# Patient Record
Sex: FEMALE | Race: BLACK OR AFRICAN AMERICAN | Employment: FULL TIME | URBAN - METROPOLITAN AREA
[De-identification: names, ages, dates, MRNs, and addresses within clinical notes are randomized per-mention and may not be internally consistent; named-entity substitution may affect disease eponyms.]

---

## 2017-02-22 ENCOUNTER — HOSPITAL ENCOUNTER (EMERGENCY)
Age: 42
Discharge: HOME | End: 2017-02-22
Attending: EMERGENCY MEDICINE
Payer: COMMERCIAL

## 2017-02-22 ENCOUNTER — APPOINTMENT (OUTPATIENT)
Dept: ULTRASOUND IMAGING | Age: 42
End: 2017-02-22
Attending: EMERGENCY MEDICINE
Payer: COMMERCIAL

## 2017-02-22 VITALS
HEART RATE: 79 BPM | HEIGHT: 64 IN | RESPIRATION RATE: 18 BRPM | WEIGHT: 235 LBS | OXYGEN SATURATION: 96 % | TEMPERATURE: 98.3 F | BODY MASS INDEX: 40.12 KG/M2 | SYSTOLIC BLOOD PRESSURE: 152 MMHG | DIASTOLIC BLOOD PRESSURE: 80 MMHG

## 2017-02-22 DIAGNOSIS — R10.11 ABDOMINAL PAIN, RIGHT UPPER QUADRANT: Primary | ICD-10-CM

## 2017-02-22 DIAGNOSIS — K80.50 BILIARY COLIC: ICD-10-CM

## 2017-02-22 LAB
ALBUMIN SERPL BCP-MCNC: 3.3 G/DL (ref 3.5–5)
ALBUMIN/GLOB SERPL: 0.7 {RATIO} (ref 1.2–3.5)
ALP SERPL-CCNC: 68 U/L (ref 50–136)
ALT SERPL-CCNC: 15 U/L (ref 12–65)
ANION GAP BLD CALC-SCNC: 6 MMOL/L (ref 7–16)
AST SERPL W P-5'-P-CCNC: 14 U/L (ref 15–37)
BACTERIA URNS QL MICRO: ABNORMAL /HPF
BASOPHILS # BLD AUTO: 0 K/UL (ref 0–0.2)
BASOPHILS # BLD: 0 % (ref 0–2)
BILIRUB SERPL-MCNC: 0.3 MG/DL (ref 0.2–1.1)
BUN SERPL-MCNC: 11 MG/DL (ref 6–23)
CALCIUM SERPL-MCNC: 9 MG/DL (ref 8.3–10.4)
CASTS URNS QL MICRO: 0 /LPF
CHLORIDE SERPL-SCNC: 103 MMOL/L (ref 98–107)
CO2 SERPL-SCNC: 30 MMOL/L (ref 21–32)
CREAT SERPL-MCNC: 0.9 MG/DL (ref 0.6–1)
CRYSTALS URNS QL MICRO: ABNORMAL /LPF
DIFFERENTIAL METHOD BLD: ABNORMAL
EOSINOPHIL # BLD: 0.2 K/UL (ref 0–0.8)
EOSINOPHIL NFR BLD: 3 % (ref 0.5–7.8)
EPI CELLS #/AREA URNS HPF: ABNORMAL /HPF
ERYTHROCYTE [DISTWIDTH] IN BLOOD BY AUTOMATED COUNT: 14.5 % (ref 11.9–14.6)
GLOBULIN SER CALC-MCNC: 4.9 G/DL (ref 2.3–3.5)
GLUCOSE SERPL-MCNC: 110 MG/DL (ref 65–100)
HCG UR QL: NEGATIVE
HCT VFR BLD AUTO: 33.9 % (ref 35.8–46.3)
HGB BLD-MCNC: 10.5 G/DL (ref 11.7–15.4)
IMM GRANULOCYTES # BLD: 0 K/UL (ref 0–0.5)
IMM GRANULOCYTES NFR BLD AUTO: 0.1 % (ref 0–5)
LIPASE SERPL-CCNC: 119 U/L (ref 73–393)
LYMPHOCYTES # BLD AUTO: 43 % (ref 13–44)
LYMPHOCYTES # BLD: 3.1 K/UL (ref 0.5–4.6)
MCH RBC QN AUTO: 25.7 PG (ref 26.1–32.9)
MCHC RBC AUTO-ENTMCNC: 31 G/DL (ref 31.4–35)
MCV RBC AUTO: 83.1 FL (ref 79.6–97.8)
MONOCYTES # BLD: 0.3 K/UL (ref 0.1–1.3)
MONOCYTES NFR BLD AUTO: 4 % (ref 4–12)
MUCOUS THREADS URNS QL MICRO: 0 /LPF
NEUTS SEG # BLD: 3.6 K/UL (ref 1.7–8.2)
NEUTS SEG NFR BLD AUTO: 50 % (ref 43–78)
PLATELET # BLD AUTO: 302 K/UL (ref 150–450)
PMV BLD AUTO: 8.9 FL (ref 10.8–14.1)
POTASSIUM SERPL-SCNC: 3.8 MMOL/L (ref 3.5–5.1)
PROT SERPL-MCNC: 8.2 G/DL (ref 6.3–8.2)
RBC # BLD AUTO: 4.08 M/UL (ref 4.05–5.25)
RBC #/AREA URNS HPF: ABNORMAL /HPF
SODIUM SERPL-SCNC: 139 MMOL/L (ref 136–145)
WBC # BLD AUTO: 7.1 K/UL (ref 4.3–11.1)
WBC URNS QL MICRO: ABNORMAL /HPF

## 2017-02-22 PROCEDURE — 81003 URINALYSIS AUTO W/O SCOPE: CPT | Performed by: EMERGENCY MEDICINE

## 2017-02-22 PROCEDURE — 76705 ECHO EXAM OF ABDOMEN: CPT

## 2017-02-22 PROCEDURE — 85025 COMPLETE CBC W/AUTO DIFF WBC: CPT | Performed by: STUDENT IN AN ORGANIZED HEALTH CARE EDUCATION/TRAINING PROGRAM

## 2017-02-22 PROCEDURE — 81025 URINE PREGNANCY TEST: CPT

## 2017-02-22 PROCEDURE — 83690 ASSAY OF LIPASE: CPT | Performed by: STUDENT IN AN ORGANIZED HEALTH CARE EDUCATION/TRAINING PROGRAM

## 2017-02-22 PROCEDURE — 99284 EMERGENCY DEPT VISIT MOD MDM: CPT | Performed by: EMERGENCY MEDICINE

## 2017-02-22 PROCEDURE — 96361 HYDRATE IV INFUSION ADD-ON: CPT | Performed by: EMERGENCY MEDICINE

## 2017-02-22 PROCEDURE — 96374 THER/PROPH/DIAG INJ IV PUSH: CPT | Performed by: EMERGENCY MEDICINE

## 2017-02-22 PROCEDURE — 74011250636 HC RX REV CODE- 250/636: Performed by: EMERGENCY MEDICINE

## 2017-02-22 PROCEDURE — 81015 MICROSCOPIC EXAM OF URINE: CPT | Performed by: EMERGENCY MEDICINE

## 2017-02-22 PROCEDURE — 80053 COMPREHEN METABOLIC PANEL: CPT | Performed by: STUDENT IN AN ORGANIZED HEALTH CARE EDUCATION/TRAINING PROGRAM

## 2017-02-22 RX ORDER — ONDANSETRON 2 MG/ML
4 INJECTION INTRAMUSCULAR; INTRAVENOUS
Status: COMPLETED | OUTPATIENT
Start: 2017-02-22 | End: 2017-02-22

## 2017-02-22 RX ORDER — HYOSCYAMINE SULFATE 0.12 MG/1
0.25 TABLET SUBLINGUAL
Qty: 20 TAB | Refills: 0 | Status: SHIPPED | OUTPATIENT
Start: 2017-02-22 | End: 2017-06-01

## 2017-02-22 RX ORDER — SUCRALFATE 1 G/10ML
1 SUSPENSION ORAL 4 TIMES DAILY
Qty: 414 ML | Refills: 1 | Status: SHIPPED | OUTPATIENT
Start: 2017-02-22 | End: 2017-03-27 | Stop reason: CLARIF

## 2017-02-22 RX ORDER — ONDANSETRON 4 MG/1
4 TABLET, FILM COATED ORAL
Qty: 15 TAB | Refills: 0 | Status: SHIPPED | OUTPATIENT
Start: 2017-02-22 | End: 2017-03-27 | Stop reason: CLARIF

## 2017-02-22 RX ADMIN — ONDANSETRON HYDROCHLORIDE 4 MG: 2 SOLUTION INTRAMUSCULAR; INTRAVENOUS at 19:42

## 2017-02-22 RX ADMIN — SODIUM CHLORIDE 1000 ML: 900 INJECTION, SOLUTION INTRAVENOUS at 19:42

## 2017-02-22 NOTE — ED PROVIDER NOTES
Patient is a 42 y.o. female presenting with abdominal pain. The history is provided by the patient. Abdominal Pain    This is a recurrent problem. The current episode started more than 1 week ago. The problem occurs every several days. The problem has been gradually worsening. The pain is associated with eating. The pain is located in the RUQ. The quality of the pain is cramping and throbbing. The pain is moderate. Associated symptoms include diarrhea, nausea, vomiting and back pain. Pertinent negatives include no fever, no constipation, no dysuria, no headaches, no arthralgias, no myalgias and no chest pain. The pain is worsened by eating. The pain is relieved by nothing. Past workup includes no CT scan, no ultrasound. Past Medical History:   Diagnosis Date    Anemia     Diabetes ([address]     Former smoker     GERD (gastroesophageal reflux disease)     medication conrtolled    History of kidney stones     Hypertension     medication controlled    Menorrhagia with irregular cycle     Prediabetes 2/13/2016    on metformin as preventative        Past Surgical History:   Procedure Laterality Date    HX LITHOTRIPSY      HX ORTHOPAEDIC Left     hand- tendon repair    HX OTHER SURGICAL Bilateral     sweat gland removal under both arms    HX TUBAL LIGATION           Family History:   Problem Relation Age of Onset    Heart Disease Maternal Grandmother     Hypertension Mother     Hypertension Father        Social History     Social History    Marital status: SINGLE     Spouse name: N/A    Number of children: N/A    Years of education: N/A     Occupational History    Not on file.      Social History Main Topics    Smoking status: Former Smoker     Packs/day: 1.00     Years: 0.50     Quit date: 1/1/2009    Smokeless tobacco: Never Used    Alcohol use No    Drug use: No    Sexual activity: Not on file     Other Topics Concern    Not on file     Social History Narrative         ALLERGIES: Bactrim [sulfamethoxazole-trimethoprim]; Lisinopril; Oxycodone; and Sulfa (sulfonamide antibiotics)    Review of Systems   Constitutional: Negative for chills and fever. HENT: Negative for congestion, ear pain and rhinorrhea. Eyes: Negative for photophobia and discharge. Respiratory: Negative for cough and shortness of breath. Cardiovascular: Negative for chest pain and palpitations. Gastrointestinal: Positive for abdominal pain, diarrhea, nausea and vomiting. Negative for constipation. Endocrine: Negative for cold intolerance and heat intolerance. Genitourinary: Negative for dysuria and flank pain. Musculoskeletal: Positive for back pain. Negative for arthralgias, myalgias and neck pain. Skin: Negative for rash and wound. Allergic/Immunologic: Negative for environmental allergies and food allergies. Neurological: Negative for syncope and headaches. Hematological: Negative for adenopathy. Does not bruise/bleed easily. Psychiatric/Behavioral: Negative for dysphoric mood. The patient is not nervous/anxious. All other systems reviewed and are negative. Vitals:    02/22/17 1523   BP: (!) 167/101   Pulse: 99   Resp: 16   Temp: 98.5 °F (36.9 °C)   SpO2: 96%   Weight: 106.6 kg (235 lb)   Height: 5' 4\" (1.626 m)            Physical Exam   Constitutional: She is oriented to person, place, and time. She appears well-developed and well-nourished. She appears distressed. HENT:   Head: Normocephalic and atraumatic. Mouth/Throat: Oropharynx is clear and moist.   Eyes: Conjunctivae and EOM are normal. Pupils are equal, round, and reactive to light. Right eye exhibits no discharge. Left eye exhibits no discharge. No scleral icterus. Neck: Normal range of motion. Neck supple. No thyromegaly present. Cardiovascular: Normal rate, regular rhythm, normal heart sounds and intact distal pulses. Exam reveals no gallop and no friction rub. No murmur heard.   Pulmonary/Chest: Effort normal and breath sounds normal. No respiratory distress. She has no wheezes. She exhibits no tenderness. Abdominal: Soft. Bowel sounds are normal. She exhibits no distension. There is no hepatosplenomegaly. There is no tenderness. There is no rebound and no guarding. No hernia. Mild epigastric and right upper quadrant tenderness to palpation. There is no rigidity, rebound or guarding. No mass. Musculoskeletal: Normal range of motion. She exhibits no edema or tenderness. Neurological: She is alert and oriented to person, place, and time. No cranial nerve deficit. She exhibits normal muscle tone. Skin: Skin is warm and dry. No rash noted. She is not diaphoretic. No erythema. Psychiatric: She has a normal mood and affect. Her behavior is normal. Judgment and thought content normal.   Nursing note and vitals reviewed. MDM  Number of Diagnoses or Management Options  Abdominal pain, right upper quadrant: new and requires workup  Biliary colic: new and requires workup  Diagnosis management comments: Reflux versus biliary colic  Less than likely pancreatitis or hepatitis         Amount and/or Complexity of Data Reviewed  Clinical lab tests: ordered and reviewed  Tests in the radiology section of CPT®: ordered and reviewed  Tests in the medicine section of CPT®: reviewed and ordered  Review and summarize past medical records: yes    Risk of Complications, Morbidity, and/or Mortality  Presenting problems: moderate  Diagnostic procedures: moderate  Management options: moderate  General comments: Elements of this note have been dictated via voice recognition software. Text and phrases may be limited by the accuracy of the software. The chart has been reviewed, but errors may still be present.       Patient Progress  Patient progress: stable    ED Course       Procedures

## 2017-02-23 NOTE — DISCHARGE INSTRUCTIONS
Abdominal Pain: Care Instructions  Your Care Instructions    Abdominal pain has many possible causes. Some aren't serious and get better on their own in a few days. Others need more testing and treatment. If your pain continues or gets worse, you need to be rechecked and may need more tests to find out what is wrong. You may need surgery to correct the problem. Don't ignore new symptoms, such as fever, nausea and vomiting, urination problems, pain that gets worse, and dizziness. These may be signs of a more serious problem. Your doctor may have recommended a follow-up visit in the next 8 to 12 hours. If you are not getting better, you may need more tests or treatment. The doctor has checked you carefully, but problems can develop later. If you notice any problems or new symptoms, get medical treatment right away. Follow-up care is a key part of your treatment and safety. Be sure to make and go to all appointments, and call your doctor if you are having problems. It's also a good idea to know your test results and keep a list of the medicines you take. How can you care for yourself at home? · Rest until you feel better. · To prevent dehydration, drink plenty of fluids, enough so that your urine is light yellow or clear like water. Choose water and other caffeine-free clear liquids until you feel better. If you have kidney, heart, or liver disease and have to limit fluids, talk with your doctor before you increase the amount of fluids you drink. · If your stomach is upset, eat mild foods, such as rice, dry toast or crackers, bananas, and applesauce. Try eating several small meals instead of two or three large ones. · Wait until 48 hours after all symptoms have gone away before you have spicy foods, alcohol, and drinks that contain caffeine. · Do not eat foods that are high in fat. · Avoid anti-inflammatory medicines such as aspirin, ibuprofen (Advil, Motrin), and naproxen (Aleve).  These can cause stomach upset. Talk to your doctor if you take daily aspirin for another health problem. When should you call for help? Call 911 anytime you think you may need emergency care. For example, call if:  · You passed out (lost consciousness). · You pass maroon or very bloody stools. · You vomit blood or what looks like coffee grounds. · You have new, severe belly pain. Call your doctor now or seek immediate medical care if:  · Your pain gets worse, especially if it becomes focused in one area of your belly. · You have a new or higher fever. · Your stools are black and look like tar, or they have streaks of blood. · You have unexpected vaginal bleeding. · You have symptoms of a urinary tract infection. These may include:  ¨ Pain when you urinate. ¨ Urinating more often than usual.  ¨ Blood in your urine. · You are dizzy or lightheaded, or you feel like you may faint. Watch closely for changes in your health, and be sure to contact your doctor if:  · You are not getting better after 1 day (24 hours). Where can you learn more? Go to http://gwenAaron Andrews Apparelbrennan.info/. Enter T237 in the search box to learn more about \"Abdominal Pain: Care Instructions. \"  Current as of: May 27, 2016  Content Version: 11.1  © 4348-4035 [employer]. Care instructions adapted under license by [employer] (which disclaims liability or warranty for this information). If you have questions about a medical condition or this instruction, always ask your healthcare professional. [place] [address] any warranty or liability for your use of this information. Low-Fat Diet for Gallbladder Disease: Care Instructions  Your Care Instructions  When you eat, the gallbladder releases bile, which helps you digest the fat in food. If you have an inflamed gallbladder, this may cause pain.  A low-fat diet may give your gallbladder a rest so you can start to heal. Your doctor and dietitian can help you make an eating plan that does not irritate your digestive system. Always talk with your doctor or dietitian before you make changes in your diet. Follow-up care is a key part of your treatment and safety. Be sure to make and go to all appointments, and call your doctor if you are having problems. It's also a good idea to know your test results and keep a list of the medicines you take. How can you care for yourself at home? · Eat many small meals and snacks each day instead of three large meals. · Choose lean meats. ¨ Eat no more than 5 to 6½ ounces of meat a day. ¨ Cut off all fat you can see. ¨ Eat chicken and turkey without the skin. ¨ Many types of fish, such as salmon, lake trout, tuna, and herring, provide healthy omega-3 fat. But, avoid fish canned in oil, such as sardines in olive oil. ¨ Bake, broil, or grill meats, poultry, or fish instead of frying them in butter or fat. · Drink or eat nonfat or low-fat milk, yogurt, cheese, or other milk products each day. ¨ Read the labels on cheeses, and choose those with less than 5 grams of fat an ounce. ¨ Try fat-free sour cream, cream cheese, or yogurt. ¨ Avoid cream soups and cream sauces on pasta. ¨ Eat low-fat ice cream, frozen yogurt, or sorbet. Avoid regular ice cream.  · Eat whole-grain cereals, breads, crackers, rice, or pasta. Avoid high-fat foods such as croissants, scones, biscuits, waffles, doughnuts, muffins, granola, and high-fat breads. · Flavor your foods with herbs and spices (such as basil, tarragon, or mint), fat-free sauces, or lemon juice instead of butter. You can also use butter substitutes, fat-free mayonnaise, or fat-free dressing. · Try applesauce, prune puree, or mashed bananas to replace some or all of the fat when you bake.   · Limit fats and oils, such as butter, margarine, mayonnaise, and salad dressing, to no more than 1 tablespoon a meal.  · Avoid high-fat foods, such as:  ¨ Chocolate, whole milk, ice cream, and processed cheese. ¨ Fried or buttered foods. ¨ Sausage, salami, and ramirez. ¨ Cinnamon rolls, cakes, pies, cookies, and other pastries. ¨ Prepared snack foods, such as potato chips, nut and granola bars, and mixed nuts. ¨ Coconut and avocado. · Learn how to read food labels for serving sizes and ingredients. Fast-food and convenience-food meals often have lots of fat. Where can you learn more? Go to http://gwen-brennan.info/. Enter B818 in the search box to learn more about \"Low-Fat Diet for Gallbladder Disease: Care Instructions. \"  Current as of: July 26, 2016  Content Version: 11.1  © 5003-0759 [employer]. Care instructions adapted under license by [employer] (which disclaims liability or warranty for this information). If you have questions about a medical condition or this instruction, always ask your healthcare professional. [place] [address] any warranty or liability for your use of this information.

## 2017-03-01 PROBLEM — K21.00 GASTROESOPHAGEAL REFLUX DISEASE WITH ESOPHAGITIS: Status: ACTIVE | Noted: 2017-03-01

## 2017-03-01 PROBLEM — R10.11 RUQ PAIN: Status: ACTIVE | Noted: 2017-03-01

## 2017-03-07 ENCOUNTER — HOSPITAL ENCOUNTER (OUTPATIENT)
Dept: NUCLEAR MEDICINE | Age: 42
Discharge: HOME | End: 2017-03-07
Attending: SURGERY
Payer: COMMERCIAL

## 2017-03-07 DIAGNOSIS — R10.11 RUQ PAIN: ICD-10-CM

## 2017-03-07 PROCEDURE — 74011250636 HC RX REV CODE- 250/636: Performed by: SURGERY

## 2017-03-07 PROCEDURE — 78227 HEPATOBIL SYST IMAGE W/DRUG: CPT

## 2017-03-07 RX ADMIN — SINCALIDE 2.22 MCG: 5 INJECTION, POWDER, LYOPHILIZED, FOR SOLUTION INTRAVENOUS at 09:15

## 2017-03-10 RX ORDER — CEFAZOLIN SODIUM IN 0.9 % NACL 2 G/50 ML
2 INTRAVENOUS SOLUTION, PIGGYBACK (ML) INTRAVENOUS ONCE
Status: CANCELLED | OUTPATIENT
Start: 2017-03-28 | End: 2017-03-28

## 2017-03-27 ENCOUNTER — ANESTHESIA EVENT (OUTPATIENT)
Dept: SURGERY | Age: 42
End: 2017-03-27
Payer: COMMERCIAL

## 2017-03-28 ENCOUNTER — HOSPITAL ENCOUNTER (OUTPATIENT)
Age: 42
Setting detail: OUTPATIENT SURGERY
Discharge: HOME | End: 2017-03-28
Attending: SURGERY | Admitting: SURGERY
Payer: COMMERCIAL

## 2017-03-28 ENCOUNTER — ANESTHESIA (OUTPATIENT)
Dept: SURGERY | Age: 42
End: 2017-03-28
Payer: COMMERCIAL

## 2017-03-28 ENCOUNTER — SURGERY (OUTPATIENT)
Age: 42
End: 2017-03-28

## 2017-03-28 VITALS
SYSTOLIC BLOOD PRESSURE: 144 MMHG | DIASTOLIC BLOOD PRESSURE: 76 MMHG | BODY MASS INDEX: 42.25 KG/M2 | TEMPERATURE: 97.4 F | RESPIRATION RATE: 18 BRPM | HEART RATE: 81 BPM | OXYGEN SATURATION: 98 % | WEIGHT: 246.13 LBS

## 2017-03-28 PROBLEM — K81.1 CHRONIC CHOLECYSTITIS: Status: ACTIVE | Noted: 2017-03-28

## 2017-03-28 LAB — GLUCOSE BLD STRIP.AUTO-MCNC: 96 MG/DL (ref 65–100)

## 2017-03-28 PROCEDURE — 74011000250 HC RX REV CODE- 250: Performed by: SURGERY

## 2017-03-28 PROCEDURE — 74011250636 HC RX REV CODE- 250/636

## 2017-03-28 PROCEDURE — 74011250637 HC RX REV CODE- 250/637: Performed by: SURGERY

## 2017-03-28 PROCEDURE — 77030008703 HC TU ET UNCUF COVD -A: Performed by: ANESTHESIOLOGY

## 2017-03-28 PROCEDURE — 77030000038 HC TIP SCIS LAPSCP SURI -B: Performed by: SURGERY

## 2017-03-28 PROCEDURE — 77030008771 HC TU NG SALEM SUMP -A: Performed by: ANESTHESIOLOGY

## 2017-03-28 PROCEDURE — 74011250636 HC RX REV CODE- 250/636: Performed by: ANESTHESIOLOGY

## 2017-03-28 PROCEDURE — 77030008518 HC TBNG INSUF ENDO STRY -B: Performed by: SURGERY

## 2017-03-28 PROCEDURE — 77030008477 HC STYL SATN SLP COVD -A: Performed by: ANESTHESIOLOGY

## 2017-03-28 PROCEDURE — 77030035044 HC TRCR ENDOSC VRSPRT BLDLSS COVD -C: Performed by: SURGERY

## 2017-03-28 PROCEDURE — 74011250637 HC RX REV CODE- 250/637: Performed by: ANESTHESIOLOGY

## 2017-03-28 PROCEDURE — 77030021158 HC TRCR BLN GELPRT AMR -B: Performed by: SURGERY

## 2017-03-28 PROCEDURE — 76010000161 HC OR TIME 1 TO 1.5 HR INTENSV-TIER 1: Performed by: SURGERY

## 2017-03-28 PROCEDURE — 76060000033 HC ANESTHESIA 1 TO 1.5 HR: Performed by: SURGERY

## 2017-03-28 PROCEDURE — 88304 TISSUE EXAM BY PATHOLOGIST: CPT | Performed by: SURGERY

## 2017-03-28 PROCEDURE — 74011000250 HC RX REV CODE- 250

## 2017-03-28 PROCEDURE — 77030009851 HC PCH RTVR ENDOSC AMR -B: Performed by: SURGERY

## 2017-03-28 PROCEDURE — 77030008467 HC STPLR SKN COVD -B: Performed by: SURGERY

## 2017-03-28 PROCEDURE — 82962 GLUCOSE BLOOD TEST: CPT

## 2017-03-28 PROCEDURE — 76210000063 HC OR PH I REC FIRST 0.5 HR: Performed by: SURGERY

## 2017-03-28 PROCEDURE — 76210000020 HC REC RM PH II FIRST 0.5 HR: Performed by: SURGERY

## 2017-03-28 PROCEDURE — 77030011640 HC PAD GRND REM COVD -A: Performed by: SURGERY

## 2017-03-28 PROCEDURE — 77030031139 HC SUT VCRL2 J&J -A: Performed by: SURGERY

## 2017-03-28 PROCEDURE — 77030035051: Performed by: SURGERY

## 2017-03-28 PROCEDURE — 77030025088 HC APPL CLP LIG 1 COVD -B: Performed by: SURGERY

## 2017-03-28 PROCEDURE — 77030035048 HC TRCR ENDOSC OPTCL COVD -B: Performed by: SURGERY

## 2017-03-28 RX ORDER — SUCCINYLCHOLINE CHLORIDE 20 MG/ML
INJECTION INTRAMUSCULAR; INTRAVENOUS AS NEEDED
Status: DISCONTINUED | OUTPATIENT
Start: 2017-03-28 | End: 2017-03-28 | Stop reason: HOSPADM

## 2017-03-28 RX ORDER — LIDOCAINE HYDROCHLORIDE 10 MG/ML
0.1 INJECTION INFILTRATION; PERINEURAL AS NEEDED
Status: DISCONTINUED | OUTPATIENT
Start: 2017-03-28 | End: 2017-03-28 | Stop reason: HOSPADM

## 2017-03-28 RX ORDER — NEOSTIGMINE METHYLSULFATE 1 MG/ML
INJECTION INTRAVENOUS AS NEEDED
Status: DISCONTINUED | OUTPATIENT
Start: 2017-03-28 | End: 2017-03-28 | Stop reason: HOSPADM

## 2017-03-28 RX ORDER — GUAIFENESIN 600 MG/1
600 TABLET, EXTENDED RELEASE ORAL 2 TIMES DAILY
COMMUNITY
End: 2017-06-01

## 2017-03-28 RX ORDER — MIDAZOLAM HYDROCHLORIDE 1 MG/ML
2 INJECTION, SOLUTION INTRAMUSCULAR; INTRAVENOUS ONCE
Status: DISCONTINUED | OUTPATIENT
Start: 2017-03-28 | End: 2017-03-28 | Stop reason: HOSPADM

## 2017-03-28 RX ORDER — SODIUM CHLORIDE 9 MG/ML
50 INJECTION, SOLUTION INTRAVENOUS CONTINUOUS
Status: DISCONTINUED | OUTPATIENT
Start: 2017-03-28 | End: 2017-03-28 | Stop reason: HOSPADM

## 2017-03-28 RX ORDER — SODIUM CHLORIDE, SODIUM LACTATE, POTASSIUM CHLORIDE, CALCIUM CHLORIDE 600; 310; 30; 20 MG/100ML; MG/100ML; MG/100ML; MG/100ML
100 INJECTION, SOLUTION INTRAVENOUS CONTINUOUS
Status: DISCONTINUED | OUTPATIENT
Start: 2017-03-28 | End: 2017-03-28 | Stop reason: HOSPADM

## 2017-03-28 RX ORDER — OXYCODONE HYDROCHLORIDE 5 MG/1
5 TABLET ORAL
Status: DISCONTINUED | OUTPATIENT
Start: 2017-03-28 | End: 2017-03-28 | Stop reason: HOSPADM

## 2017-03-28 RX ORDER — DIPHENHYDRAMINE HYDROCHLORIDE 50 MG/ML
12.5 INJECTION, SOLUTION INTRAMUSCULAR; INTRAVENOUS ONCE
Status: DISCONTINUED | OUTPATIENT
Start: 2017-03-28 | End: 2017-03-28 | Stop reason: HOSPADM

## 2017-03-28 RX ORDER — MIDAZOLAM HYDROCHLORIDE 1 MG/ML
2 INJECTION, SOLUTION INTRAMUSCULAR; INTRAVENOUS
Status: DISCONTINUED | OUTPATIENT
Start: 2017-03-28 | End: 2017-03-28 | Stop reason: HOSPADM

## 2017-03-28 RX ORDER — ROCURONIUM BROMIDE 10 MG/ML
INJECTION, SOLUTION INTRAVENOUS AS NEEDED
Status: DISCONTINUED | OUTPATIENT
Start: 2017-03-28 | End: 2017-03-28 | Stop reason: HOSPADM

## 2017-03-28 RX ORDER — CEFAZOLIN SODIUM IN 0.9 % NACL 2 G/50 ML
2 INTRAVENOUS SOLUTION, PIGGYBACK (ML) INTRAVENOUS ONCE
Status: DISCONTINUED | OUTPATIENT
Start: 2017-03-28 | End: 2017-03-28 | Stop reason: HOSPADM

## 2017-03-28 RX ORDER — FENTANYL CITRATE 50 UG/ML
INJECTION, SOLUTION INTRAMUSCULAR; INTRAVENOUS AS NEEDED
Status: DISCONTINUED | OUTPATIENT
Start: 2017-03-28 | End: 2017-03-28 | Stop reason: HOSPADM

## 2017-03-28 RX ORDER — HYDROMORPHONE HYDROCHLORIDE 2 MG/ML
0.5 INJECTION, SOLUTION INTRAMUSCULAR; INTRAVENOUS; SUBCUTANEOUS
Status: DISCONTINUED | OUTPATIENT
Start: 2017-03-28 | End: 2017-03-28 | Stop reason: HOSPADM

## 2017-03-28 RX ORDER — GLYCOPYRROLATE 0.2 MG/ML
INJECTION INTRAMUSCULAR; INTRAVENOUS AS NEEDED
Status: DISCONTINUED | OUTPATIENT
Start: 2017-03-28 | End: 2017-03-28 | Stop reason: HOSPADM

## 2017-03-28 RX ORDER — SODIUM CHLORIDE 0.9 % (FLUSH) 0.9 %
5-10 SYRINGE (ML) INJECTION AS NEEDED
Status: DISCONTINUED | OUTPATIENT
Start: 2017-03-28 | End: 2017-03-28 | Stop reason: HOSPADM

## 2017-03-28 RX ORDER — PROPOFOL 10 MG/ML
INJECTION, EMULSION INTRAVENOUS AS NEEDED
Status: DISCONTINUED | OUTPATIENT
Start: 2017-03-28 | End: 2017-03-28 | Stop reason: HOSPADM

## 2017-03-28 RX ORDER — ONDANSETRON 2 MG/ML
INJECTION INTRAMUSCULAR; INTRAVENOUS AS NEEDED
Status: DISCONTINUED | OUTPATIENT
Start: 2017-03-28 | End: 2017-03-28 | Stop reason: HOSPADM

## 2017-03-28 RX ORDER — LIDOCAINE HYDROCHLORIDE 20 MG/ML
INJECTION, SOLUTION EPIDURAL; INFILTRATION; INTRACAUDAL; PERINEURAL AS NEEDED
Status: DISCONTINUED | OUTPATIENT
Start: 2017-03-28 | End: 2017-03-28 | Stop reason: HOSPADM

## 2017-03-28 RX ORDER — SODIUM CHLORIDE 0.9 % (FLUSH) 0.9 %
5-10 SYRINGE (ML) INJECTION EVERY 8 HOURS
Status: DISCONTINUED | OUTPATIENT
Start: 2017-03-28 | End: 2017-03-28 | Stop reason: HOSPADM

## 2017-03-28 RX ORDER — ACETAMINOPHEN 325 MG/1
650 TABLET ORAL
Status: COMPLETED | OUTPATIENT
Start: 2017-03-28 | End: 2017-03-28

## 2017-03-28 RX ORDER — FENTANYL CITRATE 50 UG/ML
25 INJECTION, SOLUTION INTRAMUSCULAR; INTRAVENOUS ONCE
Status: DISCONTINUED | OUTPATIENT
Start: 2017-03-28 | End: 2017-03-28 | Stop reason: HOSPADM

## 2017-03-28 RX ORDER — FAMOTIDINE 20 MG/1
20 TABLET, FILM COATED ORAL ONCE
Status: COMPLETED | OUTPATIENT
Start: 2017-03-28 | End: 2017-03-28

## 2017-03-28 RX ORDER — SODIUM CHLORIDE, SODIUM LACTATE, POTASSIUM CHLORIDE, CALCIUM CHLORIDE 600; 310; 30; 20 MG/100ML; MG/100ML; MG/100ML; MG/100ML
INJECTION, SOLUTION INTRAVENOUS
Status: DISCONTINUED | OUTPATIENT
Start: 2017-03-28 | End: 2017-03-28 | Stop reason: HOSPADM

## 2017-03-28 RX ORDER — PSEUDOEPHEDRINE HCL 30 MG
60 TABLET ORAL
COMMUNITY
End: 2017-06-01

## 2017-03-28 RX ORDER — CEFAZOLIN SODIUM IN 0.9 % NACL 2 G/50 ML
INTRAVENOUS SOLUTION, PIGGYBACK (ML) INTRAVENOUS AS NEEDED
Status: DISCONTINUED | OUTPATIENT
Start: 2017-03-28 | End: 2017-03-28 | Stop reason: HOSPADM

## 2017-03-28 RX ORDER — BUPIVACAINE HYDROCHLORIDE 2.5 MG/ML
INJECTION, SOLUTION EPIDURAL; INFILTRATION; INTRACAUDAL AS NEEDED
Status: DISCONTINUED | OUTPATIENT
Start: 2017-03-28 | End: 2017-03-28 | Stop reason: HOSPADM

## 2017-03-28 RX ORDER — OXYCODONE AND ACETAMINOPHEN 5; 325 MG/1; MG/1
1 TABLET ORAL AS NEEDED
Status: DISCONTINUED | OUTPATIENT
Start: 2017-03-28 | End: 2017-03-28 | Stop reason: HOSPADM

## 2017-03-28 RX ADMIN — FENTANYL CITRATE 50 MCG: 50 INJECTION, SOLUTION INTRAMUSCULAR; INTRAVENOUS at 07:42

## 2017-03-28 RX ADMIN — GLYCOPYRROLATE 0.1 MG: 0.2 INJECTION INTRAMUSCULAR; INTRAVENOUS at 07:50

## 2017-03-28 RX ADMIN — HYDROMORPHONE HYDROCHLORIDE 0.5 MG: 2 INJECTION, SOLUTION INTRAMUSCULAR; INTRAVENOUS; SUBCUTANEOUS at 09:17

## 2017-03-28 RX ADMIN — SODIUM CHLORIDE, SODIUM LACTATE, POTASSIUM CHLORIDE, CALCIUM CHLORIDE: 600; 310; 30; 20 INJECTION, SOLUTION INTRAVENOUS at 07:34

## 2017-03-28 RX ADMIN — PROPOFOL 200 MG: 10 INJECTION, EMULSION INTRAVENOUS at 07:45

## 2017-03-28 RX ADMIN — Medication 2 G: at 07:34

## 2017-03-28 RX ADMIN — ROCURONIUM BROMIDE 5 MG: 10 INJECTION, SOLUTION INTRAVENOUS at 07:44

## 2017-03-28 RX ADMIN — LIDOCAINE HYDROCHLORIDE 70 MG: 20 INJECTION, SOLUTION EPIDURAL; INFILTRATION; INTRACAUDAL; PERINEURAL at 07:44

## 2017-03-28 RX ADMIN — ROCURONIUM BROMIDE 25 MG: 10 INJECTION, SOLUTION INTRAVENOUS at 07:50

## 2017-03-28 RX ADMIN — ACETAMINOPHEN 650 MG: 325 TABLET, FILM COATED ORAL at 10:00

## 2017-03-28 RX ADMIN — FENTANYL CITRATE 50 MCG: 50 INJECTION, SOLUTION INTRAMUSCULAR; INTRAVENOUS at 07:44

## 2017-03-28 RX ADMIN — GLYCOPYRROLATE 0.3 MG: 0.2 INJECTION INTRAMUSCULAR; INTRAVENOUS at 08:29

## 2017-03-28 RX ADMIN — NEOSTIGMINE METHYLSULFATE 3 MG: 1 INJECTION INTRAVENOUS at 08:30

## 2017-03-28 RX ADMIN — FAMOTIDINE 20 MG: 20 TABLET ORAL at 06:56

## 2017-03-28 RX ADMIN — PROPOFOL 50 MG: 10 INJECTION, EMULSION INTRAVENOUS at 08:06

## 2017-03-28 RX ADMIN — ONDANSETRON 4 MG: 2 INJECTION INTRAMUSCULAR; INTRAVENOUS at 08:28

## 2017-03-28 RX ADMIN — BUPIVACAINE HYDROCHLORIDE 30 ML: 2.5 INJECTION, SOLUTION EPIDURAL; INFILTRATION; INTRACAUDAL; PERINEURAL at 08:25

## 2017-03-28 RX ADMIN — ROCURONIUM BROMIDE 10 MG: 10 INJECTION, SOLUTION INTRAVENOUS at 08:06

## 2017-03-28 RX ADMIN — SODIUM CHLORIDE, SODIUM LACTATE, POTASSIUM CHLORIDE, AND CALCIUM CHLORIDE 100 ML/HR: 600; 310; 30; 20 INJECTION, SOLUTION INTRAVENOUS at 07:03

## 2017-03-28 RX ADMIN — SUCCINYLCHOLINE CHLORIDE 160 MG: 20 INJECTION INTRAMUSCULAR; INTRAVENOUS at 07:45

## 2017-03-28 RX ADMIN — HYDROMORPHONE HYDROCHLORIDE 0.5 MG: 2 INJECTION, SOLUTION INTRAMUSCULAR; INTRAVENOUS; SUBCUTANEOUS at 09:07

## 2017-03-28 NOTE — IP AVS SNAPSHOT
[address] 
[phone] Patient: Chuy Reynaga MRN: [id number] GQL:1[birth year]3/0981 You are allergic to the following Allergen Reactions Bactrim (Sulfamethoxazole-Trimethoprim) Hives Lisinopril Angioedema Oxycodone Itching Sulfa (Sulfonamide Antibiotics) Hives Recent Documentation Weight BMI OB Status Smoking Status 111.6 kg 42.25 kg/m2 Having regular periods Former Smoker Emergency Contacts Name Discharge Info Relation Home Work Mobile [phone] [employer] CAREGIVER [3] Spouse [3] [phone] About your hospitalization You were admitted on:  March 28, 2017 You last received care in the:  [facility] OP PACU You were discharged on:  March 28, 2017 Unit phone number:  [phone] Why you were hospitalized Your primary diagnosis was:  Not on File Providers Seen During Your Hospitalizations Provider Role Specialty Primary office phone Shawna Soto MD Attending Provider General Surgery [phone] Your Primary Care Physician (PCP) Primary Care Physician Office Phone Office Fax [phone], [address] [place] [address] [phone] Follow-up Information Follow up With Details Comments Contact Info Joseph Burger NP   [address] [facility] 27 Your Appointments Wednesday April 05, 2017  8:15 AM EDT Global Post Op with Shawna Soto MD  
[facility] MAIN ([facility] MAIN) [facility] [address]  
[phone] Current Discharge Medication List  
  
CONTINUE these medications which have CHANGED Dose & Instructions Dispensing Information Comments Morning Noon Evening Bedtime  
 amLODIPine 5 mg tablet Commonly known as:  Derek Kaur What changed:   
- when to take this - additional instructions Your last dose was: Your next dose is:    
   
   
 Dose:  5 mg Take 1 Tab by mouth daily. Quantity:  30 Tab Refills:  0 CONTINUE these medications which have NOT CHANGED Dose & Instructions Dispensing Information Comments Morning Noon Evening Bedtime  
 hydroCHLOROthiazide 25 mg tablet Commonly known as:  HYDRODIURIL Your last dose was: Your next dose is:    
   
   
 Dose:  25 mg Take 25 mg by mouth daily. Refills:  0  
     
   
   
   
  
 hyoscyamine SL 0.125 mg SL tablet Commonly known as:  LEVSIN/SL Your last dose was: Your next dose is:    
   
   
 Dose:  0.25 mg  
2 Tabs by SubLINGual route every six (6) hours as needed for Cramping. Quantity:  20 Tab Refills:  0  
     
   
   
   
  
 metFORMIN  mg tablet Commonly known as:  GLUCOPHAGE XR Your last dose was: Your next dose is:    
   
   
 Dose:  1000 mg Take 1,000 mg by mouth Before breakfast and dinner. Refills:  0 MUCINEX 600 mg ER tablet Generic drug:  guaiFENesin ER Your last dose was: Your next dose is:    
   
   
 Dose:  600 mg Take 600 mg by mouth two (2) times a day. Refills:  0  
     
   
   
   
  
 multivitamin tablet Commonly known as:  ONE A DAY Your last dose was: Your next dose is:    
   
   
 Dose:  1 Tab Take 1 Tab by mouth daily. Hold for surgery - instructed 3/27/17 Refills:  0 PRAVACHOL 20 mg tablet Generic drug:  pravastatin Your last dose was: Your next dose is:    
   
   
 Dose:  20 mg Take 20 mg by mouth nightly. Refills:  0 SUDAFED 30 mg tablet Generic drug:  pseudoephedrine Your last dose was: Your next dose is:    
   
   
 Dose:  60 mg Take 60 mg by mouth every four (4) hours as needed for Congestion. Refills:  0 ZANTAC 150 mg tablet Generic drug:  raNITIdine Your last dose was: Your next dose is:    
   
   
 Dose:  150 mg Take 150 mg by mouth two (2) times a day. Refills:  0 Discharge Instructions ACTIVITY · As tolerated and as directed by your doctor. · Bathe or shower as directed by your doctor. DIET · Clear liquids until no nausea or vomiting; then light diet for the first day. · Advance to regular diet on second day, unless your doctor orders otherwise. · If nausea and vomiting continues, call your doctor. PAIN 
· Take pain medication as directed by your doctor. · Call your doctor if pain is NOT relieved by medication. · DO NOT take aspirin of blood thinners unless directed by your doctor. DRESSING CARE  
 
 
 
YOUR DOCTOR'S PHONE NUMBER ; [phone] DISCHARGE SUMMARY from Nurse PATIENT INSTRUCTIONS: 
 
After general anesthesia or intravenous sedation, for 24 hours or while taking prescription Narcotics: · Limit your activities · Do not drive and operate hazardous machinery · Do not make important personal or business decisions · Do  not drink alcoholic beverages · If you have not urinated within 8 hours after discharge, please contact your surgeon on call. *  Please give a list of your current medications to your Primary Care Provider.  
 
*  Please update this list whenever your medications are discontinued, doses are 
 changed, or new medications (including over-the-counter products) are added. *  Please carry medication information at all times in case of emergency situations. These are general instructions for a healthy lifestyle: No smoking/ No tobacco products/ Avoid exposure to second hand smoke Surgeon General's Warning:  Quitting smoking now greatly reduces serious risk to your health. Obesity, smoking, and sedentary lifestyle greatly increases your risk for illness A healthy diet, regular physical exercise & weight monitoring are important for maintaining a healthy lifestyle You may be retaining fluid if you have a history of heart failure or if you experience any of the following symptoms:  Weight gain of 3 pounds or more overnight or 5 pounds in a week, increased swelling in our hands or feet or shortness of breath while lying flat in bed. Please call your doctor as soon as you notice any of these symptoms; do not wait until your next office visit. Recognize signs and symptoms of STROKE: 
 
F-face looks uneven A-arms unable to move or move unevenly S-speech slurred or non-existent T-time-call 911 as soon as signs and symptoms begin-DO NOT go Back to bed or wait to see if you get better-TIME IS BRAIN. Leave dressings 4-5 days. Then remove, but keep incisions covered daily until follow-up. Try to keep incisions as dry as possible to lower risk of infection. No heavy lifting (>5lbs) for 6 weeks to reduce risk of developing a hernia in the incisions. No driving until you are off pain meds for 24hrs and have no pain with movements associated with driving. Pain prescription (Norco) on chart for patient to use as needed for pain Follow-up with Dr Hannah Ching in 8 days on a Wednesday in the office at: 
[address], [address] 
(Call for an appt time-->[phone]) Discharge Orders None Introducing [facility]! Soren Lucero introduces [employer] patient portal. Now you can access parts of your medical record, email your doctor's office, and request medication refills online. 1. In your internet browser, go to [email or web address] 2. Click on the First Time User? Click Here link in the Sign In box. You will see the New Member Sign Up page. 3. Enter your Brighter[employer] Access Code exactly as it appears below. You will not need to use this code after youve completed the sign-up process. If you do not sign up before the expiration date, you must request a new code. · [employer] Access Code: [email or web address] Expires: 5/23/2017  4:17 PM 
 
4. Enter the last four digits of your Social Security Number (xxxx) and Date of Birth (mm/dd/yyyy) as indicated and click Submit. You will be taken to the next sign-up page. 5. Create a Brightergy ID. This will be your [employer] login ID and cannot be changed, so think of one that is secure and easy to remember. 6. Create a [employer] password. You can change your password at any time. 7. Enter your Password Reset Question and Answer. This can be used at a later time if you forget your password. 8. Enter your e-mail address. You will receive e-mail notification when new information is available in [address] 9. Click Sign Up. You can now view and download portions of your medical record. 10. Click the Download Summary menu link to download a portable copy of your medical information. If you have questions, please visit the Frequently Asked Questions section of the [employer] website. Remember, [employer]ightergy is NOT to be used for urgent needs. For medical emergencies, dial 911. Now available from your iPhone and Android! General Information Please provide this summary of care documentation to your next provider. Patient Signature:  ____________________________________________________________ Date:  ____________________________________________________________  
  
Althia Kras Provider Signature:  ____________________________________________________________ Date:  ____________________________________________________________

## 2017-03-28 NOTE — ANESTHESIA POSTPROCEDURE EVALUATION
Post-Anesthesia Evaluation and Assessment    Patient: Radha Johnson MRN: [id number]  SSN: [id number]    YOB: 1975  Age: 36 y.o. Sex: female       Cardiovascular Function/Vital Signs  Visit Vitals    /75    Pulse 70    Temp 36.3 °C (97.4 °F)    Resp 16    Wt 111.6 kg (246 lb 2 oz)    SpO2 97%    BMI 42.25 kg/m2       Patient is status post general anesthesia for Procedure(s):  CHOLECYSTECTOMY LAPAROSCOPIC. Nausea/Vomiting: None    Postoperative hydration reviewed and adequate. Pain:  Pain Scale 1: Numeric (0 - 10) (03/28/17 0907)  Pain Intensity 1: 6 (03/28/17 0907)   Managed    Neurological Status:   Neuro (WDL): Exceptions to [facility] (03/28/17 5873)  Neuro  Neurologic State: Other (Comment) (sleeping from anesthesia) (03/28/17 0849)   At baseline    Mental Status and Level of Consciousness: Arousable    Pulmonary Status:   O2 Device: Nasal cannula (03/28/17 0849)   Adequate oxygenation and airway patent    Complications related to anesthesia: None    Post-anesthesia assessment completed.  No concerns    Signed By: Aliyah La MD     March 28, 2017

## 2017-03-28 NOTE — DISCHARGE INSTRUCTIONS
ACTIVITY  · As tolerated and as directed by your doctor. · Bathe or shower as directed by your doctor. DIET  · Clear liquids until no nausea or vomiting; then light diet for the first day. · Advance to regular diet on second day, unless your doctor orders otherwise. · If nausea and vomiting continues, call your doctor. PAIN  · Take pain medication as directed by your doctor. · Call your doctor if pain is NOT relieved by medication. · DO NOT take aspirin of blood thinners unless directed by your doctor. DRESSING CARE       CALL YOUR DOCTOR IF   · Excessive bleeding that does not stop after holding pressure over the area  · Temperature of 101 degrees F or above  · Excessive redness, swelling or bruising, and/ or green or yellow, smelly discharge from incision    AFTER ANESTHESIA   · For the first 24 hours: DO NOT Drive, Drink alcoholic beverages, or Make important decisions. · Be aware of dizziness following anesthesia and while taking pain medication. APPOINTMENT DATE/ TIME; weds 4/5/17 0855am    YOUR DOCTOR'S PHONE NUMBER ; [phone]      DISCHARGE SUMMARY from Nurse    PATIENT INSTRUCTIONS:    After general anesthesia or intravenous sedation, for 24 hours or while taking prescription Narcotics:  · Limit your activities  · Do not drive and operate hazardous machinery  · Do not make important personal or business decisions  · Do  not drink alcoholic beverages  · If you have not urinated within 8 hours after discharge, please contact your surgeon on call. *  Please give a list of your current medications to your Primary Care Provider. *  Please update this list whenever your medications are discontinued, doses are      changed, or new medications (including over-the-counter products) are added. *  Please carry medication information at all times in case of emergency situations.       These are general instructions for a healthy lifestyle:    No smoking/ No tobacco products/ Avoid exposure to second hand smoke    Surgeon General's Warning:  Quitting smoking now greatly reduces serious risk to your health. Obesity, smoking, and sedentary lifestyle greatly increases your risk for illness    A healthy diet, regular physical exercise & weight monitoring are important for maintaining a healthy lifestyle    You may be retaining fluid if you have a history of heart failure or if you experience any of the following symptoms:  Weight gain of 3 pounds or more overnight or 5 pounds in a week, increased swelling in our hands or feet or shortness of breath while lying flat in bed. Please call your doctor as soon as you notice any of these symptoms; do not wait until your next office visit. Recognize signs and symptoms of STROKE:    F-face looks uneven    A-arms unable to move or move unevenly    S-speech slurred or non-existent    T-time-call 911 as soon as signs and symptoms begin-DO NOT go       Back to bed or wait to see if you get better-TIME IS BRAIN. Leave dressings 4-5 days. Then remove, but keep incisions covered daily until follow-up. Try to keep incisions as dry as possible to lower risk of infection. No heavy lifting (>5lbs) for 6 weeks to reduce risk of developing a hernia in the incisions. No driving until you are off pain meds for 24hrs and have no pain with movements associated with driving.     Pain prescription (Norco) on chart for patient to use as needed for pain  Follow-up with Dr Sandra Han in 8 days on a Wednesday in the office at:  [address], [address]  (Call for an appt time-->[phone])

## 2017-03-28 NOTE — PERIOP NOTES
Difficulty obtaining blood for labs. Verbal order from Dr. Ilda York that CBC, CMP, and K+ did not have to be checked. Informed Dr. Meryle Mealing of order.

## 2017-03-28 NOTE — OP NOTES
[address], [address] [facility]  [phone]    [address] [place] [address] REPORT    Name: Juliano Lindsay     Date of Surgery: 3/28/2017  Med Record Number: [id number]   Age: 36 y.o. Sex: female   Pre-operative Diagnosis: Chronic Cholecystitis  Post-operative Diagnosis: same  Procedure: Laparoscopic Cholecystectomy  Surgeon: Kaila Collins MD  Asistants: none  Anesthesia:  General  Complications: none  Specimen: gallbladder to path  Estimated Blood Loss: <30cc    Procedure Description:   The risks, benefits, potential complications, treatment options, and expected outcomes were discussed with the patient pre-operatively. The patient voiced understanding and gave informed consent preoperatively. The patient was taken to the Operating Room, and the [address] [place] [address] time-out protocol checklist was followed. After the induction of adequate anesthesia, the abdomen was prepped and draped in the usual sterile fashion. Preoperative antibiotics were given. A sub umbilical incision was made and a cut down approach was used to place a blunt Jamila trochar under direct vision. After adequate pneumoperitioneum, two 5mm static and dynamic retraction ports were placed and an 11mm trochar also placed, all in the usual locations. The gallbladder was retracted and inflammatory adhesions to the inferior aspect of the gallbladder were taken down. Careful and tedious dissection was performed to free up the cystic duct and artery from the surrounding tissues. A clear window was created between the inferior aspect of the gallbladder wall, the cystic duct, and the cystic artery. The cystic duct could clearly be seen to enter the gallbladder and the cystic artery seen to traverse on the gallbladder wall toward the fundus of the gallbladder. We placed 3 clips on the distal cystic duct (patient side) and 2 clips on the proximal (specimen side) of the cystic duct.   We then placed 2 clips on the proximal cystic artery and 2 clips on the distal cystic artery. Both structures were then divided. The cystic artery could be seen to pulsate at its clipped end as usual.  The gallbladder was the removed from its attachments to the liver. Small venous tributaries were clipped as needed as dissection was carried up toward the gallbladder fundus. The gallbladder was retracted out through the umbilical trochar suite with the camera placed temporarily through the epigastric trochar site. The gallbladder fossa was inspected. No bile leaks were seen, the clips on the artery and duct were intact and hemostatsis confirmed. Marcaine was infiltrated into the preperitoneal tissues around each trochar site. The fascia of the umbilical incision was closed with interrupted 0 vicryl suture. Clips were placed to close the skin incisions. The wounds were dressed sterilely with telfa and tegaderm, All sponge, instruments, and needle counts were correct. The patient was taken to recovery in good condition.     Shakeel Agosto MD, FACS

## 2017-03-28 NOTE — INTERVAL H&P NOTE
H&P Update:  Aflred Infante was seen and examined. History and physical has been reviewed. The patient has been examined.  There have been no significant clinical changes since the completion of the originally dated History and Physical.    Signed By: Oly Bowden MD     March 28, 2017 7:11 AM

## 2017-03-28 NOTE — IP AVS SNAPSHOT
Current Discharge Medication List  
  
CONTINUE these medications which have CHANGED Dose & Instructions Dispensing Information Comments Morning Noon Evening Bedtime  
 amLODIPine 5 mg tablet Commonly known as:  Sabrina Hamilton What changed:   
- when to take this 
- additional instructions Your last dose was: Your next dose is:    
   
   
 Dose:  5 mg Take 1 Tab by mouth daily. Quantity:  30 Tab Refills:  0 CONTINUE these medications which have NOT CHANGED Dose & Instructions Dispensing Information Comments Morning Noon Evening Bedtime  
 hydroCHLOROthiazide 25 mg tablet Commonly known as:  HYDRODIURIL Your last dose was: Your next dose is:    
   
   
 Dose:  25 mg Take 25 mg by mouth daily. Refills:  0  
     
   
   
   
  
 hyoscyamine SL 0.125 mg SL tablet Commonly known as:  LEVSIN/SL Your last dose was: Your next dose is:    
   
   
 Dose:  0.25 mg  
2 Tabs by SubLINGual route every six (6) hours as needed for Cramping. Quantity:  20 Tab Refills:  0  
     
   
   
   
  
 metFORMIN  mg tablet Commonly known as:  GLUCOPHAGE XR Your last dose was: Your next dose is:    
   
   
 Dose:  1000 mg Take 1,000 mg by mouth Before breakfast and dinner. Refills:  0 MUCINEX 600 mg ER tablet Generic drug:  guaiFENesin ER Your last dose was: Your next dose is:    
   
   
 Dose:  600 mg Take 600 mg by mouth two (2) times a day. Refills:  0  
     
   
   
   
  
 multivitamin tablet Commonly known as:  ONE A DAY Your last dose was: Your next dose is:    
   
   
 Dose:  1 Tab Take 1 Tab by mouth daily. Hold for surgery - instructed 3/27/17 Refills:  0 PRAVACHOL 20 mg tablet Generic drug:  pravastatin Your last dose was:     
   
Your next dose is:    
   
   
 Dose:  20 mg  
 Take 20 mg by mouth nightly. Refills:  0 SUDAFED 30 mg tablet Generic drug:  pseudoephedrine Your last dose was: Your next dose is:    
   
   
 Dose:  60 mg Take 60 mg by mouth every four (4) hours as needed for Congestion. Refills:  0  
     
   
   
   
  
 ZANTAC 150 mg tablet Generic drug:  raNITIdine Your last dose was: Your next dose is:    
   
   
 Dose:  150 mg Take 150 mg by mouth two (2) times a day. Refills:  0

## 2017-03-28 NOTE — H&P (VIEW-ONLY)
[facility]  [address] [place] [address]  [phone]    Office Note/H&P/Consult Note   Kali Rich   MRN: [id number]     : 1975        HPI: Kali Rich is a 42 y.o. female who was referred for evaluation of right upper quadrant pain. She recently was seen in the ER with RUQ pain. Her white count LFTs and lipase were normal.  She had an ultrasound which is shown below and was unremarkable. She reported her pain following meals. The attack pain that led to her ER visit was preceded by a large sandwich. She has nausea but no vomiting. She has a history of a bilateral tubal ligation about 20 years ago. She is status post EGD in 2016 by Dr. Zainab Garcia. The patient reports EGD was unremarkable except for esophagitis. Sudeep test was negative. She is on Zantac twice a day. She reports prior episodes of right upper quadrant pain as well dating back to about 7 months ago. She reports RUQ pain which is constant now and located focally in the right subcostal margin region without radiation. Nothing seems to make it better or worse. She has no associated fever. 17 Right Upper Quadrant Ultrasound  INDICATION: Right upper quadrant pain     FINDINGS: There are no discrete lesions in the visualized portions of the liver  or pancreas. There is no bile duct dilatation. The common bile duct measures 4  mm. The gallbladder is normal in size and appearance. No gallstones are seen. There is no wall thickening.     The right kidney measures 11.2 cm in length. There is no hydronephrosis. There  is no evidence of a renal mass. There is no ascites.      IMPRESSION: Unremarkable right upper quadrant ultrasound    3/7/17 HIDA    HISTORY: RUQ pain, no fever, no elevated WBC; need EF; h/o RUQ pain after  meals; negative U/S and labs ; epigastric pain for 2 weeks     TECHNIQUE: The patient received 6.1 mCi technetium 99m Choletec.  Imaging of the  abdomen was performed. At 40 minutes, 2.2 mcg cholecystokinin analog was  administered intravenously and a gallbladder ejection fraction was measured.     COMPARISON: Right upper quadrant ultrasound 2/22/2017     FINDINGS: Radiopharmaceutical is distributed homogeneously throughout the liver. Gallbladder activity appears at 15 minutes. Small bowel activity appears at 25  minutes. The gallbladder ejection fraction was normal, measuring 82%. The  patient had a significant symptomatic response to cholecystokinin analog ,  describing pain nausea and bloating at a level of 8 out of 10.     IMPRESSION:   1. Normal nuclear medicine hepatobiliary scan with gallbladder EF 82%. 2. Significant symptomatic response to cholecystokinin analog        Past Medical History:   Diagnosis Date    Anemia     Diabetes ([address]     Former smoker     GERD (gastroesophageal reflux disease)     medication conrtolled    History of kidney stones     Hypertension     medication controlled    Menorrhagia with irregular cycle     Prediabetes 2/13/2016    on metformin as preventative      Past Surgical History:   Procedure Laterality Date    HX LITHOTRIPSY      HX ORTHOPAEDIC Left     hand- tendon repair    HX OTHER SURGICAL Bilateral     sweat gland removal under both arms    HX TUBAL LIGATION       Current Outpatient Prescriptions   Medication Sig    pravastatin (PRAVACHOL) 20 mg tablet Take 20 mg by mouth nightly.  hydroCHLOROthiazide (HYDRODIURIL) 25 mg tablet Take 25 mg by mouth daily.  raNITIdine (ZANTAC) 150 mg tablet Take 150 mg by mouth two (2) times a day.  hyoscyamine SL (LEVSIN/SL) 0.125 mg SL tablet 2 Tabs by SubLINGual route every six (6) hours as needed for Cramping.  sucralfate (CARAFATE) 100 mg/mL suspension Take 10 mL by mouth four (4) times daily.  ondansetron hcl (ZOFRAN) 4 mg tablet Take 1 Tab by mouth every six (6) hours as needed for Nausea.  multivitamin (ONE A DAY) tablet Take 1 Tab by mouth daily.     amLODIPine (NORVASC) 5 mg tablet Take 1 Tab by mouth daily. (Patient taking differently: Take 5 mg by mouth daily. Take DOS per anesthesia protocol)    metFORMIN ER (GLUCOPHAGE XR) 500 mg tablet Before breakfast and dinner. No current facility-administered medications for this visit. ALLERGIES:  Bactrim [sulfamethoxazole-trimethoprim]; Lisinopril; Oxycodone; and Sulfa (sulfonamide antibiotics)    Social History     Social History    Marital status:      Spouse name: N/A    Number of children: N/A    Years of education: N/A     Social History Main Topics    Smoking status: Former Smoker     Packs/day: 1.00     Years: 0.50     Quit date: 1/1/2009    Smokeless tobacco: Never Used    Alcohol use No    Drug use: No    Sexual activity: Not Asked     Other Topics Concern    None     Social History Narrative     History   Smoking Status    Former Smoker    Packs/day: 1.00    Years: 0.50    Quit date: 1/1/2009   Smokeless Tobacco    Never Used     Family History   Problem Relation Age of Onset    Heart Disease Maternal Grandmother     Hypertension Mother     Hypertension Father        ROS: The patient has no difficulty with chest pain or shortness of breath. No fever or chills. Comprehensive review of systems was otherwise unremarkable except as noted above. Physical Exam:   Constitutional: Alert oriented cooperative patient in no acute distress. There were no vitals taken for this visit. Eyes:Sclera are clear. ENMT: no obvious neck masses, no ear or lip lesions  CV: RRR. Normal perfusion  Resp: No JVD. Breathing is  non-labored. GI: soft and non-distended; healed subumbilical incision     Musculoskeletal: unremarkable with normal function.    Neuro:  No obvious focal deficits  Psychiatric: normal affect and mood, no memory impairment      Labs:  Lab Results   Component Value Date/Time    WBC 7.1 02/22/2017 03:30 PM    HGB 10.5 02/22/2017 03:30 PM    PLATELET 089 25/80/2714 03:30 PM    Sodium 139 02/22/2017 03:30 PM    Potassium 3.8 02/22/2017 03:30 PM    Chloride 103 02/22/2017 03:30 PM    CO2 30 02/22/2017 03:30 PM    BUN 11 02/22/2017 03:30 PM    Creatinine 0.90 02/22/2017 03:30 PM    Glucose 110 02/22/2017 03:30 PM    Bilirubin, total 0.3 02/22/2017 03:30 PM    AST (SGOT) 14 02/22/2017 03:30 PM    ALT (SGPT) 15 02/22/2017 03:30 PM    Alk. phosphatase 68 02/22/2017 03:30 PM    Lipase 119 02/22/2017 03:30 PM    Troponin-I, Qt. <0.04 10/04/2016 09:40 PM       No results found for this or any previous visit. Assessment/Plan:     )Marcos Tanner is a 42 y.o. female who has signs and symptoms consistent with the below issues:  Problem List  Date Reviewed: 3/8/2017          Codes Class Noted    RUQ pain ICD-10-CM: R10.11  ICD-9-CM: 789.01  3/1/2017        Gastroesophageal reflux disease with esophagitis ICD-10-CM: K21.0  ICD-9-CM: 530.11  3/1/2017        ACE inhibitor-aggravated angioedema ICD-10-CM: T46.4X1A, T78. 3XXA  ICD-9-CM: 995.1, E942.6  2/13/2016        Type II diabetes mellitus ([facility] ICD-10-CM: E11.9  ICD-9-CM: 250.00  2/13/2016        Kidney stones ICD-10-CM: N20.0  ICD-9-CM: 592.0  2/10/2016        Nocturia ICD-10-CM: R35.1  ICD-9-CM: 788.43  2/10/2016        Urinary urgency ICD-10-CM: R39.15  ICD-9-CM: 788.63  2/10/2016              She will remain on Zantac twice a day for her GERD. Her signs and symptoms are worrisome for chronic cholecystitis. I discussed the patient's condition with the patient at length and treatment options. I discussed risks of surgery (laparoscopic cholecystectomy)  in language the patient could understand including bleeding, infection, need for further surgery, abscess, fistula, persistent symptoms requiring further studies, SBO, DVT, PE, heart attack, stroke, renal failure, respiratory failure, etc.... and death. The patient voiced understanding of all this and all questions were answered.   Alternatives to surgery were discussed also and risks of the alternatives. The patient requested that we proceed with surgery. Informed consent was obtained. We will schedule lap saumya for her soon.           Mela You MD,  FACS        CC: Lory Pierce NP

## 2017-04-11 ENCOUNTER — APPOINTMENT (OUTPATIENT)
Dept: GENERAL RADIOLOGY | Age: 42
End: 2017-04-11
Attending: STUDENT IN AN ORGANIZED HEALTH CARE EDUCATION/TRAINING PROGRAM
Payer: COMMERCIAL

## 2017-04-11 ENCOUNTER — HOSPITAL ENCOUNTER (EMERGENCY)
Age: 42
Discharge: HOME | End: 2017-04-11
Attending: STUDENT IN AN ORGANIZED HEALTH CARE EDUCATION/TRAINING PROGRAM
Payer: COMMERCIAL

## 2017-04-11 ENCOUNTER — APPOINTMENT (OUTPATIENT)
Dept: CT IMAGING | Age: 42
End: 2017-04-11
Attending: STUDENT IN AN ORGANIZED HEALTH CARE EDUCATION/TRAINING PROGRAM
Payer: COMMERCIAL

## 2017-04-11 VITALS
HEART RATE: 88 BPM | TEMPERATURE: 98.2 F | SYSTOLIC BLOOD PRESSURE: 137 MMHG | RESPIRATION RATE: 16 BRPM | BODY MASS INDEX: 40.97 KG/M2 | OXYGEN SATURATION: 98 % | WEIGHT: 240 LBS | DIASTOLIC BLOOD PRESSURE: 63 MMHG | HEIGHT: 64 IN

## 2017-04-11 DIAGNOSIS — R10.11 ABDOMINAL PAIN, RIGHT UPPER QUADRANT: Primary | ICD-10-CM

## 2017-04-11 LAB
ALBUMIN SERPL BCP-MCNC: 3.7 G/DL (ref 3.5–5)
ALBUMIN/GLOB SERPL: 0.7 {RATIO} (ref 1.2–3.5)
ALP SERPL-CCNC: 80 U/L (ref 50–136)
ALT SERPL-CCNC: 15 U/L (ref 12–65)
ANION GAP BLD CALC-SCNC: 7 MMOL/L (ref 7–16)
AST SERPL W P-5'-P-CCNC: 20 U/L (ref 15–37)
BASOPHILS # BLD AUTO: 0 K/UL (ref 0–0.2)
BASOPHILS # BLD: 0 % (ref 0–2)
BILIRUB SERPL-MCNC: 0.3 MG/DL (ref 0.2–1.1)
BUN SERPL-MCNC: 9 MG/DL (ref 6–23)
CALCIUM SERPL-MCNC: 8.9 MG/DL (ref 8.3–10.4)
CHLORIDE SERPL-SCNC: 99 MMOL/L (ref 98–107)
CO2 SERPL-SCNC: 29 MMOL/L (ref 21–32)
CREAT SERPL-MCNC: 0.75 MG/DL (ref 0.6–1)
DIFFERENTIAL METHOD BLD: ABNORMAL
EOSINOPHIL # BLD: 0.3 K/UL (ref 0–0.8)
EOSINOPHIL NFR BLD: 4 % (ref 0.5–7.8)
ERYTHROCYTE [DISTWIDTH] IN BLOOD BY AUTOMATED COUNT: 14.6 % (ref 11.9–14.6)
GLOBULIN SER CALC-MCNC: 5.5 G/DL (ref 2.3–3.5)
GLUCOSE SERPL-MCNC: 117 MG/DL (ref 65–100)
HCG UR QL: NEGATIVE
HCT VFR BLD AUTO: 37.5 % (ref 35.8–46.3)
HGB BLD-MCNC: 11.7 G/DL (ref 11.7–15.4)
IMM GRANULOCYTES # BLD: 0 K/UL (ref 0–0.5)
IMM GRANULOCYTES NFR BLD AUTO: 0.1 % (ref 0–5)
LIPASE SERPL-CCNC: 78 U/L (ref 73–393)
LYMPHOCYTES # BLD AUTO: 23 % (ref 13–44)
LYMPHOCYTES # BLD: 1.9 K/UL (ref 0.5–4.6)
MCH RBC QN AUTO: 25.9 PG (ref 26.1–32.9)
MCHC RBC AUTO-ENTMCNC: 31.2 G/DL (ref 31.4–35)
MCV RBC AUTO: 83 FL (ref 79.6–97.8)
MONOCYTES # BLD: 0.3 K/UL (ref 0.1–1.3)
MONOCYTES NFR BLD AUTO: 4 % (ref 4–12)
NEUTS SEG # BLD: 5.6 K/UL (ref 1.7–8.2)
NEUTS SEG NFR BLD AUTO: 69 % (ref 43–78)
PLATELET # BLD AUTO: 345 K/UL (ref 150–450)
PMV BLD AUTO: 8.8 FL (ref 10.8–14.1)
POTASSIUM SERPL-SCNC: 3.9 MMOL/L (ref 3.5–5.1)
PROT SERPL-MCNC: 9.2 G/DL (ref 6.3–8.2)
RBC # BLD AUTO: 4.52 M/UL (ref 4.05–5.25)
SODIUM SERPL-SCNC: 135 MMOL/L (ref 136–145)
WBC # BLD AUTO: 8.1 K/UL (ref 4.3–11.1)

## 2017-04-11 PROCEDURE — 99284 EMERGENCY DEPT VISIT MOD MDM: CPT | Performed by: STUDENT IN AN ORGANIZED HEALTH CARE EDUCATION/TRAINING PROGRAM

## 2017-04-11 PROCEDURE — 74011000258 HC RX REV CODE- 258: Performed by: STUDENT IN AN ORGANIZED HEALTH CARE EDUCATION/TRAINING PROGRAM

## 2017-04-11 PROCEDURE — 83690 ASSAY OF LIPASE: CPT | Performed by: EMERGENCY MEDICINE

## 2017-04-11 PROCEDURE — 71020 XR CHEST PA LAT: CPT

## 2017-04-11 PROCEDURE — 74177 CT ABD & PELVIS W/CONTRAST: CPT

## 2017-04-11 PROCEDURE — 96374 THER/PROPH/DIAG INJ IV PUSH: CPT | Performed by: STUDENT IN AN ORGANIZED HEALTH CARE EDUCATION/TRAINING PROGRAM

## 2017-04-11 PROCEDURE — 96375 TX/PRO/DX INJ NEW DRUG ADDON: CPT | Performed by: STUDENT IN AN ORGANIZED HEALTH CARE EDUCATION/TRAINING PROGRAM

## 2017-04-11 PROCEDURE — 96361 HYDRATE IV INFUSION ADD-ON: CPT | Performed by: STUDENT IN AN ORGANIZED HEALTH CARE EDUCATION/TRAINING PROGRAM

## 2017-04-11 PROCEDURE — 85025 COMPLETE CBC W/AUTO DIFF WBC: CPT | Performed by: EMERGENCY MEDICINE

## 2017-04-11 PROCEDURE — 96376 TX/PRO/DX INJ SAME DRUG ADON: CPT | Performed by: STUDENT IN AN ORGANIZED HEALTH CARE EDUCATION/TRAINING PROGRAM

## 2017-04-11 PROCEDURE — 74011250636 HC RX REV CODE- 250/636: Performed by: STUDENT IN AN ORGANIZED HEALTH CARE EDUCATION/TRAINING PROGRAM

## 2017-04-11 PROCEDURE — 81025 URINE PREGNANCY TEST: CPT

## 2017-04-11 PROCEDURE — 74011636320 HC RX REV CODE- 636/320: Performed by: STUDENT IN AN ORGANIZED HEALTH CARE EDUCATION/TRAINING PROGRAM

## 2017-04-11 PROCEDURE — 81003 URINALYSIS AUTO W/O SCOPE: CPT | Performed by: STUDENT IN AN ORGANIZED HEALTH CARE EDUCATION/TRAINING PROGRAM

## 2017-04-11 PROCEDURE — 80053 COMPREHEN METABOLIC PANEL: CPT | Performed by: EMERGENCY MEDICINE

## 2017-04-11 RX ORDER — ONDANSETRON 2 MG/ML
4 INJECTION INTRAMUSCULAR; INTRAVENOUS
Status: COMPLETED | OUTPATIENT
Start: 2017-04-11 | End: 2017-04-11

## 2017-04-11 RX ORDER — MORPHINE SULFATE 4 MG/ML
4 INJECTION, SOLUTION INTRAMUSCULAR; INTRAVENOUS
Status: DISCONTINUED | OUTPATIENT
Start: 2017-04-11 | End: 2017-04-11 | Stop reason: HOSPADM

## 2017-04-11 RX ORDER — ONDANSETRON 4 MG/1
4 TABLET, ORALLY DISINTEGRATING ORAL
Qty: 8 TAB | Refills: 2 | Status: SHIPPED | OUTPATIENT
Start: 2017-04-11 | End: 2017-06-01

## 2017-04-11 RX ORDER — SODIUM CHLORIDE 0.9 % (FLUSH) 0.9 %
10 SYRINGE (ML) INJECTION
Status: COMPLETED | OUTPATIENT
Start: 2017-04-11 | End: 2017-04-11

## 2017-04-11 RX ORDER — TRAMADOL HYDROCHLORIDE 50 MG/1
50 TABLET ORAL
Qty: 20 TAB | Refills: 0 | Status: SHIPPED | OUTPATIENT
Start: 2017-04-11 | End: 2017-06-01

## 2017-04-11 RX ORDER — MORPHINE SULFATE 4 MG/ML
4 INJECTION, SOLUTION INTRAMUSCULAR; INTRAVENOUS
Status: COMPLETED | OUTPATIENT
Start: 2017-04-11 | End: 2017-04-11

## 2017-04-11 RX ADMIN — IOPAMIDOL 100 ML: 755 INJECTION, SOLUTION INTRAVENOUS at 15:06

## 2017-04-11 RX ADMIN — MORPHINE SULFATE 4 MG: 4 INJECTION, SOLUTION INTRAMUSCULAR; INTRAVENOUS at 12:35

## 2017-04-11 RX ADMIN — ONDANSETRON 4 MG: 2 INJECTION INTRAMUSCULAR; INTRAVENOUS at 12:34

## 2017-04-11 RX ADMIN — Medication 10 ML: at 15:06

## 2017-04-11 RX ADMIN — SODIUM CHLORIDE 100 ML: 900 INJECTION, SOLUTION INTRAVENOUS at 15:06

## 2017-04-11 RX ADMIN — MORPHINE SULFATE 4 MG: 4 INJECTION, SOLUTION INTRAMUSCULAR; INTRAVENOUS at 14:08

## 2017-04-11 RX ADMIN — SODIUM CHLORIDE 1000 ML: 900 INJECTION, SOLUTION INTRAVENOUS at 12:34

## 2017-04-11 RX ADMIN — DIATRIZOATE MEGLUMINE AND DIATRIZOATE SODIUM 30 ML: 600; 100 SOLUTION ORAL; RECTAL at 13:25

## 2017-04-11 NOTE — Clinical Note
Take the medication for pain/nausea as needed. Drink plenty of clear liquids to ensure hydration at home. Avoid activities that exacerbate you were pain. Please write follow-up for further evaluation and recheck with your primary care physician within  one week. Return at any time if your symptoms worsen or you have any concerns or questions.

## 2017-04-11 NOTE — ED PROVIDER NOTES
HPI Comments: 76-year-old female patient presents to emergency department with reports of increasing abdominal pain ×4 days. Patient recently underwent a laparoscopic cholecystectomy approximately 2 weeks ago. Patient states she was recovering well after this procedure. She [place] she spent an extended amount of time on her feet and developed some abdominal cramping from this. She states her pain is continuously worsened since this time. Patient describes a sharp stabbing pain over the lower epigastrium with radiation to left and right upper quadrants. She reports worsening pain with prolonged standing, palpation to the area. She reports mild improvement with leaning forward. Reports nausea, vomiting and subjective fevers. She reports a history of chronic diarrhea but denies any change in her bowel habits at this time. She denies any bladder habit changes as well. Patient is a 36 y.o. female presenting with bleeding. The history is provided by the patient. No  was used. Post-Op Problem   This is a new problem. The current episode started more than 2 days ago. The problem occurs constantly. The problem has been gradually worsening. Associated symptoms include abdominal pain. Pertinent negatives include no chest pain, no headaches and no shortness of breath. The symptoms are aggravated by standing and exertion. Relieved by: leaning over. She has tried nothing for the symptoms. The treatment provided no relief. Past Medical History:   Diagnosis Date    Adverse effect of anesthesia     delayed awakening per pt following D&C    Anemia     not requiring blood transfusions per pt    Angioedema     Breast lump on left side at 2 o'clock position     Chronic cholecystitis 3/28/2017    3/28/17 s/p lap saumya; Dr Angelika Lopez: [address]. Electronically signed out on 3/29/2017 11:23 by SY Daniels M.D.   Microscopic Description  Microscopic examination reveals gallbladder with focal chronic inflammation. Dr. Loren Lee concurs.  Former smoker     GERD (gastroesophageal reflux disease)     \"mostly controlled\" with meds- sleeps on 2 pillows    History of kidney stones     Hypercholesterolemia     Hypertension     medication controlled    Menorrhagia with irregular cycle     Morbid obesity ([address] 03/27/2017    BMI 41.5    Obesity     Prediabetes 2/13/2016    on metformin as preventative     Tobacco use disorder     Type 2 diabetes mellitus (HCC)     type 2- oral gents only- takes BS 1-2 x week- avg bs 90--- denies hypoglycemia    Vertigo     Vitamin D deficiency        Past Surgical History:   Procedure Laterality Date    HX DILATION AND CURETTAGE      HX LAP CHOLECYSTECTOMY  03/28/2017    HX LITHOTRIPSY      HX ORTHOPAEDIC Left     hand- tendon repair    HX OTHER SURGICAL Bilateral     sweat gland removal under both arms    HX TUBAL LIGATION  07/25/2005         Family History:   Problem Relation Age of Onset    Heart Disease Maternal Grandmother     Hypertension Mother     Hypertension Father     Diabetes Sister     Diabetes Brother     Colon Cancer Other        Social History     Social History    Marital status:      Spouse name: N/A    Number of children: N/A    Years of education: N/A     Occupational History    Not on file. Social History Main Topics    Smoking status: Former Smoker     Packs/day: 1.00     Years: 0.50     Quit date: 1/1/2009    Smokeless tobacco: Never Used    Alcohol use No    Drug use: No    Sexual activity: Not on file     Other Topics Concern    Not on file     Social History Narrative         ALLERGIES: Ace inhibitors; Bactrim [sulfamethoxazole-trimethoprim]; Lisinopril; Norvasc [amlodipine]; Oxycodone; and Sulfa (sulfonamide antibiotics)    Review of Systems   Constitutional: Negative for chills, diaphoresis and fever.    HENT: Negative for congestion, sneezing and sore throat. Eyes: Negative for visual disturbance. Respiratory: Negative for cough, chest tightness, shortness of breath and wheezing. Cardiovascular: Negative for chest pain and leg swelling. Gastrointestinal: Positive for abdominal pain. Negative for blood in stool, diarrhea, nausea and vomiting. Endocrine: Negative for polyuria. Genitourinary: Negative for difficulty urinating, dysuria, flank pain, hematuria and urgency. Musculoskeletal: Negative for back pain, myalgias, neck pain and neck stiffness. Skin: Negative for color change and rash. Neurological: Negative for dizziness, syncope, speech difficulty, weakness, light-headedness, numbness and headaches. Psychiatric/Behavioral: Negative for behavioral problems. All other systems reviewed and are negative. Vitals:    04/11/17 1046   BP: 132/68   Pulse: 96   Resp: 16   Temp: 98.2 °F (36.8 °C)   SpO2: 98%   Weight: 108.9 kg (240 lb)   Height: 5' 4\" (1.626 m)            Physical Exam   Constitutional: She is oriented to person, place, and time. She appears well-developed and well-nourished. No distress. Alert and oriented to person, place and time. No acute distress. Speaks in clear, fluent sentences. HENT:   Head: Normocephalic and atraumatic. Right Ear: External ear normal.   Nose: Nose normal.   Eyes: Conjunctivae and EOM are normal. Pupils are equal, round, and reactive to light. Neck: Normal range of motion and full passive range of motion without pain. Neck supple. No JVD present. No tracheal deviation present. Cardiovascular: Normal rate, regular rhythm, S1 normal, S2 normal, normal heart sounds and intact distal pulses. Exam reveals no gallop, no distant heart sounds and no friction rub. No murmur heard. Pulmonary/Chest: Effort normal and breath sounds normal. No accessory muscle usage or stridor. No tachypnea and no bradypnea. No respiratory distress. She has no decreased breath sounds.  She has no wheezes. She has no rhonchi. She has no rales. She exhibits no tenderness. Abdominal: Soft. Normal appearance. She exhibits no distension and no mass. There is no hepatosplenomegaly, splenomegaly or hepatomegaly. There is no tenderness. There is no rigidity, no rebound, no guarding, no CVA tenderness, no tenderness at McBurney's point and negative Stringer's sign. Severe pain with palpation over the left upper quadrant to epigastrium and right upper quadrant. ,  Voluntary guarding. No obvious distention. Abdomen soft to palpation. Musculoskeletal: Normal range of motion. She exhibits no edema, tenderness or deformity. Neurological: She is alert and oriented to person, place, and time. No cranial nerve deficit. Skin: Skin is warm and dry. No rash noted. She is not diaphoretic. Psychiatric: She has a normal mood and affect. Her behavior is normal.   Nursing note and vitals reviewed. MDM  Number of Diagnoses or Management Options  Diagnosis management comments: Per pathology report, patient's gallbladder showed no evidence of stones rather chronic inflammation present on evaluation. Chest x-ray clear. No evidence of intra-abdominal free air. Laboratory evaluation appears unremarkable.        Amount and/or Complexity of Data Reviewed  Clinical lab tests: ordered and reviewed  Tests in the radiology section of CPT®: ordered and reviewed  Tests in the medicine section of CPT®: ordered and reviewed  Independent visualization of images, tracings, or specimens: yes    Risk of Complications, Morbidity, and/or Mortality  Presenting problems: moderate  Diagnostic procedures: low  Management options: moderate    Patient Progress  Patient progress: stable    ED Course       Procedures

## 2017-04-11 NOTE — ED TRIAGE NOTES
Pt reports upper abdominal pain and nausea since Saturday. Pt had laproscopic gallbladder removal about 2 weeks ago. Pt also reports diarrhea.     Antonio Mosquera RN

## 2017-07-12 ENCOUNTER — HOSPITAL ENCOUNTER (EMERGENCY)
Age: 42
Discharge: HOME | End: 2017-07-12
Attending: STUDENT IN AN ORGANIZED HEALTH CARE EDUCATION/TRAINING PROGRAM
Payer: COMMERCIAL

## 2017-07-12 ENCOUNTER — APPOINTMENT (OUTPATIENT)
Dept: CT IMAGING | Age: 42
End: 2017-07-12
Attending: STUDENT IN AN ORGANIZED HEALTH CARE EDUCATION/TRAINING PROGRAM
Payer: COMMERCIAL

## 2017-07-12 VITALS
RESPIRATION RATE: 16 BRPM | HEART RATE: 87 BPM | DIASTOLIC BLOOD PRESSURE: 105 MMHG | HEIGHT: 64 IN | TEMPERATURE: 99.2 F | OXYGEN SATURATION: 100 % | SYSTOLIC BLOOD PRESSURE: 171 MMHG | BODY MASS INDEX: 40.97 KG/M2 | WEIGHT: 240 LBS

## 2017-07-12 DIAGNOSIS — N20.0 KIDNEY STONE: Primary | ICD-10-CM

## 2017-07-12 LAB
ALBUMIN SERPL BCP-MCNC: 3.5 G/DL (ref 3.5–5)
ALBUMIN/GLOB SERPL: 0.6 {RATIO} (ref 1.2–3.5)
ALP SERPL-CCNC: 73 U/L (ref 50–136)
ALT SERPL-CCNC: 15 U/L (ref 12–65)
ANION GAP BLD CALC-SCNC: 10 MMOL/L (ref 7–16)
AST SERPL W P-5'-P-CCNC: 17 U/L (ref 15–37)
BACTERIA URNS QL MICRO: ABNORMAL /HPF
BASOPHILS # BLD AUTO: 0 K/UL (ref 0–0.2)
BASOPHILS # BLD: 0 % (ref 0–2)
BILIRUB SERPL-MCNC: 0.3 MG/DL (ref 0.2–1.1)
BUN SERPL-MCNC: 9 MG/DL (ref 6–23)
CALCIUM SERPL-MCNC: 9 MG/DL (ref 8.3–10.4)
CASTS URNS QL MICRO: ABNORMAL /LPF
CHLORIDE SERPL-SCNC: 100 MMOL/L (ref 98–107)
CO2 SERPL-SCNC: 30 MMOL/L (ref 21–32)
CREAT SERPL-MCNC: 0.73 MG/DL (ref 0.6–1)
DIFFERENTIAL METHOD BLD: ABNORMAL
EOSINOPHIL # BLD: 0.2 K/UL (ref 0–0.8)
EOSINOPHIL NFR BLD: 2 % (ref 0.5–7.8)
EPI CELLS #/AREA URNS HPF: ABNORMAL /HPF
ERYTHROCYTE [DISTWIDTH] IN BLOOD BY AUTOMATED COUNT: 14.4 % (ref 11.9–14.6)
GLOBULIN SER CALC-MCNC: 5.6 G/DL (ref 2.3–3.5)
GLUCOSE SERPL-MCNC: 100 MG/DL (ref 65–100)
HCG UR QL: NEGATIVE
HCT VFR BLD AUTO: 34.9 % (ref 35.8–46.3)
HGB BLD-MCNC: 11.1 G/DL (ref 11.7–15.4)
IMM GRANULOCYTES # BLD: 0 K/UL (ref 0–0.5)
IMM GRANULOCYTES NFR BLD AUTO: 0.1 % (ref 0–5)
LYMPHOCYTES # BLD AUTO: 41 % (ref 13–44)
LYMPHOCYTES # BLD: 3.1 K/UL (ref 0.5–4.6)
MCH RBC QN AUTO: 26.3 PG (ref 26.1–32.9)
MCHC RBC AUTO-ENTMCNC: 31.8 G/DL (ref 31.4–35)
MCV RBC AUTO: 82.7 FL (ref 79.6–97.8)
MONOCYTES # BLD: 0.4 K/UL (ref 0.1–1.3)
MONOCYTES NFR BLD AUTO: 5 % (ref 4–12)
NEUTS SEG # BLD: 3.8 K/UL (ref 1.7–8.2)
NEUTS SEG NFR BLD AUTO: 52 % (ref 43–78)
PLATELET # BLD AUTO: 303 K/UL (ref 150–450)
PMV BLD AUTO: 9.1 FL (ref 10.8–14.1)
POTASSIUM SERPL-SCNC: 3.6 MMOL/L (ref 3.5–5.1)
PROT SERPL-MCNC: 9.1 G/DL (ref 6.3–8.2)
RBC # BLD AUTO: 4.22 M/UL (ref 4.05–5.25)
RBC #/AREA URNS HPF: >100 /HPF
SODIUM SERPL-SCNC: 140 MMOL/L (ref 136–145)
WBC # BLD AUTO: 7.5 K/UL (ref 4.3–11.1)
WBC URNS QL MICRO: ABNORMAL /HPF

## 2017-07-12 PROCEDURE — 81003 URINALYSIS AUTO W/O SCOPE: CPT | Performed by: STUDENT IN AN ORGANIZED HEALTH CARE EDUCATION/TRAINING PROGRAM

## 2017-07-12 PROCEDURE — 96375 TX/PRO/DX INJ NEW DRUG ADDON: CPT | Performed by: STUDENT IN AN ORGANIZED HEALTH CARE EDUCATION/TRAINING PROGRAM

## 2017-07-12 PROCEDURE — 99284 EMERGENCY DEPT VISIT MOD MDM: CPT | Performed by: STUDENT IN AN ORGANIZED HEALTH CARE EDUCATION/TRAINING PROGRAM

## 2017-07-12 PROCEDURE — 74176 CT ABD & PELVIS W/O CONTRAST: CPT

## 2017-07-12 PROCEDURE — 74011000258 HC RX REV CODE- 258: Performed by: STUDENT IN AN ORGANIZED HEALTH CARE EDUCATION/TRAINING PROGRAM

## 2017-07-12 PROCEDURE — 81025 URINE PREGNANCY TEST: CPT

## 2017-07-12 PROCEDURE — 85025 COMPLETE CBC W/AUTO DIFF WBC: CPT | Performed by: STUDENT IN AN ORGANIZED HEALTH CARE EDUCATION/TRAINING PROGRAM

## 2017-07-12 PROCEDURE — 74011250636 HC RX REV CODE- 250/636: Performed by: STUDENT IN AN ORGANIZED HEALTH CARE EDUCATION/TRAINING PROGRAM

## 2017-07-12 PROCEDURE — 96361 HYDRATE IV INFUSION ADD-ON: CPT | Performed by: STUDENT IN AN ORGANIZED HEALTH CARE EDUCATION/TRAINING PROGRAM

## 2017-07-12 PROCEDURE — 96365 THER/PROPH/DIAG IV INF INIT: CPT | Performed by: STUDENT IN AN ORGANIZED HEALTH CARE EDUCATION/TRAINING PROGRAM

## 2017-07-12 PROCEDURE — 80053 COMPREHEN METABOLIC PANEL: CPT | Performed by: STUDENT IN AN ORGANIZED HEALTH CARE EDUCATION/TRAINING PROGRAM

## 2017-07-12 PROCEDURE — 81015 MICROSCOPIC EXAM OF URINE: CPT | Performed by: STUDENT IN AN ORGANIZED HEALTH CARE EDUCATION/TRAINING PROGRAM

## 2017-07-12 RX ORDER — IBUPROFEN 800 MG/1
800 TABLET ORAL
Qty: 20 TAB | Refills: 0 | Status: SHIPPED | OUTPATIENT
Start: 2017-07-12 | End: 2017-07-19

## 2017-07-12 RX ORDER — CIPROFLOXACIN 500 MG/1
500 TABLET ORAL 2 TIMES DAILY
Qty: 14 TAB | Refills: 0 | Status: SHIPPED | OUTPATIENT
Start: 2017-07-12 | End: 2017-07-16

## 2017-07-12 RX ORDER — HYDROMORPHONE HYDROCHLORIDE 1 MG/ML
1 INJECTION, SOLUTION INTRAMUSCULAR; INTRAVENOUS; SUBCUTANEOUS
Status: COMPLETED | OUTPATIENT
Start: 2017-07-12 | End: 2017-07-12

## 2017-07-12 RX ORDER — KETOROLAC TROMETHAMINE 30 MG/ML
30 INJECTION, SOLUTION INTRAMUSCULAR; INTRAVENOUS
Status: COMPLETED | OUTPATIENT
Start: 2017-07-12 | End: 2017-07-12

## 2017-07-12 RX ORDER — TAMSULOSIN HYDROCHLORIDE 0.4 MG/1
0.4 CAPSULE ORAL DAILY
Qty: 15 CAP | Refills: 0 | Status: SHIPPED | OUTPATIENT
Start: 2017-07-12 | End: 2017-07-16

## 2017-07-12 RX ORDER — OXYCODONE HYDROCHLORIDE 5 MG/1
5 TABLET ORAL
Qty: 15 TAB | Refills: 0 | Status: SHIPPED | OUTPATIENT
Start: 2017-07-12 | End: 2017-07-16

## 2017-07-12 RX ORDER — ONDANSETRON 2 MG/ML
4 INJECTION INTRAMUSCULAR; INTRAVENOUS
Status: COMPLETED | OUTPATIENT
Start: 2017-07-12 | End: 2017-07-12

## 2017-07-12 RX ORDER — ONDANSETRON 4 MG/1
4 TABLET, ORALLY DISINTEGRATING ORAL
Qty: 8 TAB | Refills: 2 | Status: SHIPPED | OUTPATIENT
Start: 2017-07-12 | End: 2017-07-26 | Stop reason: ALTCHOICE

## 2017-07-12 RX ADMIN — HYDROMORPHONE HYDROCHLORIDE 1 MG: 1 INJECTION, SOLUTION INTRAMUSCULAR; INTRAVENOUS; SUBCUTANEOUS at 21:38

## 2017-07-12 RX ADMIN — SODIUM CHLORIDE 1000 ML: 900 INJECTION, SOLUTION INTRAVENOUS at 20:49

## 2017-07-12 RX ADMIN — ONDANSETRON 4 MG: 2 INJECTION INTRAMUSCULAR; INTRAVENOUS at 20:48

## 2017-07-12 RX ADMIN — CEFTRIAXONE 1 G: 1 INJECTION, POWDER, FOR SOLUTION INTRAMUSCULAR; INTRAVENOUS at 21:31

## 2017-07-12 RX ADMIN — KETOROLAC TROMETHAMINE 30 MG: 30 INJECTION, SOLUTION INTRAMUSCULAR at 20:48

## 2017-07-12 NOTE — ED TRIAGE NOTES
Patient complaining of left groin area pain, patient advise [facility] and they were able to visualize kidney stones on US. Patient denies any urinary complaints.

## 2017-07-12 NOTE — LETTER
NOTIFICATION RETURN TO WORK / SCHOOL 
 
7/12/2017 10:05 PM 
 
Ms. Isidra Lee 
[address] [facility] [address] To Whom It May Concern: 
 
Isidra Lee is currently under the care of [address] [place] [address] EMERGENCY DEPT. She will return to work/school on: 7/17/17 Sincerely,

## 2017-07-12 NOTE — Clinical Note
Take the medication prescribed as directed for her symptoms. Please arrange follow-up with the specialist listed.

## 2017-07-13 ENCOUNTER — ANESTHESIA EVENT (OUTPATIENT)
Dept: SURGERY | Age: 42
End: 2017-07-13
Payer: COMMERCIAL

## 2017-07-13 RX ORDER — OXYCODONE HYDROCHLORIDE 5 MG/1
5 TABLET ORAL
Status: CANCELLED | OUTPATIENT
Start: 2017-07-13

## 2017-07-13 NOTE — ED PROVIDER NOTES
HPI Comments: 44-year-old female patient presents to the emergency department with reports of left lower quadrant abdominal pain and left flank pain. Patient was seen at [facility] earlier today at which time she underwent ultrasound imaging and plain film imaging of the abdomen. Per reports, it visible renal stone was identified on the right side. Patient reports long history of multiple kidney stones requiring lithotripsy in the past.  Patient states her symptoms started approximately 24 hours ago and being constant nature since. She describes sharp aching pain in the left flank with radiation into the left lower quadrant. She reports nausea and several episodes of vomiting as well. It edition patient reports occasional diarrhea with her symptoms though denies bloody, black or tarry stools. Patient denies fever or chills. She denies chest pain or shortness of breath. Patient is a 36 y.o. female presenting with groin pain. The history is provided by the patient. No  was used. Groin Pain   This is a new problem. The current episode started yesterday. The problem occurs constantly. The problem has not changed since onset. Associated symptoms include abdominal pain. Pertinent negatives include no chest pain, no headaches and no shortness of breath. Nothing aggravates the symptoms. Nothing relieves the symptoms. She has tried nothing for the symptoms. Past Medical History:   Diagnosis Date    Adverse effect of anesthesia     delayed awakening per pt following D&C    Anemia     not requiring blood transfusions per pt    Angioedema     Breast lump on left side at 2 o'clock position     Chronic cholecystitis 3/28/2017    3/28/17 s/p lap saumya; Dr Jet Werner: [address]. Electronically signed out on 3/29/2017 11:23 by SY Garrett M.D. Microscopic Description  Microscopic examination reveals gallbladder with focal chronic inflammation.   [place] [address].  Former smoker     GERD (gastroesophageal reflux disease)     \"mostly controlled\" with meds- sleeps on 2 pillows    History of kidney stones     Hypercholesterolemia     Hypertension     medication controlled    Menorrhagia with irregular cycle     Morbid obesity ([address] 03/27/2017    BMI 41.5    Obesity     Prediabetes 2/13/2016    on metformin as preventative     Tobacco use disorder     Type 2 diabetes mellitus (HCC)     type 2- oral gents only- takes BS 1-2 x week- avg bs 90--- denies hypoglycemia    Vertigo     Vitamin D deficiency        Past Surgical History:   Procedure Laterality Date    HX DILATION AND CURETTAGE      HX LAP CHOLECYSTECTOMY  03/28/2017    HX LITHOTRIPSY      HX ORTHOPAEDIC Left     hand- tendon repair    HX OTHER SURGICAL Bilateral     sweat gland removal under both arms    HX TUBAL LIGATION  07/25/2005         Family History:   Problem Relation Age of Onset    Heart Disease Maternal Grandmother     Hypertension Mother     Hypertension Father     Diabetes Sister     Diabetes Brother     Colon Cancer Other        Social History     Social History    Marital status:      Spouse name: N/A    Number of children: N/A    Years of education: N/A     Occupational History    Not on file. Social History Main Topics    Smoking status: Former Smoker     Packs/day: 1.00     Years: 0.50     Quit date: 1/1/2009    Smokeless tobacco: Never Used    Alcohol use No    Drug use: No    Sexual activity: Yes     Partners: Male     Birth control/ protection: None     Other Topics Concern    Not on file     Social History Narrative         ALLERGIES: Ace inhibitors; Bactrim [sulfamethoxazole-trimethoprim]; Lisinopril; Norvasc [amlodipine]; Oxycodone; and Sulfa (sulfonamide antibiotics)    Review of Systems   Constitutional: Negative for chills, diaphoresis and fever. HENT: Negative for congestion, sneezing and sore throat.     Eyes: Negative for visual disturbance. Respiratory: Negative for cough, chest tightness, shortness of breath and wheezing. Cardiovascular: Negative for chest pain and leg swelling. Gastrointestinal: Positive for abdominal pain. Negative for blood in stool, diarrhea, nausea and vomiting. Endocrine: Negative for polyuria. Genitourinary: Negative for difficulty urinating, dysuria, flank pain, hematuria and urgency. Musculoskeletal: Negative for back pain, myalgias, neck pain and neck stiffness. Skin: Negative for color change and rash. Neurological: Negative for dizziness, syncope, speech difficulty, weakness, light-headedness, numbness and headaches. Psychiatric/Behavioral: Negative for behavioral problems. All other systems reviewed and are negative. Vitals:    07/12/17 1832   BP: (!) 171/105   Pulse: 87   Resp: 16   Temp: 99.2 °F (37.3 °C)   SpO2: 100%   Weight: 108.9 kg (240 lb)   Height: 5' 4\" (1.626 m)            Physical Exam   Constitutional: She is oriented to person, place, and time. She appears well-developed and well-nourished. No distress. Appears slightly uncomfortable. Alert and oriented to person, place and time. Mild acute distress. Speaks in clear, fluent sentences. HENT:   Head: Normocephalic and atraumatic. Nose: Nose normal.   Eyes: Conjunctivae and EOM are normal. Pupils are equal, round, and reactive to light. Neck: Normal range of motion. Neck supple. No JVD present. No tracheal deviation present. Cardiovascular: Normal rate, regular rhythm, S1 normal, S2 normal, normal heart sounds and intact distal pulses. Exam reveals no gallop, no distant heart sounds and no friction rub. No murmur heard. Pulmonary/Chest: Effort normal and breath sounds normal. No accessory muscle usage or stridor. No tachypnea and no bradypnea. No respiratory distress. She has no decreased breath sounds. She has no wheezes. She has no rhonchi. She has no rales. She exhibits no tenderness.    Abdominal: Soft. Normal appearance. She exhibits no distension and no mass. There is no hepatosplenomegaly, splenomegaly or hepatomegaly. There is tenderness in the left lower quadrant. There is CVA tenderness. There is no rigidity, no rebound, no guarding, no tenderness at McBurney's point and negative Stringer's sign. Left-sided CVA tenderness with pain on palpation of the left lower quadrant. No other focal findings. Musculoskeletal: Normal range of motion. She exhibits no edema, tenderness or deformity. Neurological: She is alert and oriented to person, place, and time. No cranial nerve deficit. Skin: Skin is warm and dry. No rash noted. She is not diaphoretic. Psychiatric: She has a normal mood and affect. Her behavior is normal.   Nursing note and vitals reviewed. MDM  Number of Diagnoses or Management Options  Diagnosis management comments: Pursue CT imaging of the pelvis without contrast to rule out renal stone as a cause of patient's pain given her history of multiple kidney stones in the past requiring lithotripsy.        Amount and/or Complexity of Data Reviewed  Clinical lab tests: ordered and reviewed  Tests in the radiology section of CPT®: ordered and reviewed  Tests in the medicine section of CPT®: reviewed and ordered  Independent visualization of images, tracings, or specimens: yes    Risk of Complications, Morbidity, and/or Mortality  Presenting problems: moderate  Diagnostic procedures: low  Management options: moderate    Patient Progress  Patient progress: stable    ED Course       Procedures

## 2017-07-13 NOTE — DISCHARGE INSTRUCTIONS
Kidney Stone: Care Instructions  Your Care Instructions    Kidney stones are formed when salts, minerals, and other substances normally found in the urine clump together. They can be as small as grains of sand or, rarely, as large as golf balls. While the stone is traveling through the ureter, which is the tube that carries urine from the kidney to the bladder, you will probably feel pain. The pain may be mild or very severe. You may also have some blood in your urine. As soon as the stone reaches the bladder, any intense pain should go away. If a stone is too large to pass on its own, you may need a medical procedure to help you pass the stone. The doctor has checked you carefully, but problems can develop later. If you notice any problems or new symptoms, get medical treatment right away. Follow-up care is a key part of your treatment and safety. Be sure to make and go to all appointments, and call your doctor if you are having problems. It's also a good idea to know your test results and keep a list of the medicines you take. How can you care for yourself at home? · Drink plenty of fluids, enough so that your urine is light yellow or clear like water. If you have kidney, heart, or liver disease and have to limit fluids, talk with your doctor before you increase the amount of fluids you drink. · Take pain medicines exactly as directed. Call your doctor if you think you are having a problem with your medicine. ¨ If the doctor gave you a prescription medicine for pain, take it as prescribed. ¨ If you are not taking a prescription pain medicine, ask your doctor if you can take an over-the-counter medicine. Read and follow all instructions on the label. · Your doctor may ask you to strain your urine so that you can collect your kidney stone when it passes. You can use a kitchen strainer or a tea strainer to catch the stone. Store it in a plastic bag until you see your doctor again.   Preventing future kidney stones  Some changes in your diet may help prevent kidney stones. Depending on the cause of your stones, your doctor may recommend that you:  · Drink plenty of fluids, enough so that your urine is light yellow or clear like water. If you have kidney, heart, or liver disease and have to limit fluids, talk with your doctor before you increase the amount of fluids you drink. · Limit coffee, tea, and alcohol. Also avoid grapefruit juice. · Do not take more than the recommended daily dose of vitamins C and D.  · Avoid antacids such as Gaviscon, Maalox, Mylanta, or Tums. · Limit the amount of salt (sodium) in your diet. · Eat a balanced diet that is not too high in protein. · Limit foods that are high in a substance called oxalate, which can cause kidney stones. These foods include dark green vegetables, rhubarb, chocolate, wheat bran, nuts, cranberries, and beans. When should you call for help? Call your doctor now or seek immediate medical care if:  · You cannot keep down fluids. · Your pain gets worse. · You have a fever or chills. · You have new or worse pain in your back just below your rib cage (the flank area). · You have new or more blood in your urine. Watch closely for changes in your health, and be sure to contact your doctor if:  · You do not get better as expected. Where can you learn more? Go to http://gwen-brennan.info/. Enter V082 in the search box to learn more about \"Kidney Stone: Care Instructions. \"  Current as of: April 3, 2017  Content Version: 11.3  © 6904-3406 [employer]. Care instructions adapted under license by [employer] (which disclaims liability or warranty for this information). If you have questions about a medical condition or this instruction, always ask your healthcare professional. [address] any warranty or liability for your use of this information.

## 2017-07-14 ENCOUNTER — ANESTHESIA (OUTPATIENT)
Dept: SURGERY | Age: 42
End: 2017-07-14
Payer: COMMERCIAL

## 2017-07-14 ENCOUNTER — HOSPITAL ENCOUNTER (OUTPATIENT)
Age: 42
Setting detail: OUTPATIENT SURGERY
Discharge: HOME | End: 2017-07-14
Attending: UROLOGY | Admitting: UROLOGY
Payer: COMMERCIAL

## 2017-07-14 VITALS
TEMPERATURE: 98.1 F | RESPIRATION RATE: 16 BRPM | WEIGHT: 243.2 LBS | HEART RATE: 70 BPM | DIASTOLIC BLOOD PRESSURE: 69 MMHG | SYSTOLIC BLOOD PRESSURE: 130 MMHG | OXYGEN SATURATION: 100 % | BODY MASS INDEX: 41.75 KG/M2

## 2017-07-14 LAB — GLUCOSE BLD STRIP.AUTO-MCNC: 90 MG/DL (ref 65–100)

## 2017-07-14 PROCEDURE — 77030032490 HC SLV COMPR SCD KNE COVD -B: Performed by: UROLOGY

## 2017-07-14 PROCEDURE — 76210000063 HC OR PH I REC FIRST 0.5 HR: Performed by: UROLOGY

## 2017-07-14 PROCEDURE — 77030019927 HC TBNG IRR CYSTO BAXT -A: Performed by: UROLOGY

## 2017-07-14 PROCEDURE — 77030008477 HC STYL SATN SLP COVD -A: Performed by: ANESTHESIOLOGY

## 2017-07-14 PROCEDURE — 74011000250 HC RX REV CODE- 250: Performed by: ANESTHESIOLOGY

## 2017-07-14 PROCEDURE — C1894 INTRO/SHEATH, NON-LASER: HCPCS | Performed by: UROLOGY

## 2017-07-14 PROCEDURE — 74011000250 HC RX REV CODE- 250

## 2017-07-14 PROCEDURE — 77030033647: Performed by: UROLOGY

## 2017-07-14 PROCEDURE — 76060000032 HC ANESTHESIA 0.5 TO 1 HR: Performed by: UROLOGY

## 2017-07-14 PROCEDURE — C1769 GUIDE WIRE: HCPCS | Performed by: UROLOGY

## 2017-07-14 PROCEDURE — 82962 GLUCOSE BLOOD TEST: CPT

## 2017-07-14 PROCEDURE — 74011250636 HC RX REV CODE- 250/636: Performed by: UROLOGY

## 2017-07-14 PROCEDURE — C2617 STENT, NON-COR, TEM W/O DEL: HCPCS | Performed by: UROLOGY

## 2017-07-14 PROCEDURE — 74011250636 HC RX REV CODE- 250/636: Performed by: ANESTHESIOLOGY

## 2017-07-14 PROCEDURE — 77030018832 HC SOL IRR H20 ICUM -A: Performed by: UROLOGY

## 2017-07-14 PROCEDURE — 74011250636 HC RX REV CODE- 250/636

## 2017-07-14 PROCEDURE — 76210000021 HC REC RM PH II 0.5 TO 1 HR: Performed by: UROLOGY

## 2017-07-14 PROCEDURE — 77030020463 HC FCPS ENDOSC STN BSC -C: Performed by: UROLOGY

## 2017-07-14 PROCEDURE — 76010000160 HC OR TIME 0.5 TO 1 HR INTENSV-TIER 1: Performed by: UROLOGY

## 2017-07-14 PROCEDURE — 77030008703 HC TU ET UNCUF COVD -A: Performed by: ANESTHESIOLOGY

## 2017-07-14 DEVICE — URETERAL STENT
Type: IMPLANTABLE DEVICE | Site: URETER | Status: FUNCTIONAL
Brand: PERCUFLEX™ PLUS

## 2017-07-14 RX ORDER — SODIUM CHLORIDE 0.9 % (FLUSH) 0.9 %
5-10 SYRINGE (ML) INJECTION AS NEEDED
Status: DISCONTINUED | OUTPATIENT
Start: 2017-07-14 | End: 2017-07-14 | Stop reason: HOSPADM

## 2017-07-14 RX ORDER — FENTANYL CITRATE 50 UG/ML
INJECTION, SOLUTION INTRAMUSCULAR; INTRAVENOUS AS NEEDED
Status: DISCONTINUED | OUTPATIENT
Start: 2017-07-14 | End: 2017-07-14 | Stop reason: HOSPADM

## 2017-07-14 RX ORDER — HYDROMORPHONE HYDROCHLORIDE 2 MG/ML
0.5 INJECTION, SOLUTION INTRAMUSCULAR; INTRAVENOUS; SUBCUTANEOUS
Status: DISCONTINUED | OUTPATIENT
Start: 2017-07-14 | End: 2017-07-14 | Stop reason: HOSPADM

## 2017-07-14 RX ORDER — OXYCODONE AND ACETAMINOPHEN 5; 325 MG/1; MG/1
1 TABLET ORAL
Qty: 10 TAB | Refills: 0 | Status: SHIPPED | OUTPATIENT
Start: 2017-07-14 | End: 2017-07-19

## 2017-07-14 RX ORDER — SODIUM CHLORIDE, SODIUM LACTATE, POTASSIUM CHLORIDE, CALCIUM CHLORIDE 600; 310; 30; 20 MG/100ML; MG/100ML; MG/100ML; MG/100ML
100 INJECTION, SOLUTION INTRAVENOUS CONTINUOUS
Status: DISCONTINUED | OUTPATIENT
Start: 2017-07-14 | End: 2017-07-14 | Stop reason: HOSPADM

## 2017-07-14 RX ORDER — ONDANSETRON 2 MG/ML
INJECTION INTRAMUSCULAR; INTRAVENOUS AS NEEDED
Status: DISCONTINUED | OUTPATIENT
Start: 2017-07-14 | End: 2017-07-14 | Stop reason: HOSPADM

## 2017-07-14 RX ORDER — FLUMAZENIL 0.1 MG/ML
0.2 INJECTION INTRAVENOUS
Status: DISCONTINUED | OUTPATIENT
Start: 2017-07-14 | End: 2017-07-14 | Stop reason: HOSPADM

## 2017-07-14 RX ORDER — PROPOFOL 10 MG/ML
INJECTION, EMULSION INTRAVENOUS AS NEEDED
Status: DISCONTINUED | OUTPATIENT
Start: 2017-07-14 | End: 2017-07-14 | Stop reason: HOSPADM

## 2017-07-14 RX ORDER — FENTANYL CITRATE 50 UG/ML
50 INJECTION, SOLUTION INTRAMUSCULAR; INTRAVENOUS
Status: DISCONTINUED | OUTPATIENT
Start: 2017-07-14 | End: 2017-07-14 | Stop reason: HOSPADM

## 2017-07-14 RX ORDER — NALBUPHINE HYDROCHLORIDE 20 MG/ML
5 INJECTION, SOLUTION INTRAMUSCULAR; INTRAVENOUS; SUBCUTANEOUS
Status: DISCONTINUED | OUTPATIENT
Start: 2017-07-14 | End: 2017-07-14 | Stop reason: HOSPADM

## 2017-07-14 RX ORDER — MIDAZOLAM HYDROCHLORIDE 1 MG/ML
2 INJECTION, SOLUTION INTRAMUSCULAR; INTRAVENOUS
Status: DISCONTINUED | OUTPATIENT
Start: 2017-07-14 | End: 2017-07-14 | Stop reason: HOSPADM

## 2017-07-14 RX ORDER — FENTANYL CITRATE 50 UG/ML
100 INJECTION, SOLUTION INTRAMUSCULAR; INTRAVENOUS ONCE
Status: DISCONTINUED | OUTPATIENT
Start: 2017-07-14 | End: 2017-07-14 | Stop reason: HOSPADM

## 2017-07-14 RX ORDER — DEXAMETHASONE SODIUM PHOSPHATE 4 MG/ML
INJECTION, SOLUTION INTRA-ARTICULAR; INTRALESIONAL; INTRAMUSCULAR; INTRAVENOUS; SOFT TISSUE AS NEEDED
Status: DISCONTINUED | OUTPATIENT
Start: 2017-07-14 | End: 2017-07-14 | Stop reason: HOSPADM

## 2017-07-14 RX ORDER — MIDAZOLAM HYDROCHLORIDE 1 MG/ML
2 INJECTION, SOLUTION INTRAMUSCULAR; INTRAVENOUS ONCE
Status: COMPLETED | OUTPATIENT
Start: 2017-07-14 | End: 2017-07-14

## 2017-07-14 RX ORDER — NALOXONE HYDROCHLORIDE 0.4 MG/ML
0.1 INJECTION, SOLUTION INTRAMUSCULAR; INTRAVENOUS; SUBCUTANEOUS
Status: DISCONTINUED | OUTPATIENT
Start: 2017-07-14 | End: 2017-07-14 | Stop reason: HOSPADM

## 2017-07-14 RX ORDER — LIDOCAINE HYDROCHLORIDE 20 MG/ML
INJECTION, SOLUTION EPIDURAL; INFILTRATION; INTRACAUDAL; PERINEURAL AS NEEDED
Status: DISCONTINUED | OUTPATIENT
Start: 2017-07-14 | End: 2017-07-14 | Stop reason: HOSPADM

## 2017-07-14 RX ORDER — CIPROFLOXACIN 2 MG/ML
400 INJECTION, SOLUTION INTRAVENOUS ONCE
Status: COMPLETED | OUTPATIENT
Start: 2017-07-14 | End: 2017-07-14

## 2017-07-14 RX ORDER — LIDOCAINE HYDROCHLORIDE 10 MG/ML
0.1 INJECTION INFILTRATION; PERINEURAL AS NEEDED
Status: DISCONTINUED | OUTPATIENT
Start: 2017-07-14 | End: 2017-07-14 | Stop reason: HOSPADM

## 2017-07-14 RX ORDER — ROCURONIUM BROMIDE 10 MG/ML
INJECTION, SOLUTION INTRAVENOUS AS NEEDED
Status: DISCONTINUED | OUTPATIENT
Start: 2017-07-14 | End: 2017-07-14 | Stop reason: HOSPADM

## 2017-07-14 RX ORDER — GLYCOPYRROLATE 0.2 MG/ML
INJECTION INTRAMUSCULAR; INTRAVENOUS AS NEEDED
Status: DISCONTINUED | OUTPATIENT
Start: 2017-07-14 | End: 2017-07-14 | Stop reason: HOSPADM

## 2017-07-14 RX ORDER — NEOSTIGMINE METHYLSULFATE 1 MG/ML
INJECTION INTRAVENOUS AS NEEDED
Status: DISCONTINUED | OUTPATIENT
Start: 2017-07-14 | End: 2017-07-14 | Stop reason: HOSPADM

## 2017-07-14 RX ORDER — SODIUM CHLORIDE 0.9 % (FLUSH) 0.9 %
5-10 SYRINGE (ML) INJECTION EVERY 8 HOURS
Status: DISCONTINUED | OUTPATIENT
Start: 2017-07-14 | End: 2017-07-14 | Stop reason: HOSPADM

## 2017-07-14 RX ADMIN — NEOSTIGMINE METHYLSULFATE 3 MG: 1 INJECTION INTRAVENOUS at 10:51

## 2017-07-14 RX ADMIN — SODIUM CHLORIDE, SODIUM LACTATE, POTASSIUM CHLORIDE, AND CALCIUM CHLORIDE 100 ML/HR: 600; 310; 30; 20 INJECTION, SOLUTION INTRAVENOUS at 08:53

## 2017-07-14 RX ADMIN — ONDANSETRON 4 MG: 2 INJECTION INTRAMUSCULAR; INTRAVENOUS at 10:51

## 2017-07-14 RX ADMIN — ROCURONIUM BROMIDE 40 MG: 10 INJECTION, SOLUTION INTRAVENOUS at 10:22

## 2017-07-14 RX ADMIN — FENTANYL CITRATE 100 MCG: 50 INJECTION, SOLUTION INTRAMUSCULAR; INTRAVENOUS at 10:22

## 2017-07-14 RX ADMIN — MIDAZOLAM HYDROCHLORIDE 2 MG: 1 INJECTION, SOLUTION INTRAMUSCULAR; INTRAVENOUS at 09:50

## 2017-07-14 RX ADMIN — DEXAMETHASONE SODIUM PHOSPHATE 4 MG: 4 INJECTION, SOLUTION INTRA-ARTICULAR; INTRALESIONAL; INTRAMUSCULAR; INTRAVENOUS; SOFT TISSUE at 10:51

## 2017-07-14 RX ADMIN — FENTANYL CITRATE 50 MCG: 50 INJECTION, SOLUTION INTRAMUSCULAR; INTRAVENOUS at 09:52

## 2017-07-14 RX ADMIN — PROPOFOL 200 MG: 10 INJECTION, EMULSION INTRAVENOUS at 10:22

## 2017-07-14 RX ADMIN — FAMOTIDINE 20 MG: 10 INJECTION, SOLUTION INTRAVENOUS at 09:54

## 2017-07-14 RX ADMIN — CIPROFLOXACIN 400 MG: 2 INJECTION, SOLUTION INTRAVENOUS at 10:21

## 2017-07-14 RX ADMIN — GLYCOPYRROLATE 0.4 MG: 0.2 INJECTION INTRAMUSCULAR; INTRAVENOUS at 10:51

## 2017-07-14 RX ADMIN — LIDOCAINE HYDROCHLORIDE 60 MG: 20 INJECTION, SOLUTION EPIDURAL; INFILTRATION; INTRACAUDAL; PERINEURAL at 10:22

## 2017-07-14 NOTE — ANESTHESIA POSTPROCEDURE EVALUATION
Post-Anesthesia Evaluation and Assessment    Patient: Dulce Maria Rowland MRN: [id number]  SSN: [id number]    YOB: 1975  Age: 36 y.o. Sex: female       Cardiovascular Function/Vital Signs  Visit Vitals    /69 (BP 1 Location: Left arm, BP Patient Position: Supine)    Pulse 70    Temp 36.7 °C (98.1 °F)    Resp 16    Wt 110.3 kg (243 lb 3.2 oz)    SpO2 100%    BMI 41.75 kg/m2       Patient is status post general anesthesia for Procedure(s):  CYSTOSCOPY LEFT URETEROSCOPY/LEFT STENT PLACEMENT/ STONE EXTRACTION. Nausea/Vomiting: None    Postoperative hydration reviewed and adequate. Pain:  Pain Scale 1: Numeric (0 - 10) (07/14/17 1134)  Pain Intensity 1: 0 (07/14/17 1134)   Managed    Neurological Status:   Neuro (WDL): Within Defined Limits (07/14/17 1134)   At baseline    Mental Status and Level of Consciousness: Arousable    Pulmonary Status:   O2 Device: Room air (07/14/17 1134)   Adequate oxygenation and airway patent    Complications related to anesthesia: None    Post-anesthesia assessment completed.  No concerns    Signed By: Scott Bruno MD     July 14, 2017

## 2017-07-14 NOTE — DISCHARGE INSTRUCTIONS
Ureteral Stent Placement: What to Expect at [address] [facility]  A ureteral (say \"you-REE-ter-ul\") stent is a thin, hollow tube that is placed in the ureter to help urine pass from the kidney into the bladder. Ureters are the tubes that connect the kidneys to the bladder. You may have a small amount of blood in your urine for 1 to 3 days after the procedure. While the stent is in place, you may have to urinate more often, feel a sudden need to urinate, or feel like you cannot completely empty your bladder. You may feel some pain when you urinate or do strenuous activity. You also may notice a small amount of blood in your urine after strenuous activities. These side effects usually do not prevent people from doing their normal daily activities. You may have a thin string coming out of your urethra. Your urethra is the tube that carries urine from your bladder to outside your body. This string is attached to the stent. Try not to pull on the string. The doctor will use the string to pull out the stent when you no longer need it. After the procedure, urine may flow better from your kidneys to your bladder. A ureteral stent may be left in place for several days or for as long as several months. Your doctor will take it out when you no longer need it. This care sheet gives you a general idea about how long it will take for you to recover. But each person recovers at a different pace. Follow the steps below to get better as quickly as possible. How can you care for yourself at home? Activity  · Rest when you feel tired. Getting enough sleep will help you recover. · Avoid strenuous activities, such as bicycle riding, jogging, weight lifting, or aerobic exercise, until your doctor says it is okay. · Ask your doctor when you can drive again. · Most people are able to return to work the day after the procedure.  If your work requires intense activity, you may feel pain in your kidney area or get tired easily. If this happens, you may need to do less strenuous activities while the stent is in. · Ask your doctor when it is okay for you to have sex. Diet  · You can eat your normal diet. If your stomach is upset, try bland, low-fat foods like plain rice, broiled chicken, toast, and yogurt. · Drink plenty of fluids (unless your doctor tells you not to). Medicines  · Your doctor will tell you if and when you can restart your medicines. He or she will also give you instructions about taking any new medicines. · If you take blood thinners, such as warfarin (Coumadin), clopidogrel (Plavix), or aspirin, be sure to talk to your doctor. He or she will tell you if and when to start taking those medicines again. Make sure that you understand exactly what your doctor wants you to do. · Be safe with medicines. Take pain medicines exactly as directed. ¨ If the doctor gave you a prescription medicine for pain, take it as prescribed. ¨ If you are not taking a prescription pain medicine, ask your doctor if you can take an over-the-counter medicine. · If you think your pain medicine is making you sick to your stomach:  ¨ Take your medicine after meals (unless your doctor has told you not to). ¨ Ask your doctor for a different pain medicine. · If your doctor prescribed antibiotics, take them as directed. Do not stop taking them just because you feel better. You need to take the full course of antibiotics. Follow-up care is a key part of your treatment and safety. Be sure to make and go to all appointments, and call your doctor if you are having problems. It's also a good idea to know your test results and keep a list of the medicines you take. When should you call for help? Call 911 anytime you think you may need emergency care. For example, call if:  · You passed out (lost consciousness). · You have severe trouble breathing. · You have sudden chest pain and shortness of breath, or you cough up blood.   · You have severe belly pain. Call your doctor now or seek immediate medical care if:  · Part or all of the stent comes out of your urethra. · You have pain that does not get better after you take pain medicine. · You have symptoms of a urinary infection. For example:  ¨ You have blood or pus in your urine. ¨ You have pain in your back just below your rib cage. This is called flank pain. ¨ You have a fever, chills, or body aches. ¨ It hurts to urinate. ¨ You have groin or belly pain. · You cannot control when you urinate, or you leak urine. Watch closely for changes in your health, and be sure to contact your doctor if you have any problems. Where can you learn more? Go to http://gwen-brennan.info/. Enter G777 in the search box to learn more about \"Ureteral Stent Placement: What to Expect at Home. \"  Current as of: August 12, 2016  Content Version: 11.3  © 6680-0460 [employer]. Care instructions adapted under license by [employer] (which disclaims liability or warranty for this information). If you have questions about a medical condition or this instruction, always ask your healthcare professional. [place] [address] any warranty or liability for your use of this information. CYSTOSCOPY    ACTIVITY   · As tolerated and as directed by your doctor. · Bathe or shower as directed by your doctor. DIET  · Clear liquids until no nausea or vomiting; then light diet for the first day. · Drink plenty of liquids. At least 8 glasses of water to help flush out bladder. Limit amount of caffeine. · Advance to regular diet on second day, unless your doctor orders otherwise. · If nausea and vomiting continues, call your doctor. PAIN  · Take pain medication as directed by your doctor. · Call your doctor if pain is NOT relieved by medication. · DO NOT take aspirin or blood thinners until directed by your doctor.      CALL YOUR DOCTOR IF  · Expect blood-tinged urine. Call your doctor if it lasts more than 72 hours or if you cannot see through the urine. · Temperature of 101 degrees or above. · Unable to empty bladder. AFTER ANESTHESIA  · For the next 24 hours: DO NOT Drive, drink alcoholic beverages, or Make important decisions. · Be aware of dizziness following anesthesia and while taking pain medications        DISCHARGE SUMMARY from Nurse    PATIENT INSTRUCTIONS:    After general anesthesia or intravenous sedation, for 24 hours or while taking prescription Narcotics:  · Limit your activities  · Do not drive and operate hazardous machinery  · Do not make important personal or business decisions  · Do  not drink alcoholic beverages  · If you have not urinated within 8 hours after discharge, please contact your surgeon on call. *  Please give a list of your current medications to your Primary Care Provider. *  Please update this list whenever your medications are discontinued, doses are      changed, or new medications (including over-the-counter products) are added. *  Please carry medication information at all times in case of emergency situations. These are general instructions for a healthy lifestyle:    No smoking/ No tobacco products/ Avoid exposure to second hand smoke    Surgeon General's Warning:  Quitting smoking now greatly reduces serious risk to your health. Obesity, smoking, and sedentary lifestyle greatly increases your risk for illness    A healthy diet, regular physical exercise & weight monitoring are important for maintaining a healthy lifestyle    You may be retaining fluid if you have a history of heart failure or if you experience any of the following symptoms:  Weight gain of 3 pounds or more overnight or 5 pounds in a week, increased swelling in our hands or feet or shortness of breath while lying flat in bed.   Please call your doctor as soon as you notice any of these symptoms; do not wait until your next office visit. Recognize signs and symptoms of STROKE:    F-face looks uneven    A-arms unable to move or move unevenly    S-speech slurred or non-existent    T-time-call 911 as soon as signs and symptoms begin-DO NOT go       Back to bed or wait to see if you get better-TIME IS BRAIN.

## 2017-07-14 NOTE — PERIOP NOTES
Discharge instructions given to spouse. Verbalized understanding and opportunity for questions was given. Dr Wendell Cockayne okay to discharge at this time. Pt and belongings taken via wheelchair to car.

## 2017-07-14 NOTE — IP AVS SNAPSHOT
Home 
 
 
 [address] [facility] [address] 
[phone] Patient: Staci Lanier MRN: [id number] FHR:[birth year] You are allergic to the following Allergen Reactions Ace Inhibitors Angioedema Bactrim (Sulfamethoxazole-Trimethoprim) Hives Lisinopril Angioedema Norvasc (Amlodipine) Unknown (comments) Oxycodone Itching Sulfa (Sulfonamide Antibiotics) Hives Recent Documentation Weight BMI OB Status Smoking Status  
  
  
 110.3 kg 41.75 kg/m2 Having regular periods Former Smoker Emergency Contacts Name Discharge Info Relation Home Work Mobile [address] CAREGIVER [3] Spouse [3] [phone] About your hospitalization You were admitted on:  July 14, 2017 You last received care in the:  [place] [address] You were discharged on:  July 14, 2017 Unit phone number:  [phone] Why you were hospitalized Your primary diagnosis was:  Not on File Providers Seen During Your Hospitalizations Provider Role Specialty Primary office phone Toro Morrell MD Attending Provider Urology [phone] Your Primary Care Physician (PCP) Primary Care Physician Office Phone Office Fax [address] [phone] Follow-up Information Follow up With Details Comments Contact Info Alexandra Gallego NP   [address] Toro Morrell MD Follow up on 7/20/2017  3:15 [address] [address] 
[phone] Your Appointments Thursday July 20, 2017  3:15 PM EDT Office Visit with MIQU[facility] Urology [facility] (U [facility] UROLOGY) [address]   
[phone] Friday July 21, 2017  9:30 AM EDT SHORT with Alexandra Gallego NP  
 [address]  
[phone] Current Discharge Medication List  
  
CONTINUE these medications which have CHANGED Dose & Instructions Dispensing Information Comments Morning Noon Evening Bedtime * oxyCODONE-acetaminophen  mg per tablet Commonly known as:  PERCOCET 10 What changed:  Another medication with the same name was added. Make sure you understand how and when to take each. Your last dose was: Your next dose is:    
   
   
 Dose:  1 Tab Take 1 Tab by mouth every six (6) hours as needed for Pain. Max Daily Amount: 4 Tabs. Quantity:  20 Tab Refills:  0  
     
   
   
   
  
 * oxyCODONE-acetaminophen 5-325 mg per tablet Commonly known as:  PERCOCET What changed: You were already taking a medication with the same name, and this prescription was added. Make sure you understand how and when to take each. Your last dose was: Your next dose is:    
   
   
 Dose:  1 Tab Take 1 Tab by mouth every six (6) hours as needed for Pain. Max Daily Amount: 4 Tabs. Quantity:  10 Tab Refills:  0  
     
   
   
   
  
 tamsulosin 0.4 mg capsule Commonly known as:  FLOMAX What changed:  when to take this Your last dose was: Your next dose is:    
   
   
 Dose:  0.4 mg Take 1 Cap by mouth daily for 15 days. Quantity:  15 Cap Refills:  0  
     
   
   
   
  
 * Notice: This list has 2 medication(s) that are the same as other medications prescribed for you. Read the directions carefully, and ask your doctor or other care provider to review them with you. CONTINUE these medications which have NOT CHANGED Dose & Instructions Dispensing Information Comments Morning Noon Evening Bedtime  
 ciprofloxacin HCl 500 mg tablet Commonly known as:  CIPRO Your last dose was: Your next dose is: Dose:  500 mg Take 1 Tab by mouth two (2) times a day for 7 days. Quantity:  14 Tab Refills:  0  
     
   
   
   
  
 hydroCHLOROthiazide 25 mg tablet Commonly known as:  HYDRODIURIL Your last dose was: Your next dose is:    
   
   
 Dose:  25 mg Take 25 mg by mouth every morning. Refills:  0  
     
   
   
   
  
 ibuprofen 800 mg tablet Commonly known as:  MOTRIN Your last dose was: Your next dose is:    
   
   
 Dose:  800 mg Take 1 Tab by mouth every six (6) hours as needed for Pain for up to 7 days. Quantity:  20 Tab Refills:  0  
     
   
   
   
  
 ketorolac 10 mg tablet Commonly known as:  TORADOL Your last dose was: Your next dose is:    
   
   
 Dose:  10 mg Take 1 Tab by mouth every six (6) hours as needed for Pain for up to 5 days. Quantity:  15 Tab Refills:  0  
     
   
   
   
  
 metFORMIN  mg tablet Commonly known as:  GLUCOPHAGE XR Your last dose was: Your next dose is:    
   
   
 Dose:  1000 mg Take 1,000 mg by mouth Before breakfast and dinner. Refills:  0  
     
   
   
   
  
 multivitamin tablet Commonly known as:  ONE A DAY Your last dose was: Your next dose is:    
   
   
 Dose:  1 Tab Take 1 Tab by mouth daily. Hold for surgery - instructed 3/27/17 Refills:  0  
     
   
   
   
  
 ondansetron 4 mg disintegrating tablet Commonly known as:  ZOFRAN ODT Your last dose was: Your next dose is:    
   
   
 Dose:  4 mg Take 1 Tab by mouth every eight (8) hours as needed for Nausea. Quantity:  8 Tab Refills:  2  
     
   
   
   
  
 oxyCODONE IR 5 mg immediate release tablet Commonly known as:  Buren Lyme Your last dose was: Your next dose is:    
   
   
 Dose:  5 mg Take 1 Tab by mouth every four (4) hours as needed for Pain for up to 15 doses. Max Daily Amount: 30 mg.  
 Quantity:  15 Tab Refills:  0 pravastatin 40 mg tablet Commonly known as:  PRAVACHOL Your last dose was: Your next dose is:    
   
   
 Dose:  40 mg Take 1 Tab by mouth daily for 30 days. Quantity:  30 Tab Refills:  0 Where to Get Your Medications Information on where to get these meds will be given to you by the nurse or doctor. ! Ask your nurse or doctor about these medications  
  oxyCODONE-acetaminophen 5-325 mg per tablet Discharge Instructions Ureteral Stent Placement: What to Expect at Home Your Recovery A ureteral (say \"you-REE-ter-\") stent is a thin, hollow tube that is placed in the ureter to help urine pass from the kidney into the bladder. Ureters are the tubes that connect the kidneys to the bladder. You may have a small amount of blood in your urine for 1 to 3 days after the procedure. While the stent is in place, you may have to urinate more often, feel a sudden need to urinate, or feel like you cannot completely empty your bladder. You may feel some pain when you urinate or do strenuous activity. You also may notice a small amount of blood in your urine after strenuous activities. These side effects usually do not prevent people from doing their normal daily activities. You may have a thin string coming out of your urethra. Your urethra is the tube that carries urine from your bladder to outside your body. This string is attached to the stent. Try not to pull on the string. The doctor will use the string to pull out the stent when you no longer need it. After the procedure, urine may flow better from your kidneys to your bladder. A ureteral stent may be left in place for several days or for as long as several months. Your doctor will take it out when you no longer need it. This care sheet gives you a general idea about how long it will take for you to recover. But each person recovers at a different pace.  Follow the steps below to get better as quickly as possible. How can you care for yourself at home? Activity · Rest when you feel tired. Getting enough sleep will help you recover. · Avoid strenuous activities, such as bicycle riding, jogging, weight lifting, or aerobic exercise, until your doctor says it is okay. · Ask your doctor when you can drive again. · Most people are able to return to work the day after the procedure. If your work requires intense activity, you may feel pain in your kidney area or get tired easily. If this happens, you may need to do less strenuous activities while the stent is in. · Ask your doctor when it is okay for you to have sex. Diet · You can eat your normal diet. If your stomach is upset, try bland, low-fat foods like plain rice, broiled chicken, toast, and yogurt. · Drink plenty of fluids (unless your doctor tells you not to). Medicines · Your doctor will tell you if and when you can restart your medicines. He or she will also give you instructions about taking any new medicines. · If you take blood thinners, such as warfarin (Coumadin), clopidogrel (Plavix), or aspirin, be sure to talk to your doctor. He or she will tell you if and when to start taking those medicines again. Make sure that you understand exactly what your doctor wants you to do. · Be safe with medicines. Take pain medicines exactly as directed. ¨ If the doctor gave you a prescription medicine for pain, take it as prescribed. ¨ If you are not taking a prescription pain medicine, ask your doctor if you can take an over-the-counter medicine. · If you think your pain medicine is making you sick to your stomach: 
¨ Take your medicine after meals (unless your doctor has told you not to). ¨ Ask your doctor for a different pain medicine. · If your doctor prescribed antibiotics, take them as directed. Do not stop taking them just because you feel better. You need to take the full course of antibiotics. Follow-up care is a key part of your treatment and safety. Be sure to make and go to all appointments, and call your doctor if you are having problems. It's also a good idea to know your test results and keep a list of the medicines you take. When should you call for help? Call 911 anytime you think you may need emergency care. For example, call if: 
· You passed out (lost consciousness). · You have severe trouble breathing. · You have sudden chest pain and shortness of breath, or you cough up blood. · You have severe belly pain. Call your doctor now or seek immediate medical care if: · Part or all of the stent comes out of your urethra. · You have pain that does not get better after you take pain medicine. · You have symptoms of a urinary infection. For example: ¨ You have blood or pus in your urine. ¨ You have pain in your back just below your rib cage. This is called flank pain. ¨ You have a fever, chills, or body aches. ¨ It hurts to urinate. ¨ You have groin or belly pain. · You cannot control when you urinate, or you leak urine. Watch closely for changes in your health, and be sure to contact your doctor if you have any problems. Where can you learn more? Go to http://gwen-brennan.info/. Enter K968 in the search box to learn more about \"Ureteral Stent Placement: What to Expect at Home. \" Current as of: August 12, 2016 Content Version: 11.3 © 0190-4390 "[employer]. Care instructions adapted under license by [employer] (which disclaims liability or warranty for this information). If you have questions about a medical condition or this instruction, always ask your healthcare professional. [place] [address] any warranty or liability for your use of this information. CYSTOSCOPY 
 
ACTIVITY · As tolerated and as directed by your doctor. · Bathe or shower as directed by your doctor. DIET · Clear liquids until no nausea or vomiting; then light diet for the first day. · Drink plenty of liquids. At least 8 glasses of water to help flush out bladder. Limit amount of caffeine. · Advance to regular diet on second day, unless your doctor orders otherwise. · If nausea and vomiting continues, call your doctor. PAIN 
· Take pain medication as directed by your doctor. · Call your doctor if pain is NOT relieved by medication. · DO NOT take aspirin or blood thinners until directed by your doctor. CALL YOUR DOCTOR IF 
· Expect blood-tinged urine. Call your doctor if it lasts more than 72 hours or if you cannot see through the urine. · Temperature of 101 degrees or above. · Unable to empty bladder. AFTER ANESTHESIA · For the next 24 hours: DO NOT Drive, drink alcoholic beverages, or Make important decisions. · Be aware of dizziness following anesthesia and while taking pain medications DISCHARGE SUMMARY from Nurse PATIENT INSTRUCTIONS: 
 
After general anesthesia or intravenous sedation, for 24 hours or while taking prescription Narcotics: · Limit your activities · Do not drive and operate hazardous machinery · Do not make important personal or business decisions · Do  not drink alcoholic beverages · If you have not urinated within 8 hours after discharge, please contact your surgeon on call. *  Please give a list of your current medications to your Primary Care Provider. *  Please update this list whenever your medications are discontinued, doses are 
    changed, or new medications (including over-the-counter products) are added. *  Please carry medication information at all times in case of emergency situations. These are general instructions for a healthy lifestyle: No smoking/ No tobacco products/ Avoid exposure to second hand smoke Surgeon General's Warning:  Quitting smoking now greatly reduces serious risk to your health. Obesity, smoking, and sedentary lifestyle greatly increases your risk for illness A healthy diet, regular physical exercise & weight monitoring are important for maintaining a healthy lifestyle You may be retaining fluid if you have a history of heart failure or if you experience any of the following symptoms:  Weight gain of 3 pounds or more overnight or 5 pounds in a week, increased swelling in our hands or feet or shortness of breath while lying flat in bed. Please call your doctor as soon as you notice any of these symptoms; do not wait until your next office visit. Recognize signs and symptoms of STROKE: 
 
F-face looks uneven A-arms unable to move or move unevenly S-speech slurred or non-existent T-time-call 911 as soon as signs and symptoms begin-DO NOT go Back to bed or wait to see if you get better-TIME IS BRAIN. Discharge Orders None Introducing [address]! [facility] introduces ipnexus patient portal. Now you can access parts of your medical record, email your doctor's office, and request medication refills online. 1. In your internet browser, go to [email or web address] 2. Click on the First Time User? Click Here link in the Sign In box. You will see the New Member Sign Up page. 3. Enter your ipnexus Access Code exactly as it appears below. You will not need to use this code after youve completed the sign-up process. If you do not sign up before the expiration date, you must request a new code. · ipnexus Access Code: [email or web address] Expires: 8/30/2017  2:37 PM 
 
4. Enter the last four digits of your Social Security Number (xxxx) and Date of Birth (mm/dd/yyyy) as indicated and click Submit. You will be taken to the next sign-up page. 5. Create a ipnexus ID. This will be your ipnexus login ID and cannot be changed, so think of one that is secure and easy to remember. 6. Create a Ramen password. You can change your password at any time. 7. Enter your Password Reset Question and Answer. This can be used at a later time if you forget your password. 8. Enter your e-mail address. You will receive e-mail notification when new information is available in [address] 9. Click Sign Up. You can now view and download portions of your medical record. 10. Click the Download Summary menu link to download a portable copy of your medical information. If you have questions, please visit the Frequently Asked Questions section of the Ramen website. Remember, Ramen is NOT to be used for urgent needs. For medical emergencies, dial 911. Now available from your iPhone and Android! General Information Please provide this summary of care documentation to your next provider. Patient Signature:  ____________________________________________________________ Date:  ____________________________________________________________  
  
Dee Ibarra Provider Signature:  ____________________________________________________________ Date:  ____________________________________________________________

## 2017-07-14 NOTE — ANESTHESIA PREPROCEDURE EVALUATION
Anesthetic History   No history of anesthetic complications            Review of Systems / Medical History  Nursing notes reviewed and pertinent labs reviewed    Pulmonary                Comments: Former smoker   Neuro/Psych   Within defined limits           Cardiovascular    Hypertension: well controlled          Hyperlipidemia    Exercise tolerance: >4 METS  Comments: Denies recent CP, SOB or Palpitations   GI/Hepatic/Renal     GERD    Renal disease: stones       Endo/Other    Diabetes: type 2    Morbid obesity and anemia     Other Findings            Physical Exam    Airway  Mallampati: II  TM Distance: 4 - 6 cm  Neck ROM: normal range of motion   Mouth opening: Normal     Cardiovascular  Regular rate and rhythm,  S1 and S2 normal,  no murmur, click, rub, or gallop             Dental  No notable dental hx       Pulmonary  Breath sounds clear to auscultation               Abdominal  GI exam deferred       Other Findings            Anesthetic Plan    ASA: 3  Anesthesia type: general          Induction: Intravenous  Anesthetic plan and risks discussed with: Patient

## 2017-07-16 ENCOUNTER — HOSPITAL ENCOUNTER (EMERGENCY)
Age: 42
Discharge: HOME | End: 2017-07-17
Attending: EMERGENCY MEDICINE
Payer: COMMERCIAL

## 2017-07-16 VITALS
OXYGEN SATURATION: 98 % | RESPIRATION RATE: 16 BRPM | DIASTOLIC BLOOD PRESSURE: 96 MMHG | BODY MASS INDEX: 41.32 KG/M2 | SYSTOLIC BLOOD PRESSURE: 149 MMHG | HEART RATE: 85 BPM | HEIGHT: 64 IN | WEIGHT: 242 LBS | TEMPERATURE: 98.6 F

## 2017-07-16 DIAGNOSIS — N20.0 KIDNEY STONE: Primary | ICD-10-CM

## 2017-07-16 LAB
APPEARANCE UR: ABNORMAL
BACTERIA URNS QL MICRO: ABNORMAL /HPF
BILIRUB UR QL: NEGATIVE
CASTS URNS QL MICRO: ABNORMAL /LPF
COLOR UR: YELLOW
EPI CELLS #/AREA URNS HPF: ABNORMAL /HPF
GLUCOSE UR STRIP.AUTO-MCNC: NEGATIVE MG/DL
HGB UR QL STRIP: ABNORMAL
KETONES UR QL STRIP.AUTO: NEGATIVE MG/DL
LEUKOCYTE ESTERASE UR QL STRIP.AUTO: ABNORMAL
NITRITE UR QL STRIP.AUTO: NEGATIVE
PH UR STRIP: 7.5 [PH] (ref 5–9)
PROT UR STRIP-MCNC: 100 MG/DL
RBC #/AREA URNS HPF: >100 /HPF
SP GR UR REFRACTOMETRY: 1.01 (ref 1–1.02)
UROBILINOGEN UR QL STRIP.AUTO: 0.2 EU/DL (ref 0.2–1)
WBC URNS QL MICRO: ABNORMAL /HPF

## 2017-07-16 PROCEDURE — 74011250637 HC RX REV CODE- 250/637: Performed by: EMERGENCY MEDICINE

## 2017-07-16 PROCEDURE — 74011250636 HC RX REV CODE- 250/636: Performed by: EMERGENCY MEDICINE

## 2017-07-16 PROCEDURE — 81001 URINALYSIS AUTO W/SCOPE: CPT | Performed by: EMERGENCY MEDICINE

## 2017-07-16 PROCEDURE — 96372 THER/PROPH/DIAG INJ SC/IM: CPT | Performed by: EMERGENCY MEDICINE

## 2017-07-16 PROCEDURE — 99283 EMERGENCY DEPT VISIT LOW MDM: CPT | Performed by: EMERGENCY MEDICINE

## 2017-07-16 RX ORDER — KETOROLAC TROMETHAMINE 30 MG/ML
60 INJECTION, SOLUTION INTRAMUSCULAR; INTRAVENOUS
Status: COMPLETED | OUTPATIENT
Start: 2017-07-16 | End: 2017-07-16

## 2017-07-16 RX ORDER — ONDANSETRON 4 MG/1
4 TABLET, ORALLY DISINTEGRATING ORAL ONCE
Status: COMPLETED | OUTPATIENT
Start: 2017-07-16 | End: 2017-07-16

## 2017-07-16 RX ADMIN — ONDANSETRON 4 MG: 4 TABLET, ORALLY DISINTEGRATING ORAL at 23:40

## 2017-07-16 RX ADMIN — KETOROLAC TROMETHAMINE 60 MG: 30 INJECTION, SOLUTION INTRAMUSCULAR at 23:39

## 2017-07-16 NOTE — LETTER
[facility] EMERGENCY DEPT 
[address] [place] [id number] 
[phone] Work/School Note Date: 7/16/2017 To Whom It May concern: 
 
Petar Barber was seen and treated today in the emergency room by the following provider(s): 
No providers found. Petar Barber may return to work on 7/20/2017. Sincerely, Ceferino Blackburn RN

## 2017-07-17 NOTE — ED PROVIDER NOTES
HPI Comments: Patient she had a stent placed in her left ureter on Friday July 14. She is here because she wants it removed. She was seen July 12 and diagnosed with a 5 mm stone in the left UPJ. She followed up with Urology and had stone extraction and stent placement. Was told it should remain for 7 days. She does not like the way Oxycodone makes her feel so is not taking it. Has been taking Ibuprofen. 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 Has nausea with no vomiting. Not taking the Zofran. Has some dysuria. Not taking the Cipro. No fever. Complains of left flank pain. Patient is a 36 y.o. female presenting with flank pain. The history is provided by the patient, medical records and the spouse. Flank Pain    This is a new problem. The current episode started more than 2 days ago. The problem has not changed since onset. The problem occurs constantly. Patient reports not work related injury. The pain is associated with no known injury. The pain is present in the left side. The quality of the pain is described as sharp. The pain does not radiate. The pain is moderate. Pertinent negatives include no fever, no abdominal pain and no leg pain. She has tried analgesics for the symptoms. Risk factors include history of kidney stones. Past Medical History:   Diagnosis Date    Adverse effect of anesthesia     delayed awakening per pt following D&C    Anemia     not requiring blood transfusions per pt    Angioedema     Breast lump on left side at 2 o'clock position     Chronic cholecystitis 3/28/2017    3/28/17 s/p lap saumya; Dr Candance Shin: [address]. Electronically signed out on 3/29/2017 11:23 by SY Hewitt M.D. Microscopic Description  Microscopic examination reveals gallbladder with focal chronic inflammation. Dr. Mellisa Tinsley concurs.      Former smoker     GERD (gastroesophageal reflux disease)     \"mostly controlled\" with meds- sleeps on 2 pillows    History of kidney stones     Hypercholesterolemia     Hypertension     medication controlled    Menorrhagia with irregular cycle     Morbid obesity ([address] 03/27/2017    BMI 41.5    Obesity     Prediabetes 2/13/2016    on metformin as preventative     Tobacco use disorder     Type 2 diabetes mellitus (HCC)     type 2- oral gents only- takes BS 1-2 x week- avg bs 90--- denies hypoglycemia    Vertigo     Vitamin D deficiency        Past Surgical History:   Procedure Laterality Date    HX DILATION AND CURETTAGE      HX LAP CHOLECYSTECTOMY  03/28/2017    HX LITHOTRIPSY      HX ORTHOPAEDIC Left     hand- tendon repair    HX OTHER SURGICAL Bilateral     sweat gland removal under both arms    HX TUBAL LIGATION  07/25/2005         Family History:   Problem Relation Age of Onset    Heart Disease Maternal Grandmother     Hypertension Mother     Hypertension Father     Diabetes Sister     Diabetes Brother     Colon Cancer Other        Social History     Social History    Marital status:      Spouse name: N/A    Number of children: N/A    Years of education: N/A     Occupational History    Not on file. Social History Main Topics    Smoking status: Former Smoker     Packs/day: 1.00     Years: 0.50     Quit date: 1/1/2009    Smokeless tobacco: Never Used    Alcohol use No    Drug use: No    Sexual activity: Yes     Partners: Male     Birth control/ protection: None     Other Topics Concern    Not on file     Social History Narrative         ALLERGIES: Ace inhibitors; Bactrim [sulfamethoxazole-trimethoprim]; Lisinopril; Norvasc [amlodipine]; Oxycodone; and Sulfa (sulfonamide antibiotics)    Review of Systems   Constitutional: Negative. Negative for fever. Respiratory: Negative. Cardiovascular: Negative. Gastrointestinal: Positive for nausea. Negative for abdominal pain and vomiting.    Genitourinary: Positive for flank pain. Musculoskeletal: Positive for back pain. Skin: Negative. Neurological: Negative. Vitals:    07/16/17 2135 07/16/17 2342   BP: (!) 149/96    Pulse: 85    Resp: 16    Temp: 98.6 °F (37 °C)    SpO2: 98% 98%   Weight: 109.8 kg (242 lb)    Height: 5' 4\" (1.626 m)             Physical Exam   Constitutional: She is oriented to person, place, and time. She appears well-developed and well-nourished. HENT:   Head: Normocephalic and atraumatic. Mouth/Throat: Oropharynx is clear and moist.   Eyes: Pupils are equal, round, and reactive to light. Cardiovascular: Normal rate, regular rhythm, normal heart sounds and intact distal pulses. Pulmonary/Chest: Effort normal and breath sounds normal.   Abdominal: Soft. Bowel sounds are normal. She exhibits no mass. There is no tenderness. Musculoskeletal: She exhibits no edema. Neurological: She is alert and oriented to person, place, and time. Skin: Skin is warm and dry. Nursing note and vitals reviewed. [facility]  ED Course       Procedures    Left flank pain - recent stone extraction and stent placement  Toradol 60 mg IM, Zofran 4 mg po  Advised to call Urology office in am. Take medications as prescribed for pain, nausea, antibiotic. Drink water.

## 2017-07-17 NOTE — ED TRIAGE NOTES
Had a urinary stent placed on Friday by Dr Lisandro Hernandes. States that the pain is unbearable.   Feels constipated

## 2017-07-17 NOTE — DISCHARGE INSTRUCTIONS
Take your Cipro  Drink a lot of water  Take your Percocet / Oxycodone if needed  Take your Zofran if needed for nausea  Call Dr. Raj Castorena office in am       Kidney Stone: Care Instructions  Your Care Instructions    Kidney stones are formed when salts, minerals, and other substances normally found in the urine clump together. They can be as small as grains of sand or, rarely, as large as golf balls. While the stone is traveling through the ureter, which is the tube that carries urine from the kidney to the bladder, you will probably feel pain. The pain may be mild or very severe. You may also have some blood in your urine. As soon as the stone reaches the bladder, any intense pain should go away. If a stone is too large to pass on its own, you may need a medical procedure to help you pass the stone. The doctor has checked you carefully, but problems can develop later. If you notice any problems or new symptoms, get medical treatment right away. Follow-up care is a key part of your treatment and safety. Be sure to make and go to all appointments, and call your doctor if you are having problems. It's also a good idea to know your test results and keep a list of the medicines you take. How can you care for yourself at home? · Drink plenty of fluids, enough so that your urine is light yellow or clear like water. If you have kidney, heart, or liver disease and have to limit fluids, talk with your doctor before you increase the amount of fluids you drink. · Take pain medicines exactly as directed. Call your doctor if you think you are having a problem with your medicine. ¨ If the doctor gave you a prescription medicine for pain, take it as prescribed. ¨ If you are not taking a prescription pain medicine, ask your doctor if you can take an over-the-counter medicine. Read and follow all instructions on the label.   · Your doctor may ask you to strain your urine so that you can collect your kidney stone when it passes. You can use a kitchen strainer or a tea strainer to catch the stone. Store it in a plastic bag until you see your doctor again. Preventing future kidney stones  Some changes in your diet may help prevent kidney stones. Depending on the cause of your stones, your doctor may recommend that you:  · Drink plenty of fluids, enough so that your urine is light yellow or clear like water. If you have kidney, heart, or liver disease and have to limit fluids, talk with your doctor before you increase the amount of fluids you drink. · Limit coffee, tea, and alcohol. Also avoid grapefruit juice. · Do not take more than the recommended daily dose of vitamins C and D.  · Avoid antacids such as Gaviscon, Maalox, Mylanta, or Tums. · Limit the amount of salt (sodium) in your diet. · Eat a balanced diet that is not too high in protein. · Limit foods that are high in a substance called oxalate, which can cause kidney stones. These foods include dark green vegetables, rhubarb, chocolate, wheat bran, nuts, cranberries, and beans. When should you call for help? Call your doctor now or seek immediate medical care if:  · You cannot keep down fluids. · Your pain gets worse. · You have a fever or chills. · You have new or worse pain in your back just below your rib cage (the flank area). · You have new or more blood in your urine. Watch closely for changes in your health, and be sure to contact your doctor if:  · You do not get better as expected. Where can you learn more? Go to http://gwen-brennan.info/. Enter Z957 in the search box to learn more about \"Kidney Stone: Care Instructions. \"  Current as of: April 3, 2017  Content Version: 11.3  © 6299-3298 [employer]. Care instructions adapted under license by [employer] (which disclaims liability or warranty for this information).  If you have questions about a medical condition or this instruction, always ask your healthcare professional. [address] any warranty or liability for your use of this information.

## 2017-07-18 NOTE — OP NOTES
[address] [facility]   OPERATIVE REPORT       Name:  Yuval Valles   MR#:  [id number]   :  1975   Account #:  [de-identified]   Date of Adm:  2017       DESCRIPTION OF PROCEDURE: 2017     SURGEON: Padma Gilman. Patricia Caldera MD    PREOPERATIVE DIAGNOSIS: A 5 mm left ureteropelvic junction   calculus. POSTOPERATIVE DIAGNOSIS: A 5 mm left ureteropelvic junction   calculus. PROCEDURE PERFORMED TODAY: Cystourethroscopy, left ureteroscopy,   stone extraction, and left ureteral stent placement. ANESTHESIA: General.     ESTIMATED BLOOD LOSS: Minimal.     COMPLICATIONS: None apparent. INDICATIONS: This is a 53-year-old,  female with   a history of stones who presented to the office after a CAT scan   on , revealing a 5 mm left ureteropelvic junction   calculus with proximal left hydronephrosis and a nonobstructive   6 mm right midpole calculus. After a discussion of the risks   versus benefits, she decided to move forward with the above-  named procedure in regard to her left obstructive UPJ stone. TECHNIQUE: The patient was taken to the operating room, placed   in supine position. Anesthesia was induced via the   anesthesiology service. She was repositioned in the lithotomy   position, and prepped and draped in a sterile surgical fashion   with her genitalia in a sterile field. A 22-Armenian rigid   cystoscope was introduced atraumatically via the urethra, and   pan urethroscopy and cystoscopy were performed with no pathology   noted. I turned my attention to the left ureteral orifice, which was   cannulated with a Sensor wire, which was advanced until a good   curl was noted in the left renal pelvis by fluoroscopy. The   bladder was emptied and the cystoscope was removed. An 8/10 coaxial dilator sheath set was used to dilate the left   ureteral orifice under fluoroscopic guidance, and it was then   removed.  I next advanced the semi-rigid ureteroscope to the   level of the left ureteropelvic junction, where the stone was   evident. A Tricep grasper was then used. The stone was very soft   and when grasping with the Tricep grasper, it actually broke   into 3 fragments. Each of these 3 fragments were then grasped and   brought to the level of the bladder, such that at the end of the   procedure there were no stone fragments whatsoever left within   the left ureter. The scope was removed. Due to some edema at the UPJ, the decision was made to place a   left ureteral stent and the cystoscope was backloaded over the   safety wire to the level of the left ureteral orifice. A 4.8-  Fijian x 24 cm Double-J ureteral stent was deployed with a good   curl noted in the left renal pelvis by fluoroscopy and in the   bladder by cystoscopy. The bladder was emptied and the   cystoscope was removed, and a short portion of string was left   exiting the urethral meatus. PLAN: The patient will follow up in our office the middle of next   week. No KUB will be needed and the stent may be removed by   grasping the string exiting the urethral meatus.         MD ERINN Vang / CHRIS   D:  07/14/2017   11:07   T:  07/18/2017   13:45   Job #:  [id number]

## 2017-07-19 ENCOUNTER — HOSPITAL ENCOUNTER (EMERGENCY)
Age: 42
Discharge: HOME | End: 2017-07-19
Attending: EMERGENCY MEDICINE
Payer: COMMERCIAL

## 2017-07-19 VITALS
TEMPERATURE: 98.7 F | DIASTOLIC BLOOD PRESSURE: 72 MMHG | OXYGEN SATURATION: 96 % | RESPIRATION RATE: 22 BRPM | WEIGHT: 242 LBS | BODY MASS INDEX: 41.32 KG/M2 | SYSTOLIC BLOOD PRESSURE: 151 MMHG | HEART RATE: 83 BPM | HEIGHT: 64 IN

## 2017-07-19 DIAGNOSIS — R10.9 FLANK PAIN: Primary | ICD-10-CM

## 2017-07-19 LAB
ALBUMIN SERPL BCP-MCNC: 3.3 G/DL (ref 3.5–5)
ALBUMIN/GLOB SERPL: 0.6 {RATIO} (ref 1.2–3.5)
ALP SERPL-CCNC: 64 U/L (ref 50–136)
ALT SERPL-CCNC: 16 U/L (ref 12–65)
ANION GAP BLD CALC-SCNC: 9 MMOL/L (ref 7–16)
AST SERPL W P-5'-P-CCNC: 17 U/L (ref 15–37)
BASOPHILS # BLD AUTO: 0 K/UL (ref 0–0.2)
BASOPHILS # BLD: 0 % (ref 0–2)
BILIRUB SERPL-MCNC: 0.3 MG/DL (ref 0.2–1.1)
BUN SERPL-MCNC: 11 MG/DL (ref 6–23)
CALCIUM SERPL-MCNC: 9.2 MG/DL (ref 8.3–10.4)
CHLORIDE SERPL-SCNC: 101 MMOL/L (ref 98–107)
CO2 SERPL-SCNC: 29 MMOL/L (ref 21–32)
CREAT SERPL-MCNC: 0.91 MG/DL (ref 0.6–1)
DIFFERENTIAL METHOD BLD: ABNORMAL
EOSINOPHIL # BLD: 0.1 K/UL (ref 0–0.8)
EOSINOPHIL NFR BLD: 2 % (ref 0.5–7.8)
ERYTHROCYTE [DISTWIDTH] IN BLOOD BY AUTOMATED COUNT: 14.6 % (ref 11.9–14.6)
GLOBULIN SER CALC-MCNC: 5.3 G/DL (ref 2.3–3.5)
GLUCOSE SERPL-MCNC: 120 MG/DL (ref 65–100)
HCG UR QL: NEGATIVE
HCT VFR BLD AUTO: 33 % (ref 35.8–46.3)
HGB BLD-MCNC: 10.4 G/DL (ref 11.7–15.4)
IMM GRANULOCYTES # BLD: 0 K/UL (ref 0–0.5)
IMM GRANULOCYTES NFR BLD AUTO: 0.2 % (ref 0–5)
LIPASE SERPL-CCNC: 65 U/L (ref 73–393)
LYMPHOCYTES # BLD AUTO: 21 % (ref 13–44)
LYMPHOCYTES # BLD: 1.9 K/UL (ref 0.5–4.6)
MCH RBC QN AUTO: 26.1 PG (ref 26.1–32.9)
MCHC RBC AUTO-ENTMCNC: 31.5 G/DL (ref 31.4–35)
MCV RBC AUTO: 82.7 FL (ref 79.6–97.8)
MONOCYTES # BLD: 0.5 K/UL (ref 0.1–1.3)
MONOCYTES NFR BLD AUTO: 5 % (ref 4–12)
NEUTS SEG # BLD: 6.8 K/UL (ref 1.7–8.2)
NEUTS SEG NFR BLD AUTO: 72 % (ref 43–78)
PLATELET # BLD AUTO: 302 K/UL (ref 150–450)
PMV BLD AUTO: 8.7 FL (ref 10.8–14.1)
POTASSIUM SERPL-SCNC: 3.8 MMOL/L (ref 3.5–5.1)
PROT SERPL-MCNC: 8.6 G/DL (ref 6.3–8.2)
RBC # BLD AUTO: 3.99 M/UL (ref 4.05–5.25)
SODIUM SERPL-SCNC: 139 MMOL/L (ref 136–145)
WBC # BLD AUTO: 9.3 K/UL (ref 4.3–11.1)

## 2017-07-19 PROCEDURE — 99284 EMERGENCY DEPT VISIT MOD MDM: CPT | Performed by: EMERGENCY MEDICINE

## 2017-07-19 PROCEDURE — 96374 THER/PROPH/DIAG INJ IV PUSH: CPT | Performed by: EMERGENCY MEDICINE

## 2017-07-19 PROCEDURE — 74011250636 HC RX REV CODE- 250/636: Performed by: EMERGENCY MEDICINE

## 2017-07-19 PROCEDURE — 81025 URINE PREGNANCY TEST: CPT

## 2017-07-19 PROCEDURE — 80053 COMPREHEN METABOLIC PANEL: CPT | Performed by: EMERGENCY MEDICINE

## 2017-07-19 PROCEDURE — 85025 COMPLETE CBC W/AUTO DIFF WBC: CPT | Performed by: EMERGENCY MEDICINE

## 2017-07-19 PROCEDURE — 83690 ASSAY OF LIPASE: CPT | Performed by: EMERGENCY MEDICINE

## 2017-07-19 PROCEDURE — 96361 HYDRATE IV INFUSION ADD-ON: CPT | Performed by: EMERGENCY MEDICINE

## 2017-07-19 PROCEDURE — 81003 URINALYSIS AUTO W/O SCOPE: CPT | Performed by: EMERGENCY MEDICINE

## 2017-07-19 PROCEDURE — 96375 TX/PRO/DX INJ NEW DRUG ADDON: CPT | Performed by: EMERGENCY MEDICINE

## 2017-07-19 RX ORDER — ONDANSETRON 2 MG/ML
4 INJECTION INTRAMUSCULAR; INTRAVENOUS
Status: COMPLETED | OUTPATIENT
Start: 2017-07-19 | End: 2017-07-19

## 2017-07-19 RX ORDER — KETOROLAC TROMETHAMINE 30 MG/ML
30 INJECTION, SOLUTION INTRAMUSCULAR; INTRAVENOUS
Status: COMPLETED | OUTPATIENT
Start: 2017-07-19 | End: 2017-07-19

## 2017-07-19 RX ORDER — OXYCODONE AND ACETAMINOPHEN 5; 325 MG/1; MG/1
1 TABLET ORAL
Qty: 15 TAB | Refills: 0 | Status: SHIPPED | OUTPATIENT
Start: 2017-07-19 | End: 2017-07-26 | Stop reason: ALTCHOICE

## 2017-07-19 RX ADMIN — SODIUM CHLORIDE 1000 ML: 900 INJECTION, SOLUTION INTRAVENOUS at 18:22

## 2017-07-19 RX ADMIN — KETOROLAC TROMETHAMINE 30 MG: 30 INJECTION, SOLUTION INTRAMUSCULAR at 18:22

## 2017-07-19 RX ADMIN — ONDANSETRON 4 MG: 2 INJECTION INTRAMUSCULAR; INTRAVENOUS at 18:23

## 2017-07-19 NOTE — ED TRIAGE NOTES
Pt had lithotripsy 4 days ago and a stent to the left ureter removed today, here with \"severe pain\".

## 2017-07-19 NOTE — ED PROVIDER NOTES
HPI Comments: Patient with lithotripsy and stent placement 4 days ago. Had the stent removed on the left today around 2:30.   1-2 hours afterwards developed some left flank pain. This progressively worsened with some nausea and vomiting. Patient is a 36 y.o. female presenting with flank pain. The history is provided by the patient. No  was used. Flank Pain    This is a new problem. The current episode started 1 to 2 hours ago. The problem has been gradually worsening. The problem occurs constantly. Patient reports not work related injury. The pain is associated with no known injury. The pain is present in the left side. The quality of the pain is described as sharp. The pain does not radiate. The pain is moderate. Associated symptoms include abdominal pain (Left flank). Pertinent negatives include no chest pain, no fever, no numbness, no headaches, no abdominal swelling, no bowel incontinence, no perianal numbness, no bladder incontinence, no dysuria, no leg pain, no paresthesias and no weakness. She has tried nothing for the symptoms. Risk factors include history of kidney stones. Past Medical History:   Diagnosis Date    Adverse effect of anesthesia     delayed awakening per pt following D&C    Anemia     not requiring blood transfusions per pt    Angioedema     Breast lump on left side at 2 o'clock position     Chronic cholecystitis 3/28/2017    3/28/17 s/p lisandra saumya; Dr Esthela Orozco: [address]. Electronically signed out on 3/29/2017 11:23 by SY Moreno M.D. Microscopic Description  Microscopic examination reveals gallbladder with focal chronic inflammation. Dr. Marlen Nelson concurs.      Former smoker     GERD (gastroesophageal reflux disease)     \"mostly controlled\" with meds- sleeps on 2 pillows    History of kidney stones     Hypercholesterolemia     Hypertension     medication controlled    Menorrhagia with irregular cycle     Morbid obesity ([facility] [address] 03/27/2017    BMI 41.5    Obesity     Prediabetes 2/13/2016    on metformin as preventative     Tobacco use disorder     Type 2 diabetes mellitus (HCC)     type 2- oral gents only- takes BS 1-2 x week- avg bs 90--- denies hypoglycemia    Vertigo     Vitamin D deficiency        Past Surgical History:   Procedure Laterality Date    HX DILATION AND CURETTAGE      HX LAP CHOLECYSTECTOMY  03/28/2017    HX LITHOTRIPSY      HX ORTHOPAEDIC Left     hand- tendon repair    HX OTHER SURGICAL Bilateral     sweat gland removal under both arms    HX TUBAL LIGATION  07/25/2005         Family History:   Problem Relation Age of Onset    Heart Disease Maternal Grandmother     Hypertension Mother     Hypertension Father     Diabetes Sister     Diabetes Brother     Colon Cancer Other        Social History     Social History    Marital status:      Spouse name: N/A    Number of children: N/A    Years of education: N/A     Occupational History    Not on file. Social History Main Topics    Smoking status: Former Smoker     Packs/day: 1.00     Years: 0.50     Quit date: 1/1/2009    Smokeless tobacco: Never Used    Alcohol use No    Drug use: No    Sexual activity: Yes     Partners: Male     Birth control/ protection: None     Other Topics Concern    Not on file     Social History Narrative         ALLERGIES: Ace inhibitors; Bactrim [sulfamethoxazole-trimethoprim]; Lisinopril; Norvasc [amlodipine]; Oxycodone; and Sulfa (sulfonamide antibiotics)    Review of Systems   Constitutional: Negative for chills and fever. HENT: Negative for rhinorrhea and sore throat. Eyes: Negative for pain and redness. Respiratory: Negative for chest tightness, shortness of breath and wheezing. Cardiovascular: Negative for chest pain and leg swelling. Gastrointestinal: Positive for abdominal pain (Left flank), nausea and vomiting. Negative for bowel incontinence and diarrhea.    Genitourinary: Positive for flank pain and hematuria. Negative for bladder incontinence and dysuria. Musculoskeletal: Negative for back pain, gait problem, neck pain and neck stiffness. Skin: Negative for color change and rash. Neurological: Negative for weakness, numbness, headaches and paresthesias. Vitals:    07/19/17 1734   BP: (!) 124/93   Pulse: (!) 108   Resp: 22   Temp: 98.6 °F (37 °C)   SpO2: 97%   Weight: 109.8 kg (242 lb)   Height: 5' 4\" (1.626 m)            Physical Exam   Constitutional: She is oriented to person, place, and time. She appears well-developed and well-nourished. HENT:   Head: Normocephalic and atraumatic. Neck: Normal range of motion. Neck supple. Cardiovascular: Regular rhythm. Tachycardia present. No murmur heard. Pulmonary/Chest: Effort normal and breath sounds normal. She has no wheezes. Abdominal: Soft. Bowel sounds are normal. There is tenderness (left flank). Musculoskeletal: Normal range of motion. She exhibits no edema. Neurological: She is alert and oriented to person, place, and time. Skin: Skin is warm and dry. Nursing note and vitals reviewed. MDM  Number of Diagnoses or Management Options  Diagnosis management comments: Pt feels much better with toradol. Will discharge with urology follow up.         Amount and/or Complexity of Data Reviewed  Clinical lab tests: ordered and reviewed  Tests in the medicine section of CPT®: ordered and reviewed    Patient Progress  Patient progress: stable    ED Course       Procedures           Results Include:    Recent Results (from the past 24 hour(s))   CBC WITH AUTOMATED DIFF    Collection Time: 07/19/17  6:15 PM   Result Value Ref Range    WBC 9.3 4.3 - 11.1 K/uL    RBC 3.99 (L) 4.05 - 5.25 M/uL    HGB 10.4 (L) 11.7 - 15.4 g/dL    HCT 33.0 (L) 35.8 - 46.3 %    MCV 82.7 79.6 - 97.8 FL    MCH 26.1 26.1 - 32.9 PG    MCHC 31.5 31.4 - 35.0 g/dL    RDW 14.6 11.9 - 14.6 %    PLATELET 898 142 - 217 K/uL    MPV 8.7 (L) 10.8 - 14.1 FL    DF AUTOMATED      NEUTROPHILS 72 43 - 78 %    LYMPHOCYTES 21 13 - 44 %    MONOCYTES 5 4.0 - 12.0 %    EOSINOPHILS 2 0.5 - 7.8 %    BASOPHILS 0 0.0 - 2.0 %    IMMATURE GRANULOCYTES 0.2 0.0 - 5.0 %    ABS. NEUTROPHILS 6.8 1.7 - 8.2 K/UL    ABS. LYMPHOCYTES 1.9 0.5 - 4.6 K/UL    ABS. MONOCYTES 0.5 0.1 - 1.3 K/UL    ABS. EOSINOPHILS 0.1 0.0 - 0.8 K/UL    ABS. BASOPHILS 0.0 0.0 - 0.2 K/UL    ABS. IMM. GRANS. 0.0 0.0 - 0.5 K/UL   METABOLIC PANEL, COMPREHENSIVE    Collection Time: 07/19/17  6:15 PM   Result Value Ref Range    Sodium 139 136 - 145 mmol/L    Potassium 3.8 3.5 - 5.1 mmol/L    Chloride 101 98 - 107 mmol/L    CO2 29 21 - 32 mmol/L    Anion gap 9 7 - 16 mmol/L    Glucose 120 (H) 65 - 100 mg/dL    BUN 11 6 - 23 MG/DL    Creatinine 0.91 0.6 - 1.0 MG/DL    GFR est AA >60 >60 ml/min/1.73m2    GFR est non-AA >60 >60 ml/min/1.73m2    Calcium 9.2 8.3 - 10.4 MG/DL    Bilirubin, total 0.3 0.2 - 1.1 MG/DL    ALT (SGPT) 16 12 - 65 U/L    AST (SGOT) 17 15 - 37 U/L    Alk.  phosphatase 64 50 - 136 U/L    Protein, total 8.6 (H) 6.3 - 8.2 g/dL    Albumin 3.3 (L) 3.5 - 5.0 g/dL    Globulin 5.3 (H) 2.3 - 3.5 g/dL    A-G Ratio 0.6 (L) 1.2 - 3.5     LIPASE    Collection Time: 07/19/17  6:15 PM   Result Value Ref Range    Lipase 65 (L) 73 - 393 U/L   HCG URINE, QL. - POC    Collection Time: 07/19/17  6:32 PM   Result Value Ref Range    Pregnancy test,urine (POC) NEGATIVE  NEG

## 2017-07-19 NOTE — DISCHARGE INSTRUCTIONS

## 2017-07-29 PROBLEM — M54.50 CHRONIC BILATERAL LOW BACK PAIN WITHOUT SCIATICA: Status: ACTIVE | Noted: 2017-07-29

## 2017-07-29 PROBLEM — E66.01 MORBID OBESITY WITH BMI OF 40.0-44.9, ADULT (HCC): Status: ACTIVE | Noted: 2017-07-29

## 2017-07-29 PROBLEM — G89.29 CHRONIC BILATERAL LOW BACK PAIN WITHOUT SCIATICA: Status: ACTIVE | Noted: 2017-07-29

## 2017-08-01 PROBLEM — E11.9 COMPREHENSIVE DIABETIC FOOT EXAMINATION, TYPE 2 DM, ENCOUNTER FOR (HCC): Status: ACTIVE | Noted: 2017-08-01

## 2017-09-24 ENCOUNTER — HOSPITAL ENCOUNTER (EMERGENCY)
Age: 42
Discharge: HOME | End: 2017-09-24
Payer: COMMERCIAL

## 2017-09-24 VITALS
BODY MASS INDEX: 40.97 KG/M2 | HEART RATE: 94 BPM | WEIGHT: 240 LBS | OXYGEN SATURATION: 98 % | HEIGHT: 64 IN | SYSTOLIC BLOOD PRESSURE: 162 MMHG | TEMPERATURE: 97.9 F | DIASTOLIC BLOOD PRESSURE: 77 MMHG | RESPIRATION RATE: 18 BRPM

## 2017-09-24 DIAGNOSIS — J02.9 ACUTE PHARYNGITIS, UNSPECIFIED ETIOLOGY: Primary | ICD-10-CM

## 2017-09-24 LAB — DEPRECATED S PYO AG THROAT QL EIA: NEGATIVE

## 2017-09-24 PROCEDURE — 87880 STREP A ASSAY W/OPTIC: CPT | Performed by: EMERGENCY MEDICINE

## 2017-09-24 PROCEDURE — 99282 EMERGENCY DEPT VISIT SF MDM: CPT

## 2017-09-24 PROCEDURE — 87081 CULTURE SCREEN ONLY: CPT | Performed by: EMERGENCY MEDICINE

## 2017-09-24 RX ORDER — HYDROCODONE BITARTRATE AND HOMATROPINE METHYLBROMIDE 1.5; 5 MG/5ML; MG/5ML
5 SYRUP ORAL
Qty: 30 ML | Refills: 0 | Status: SHIPPED | OUTPATIENT
Start: 2017-09-24 | End: 2017-11-13 | Stop reason: ALTCHOICE

## 2017-09-24 RX ORDER — IBUPROFEN 600 MG/1
600 TABLET ORAL
Qty: 20 TAB | Refills: 0 | Status: SHIPPED | OUTPATIENT
Start: 2017-09-24 | End: 2018-01-25

## 2017-09-24 RX ORDER — AMOXICILLIN 500 MG/1
500 TABLET, FILM COATED ORAL 3 TIMES DAILY
Qty: 30 TAB | Refills: 0 | Status: SHIPPED | OUTPATIENT
Start: 2017-09-24 | End: 2017-09-24

## 2017-09-24 RX ORDER — AMOXICILLIN 500 MG/1
500 TABLET, FILM COATED ORAL 3 TIMES DAILY
Qty: 30 TAB | Refills: 0 | Status: SHIPPED | OUTPATIENT
Start: 2017-09-24 | End: 2017-11-13 | Stop reason: ALTCHOICE

## 2017-09-24 RX ORDER — LIDOCAINE HYDROCHLORIDE 20 MG/ML
15 SOLUTION OROPHARYNGEAL
Status: DISCONTINUED | OUTPATIENT
Start: 2017-09-24 | End: 2017-09-24

## 2017-09-24 RX ORDER — MAG HYDROX/ALUMINUM HYD/SIMETH 200-200-20
30 SUSPENSION, ORAL (FINAL DOSE FORM) ORAL
Status: DISCONTINUED | OUTPATIENT
Start: 2017-09-24 | End: 2017-09-24

## 2017-09-24 NOTE — ED TRIAGE NOTES
Pt also states she found out on 9/14/17 that she was diagnosed with herpes simplex. States she is having outbreak.

## 2017-09-24 NOTE — DISCHARGE INSTRUCTIONS
Sore Throat: Care Instructions  Your Care Instructions    Infection by bacteria or a virus causes most sore throats. Cigarette smoke, dry air, air pollution, allergies, and yelling can also cause a sore throat. Sore throats can be painful and annoying. Fortunately, most sore throats go away on their own. If you have a bacterial infection, your doctor may prescribe antibiotics. Follow-up care is a key part of your treatment and safety. Be sure to make and go to all appointments, and call your doctor if you are having problems. It's also a good idea to know your test results and keep a list of the medicines you take. How can you care for yourself at home? · If your doctor prescribed antibiotics, take them as directed. Do not stop taking them just because you feel better. You need to take the full course of antibiotics. · Gargle with warm salt water once an hour to help reduce swelling and relieve discomfort. Use 1 teaspoon of salt mixed in 1 cup of warm water. · Take an over-the-counter pain medicine, such as acetaminophen (Tylenol), ibuprofen (Advil, Motrin), or naproxen (Aleve). Read and follow all instructions on the label. · Be careful when taking over-the-counter cold or flu medicines and Tylenol at the same time. Many of these medicines have acetaminophen, which is Tylenol. Read the labels to make sure that you are not taking more than the recommended dose. Too much acetaminophen (Tylenol) can be harmful. · Drink plenty of fluids. Fluids may help soothe an irritated throat. Hot fluids, such as tea or soup, may help decrease throat pain. · Use over-the-counter throat lozenges to soothe pain. Regular cough drops or hard candy may also help. These should not be given to young children because of the risk of choking. · Do not smoke or allow others to smoke around you. If you need help quitting, talk to your doctor about stop-smoking programs and medicines.  These can increase your chances of quitting for good. · Use a vaporizer or humidifier to add moisture to your bedroom. Follow the directions for cleaning the machine. When should you call for help? Call your doctor now or seek immediate medical care if:  · You have new or worse trouble swallowing. · Your sore throat gets much worse on one side. Watch closely for changes in your health, and be sure to contact your doctor if you do not get better as expected. Where can you learn more? Go to http://gwen-brennan.info/. Enter [phone] in the search box to learn more about \"Sore Throat: Care Instructions. \"  Current as of: July 29, 2016  Content Version: 11.3  © 9517-5554 [employer]. Care instructions adapted under license by [employer] (which disclaims liability or warranty for this information). If you have questions about a medical condition or this instruction, always ask your healthcare professional. [address] any warranty or liability for your use of this information.

## 2017-09-24 NOTE — LETTER
[facility] EMERGENCY DEPT 
[address] [place] [id number] 
[phone] Work/School Note Date: 9/24/2017 To Whom It May concern: 
 
Char Urrutia was seen and treated today in the emergency room by the following provider(s): 
Attending Provider: Blas Griffin MD. Char Urrutia may return to work on 9/25/17. Sincerely, Shanta Wolf RN

## 2017-09-24 NOTE — ED TRIAGE NOTES
Pt in c/o sore throat with bilateral ear pain and fever x 3 days. Denies vomiting or diarrhea. Attempted tylenol without relief.

## 2017-09-24 NOTE — LETTER
[facility] EMERGENCY DEPT 
[address] [place] [id number] 
[phone] Work/School Note Date: 9/24/2017 To Whom It May concern: 
 
Petros Santiago was seen and treated today in the emergency room by the following provider(s): 
Attending Provider: Yoselin Meadows MD. Petros Santiago may return to work on 9/26/17. Sincerely, Pinky Frederick RN

## 2017-09-24 NOTE — ED PROVIDER NOTES
HPI Comments: 39-year-old female complaining of sore throat fever. Patient is a 36 y.o. female presenting with sore throat. The history is provided by the patient. Sore Throat    This is a new problem. Patient reports a subjective fever - was not measured. Pertinent negatives include no diarrhea, no vomiting, no congestion and no drooling. She has tried NSAIDs and acetaminophen for the symptoms. Past Medical History:   Diagnosis Date    Adverse effect of anesthesia     delayed awakening per pt following D&C    Anemia     not requiring blood transfusions per pt    Angioedema     Breast lump on left side at 2 o'clock position     Chronic cholecystitis 3/28/2017    3/28/17 s/p lap saumya; Dr Moncada Ao: [address]. Electronically signed out on 3/29/2017 11:23 by SY Fajardo M.D. Microscopic Description  Microscopic examination reveals gallbladder with focal chronic inflammation. Dr. Lyssa Brady concurs.      Former smoker     GERD (gastroesophageal reflux disease)     \"mostly controlled\" with meds- sleeps on 2 pillows    History of kidney stones     Hypercholesterolemia     Hypertension     medication controlled    Menorrhagia with irregular cycle     Morbid obesity ([address] 03/27/2017    BMI 41.5    Obesity     Prediabetes 2/13/2016    on metformin as preventative     Tobacco use disorder     Type 2 diabetes mellitus (HCC)     type 2- oral gents only- takes BS 1-2 x week- avg bs 90--- denies hypoglycemia    Vertigo     Vitamin D deficiency        Past Surgical History:   Procedure Laterality Date    HX DILATION AND CURETTAGE      HX LAP CHOLECYSTECTOMY  03/28/2017    HX LITHOTRIPSY      HX ORTHOPAEDIC Left     hand- tendon repair    HX OTHER SURGICAL Bilateral     sweat gland removal under both arms    HX TUBAL LIGATION  07/25/2005         Family History:   Problem Relation Age of Onset    Heart Disease Maternal Grandmother     Hypertension Mother  Hypertension Father     Diabetes Sister     Diabetes Brother     Colon Cancer Other        Social History     Social History    Marital status:      Spouse name: N/A    Number of children: N/A    Years of education: N/A     Occupational History    Not on file. Social History Main Topics    Smoking status: Former Smoker     Packs/day: 1.00     Years: 0.50     Quit date: 1/1/2009    Smokeless tobacco: Never Used    Alcohol use No    Drug use: No    Sexual activity: Yes     Partners: Male     Birth control/ protection: None     Other Topics Concern    Not on file     Social History Narrative         ALLERGIES: Ace inhibitors; Bactrim [sulfamethoxazole-trimethoprim]; Lisinopril; Norvasc [amlodipine]; Oxycodone; and Sulfa (sulfonamide antibiotics)    Review of Systems   Constitutional: Negative. Negative for activity change. HENT: Positive for sore throat. Negative for congestion and drooling. Eyes: Negative. Respiratory: Negative. Cardiovascular: Negative. Gastrointestinal: Negative. Negative for diarrhea and vomiting. Genitourinary: Negative. Musculoskeletal: Negative. Skin: Negative. Neurological: Negative. Psychiatric/Behavioral: Negative. All other systems reviewed and are negative. Vitals:    09/24/17 0651 09/24/17 0704   BP: 162/77    Pulse: 94    Resp: 18    Temp: 97.9 °F (36.6 °C)    SpO2: 98% 98%   Weight: 108.9 kg (240 lb)    Height: 5' 4\" (1.626 m)             Physical Exam   Constitutional: She is oriented to person, place, and time. She appears well-developed and well-nourished. No distress. HENT:   Head: Normocephalic and atraumatic. Right Ear: External ear normal.   Left Ear: External ear normal.   Nose: Nose normal.   Mouth/Throat: Posterior oropharyngeal edema and posterior oropharyngeal erythema present. No oropharyngeal exudate. Eyes: Conjunctivae and EOM are normal. Pupils are equal, round, and reactive to light.  Right eye exhibits no discharge. Left eye exhibits no discharge. No scleral icterus. Neck: Normal range of motion. Neck supple. No JVD present. No tracheal deviation present. Cardiovascular: Normal rate, regular rhythm and intact distal pulses. Pulmonary/Chest: Effort normal and breath sounds normal. No stridor. No respiratory distress. She has no wheezes. She exhibits no tenderness. Abdominal: Soft. Bowel sounds are normal. She exhibits no distension and no mass. There is no tenderness. Musculoskeletal: Normal range of motion. She exhibits no edema or tenderness. Neurological: She is alert and oriented to person, place, and time. No cranial nerve deficit. Skin: Skin is warm and dry. No rash noted. She is not diaphoretic. No erythema. No pallor. Psychiatric: She has a normal mood and affect. Her behavior is normal. Thought content normal.   Nursing note and vitals reviewed. MDM  Number of Diagnoses or Management Options  Acute pharyngitis, unspecified etiology: minor  Diagnosis management comments: Gargle warm salt water Tylenol or Motrin for fever and chills body aches.   Complete antibiotics course follow up primary care physician and return if any problems    ED Course       Procedures

## 2017-09-26 LAB
BACTERIA SPEC CULT: NORMAL
SERVICE CMNT-IMP: NORMAL

## 2017-11-14 ENCOUNTER — HOSPITAL ENCOUNTER (OUTPATIENT)
Dept: CT IMAGING | Age: 42
Discharge: HOME | End: 2017-11-14
Attending: NURSE PRACTITIONER
Payer: COMMERCIAL

## 2017-11-14 DIAGNOSIS — R10.9 RIGHT FLANK PAIN: ICD-10-CM

## 2017-11-14 PROCEDURE — 74176 CT ABD & PELVIS W/O CONTRAST: CPT

## 2017-11-26 PROBLEM — E66.01 OBESITY, CLASS III, BMI 40-49.9 (MORBID OBESITY) (HCC): Status: ACTIVE | Noted: 2017-11-26

## 2017-11-26 PROBLEM — M79.672 LEFT FOOT PAIN: Status: ACTIVE | Noted: 2017-11-26

## 2017-11-26 PROBLEM — N20.0 KIDNEY STONE: Status: ACTIVE | Noted: 2017-11-26

## 2017-11-30 ENCOUNTER — ANESTHESIA (OUTPATIENT)
Dept: SURGERY | Age: 42
End: 2017-11-30
Payer: COMMERCIAL

## 2017-11-30 ENCOUNTER — ANESTHESIA EVENT (OUTPATIENT)
Dept: SURGERY | Age: 42
End: 2017-11-30
Payer: COMMERCIAL

## 2017-11-30 ENCOUNTER — HOSPITAL ENCOUNTER (OUTPATIENT)
Age: 42
Setting detail: OUTPATIENT SURGERY
Discharge: HOME | End: 2017-11-30
Attending: UROLOGY | Admitting: UROLOGY
Payer: COMMERCIAL

## 2017-11-30 VITALS
DIASTOLIC BLOOD PRESSURE: 86 MMHG | TEMPERATURE: 98.4 F | SYSTOLIC BLOOD PRESSURE: 129 MMHG | BODY MASS INDEX: 42.02 KG/M2 | HEART RATE: 77 BPM | HEIGHT: 64 IN | WEIGHT: 246.1 LBS | OXYGEN SATURATION: 98 % | RESPIRATION RATE: 18 BRPM

## 2017-11-30 LAB
GLUCOSE BLD STRIP.AUTO-MCNC: 95 MG/DL (ref 65–100)
HGB BLD-MCNC: 11.2 G/DL (ref 11.7–15.4)

## 2017-11-30 PROCEDURE — 74011250637 HC RX REV CODE- 250/637: Performed by: ANESTHESIOLOGY

## 2017-11-30 PROCEDURE — 74011250636 HC RX REV CODE- 250/636: Performed by: UROLOGY

## 2017-11-30 PROCEDURE — 74011250636 HC RX REV CODE- 250/636

## 2017-11-30 PROCEDURE — 82962 GLUCOSE BLOOD TEST: CPT

## 2017-11-30 PROCEDURE — 85018 HEMOGLOBIN: CPT | Performed by: ANESTHESIOLOGY

## 2017-11-30 PROCEDURE — 74011250636 HC RX REV CODE- 250/636: Performed by: ANESTHESIOLOGY

## 2017-11-30 PROCEDURE — 76210000020 HC REC RM PH II FIRST 0.5 HR: Performed by: UROLOGY

## 2017-11-30 PROCEDURE — 76060000032 HC ANESTHESIA 0.5 TO 1 HR: Performed by: UROLOGY

## 2017-11-30 PROCEDURE — 76210000006 HC OR PH I REC 0.5 TO 1 HR: Performed by: UROLOGY

## 2017-11-30 PROCEDURE — 76010000138 HC OR TIME 0.5 TO 1 HR: Performed by: UROLOGY

## 2017-11-30 PROCEDURE — 74011000250 HC RX REV CODE- 250

## 2017-11-30 PROCEDURE — 74011000250 HC RX REV CODE- 250: Performed by: ANESTHESIOLOGY

## 2017-11-30 PROCEDURE — 50590 FRAGMENTING OF KIDNEY STONE: CPT | Performed by: UROLOGY

## 2017-11-30 PROCEDURE — 77030020143 HC AIRWY LARYN INTUB CGAS -A: Performed by: ANESTHESIOLOGY

## 2017-11-30 RX ORDER — CEFAZOLIN SODIUM IN 0.9 % NACL 2 G/50 ML
2 INTRAVENOUS SOLUTION, PIGGYBACK (ML) INTRAVENOUS ONCE
Status: COMPLETED | OUTPATIENT
Start: 2017-11-30 | End: 2017-11-30

## 2017-11-30 RX ORDER — SODIUM CHLORIDE 0.9 % (FLUSH) 0.9 %
5-10 SYRINGE (ML) INJECTION AS NEEDED
Status: DISCONTINUED | OUTPATIENT
Start: 2017-11-30 | End: 2017-12-01 | Stop reason: HOSPADM

## 2017-11-30 RX ORDER — ACETAMINOPHEN 500 MG
1000 TABLET ORAL ONCE
Status: COMPLETED | OUTPATIENT
Start: 2017-11-30 | End: 2017-11-30

## 2017-11-30 RX ORDER — TAMSULOSIN HYDROCHLORIDE 0.4 MG/1
0.4 CAPSULE ORAL DAILY
Qty: 20 CAP | Refills: 0 | Status: SHIPPED | OUTPATIENT
Start: 2017-11-30 | End: 2018-01-03 | Stop reason: CLARIF

## 2017-11-30 RX ORDER — OXYCODONE AND ACETAMINOPHEN 5; 325 MG/1; MG/1
1-2 TABLET ORAL
Qty: 25 TAB | Refills: 0 | Status: SHIPPED | OUTPATIENT
Start: 2017-11-30 | End: 2018-01-03 | Stop reason: CLARIF

## 2017-11-30 RX ORDER — LIDOCAINE HYDROCHLORIDE 10 MG/ML
0.1 INJECTION INFILTRATION; PERINEURAL AS NEEDED
Status: DISCONTINUED | OUTPATIENT
Start: 2017-11-30 | End: 2017-11-30 | Stop reason: HOSPADM

## 2017-11-30 RX ORDER — PROPOFOL 10 MG/ML
INJECTION, EMULSION INTRAVENOUS AS NEEDED
Status: DISCONTINUED | OUTPATIENT
Start: 2017-11-30 | End: 2017-11-30 | Stop reason: HOSPADM

## 2017-11-30 RX ORDER — NALOXONE HYDROCHLORIDE 0.4 MG/ML
0.1 INJECTION, SOLUTION INTRAMUSCULAR; INTRAVENOUS; SUBCUTANEOUS AS NEEDED
Status: DISCONTINUED | OUTPATIENT
Start: 2017-11-30 | End: 2017-12-01 | Stop reason: HOSPADM

## 2017-11-30 RX ORDER — SODIUM CHLORIDE, SODIUM LACTATE, POTASSIUM CHLORIDE, CALCIUM CHLORIDE 600; 310; 30; 20 MG/100ML; MG/100ML; MG/100ML; MG/100ML
125 INJECTION, SOLUTION INTRAVENOUS CONTINUOUS
Status: DISCONTINUED | OUTPATIENT
Start: 2017-11-30 | End: 2017-11-30 | Stop reason: HOSPADM

## 2017-11-30 RX ORDER — ONDANSETRON 2 MG/ML
INJECTION INTRAMUSCULAR; INTRAVENOUS AS NEEDED
Status: DISCONTINUED | OUTPATIENT
Start: 2017-11-30 | End: 2017-11-30 | Stop reason: HOSPADM

## 2017-11-30 RX ORDER — SODIUM CHLORIDE, SODIUM LACTATE, POTASSIUM CHLORIDE, CALCIUM CHLORIDE 600; 310; 30; 20 MG/100ML; MG/100ML; MG/100ML; MG/100ML
75 INJECTION, SOLUTION INTRAVENOUS CONTINUOUS
Status: DISCONTINUED | OUTPATIENT
Start: 2017-11-30 | End: 2017-11-30 | Stop reason: HOSPADM

## 2017-11-30 RX ORDER — KETOROLAC TROMETHAMINE 30 MG/ML
30 INJECTION, SOLUTION INTRAMUSCULAR; INTRAVENOUS AS NEEDED
Status: DISCONTINUED | OUTPATIENT
Start: 2017-11-30 | End: 2017-12-01 | Stop reason: HOSPADM

## 2017-11-30 RX ORDER — SODIUM CHLORIDE, SODIUM LACTATE, POTASSIUM CHLORIDE, CALCIUM CHLORIDE 600; 310; 30; 20 MG/100ML; MG/100ML; MG/100ML; MG/100ML
125 INJECTION, SOLUTION INTRAVENOUS CONTINUOUS
Status: DISCONTINUED | OUTPATIENT
Start: 2017-11-30 | End: 2017-12-01 | Stop reason: HOSPADM

## 2017-11-30 RX ORDER — MIDAZOLAM HYDROCHLORIDE 1 MG/ML
2 INJECTION, SOLUTION INTRAMUSCULAR; INTRAVENOUS
Status: DISCONTINUED | OUTPATIENT
Start: 2017-11-30 | End: 2017-11-30 | Stop reason: HOSPADM

## 2017-11-30 RX ORDER — SODIUM CHLORIDE 0.9 % (FLUSH) 0.9 %
5-10 SYRINGE (ML) INJECTION AS NEEDED
Status: DISCONTINUED | OUTPATIENT
Start: 2017-11-30 | End: 2017-11-30 | Stop reason: HOSPADM

## 2017-11-30 RX ORDER — LIDOCAINE HYDROCHLORIDE 20 MG/ML
INJECTION, SOLUTION EPIDURAL; INFILTRATION; INTRACAUDAL; PERINEURAL AS NEEDED
Status: DISCONTINUED | OUTPATIENT
Start: 2017-11-30 | End: 2017-11-30 | Stop reason: HOSPADM

## 2017-11-30 RX ORDER — SODIUM CHLORIDE 0.9 % (FLUSH) 0.9 %
5-10 SYRINGE (ML) INJECTION EVERY 8 HOURS
Status: DISCONTINUED | OUTPATIENT
Start: 2017-11-30 | End: 2017-11-30 | Stop reason: HOSPADM

## 2017-11-30 RX ORDER — HYDROMORPHONE HYDROCHLORIDE 2 MG/ML
0.5 INJECTION, SOLUTION INTRAMUSCULAR; INTRAVENOUS; SUBCUTANEOUS
Status: DISCONTINUED | OUTPATIENT
Start: 2017-11-30 | End: 2017-12-01 | Stop reason: HOSPADM

## 2017-11-30 RX ADMIN — LIDOCAINE HYDROCHLORIDE 100 MG: 20 INJECTION, SOLUTION EPIDURAL; INFILTRATION; INTRACAUDAL; PERINEURAL at 18:52

## 2017-11-30 RX ADMIN — CEFAZOLIN 2 G: 1 INJECTION, POWDER, FOR SOLUTION INTRAMUSCULAR; INTRAVENOUS; PARENTERAL at 18:52

## 2017-11-30 RX ADMIN — MIDAZOLAM HYDROCHLORIDE 2 MG: 1 INJECTION, SOLUTION INTRAMUSCULAR; INTRAVENOUS at 18:10

## 2017-11-30 RX ADMIN — FAMOTIDINE 20 MG: 10 INJECTION, SOLUTION INTRAVENOUS at 18:09

## 2017-11-30 RX ADMIN — ONDANSETRON 4 MG: 2 INJECTION INTRAMUSCULAR; INTRAVENOUS at 18:57

## 2017-11-30 RX ADMIN — ACETAMINOPHEN 1000 MG: 500 TABLET, FILM COATED ORAL at 18:08

## 2017-11-30 RX ADMIN — SODIUM CHLORIDE, SODIUM LACTATE, POTASSIUM CHLORIDE, AND CALCIUM CHLORIDE 75 ML/HR: 600; 310; 30; 20 INJECTION, SOLUTION INTRAVENOUS at 17:01

## 2017-11-30 RX ADMIN — PROPOFOL 200 MG: 10 INJECTION, EMULSION INTRAVENOUS at 18:52

## 2017-11-30 RX ADMIN — SODIUM CHLORIDE, SODIUM LACTATE, POTASSIUM CHLORIDE, AND CALCIUM CHLORIDE: 600; 310; 30; 20 INJECTION, SOLUTION INTRAVENOUS at 18:46

## 2017-11-30 NOTE — IP AVS SNAPSHOT
[address] [address] 
[phone] Patient: Mattie Smith MRN: [id number] VQP:9/9/1979 About your hospitalization You were admitted on:  November 30, 2017 You last received care in the:  [facility] PACU You were discharged on:  November 30, 2017 Why you were hospitalized Your primary diagnosis was:  Not on File Things You Need To Do (next 8 weeks) Call Lila Mccullough MD in 2 week(s) Call for appt with NP in 2 weeks for JESSIE Phone:  [phone] Where:  [address], [facility] [address] Discharge Orders None A check keila indicates which time of day the medication should be taken. My Medications TAKE these medications as instructed Instructions Each Dose to Equal  
 Morning Noon Evening Bedtime  
 hydroCHLOROthiazide 25 mg tablet Commonly known as:  HYDRODIURIL Your last dose was: Your next dose is: Take 1 Tab by mouth every morning for 90 days. 25 mg  
    
   
   
   
  
 ibuprofen 600 mg tablet Commonly known as:  MOTRIN Your last dose was: Your next dose is: Take 1 Tab by mouth every six (6) hours as needed for Pain. 600 mg  
    
   
   
   
  
 meclizine 25 mg tablet Commonly known as:  ANTIVERT Your last dose was: Your next dose is: Take 25 mg by mouth three (3) times daily as needed. 25 mg  
    
   
   
   
  
 metFORMIN 1,000 mg tablet Commonly known as:  GLUCOPHAGE Your last dose was: Your next dose is: Take 2,000 mg by mouth two (2) times daily (with meals). 2000 mg  
    
   
   
   
  
 metroNIDAZOLE 500 mg tablet Commonly known as:  FLAGYL Your last dose was: Your next dose is: Take 1 Tab by mouth two (2) times a day for 10 days.   
 500 mg  
    
   
   
   
  
 * oxyCODONE-acetaminophen 5-325 mg per tablet Commonly known as:  PERCOCET Your last dose was: Your next dose is: Take 1-2 Tabs by mouth every four (4) hours as needed for Pain. Max Daily Amount: 12 Tabs. 1-2 Tab * oxyCODONE-acetaminophen 5-325 mg per tablet Commonly known as:  PERCOCET Your last dose was: Your next dose is: Take 1-2 Tabs by mouth every four (4) hours as needed for Pain. Max Daily Amount: 12 Tabs. 1-2 Tab PRAVACHOL 40 mg tablet Generic drug:  pravastatin Your last dose was: Your next dose is: Take 40 mg by mouth nightly. 40 mg  
    
   
   
   
  
 tamsulosin 0.4 mg capsule Commonly known as:  FLOMAX Your last dose was: Your next dose is: Take 1 Cap by mouth daily. 0.4 mg  
    
   
   
   
  
 * Notice: This list has 2 medication(s) that are the same as other medications prescribed for you. Read the directions carefully, and ask your doctor or other care provider to review them with you. Where to Get Your Medications These medications were sent to [facility], [address] [place] [address] Way [address] Hours:  24-hours Phone:  [phone]  
  tamsulosin 0.4 mg capsule Information on where to get these meds will be given to you by the nurse or doctor. ! Ask your nurse or doctor about these medications  
  oxyCODONE-acetaminophen 5-325 mg per tablet Discharge Instructions Call for appt with Nurse Practitionor  in 2 weeks for KUB ACTIVITY · As tolerated and as directed by your doctor. · Walking and mild exercise help to pass the stone fragments. DIET · Clear liquids until no nausea and vomiting; then resume light diet for the first day · Advance to regular diet on second day, unless your doctor orders otherwise. · If nauseated and/ or vomiting, call your doctor. · Drink at least 8 glasses of water a day (avoid caffeinated beverages). PAIN 
· Take pain medication as directed. · Call your doctor if pain is NOT relieved by medication. · DO NOT take aspirin or blood thinners until directed by your doctor. CALL YOUR DOCTOR IF  
· Expect blood-tinged urine. Call your doctor if it lasts more than 72 hours or if you cannot see through the urine. · Aches, chills, or fever of 101 degree F or above Lithotripsy does not make your stone disappear. The treatment is meant to crush your stone so that the fragments can be passed. Strain your urine, save any fragments, and take them to your doctor. The fragments may not begin to pass for up to 1-2 months. You may experience slight bruising at the treated site. After general anesthesia or intravenous sedation, for 24 hours or while taking prescription Narcotics: · Limit your activities · Do not drive and operate hazardous machinery · Do not make important personal or business decisions · Do  not drink alcoholic beverages · If you have not urinated within 8 hours after discharge, please contact your surgeon on call. *  Please give a list of your current medications to your Primary Care Provider. *  Please update this list whenever your medications are discontinued, doses are 
    changed, or new medications (including over-the-counter products) are added. *  Please carry medication information at all times in case of emergency situations. These are general instructions for a healthy lifestyle: No smoking/ No tobacco products/ Avoid exposure to second hand smoke Surgeon General's Warning:  Quitting smoking now greatly reduces serious risk to your health. Obesity, smoking, and sedentary lifestyle greatly increases your risk for illness A healthy diet, regular physical exercise & weight monitoring are important for maintaining a healthy lifestyle Recognize signs and symptoms of STROKE: 
F-face looks uneven A-arms unable to move or move unevenly S-speech slurred or non-existent T-time-call 911 as soon as signs and symptoms begin-DO NOT go Back to bed or wait to see if you get better-TIME IS BRAIN. Introducing [facility] & HEALTH SERVICES! Dear Paramjit Dimas: 
Thank you for requesting a Barcoding account. Our records indicate that you already have an active Barcoding account. You can access your account anytime at [email or web address] Eco Did you know that you can access your hospital and ER discharge instructions at any time in Barcoding? You can also review all of your test results from your hospital stay or ER visit. Additional Information If you have questions, please visit the Frequently Asked Questions section of the Barcoding website at [email or web address] Remember, Barcoding is NOT to be used for urgent needs. For medical emergencies, dial 911. Now available from your iPhone and Android! Providers Seen During Your Hospitalization Provider Specialty Primary office phone Hugo Crews MD Urology [phone] Your Primary Care Physician (PCP) Primary Care Physician Office Phone Office Fax [facility] [facility], [address] [place] [address] [phone] You are allergic to the following Allergen Reactions Lisinopril Anaphylaxis Ace Inhibitors Angioedema Bactrim (Sulfamethoxazole-Trimethoprim) Hives Norvasc (Amlodipine) Other (comments) Pt denies any allergic reaction to Norvasc Oxycodone Other (comments) Pt denies any type of allergic reaction with Oxycodone Sulfa (Sulfonamide Antibiotics) Hives Recent Documentation Height Weight BMI OB Status Smoking Status 1.626 m 111.6 kg 42.24 kg/m2 Ablation Former Smoker Emergency Contacts Name Discharge Info Relation Home Work Mobile [address] CAREGIVER [3] Spouse [3] [phone] Patient Belongings The following personal items are in your possession at time of discharge: 
  Dental Appliances: None  Visual Aid: Glasses      Home Medications: None   Jewelry: None  Clothing: Footwear, Pants, Shirt, Undergarments    Other Valuables: Eyeglasses Please provide this summary of care documentation to your next provider. Signatures-by signing, you are acknowledging that this After Visit Summary has been reviewed with you and you have received a copy. Patient Signature:  ____________________________________________________________ Date:  ____________________________________________________________  
  
[facility] Provider Signature:  ____________________________________________________________ Date:  ____________________________________________________________

## 2017-11-30 NOTE — PROGRESS NOTES
Initial and pre-op visit made by [facility] [place], operated by [facility]. Pt appeared to be comfortable and in a good mood. Pt expressed being glad that the procedure is soon. Pt's son and linda were present during visit. Pt spoke of her health history and how they are working on helping her address her current and future health concerns. Pt shared of her family and was in a joking mood.  provided spiritual care through presence, active listening, supportive responses, and prayer.

## 2017-11-30 NOTE — ANESTHESIA PREPROCEDURE EVALUATION
Anesthetic History               Review of Systems / Medical History  Patient summary reviewed, nursing notes reviewed and pertinent labs reviewed    Pulmonary                   Neuro/Psych              Cardiovascular    Hypertension              Exercise tolerance[de-identified] Limited by morbid obesity     GI/Hepatic/Renal     GERD: well controlled    Renal disease: stones       Endo/Other    Diabetes: well controlled, type 2    Morbid obesity     Other Findings            Physical Exam    Airway  Mallampati: I  TM Distance: 4 - 6 cm  Neck ROM: normal range of motion   Mouth opening: Normal     Cardiovascular  Regular rate and rhythm,  S1 and S2 normal,  no murmur, click, rub, or gallop             Dental  No notable dental hx       Pulmonary  Breath sounds clear to auscultation               Abdominal  GI exam deferred       Other Findings            Anesthetic Plan    ASA: 3  Anesthesia type: general            Anesthetic plan and risks discussed with: Spouse and Son / Daughter

## 2017-12-01 NOTE — PERIOP NOTES
Preoperative flank skin condition: clear, dry and intact  Lead shield: {BSHSI YES  Patient ear protection: {MODEL YES/  Gel applied to patient's flank and Lithotripsy head: {MODEL YES/Starting power level:11  Shock start time (first  fluoroscopy): 1933  Shock stop time (last lithotripsy shock)1958  Ending power level:11  Total shocks: 3000  Total fluoroscopy time: 3.44  Postoperative flank skin condition:clear, dry and intact

## 2017-12-01 NOTE — OP NOTES
[address]   OPERATIVE REPORT       Name:  Renee Vásquez   MR#:  [id number]   :  1975   Account #:  [de-identified]   Date of Adm:  2017       DATE OF SURGERY: 2017     PREOPERATIVE DIAGNOSIS: Right renal calculus. POSTOPERATIVE DIAGNOSIS: Right renal calculus. PROCEDURE PERFORMED: Right renal extracorporeal shock wave   lithotripsy. SURGEON: Andres Griffin. Radha Cordero MD.     ANESTHESIA: General.     COMPLICATIONS:      FINDINGS: A 6 mm stone in the right mid kidney. DESCRIPTION OF PROCEDURE: The patient was given a general   anesthetic, placed in the supine position on the lithotripsy   treatment table. The stone was visualized by fluoroscopy. It was   a 6 mm stone in the right mid kidney. Treatment was begun at a low treatment setting and gradually   increased up to a power setting of 11 kV. A total of 3000 shocks   were given. The stone showed excellent pulverization. She   tolerated this well. PLAN: She will be discharged home. MEDICATIONS   1. Tamsulosin. 2. Norco 5. DISCHARGE INSTRUCTIONS: She is to call for an appointment to   come back in 2 weeks for a KUB.         MD Nohelia Benjamin / Leon Rayo   D:  2017   19:42   T:  2017   11:04   Job #:  [id number]

## 2017-12-01 NOTE — BRIEF OP NOTE
BRIEF OPERATIVE NOTE    Date of Procedure: 11/30/2017   Preoperative Diagnosis: Stone, kidney [N20.0]  Postoperative Diagnosis: Stone, kidney [N20.0]    Procedure(s):  RIGHT LITHOTRIPSY EXTRACORPOREAL SHOCKWAVE ESWL  REQUEST TO FOLLOW  Surgeon(s) and Role:     * Melvin Vega MD - Primary         Assistant Staff:       Surgical Staff:  Circ-1: Mona Bejarano RN  Radiology Technician: Aimee Brand, RT, R, CT  Event Time In   Incision Start 1858   Incision Close 1933     Anesthesia: General   Estimated Blood Loss: none  Specimens: * No specimens in log *   Findings: see op note   Complications: none  Implants: * No implants in log *

## 2017-12-01 NOTE — PERIOP NOTES
Preoperative flank skin condition: clear , dry and intact  Lead shield: {BSHSI Yes   [id number]}  Patient ear protection: {MODEL YES 200010}  Gel applied to patient's flank and Lithotripsy head: {MODEL YES:200010}  Starting power level: ***  Shock start time (first  fluoroscopy): ***  Shock stop time (last lithotripsy shock): ***  Ending power level: ***  Total shocks: ***  Total fluoroscopy time: ***  Postoperative flank skin condition: ***

## 2017-12-01 NOTE — DISCHARGE INSTRUCTIONS
Call for appt with Nurse Practitionor  in 2 weeks for KUB    ACTIVITY  · As tolerated and as directed by your doctor. · Walking and mild exercise help to pass the stone fragments. DIET  · Clear liquids until no nausea and vomiting; then resume light diet for the first day  · Advance to regular diet on second day, unless your doctor orders otherwise. · If nauseated and/ or vomiting, call your doctor. · Drink at least 8 glasses of water a day (avoid caffeinated beverages). PAIN  · Take pain medication as directed. · Call your doctor if pain is NOT relieved by medication. · DO NOT take aspirin or blood thinners until directed by your doctor. CALL YOUR DOCTOR IF   · Expect blood-tinged urine. Call your doctor if it lasts more than 72 hours or if you cannot see through the urine. · Aches, chills, or fever of 101 degree F or above    Lithotripsy does not make your stone disappear. The treatment is meant to crush your stone so that the fragments can be passed. Strain your urine, save any fragments, and take them to your doctor. The fragments may not begin to pass for up to 1-2 months. You may experience slight bruising at the treated site. After general anesthesia or intravenous sedation, for 24 hours or while taking prescription Narcotics:  · Limit your activities  · Do not drive and operate hazardous machinery  · Do not make important personal or business decisions  · Do  not drink alcoholic beverages  · If you have not urinated within 8 hours after discharge, please contact your surgeon on call. *  Please give a list of your current medications to your Primary Care Provider. *  Please update this list whenever your medications are discontinued, doses are      changed, or new medications (including over-the-counter products) are added. *  Please carry medication information at all times in case of emergency situations.     These are general instructions for a healthy lifestyle:  No smoking/ No tobacco products/ Avoid exposure to second hand smoke  Surgeon General's Warning:  Quitting smoking now greatly reduces serious risk to your health. Obesity, smoking, and sedentary lifestyle greatly increases your risk for illness  A healthy diet, regular physical exercise & weight monitoring are important for maintaining a healthy lifestyle    Recognize signs and symptoms of STROKE:  F-face looks uneven  A-arms unable to move or move unevenly  S-speech slurred or non-existent  T-time-call 911 as soon as signs and symptoms begin-DO NOT go       Back to bed or wait to see if you get better-TIME IS BRAIN.

## 2017-12-01 NOTE — ANESTHESIA POSTPROCEDURE EVALUATION
Post-Anesthesia Evaluation and Assessment    Patient: Delia Damon MRN: [id number]  SSN: [id number]    YOB: 1975  Age: 36 y.o. Sex: female       Cardiovascular Function/Vital Signs  Visit Vitals    /86    Pulse 77    Temp 36.9 °C (98.4 °F)    Resp 18    Ht 5' 4\" (1.626 m)    Wt 111.6 kg (246 lb 1.6 oz)    SpO2 98%    BMI 42.24 kg/m2       Patient is status post general anesthesia for Procedure(s):  RIGHT LITHOTRIPSY EXTRACORPOREAL SHOCKWAVE ESWL  REQUEST TO FOLLOW. Nausea/Vomiting: None    Postoperative hydration reviewed and adequate. Pain:  Pain Scale 1: Numeric (0 - 10) (11/30/17 2018)  Pain Intensity 1: 0 (11/30/17 2018)   Managed    Neurological Status:   Neuro (WDL): Within Defined Limits (11/30/17 2018)   At baseline    Mental Status and Level of Consciousness: Arousable    Pulmonary Status:   O2 Device: Room air (11/30/17 2018)   Adequate oxygenation and airway patent    Complications related to anesthesia: None    Post-anesthesia assessment completed.  No concerns    Signed By: Lata Ashley MD     November 30, 2017

## 2018-01-02 ENCOUNTER — HOSPITAL ENCOUNTER (OUTPATIENT)
Dept: GENERAL RADIOLOGY | Age: 43
Discharge: HOME | End: 2018-01-02
Attending: NURSE PRACTITIONER
Payer: COMMERCIAL

## 2018-01-02 VITALS — BODY MASS INDEX: 41.32 KG/M2 | WEIGHT: 242 LBS | HEIGHT: 64 IN

## 2018-01-02 DIAGNOSIS — R10.9 FLANK PAIN: ICD-10-CM

## 2018-01-02 DIAGNOSIS — Z87.442 HISTORY OF RENAL STONE: ICD-10-CM

## 2018-01-02 PROCEDURE — 74400 UROGRAPHY IV +-KUB TOMOG: CPT

## 2018-01-02 PROCEDURE — 74011636320 HC RX REV CODE- 636/320: Performed by: NURSE PRACTITIONER

## 2018-01-02 RX ADMIN — IOPAMIDOL 100 ML: 755 INJECTION, SOLUTION INTRAVENOUS at 09:34

## 2018-01-03 ENCOUNTER — ANESTHESIA EVENT (OUTPATIENT)
Dept: SURGERY | Age: 43
End: 2018-01-03
Payer: COMMERCIAL

## 2018-01-04 ENCOUNTER — ANESTHESIA (OUTPATIENT)
Dept: SURGERY | Age: 43
End: 2018-01-04
Payer: COMMERCIAL

## 2018-01-04 ENCOUNTER — HOSPITAL ENCOUNTER (OUTPATIENT)
Age: 43
Setting detail: OUTPATIENT SURGERY
Discharge: HOME | End: 2018-01-04
Attending: UROLOGY | Admitting: UROLOGY
Payer: COMMERCIAL

## 2018-01-04 VITALS
WEIGHT: 247 LBS | OXYGEN SATURATION: 100 % | SYSTOLIC BLOOD PRESSURE: 144 MMHG | HEART RATE: 77 BPM | BODY MASS INDEX: 42.4 KG/M2 | RESPIRATION RATE: 16 BRPM | TEMPERATURE: 97.8 F | DIASTOLIC BLOOD PRESSURE: 71 MMHG

## 2018-01-04 DIAGNOSIS — N20.0 KIDNEY STONES: Primary | ICD-10-CM

## 2018-01-04 LAB — GLUCOSE BLD STRIP.AUTO-MCNC: 92 MG/DL (ref 65–100)

## 2018-01-04 PROCEDURE — 50590 FRAGMENTING OF KIDNEY STONE: CPT | Performed by: UROLOGY

## 2018-01-04 PROCEDURE — 76210000021 HC REC RM PH II 0.5 TO 1 HR: Performed by: UROLOGY

## 2018-01-04 PROCEDURE — 74011250636 HC RX REV CODE- 250/636

## 2018-01-04 PROCEDURE — 76010000149 HC OR TIME 1 TO 1.5 HR: Performed by: UROLOGY

## 2018-01-04 PROCEDURE — 76210000063 HC OR PH I REC FIRST 0.5 HR: Performed by: UROLOGY

## 2018-01-04 PROCEDURE — 76060000033 HC ANESTHESIA 1 TO 1.5 HR: Performed by: UROLOGY

## 2018-01-04 PROCEDURE — 74011250636 HC RX REV CODE- 250/636: Performed by: UROLOGY

## 2018-01-04 PROCEDURE — 74011000250 HC RX REV CODE- 250

## 2018-01-04 PROCEDURE — 74011250637 HC RX REV CODE- 250/637: Performed by: ANESTHESIOLOGY

## 2018-01-04 PROCEDURE — 74011250636 HC RX REV CODE- 250/636: Performed by: ANESTHESIOLOGY

## 2018-01-04 PROCEDURE — 82962 GLUCOSE BLOOD TEST: CPT

## 2018-01-04 PROCEDURE — 77030020143 HC AIRWY LARYN INTUB CGAS -A: Performed by: ANESTHESIOLOGY

## 2018-01-04 PROCEDURE — 77030032490 HC SLV COMPR SCD KNE COVD -B: Performed by: UROLOGY

## 2018-01-04 RX ORDER — TAMSULOSIN HYDROCHLORIDE 0.4 MG/1
0.4 CAPSULE ORAL DAILY
Qty: 20 CAP | Refills: 0 | Status: SHIPPED | OUTPATIENT
Start: 2018-01-04 | End: 2018-01-24 | Stop reason: SINTOL

## 2018-01-04 RX ORDER — ONDANSETRON 2 MG/ML
4 INJECTION INTRAMUSCULAR; INTRAVENOUS ONCE
Status: DISCONTINUED | OUTPATIENT
Start: 2018-01-04 | End: 2018-01-04 | Stop reason: HOSPADM

## 2018-01-04 RX ORDER — LIDOCAINE HYDROCHLORIDE 10 MG/ML
0.1 INJECTION INFILTRATION; PERINEURAL AS NEEDED
Status: DISCONTINUED | OUTPATIENT
Start: 2018-01-04 | End: 2018-01-04 | Stop reason: HOSPADM

## 2018-01-04 RX ORDER — SODIUM CHLORIDE, SODIUM LACTATE, POTASSIUM CHLORIDE, CALCIUM CHLORIDE 600; 310; 30; 20 MG/100ML; MG/100ML; MG/100ML; MG/100ML
100 INJECTION, SOLUTION INTRAVENOUS CONTINUOUS
Status: DISCONTINUED | OUTPATIENT
Start: 2018-01-04 | End: 2018-01-04 | Stop reason: HOSPADM

## 2018-01-04 RX ORDER — CEFAZOLIN SODIUM/WATER 2 G/20 ML
2 SYRINGE (ML) INTRAVENOUS ONCE
Status: COMPLETED | OUTPATIENT
Start: 2018-01-04 | End: 2018-01-04

## 2018-01-04 RX ORDER — MIDAZOLAM HYDROCHLORIDE 1 MG/ML
2 INJECTION, SOLUTION INTRAMUSCULAR; INTRAVENOUS ONCE
Status: DISCONTINUED | OUTPATIENT
Start: 2018-01-04 | End: 2018-01-04 | Stop reason: HOSPADM

## 2018-01-04 RX ORDER — MIDAZOLAM HYDROCHLORIDE 1 MG/ML
INJECTION, SOLUTION INTRAMUSCULAR; INTRAVENOUS AS NEEDED
Status: DISCONTINUED | OUTPATIENT
Start: 2018-01-04 | End: 2018-01-04 | Stop reason: HOSPADM

## 2018-01-04 RX ORDER — NALOXONE HYDROCHLORIDE 0.4 MG/ML
0.1 INJECTION, SOLUTION INTRAMUSCULAR; INTRAVENOUS; SUBCUTANEOUS AS NEEDED
Status: DISCONTINUED | OUTPATIENT
Start: 2018-01-04 | End: 2018-01-04 | Stop reason: HOSPADM

## 2018-01-04 RX ORDER — OXYCODONE HYDROCHLORIDE 5 MG/1
5 TABLET ORAL ONCE
Status: COMPLETED | OUTPATIENT
Start: 2018-01-04 | End: 2018-01-04

## 2018-01-04 RX ORDER — LIDOCAINE HYDROCHLORIDE 20 MG/ML
INJECTION, SOLUTION EPIDURAL; INFILTRATION; INTRACAUDAL; PERINEURAL AS NEEDED
Status: DISCONTINUED | OUTPATIENT
Start: 2018-01-04 | End: 2018-01-04 | Stop reason: HOSPADM

## 2018-01-04 RX ORDER — DEXAMETHASONE SODIUM PHOSPHATE 4 MG/ML
INJECTION, SOLUTION INTRA-ARTICULAR; INTRALESIONAL; INTRAMUSCULAR; INTRAVENOUS; SOFT TISSUE AS NEEDED
Status: DISCONTINUED | OUTPATIENT
Start: 2018-01-04 | End: 2018-01-04 | Stop reason: HOSPADM

## 2018-01-04 RX ORDER — HYDROMORPHONE HYDROCHLORIDE 2 MG/ML
0.5 INJECTION, SOLUTION INTRAMUSCULAR; INTRAVENOUS; SUBCUTANEOUS
Status: DISCONTINUED | OUTPATIENT
Start: 2018-01-04 | End: 2018-01-04 | Stop reason: HOSPADM

## 2018-01-04 RX ORDER — PROPOFOL 10 MG/ML
INJECTION, EMULSION INTRAVENOUS AS NEEDED
Status: DISCONTINUED | OUTPATIENT
Start: 2018-01-04 | End: 2018-01-04 | Stop reason: HOSPADM

## 2018-01-04 RX ORDER — DIPHENHYDRAMINE HYDROCHLORIDE 50 MG/ML
12.5 INJECTION, SOLUTION INTRAMUSCULAR; INTRAVENOUS
Status: DISCONTINUED | OUTPATIENT
Start: 2018-01-04 | End: 2018-01-04 | Stop reason: HOSPADM

## 2018-01-04 RX ORDER — FENTANYL CITRATE 50 UG/ML
INJECTION, SOLUTION INTRAMUSCULAR; INTRAVENOUS AS NEEDED
Status: DISCONTINUED | OUTPATIENT
Start: 2018-01-04 | End: 2018-01-04 | Stop reason: HOSPADM

## 2018-01-04 RX ORDER — MIDAZOLAM HYDROCHLORIDE 1 MG/ML
2 INJECTION, SOLUTION INTRAMUSCULAR; INTRAVENOUS
Status: DISCONTINUED | OUTPATIENT
Start: 2018-01-04 | End: 2018-01-04 | Stop reason: HOSPADM

## 2018-01-04 RX ORDER — ONDANSETRON 2 MG/ML
INJECTION INTRAMUSCULAR; INTRAVENOUS AS NEEDED
Status: DISCONTINUED | OUTPATIENT
Start: 2018-01-04 | End: 2018-01-04 | Stop reason: HOSPADM

## 2018-01-04 RX ORDER — FENTANYL CITRATE 50 UG/ML
100 INJECTION, SOLUTION INTRAMUSCULAR; INTRAVENOUS ONCE
Status: DISCONTINUED | OUTPATIENT
Start: 2018-01-04 | End: 2018-01-04 | Stop reason: HOSPADM

## 2018-01-04 RX ORDER — HYDROCODONE BITARTRATE AND ACETAMINOPHEN 5; 325 MG/1; MG/1
1-2 TABLET ORAL
Qty: 20 TAB | Refills: 0 | Status: SHIPPED | OUTPATIENT
Start: 2018-01-04 | End: 2018-01-25

## 2018-01-04 RX ORDER — ALBUTEROL SULFATE 0.83 MG/ML
2.5 SOLUTION RESPIRATORY (INHALATION) AS NEEDED
Status: DISCONTINUED | OUTPATIENT
Start: 2018-01-04 | End: 2018-01-04 | Stop reason: HOSPADM

## 2018-01-04 RX ADMIN — MIDAZOLAM HYDROCHLORIDE 2 MG: 1 INJECTION, SOLUTION INTRAMUSCULAR; INTRAVENOUS at 15:06

## 2018-01-04 RX ADMIN — PROPOFOL 200 MG: 10 INJECTION, EMULSION INTRAVENOUS at 15:20

## 2018-01-04 RX ADMIN — ONDANSETRON 4 MG: 2 INJECTION INTRAMUSCULAR; INTRAVENOUS at 15:40

## 2018-01-04 RX ADMIN — FENTANYL CITRATE 50 MCG: 50 INJECTION, SOLUTION INTRAMUSCULAR; INTRAVENOUS at 15:47

## 2018-01-04 RX ADMIN — OXYCODONE HYDROCHLORIDE 5 MG: 5 TABLET ORAL at 16:57

## 2018-01-04 RX ADMIN — LIDOCAINE HYDROCHLORIDE 100 MG: 20 INJECTION, SOLUTION EPIDURAL; INFILTRATION; INTRACAUDAL; PERINEURAL at 15:20

## 2018-01-04 RX ADMIN — SODIUM CHLORIDE, SODIUM LACTATE, POTASSIUM CHLORIDE, AND CALCIUM CHLORIDE 100 ML/HR: 600; 310; 30; 20 INJECTION, SOLUTION INTRAVENOUS at 14:15

## 2018-01-04 RX ADMIN — FENTANYL CITRATE 50 MCG: 50 INJECTION, SOLUTION INTRAMUSCULAR; INTRAVENOUS at 15:09

## 2018-01-04 RX ADMIN — Medication 2 G: at 15:09

## 2018-01-04 RX ADMIN — DEXAMETHASONE SODIUM PHOSPHATE 4 MG: 4 INJECTION, SOLUTION INTRA-ARTICULAR; INTRALESIONAL; INTRAMUSCULAR; INTRAVENOUS; SOFT TISSUE at 15:40

## 2018-01-04 NOTE — PERIOP NOTES
PACU DISCHARGE NOTE    Vital signs stable, pain well controlled, alert and oriented times three or at baseline, follow up per surgeon, no anesthetic complications. Discharge instructions given to pt, her , and her father. All voice understanding of instructions. Pt's , Yo Valdivia, has the prescription for Cobb and pt's belongings. Pt is tolerating PO and has had her IV removed intact. Pt to discharge door via wheelchair and left in care of her .

## 2018-01-04 NOTE — ANESTHESIA POSTPROCEDURE EVALUATION
Post-Anesthesia Evaluation and Assessment    Patient: Sia Balderas MRN: [id number]  SSN: [id number]    YOB: 1975  Age: 36 y.o. Sex: female       Cardiovascular Function/Vital Signs  Visit Vitals    /83 (BP 1 Location: Right arm, BP Patient Position: At rest)    Pulse 87    Temp 36.6 °C (97.8 °F)    Resp 16    Wt 112 kg (247 lb)    SpO2 100%    BMI 42.4 kg/m2       Patient is status post general anesthesia for Procedure(s):  LEFT LITHOTRIPSY EXTRACORPOREAL SHOCKWAVE . Nausea/Vomiting: None    Postoperative hydration reviewed and adequate. Pain:  Pain Scale 1: Numeric (0 - 10) (01/04/18 1635)  Pain Intensity 1: 0 (01/04/18 1635)   Managed    Neurological Status:   Neuro (WDL): Exceptions to WDL (01/04/18 1635)  Neuro  Neurologic State: Drowsy (01/04/18 1635)  Orientation Level: Oriented X4 (01/04/18 1635)  LUE Motor Response: Purposeful (01/04/18 1635)  LLE Motor Response: Purposeful (01/04/18 1635)  RUE Motor Response: Purposeful (01/04/18 1635)  RLE Motor Response: Purposeful (01/04/18 1635)   At baseline    Mental Status and Level of Consciousness: Arousable    Pulmonary Status:   O2 Device: Room air (01/04/18 1635)   Adequate oxygenation and airway patent    Complications related to anesthesia: None    Post-anesthesia assessment completed.  No concerns    Signed By: Deejay Emery MD     January 4, 2018

## 2018-01-04 NOTE — BRIEF OP NOTE
BRIEF OPERATIVE NOTE    Date of Procedure: 1/4/2018   Preoperative Diagnosis: Stone, kidney [N20.0]  Postoperative Diagnosis: LEFT UPJ STONE    Procedure(s):  LEFT LITHOTRIPSY EXTRACORPOREAL SHOCKWAVE   Surgeon(s) and Role:     * Stephani Levine MD - Primary         Assistant Staff:       Surgical Staff:  Circ-1: Otoniel Martinez RN  Circ-Relief: Lia Jaeger RN  Radiology Technician: Raisa Bergeron, RT, R, CT  Event Time In   Incision Start 1536   Incision Close 1604     Anesthesia: General   Estimated Blood Loss: nne  Specimens: * No specimens in log *   Findings: see op note   Complications: none  Implants: * No implants in log *

## 2018-01-04 NOTE — IP AVS SNAPSHOT
[address] 
[phone] Patient: Guy Ly MRN: [id number] SRM:3/6[birth year]102 About your hospitalization You were admitted on:  January 4, 2018 You last received care in the:  [facility] OP PACU You were discharged on:  January 4, 2018 Why you were hospitalized Your primary diagnosis was:  Not on File Follow-up Information Follow up With Details Comments Contact Info MD Dr. Melvin Norwood office will call you [facility] follow-up date and time. [address] [address] 
[phone] Your Scheduled Appointments Monday January 08, 2018  8:30 AM EST Office Visit with Stephani Levine MD  
[facility] ([facility] [place] [address]) [address] [address]  
[phone] Friday February 16, 2018 11:20 AM EST Office Visit with Stephani Levine MD  
[facility] ([facility] [place] [address]) [address] [address]  
[phone] Discharge Orders None A check keila indicates which time of day the medication should be taken. My Medications START taking these medications Instructions Each Dose to Equal  
 Morning Noon Evening Bedtime HYDROcodone-acetaminophen 5-325 mg per tablet Commonly known as:  Chalo Fraction Your last dose was: Your next dose is: Take 1-2 Tabs by mouth every four (4) hours as needed for Pain. Max Daily Amount: 12 Tabs. Indications: Pain 1-2 Tab  
    
   
   
   
  
 tamsulosin 0.4 mg capsule Commonly known as:  FLOMAX Your last dose was: Your next dose is: Take 1 Cap by mouth daily. 0.4 mg  
    
   
   
   
  
  
CONTINUE taking these medications Instructions Each Dose to Equal  
 Morning Noon Evening Bedtime  
 ibuprofen 600 mg tablet Commonly known as:  MOTRIN  
 Your last dose was: Your next dose is: Take 1 Tab by mouth every six (6) hours as needed for Pain. 600 mg  
    
   
   
   
  
 meclizine 25 mg tablet Commonly known as:  ANTIVERT Your last dose was: Your next dose is: Take 25 mg by mouth three (3) times daily as needed. 25 mg  
    
   
   
   
  
 metFORMIN 1,000 mg tablet Commonly known as:  GLUCOPHAGE Your last dose was: Your next dose is: Take 1,000 mg by mouth two (2) times daily (with meals). Indications: type 2 diabetes mellitus 1000 mg  
    
   
   
   
  
 metroNIDAZOLE 500 mg tablet Commonly known as:  FLAGYL Your last dose was: Your next dose is: Take 1 Tab by mouth two (2) times a day for 10 days. 500 mg  
    
   
   
   
  
 oxyCODONE-acetaminophen 5-325 mg per tablet Commonly known as:  PERCOCET Your last dose was: Your next dose is: Take 1-2 Tabs by mouth every four (4) hours as needed for Pain. Max Daily Amount: 12 Tabs. 1-2 Tab PRAVACHOL 40 mg tablet Generic drug:  pravastatin Your last dose was: Your next dose is: Take 40 mg by mouth nightly. 40 mg Where to Get Your Medications These medications were sent to [address] [facility], [address] [place] [address] Way [address] Hours:  24-hours Phone:  [phone]  
  tamsulosin 0.4 mg capsule Information on where to get these meds will be given to you by the nurse or doctor. ! Ask your nurse or doctor about these medications HYDROcodone-acetaminophen 5-325 mg per tablet Discharge Instructions Lithotripsy: What to Expect at [facility] Your Recovery Lithotripsy is a way to treat kidney stones without surgery.  It is also called extracorporeal shock wave lithotripsy, or ESWL. This treatment uses sound waves to break kidney stones into tiny pieces. These pieces can then pass out of the body in the urine. You may have a small amount of blood in your urine after this treatment. Your urine may be slightly pink or reddish. The blood in the urine often goes away after 2 days. This care sheet gives you a general idea about how long it will take for you to recover. But each person recovers at a different pace. Follow the steps below to feel better as quickly as possible. How can you care for yourself at home? Activity ? · Rest as much as you need to after you go home. ? · You may do your regular activities. But avoid hard exercise or sports for a week. Wait until there is no blood in your urine and the stent is out. Diet ? · You can eat your normal diet. ? · Drink plenty of fluids, enough so that your urine is light yellow or clear like water. If you have kidney, heart, or liver disease and have to limit fluids, talk with your doctor before you increase the amount of fluids you drink. Medicines ? · Your doctor will tell you if and when you can restart your medicines. He or she will also give you instructions about taking any new medicines. ? · If you take blood thinners, such as warfarin (Coumadin), clopidogrel (Plavix), or aspirin, be sure to talk to your doctor. He or she will tell you if and when to start taking those medicines again. Make sure that you understand exactly what your doctor wants you to do. ? · Be safe with medicines. Read and follow all instructions on the label. ¨ If the doctor gave you a prescription medicine for pain, take it as prescribed. ¨ If you are not taking a prescription pain medicine, ask your doctor if you can take acetaminophen (Tylenol). Do not take ibuprofen (Advil, Motrin) or naproxen (Aleve), or similar medicines unless your doctor tells you to. ¨ Do not take two or more pain medicines at the same time unless the doctor told you to. Many pain medicines have acetaminophen, which is Tylenol. Too much acetaminophen (Tylenol) can be harmful. Other instructions ? · Urinate through the strainer the doctor gives you. Save any stone pieces, including those that look like sand or gravel. Take these to your doctor. This will help your doctor find the cause of your stones. Follow-up care is a key part of your treatment and safety. Be sure to make and go to all appointments, and call your doctor if you are having problems. It's also a good idea to know your test results and keep a list of the medicines you take. When should you call for help? Call 911 anytime you think you may need emergency care. For example, call if: 
? · You passed out (lost consciousness). ? · You have chest pain, are short of breath, or cough up blood. ?Call your doctor now or seek immediate medical care if: 
? · You have pain that does not get better after you take pain medicine. ? · You have new or more blood clots in your urine. (It is normal for the urine to be pink for a few days.) ? · You cannot urinate. ? · You have symptoms of a urinary tract infection. These may include: 
¨ Pain or burning when you urinate. ¨ A frequent need to urinate without being able to pass much urine. ¨ Pain in the flank, which is just below the rib cage and above the waist on either side of the back. ¨ Blood in the urine. ¨ A fever. ? · You are sick to your stomach or cannot drink fluids. ? · You have signs of a blood clot in your leg (called a deep vein thrombosis), such as: 
¨ Pain in the calf, back of the knee, thigh, or groin. ¨ Redness and swelling in your leg. ? Watch closely for any changes in your health, and be sure to contact your doctor if you have any problems. Where can you learn more? Go to http://gwen-brennan.info/. Enter B911 in the search box to learn more about \"Lithotripsy: What to Expect at Home. \" Current as of: May 12, 2017 Content Version: 11.4 © 3026-1116 Healthwise, i4.ms. Care instructions adapted under license by [employer] (which disclaims liability or warranty for this information). If you have questions about a medical condition or this instruction, always ask your healthcare professional. [place] [address] any warranty or liability for your use of this information. After general anesthesia or intravenous sedation, for 24 hours or while taking prescription Narcotics: · Limit your activities · Do not drive and operate hazardous machinery · Do not make important personal or business decisions · Do  not drink alcoholic beverages · If you have not urinated within 8 hours after discharge, please contact your surgeon on call. *  Please give a list of your current medications to your Primary Care Provider. *  Please update this list whenever your medications are discontinued, doses are 
    changed, or new medications (including over-the-counter products) are added. *  Please carry medication information at all times in case of emergency situations. These are general instructions for a healthy lifestyle: No smoking/ No tobacco products/ Avoid exposure to second hand smoke Surgeon General's Warning:  Quitting smoking now greatly reduces serious risk to your health. Obesity, smoking, and sedentary lifestyle greatly increases your risk for illness A healthy diet, regular physical exercise & weight monitoring are important for maintaining a healthy lifestyle You may be retaining fluid if you have a history of heart failure or if you experience any of the following symptoms:  Weight gain of 3 pounds or more overnight or 5 pounds in a week, increased swelling in our hands or feet or shortness of breath while lying flat in bed.   Please call your doctor as soon as you notice any of these symptoms; do not wait until your next office visit. Recognize signs and symptoms of STROKE: 
 
F-face looks uneven A-arms unable to move or move unevenly S-speech slurred or non-existent T-time-call 911 as soon as signs and symptoms begin-DO NOT go Back to bed or wait to see if you get better-TIME IS BRAIN. [address] January 4, 2018 RE: Anthony Spear To Whom It May Concern, This is to certify that Anthony Spear may may return to work on Monday, January 8. She was a patient here at [facility] on Thursday, January 4, 2018. Please feel free to contact my office if you have any questions or concerns. Thank you for your assistance in this matter. Sincerely, Homero Garcia RN On behalf of Dr. Jose Taylor. Lisha Blackman. ([phone]) Introducing [facility] SERVICES! Dear Sachi Simmons: 
Thank you for requesting a Splyst account. Our records indicate that you already have an active Splyst account. You can access your account anytime at [email or web address] Did you know that you can access your hospital and ER discharge instructions at any time in Splyst? You can also review all of your test results from your hospital stay or ER visit. Additional Information If you have questions, please visit the Frequently Asked Questions section of the Splyst website at [email or web address] Remember, Splyst is NOT to be used for urgent needs. For medical emergencies, dial 911. Now available from your iPhone and Android! Providers Seen During Your Hospitalization Provider Specialty Primary office phone Joce St MD Urology [phone] Your Primary Care Physician (PCP) Primary Care Physician Office Phone Office Fax [address] [facility], [address] [place] [address] [phone] You are allergic to the following Allergen Reactions Lisinopril Anaphylaxis Ace Inhibitors Angioedema Bactrim (Sulfamethoxazole-Trimethoprim) Hives Norvasc (Amlodipine) Other (comments) Pt denies any allergic reaction to Norvasc Sulfa (Sulfonamide Antibiotics) Hives Recent Documentation Weight BMI OB Status Smoking Status 112 kg 42.4 kg/m2 Ablation Former Smoker Emergency Contacts Name Discharge Info Relation Home Work Mobile [address] CAREGIVER [3] Spouse [3] [phone] Patient Belongings The following personal items are in your possession at time of discharge: 
  Dental Appliances: None  Visual Aid: Glasses      Home Medications: None   Jewelry: None  Clothing: Footwear, Undergarments, Shirt, Pants    Other Valuables: Cell Phone, Eyeglasses Please provide this summary of care documentation to your next provider. Signatures-by signing, you are acknowledging that this After Visit Summary has been reviewed with you and you have received a copy. Patient Signature:  ____________________________________________________________ Date:  ____________________________________________________________  
  
Shea Moritz Provider Signature:  ____________________________________________________________ Date:  ____________________________________________________________

## 2018-01-04 NOTE — PERIOP NOTES
Preoperative flank skin condition: WARM DRY AND INTACT  Lead shield: Yes  Patient ear protection: Yes   Gel applied to patient's flank and Lithotripsy head: Yes   Starting power level: 3  Shock start time (first  fluoroscopy): 15:26  Shock stop time (last lithotripsy shock): 16:03  Ending power level: 11  Total shocks: 3000  Total fluoroscopy time: 2 min 59 sec  Postoperative flank skin condition: pink     FILM TAKEN PRIOR TO ANESTHESIA INDUCTION CONFIRMING LEFT UPJ STONE

## 2018-01-04 NOTE — PERIOP NOTES
Pt had an allergy for Oxycodone listed on her chart. Pt states she is not allergic and has taken Percocet recently. Her  also states she has taken Oxycodone without issues. Spoke with Kaylene Mendez in pharmacy removed the allergy from pt's chart. Pt does not want [place] prescription that Dr. Clare Kim left for her and wants Dr. Clare Kim notified. Called Dr. Clare Kim. He states he had to write Kristine Pako due to the allergy listed and will be unable to write another prescription until the morning. Explained above info pt and her .

## 2018-01-04 NOTE — OP NOTES
[address]  OPERATIVE REPORT    Hayley Lawrence  MR#: [id number]  : 1975  ACCOUNT #: [de-identified]   DATE OF SERVICE: 2018    PREOPERATIVE DIAGNOSIS:  Left ureteral calculus. POSTOPERATIVE DIAGNOSIS:  Left ureteral calculus. PROCEDURE:  Left extracorporeal shock wave lithotripsy. SURGEON:  Kimberley Ortiz MD    FINDINGS:  A 4 mm stone in the left proximal ureter. DESCRIPTION OF PROCEDURE:  The patient was placed on the lithotripsy treatment table. Fluoroscopy was used to identify the stone. This is a 4 mm calcification located in the area of the left UPJ. She was then given a general anesthetic. Treatment was begun on the left proximal stone at low treatment setting, gradually increased power to 11 kV. A total of 3000 shocks were given. The stone showed excellent pulverization. There were no complications. She tolerated this well. PLAN:  She is to be discharged home. Return to the office in 2 weeks for KUB. ANESTHESIA:  General.    COMPLICATIONS:  None. BLOOD LOSS:  None. SPECIMEN:  None.       Janeann Qualia, MD Jillene Schwab / Lakesha Conde  D: 2018 16:17     T: 2018 17:21  JOB #: [id number]

## 2018-01-04 NOTE — DISCHARGE INSTRUCTIONS
Lithotripsy: What to Expect at [address] is a way to treat kidney stones without surgery. It is also called extracorporeal shock wave lithotripsy, or ESWL. This treatment uses sound waves to break kidney stones into tiny pieces. These pieces can then pass out of the body in the urine. You may have a small amount of blood in your urine after this treatment. Your urine may be slightly pink or reddish. The blood in the urine often goes away after 2 days. This care sheet gives you a general idea about how long it will take for you to recover. But each person recovers at a different pace. Follow the steps below to feel better as quickly as possible. How can you care for yourself at home? Activity  ? · Rest as much as you need to after you go home. ? · You may do your regular activities. But avoid hard exercise or sports for a week. Wait until there is no blood in your urine and the stent is out. Diet  ? · You can eat your normal diet. ? · Drink plenty of fluids, enough so that your urine is light yellow or clear like water. If you have kidney, heart, or liver disease and have to limit fluids, talk with your doctor before you increase the amount of fluids you drink. Medicines  ? · Your doctor will tell you if and when you can restart your medicines. He or she will also give you instructions about taking any new medicines. ? · If you take blood thinners, such as warfarin (Coumadin), clopidogrel (Plavix), or aspirin, be sure to talk to your doctor. He or she will tell you if and when to start taking those medicines again. Make sure that you understand exactly what your doctor wants you to do. ? · Be safe with medicines. Read and follow all instructions on the label. ¨ If the doctor gave you a prescription medicine for pain, take it as prescribed. ¨ If you are not taking a prescription pain medicine, ask your doctor if you can take acetaminophen (Tylenol).  Do not take ibuprofen (Advil, Motrin) or naproxen (Aleve), or similar medicines unless your doctor tells you to. ¨ Do not take two or more pain medicines at the same time unless the doctor told you to. Many pain medicines have acetaminophen, which is Tylenol. Too much acetaminophen (Tylenol) can be harmful. Other instructions  ? · Urinate through the strainer the doctor gives you. Save any stone pieces, including those that look like sand or gravel. Take these to your doctor. This will help your doctor find the cause of your stones. Follow-up care is a key part of your treatment and safety. Be sure to make and go to all appointments, and call your doctor if you are having problems. It's also a good idea to know your test results and keep a list of the medicines you take. When should you call for help? Call 911 anytime you think you may need emergency care. For example, call if:  ? · You passed out (lost consciousness). ? · You have chest pain, are short of breath, or cough up blood. ?Call your doctor now or seek immediate medical care if:  ? · You have pain that does not get better after you take pain medicine. ? · You have new or more blood clots in your urine. (It is normal for the urine to be pink for a few days.)   ? · You cannot urinate. ? · You have symptoms of a urinary tract infection. These may include:  ¨ Pain or burning when you urinate. ¨ A frequent need to urinate without being able to pass much urine. ¨ Pain in the flank, which is just below the rib cage and above the waist on either side of the back. ¨ Blood in the urine. ¨ A fever. ? · You are sick to your stomach or cannot drink fluids. ? · You have signs of a blood clot in your leg (called a deep vein thrombosis), such as:  ¨ Pain in the calf, back of the knee, thigh, or groin. ¨ Redness and swelling in your leg. ? Watch closely for any changes in your health, and be sure to contact your doctor if you have any problems.   Where can you learn more?  Go to http://gwen-brennan.info/. Enter D739 in the search box to learn more about \"Lithotripsy: What to Expect at Home. \"  Current as of: May 12, 2017  Content Version: 11.4  © 6199-1261 [employer]. Care instructions adapted under license by [employer] (which disclaims liability or warranty for this information). If you have questions about a medical condition or this instruction, always ask your healthcare professional. [address] any warranty or liability for your use of this information. After general anesthesia or intravenous sedation, for 24 hours or while taking prescription Narcotics:  · Limit your activities  · Do not drive and operate hazardous machinery  · Do not make important personal or business decisions  · Do  not drink alcoholic beverages  · If you have not urinated within 8 hours after discharge, please contact your surgeon on call. *  Please give a list of your current medications to your Primary Care Provider. *  Please update this list whenever your medications are discontinued, doses are      changed, or new medications (including over-the-counter products) are added. *  Please carry medication information at all times in case of emergency situations. These are general instructions for a healthy lifestyle:  No smoking/ No tobacco products/ Avoid exposure to second hand smoke  Surgeon General's Warning:  Quitting smoking now greatly reduces serious risk to your health.   Obesity, smoking, and sedentary lifestyle greatly increases your risk for illness  A healthy diet, regular physical exercise & weight monitoring are important for maintaining a healthy lifestyle    You may be retaining fluid if you have a history of heart failure or if you experience any of the following symptoms:  Weight gain of 3 pounds or more overnight or 5 pounds in a week, increased swelling in our hands or feet or shortness of breath while lying flat in bed. Please call your doctor as soon as you notice any of these symptoms; do not wait until your next office visit. Recognize signs and symptoms of STROKE:    F-face looks uneven    A-arms unable to move or move unevenly    S-speech slurred or non-existent    T-time-call 911 as soon as signs and symptoms begin-DO NOT go       Back to bed or wait to see if you get better-TIME IS BRAIN. [facility] January 4, 2018       RE: Fidelia Marinelli      To Whom It May Concern,    This is to certify that Fidelia Marinelli may may return to work on Monday, January 8. She was a patient here at [facility] on Thursday, January 4, 2018. Please feel free to contact my office if you have any questions or concerns. Thank you for your assistance in this matter. Sincerely,  Shikha Dey RN        On behalf of Dr. Norris Adams.  [facility]. ([phone])

## 2018-01-12 ENCOUNTER — HOSPITAL ENCOUNTER (EMERGENCY)
Age: 43
Discharge: HOME | End: 2018-01-12
Attending: EMERGENCY MEDICINE
Payer: COMMERCIAL

## 2018-01-12 VITALS
DIASTOLIC BLOOD PRESSURE: 85 MMHG | OXYGEN SATURATION: 99 % | BODY MASS INDEX: 41.32 KG/M2 | HEIGHT: 64 IN | HEART RATE: 80 BPM | WEIGHT: 242 LBS | TEMPERATURE: 97.7 F | RESPIRATION RATE: 17 BRPM | SYSTOLIC BLOOD PRESSURE: 158 MMHG

## 2018-01-12 DIAGNOSIS — T78.40XA ALLERGIC REACTION, INITIAL ENCOUNTER: Primary | ICD-10-CM

## 2018-01-12 LAB — GLUCOSE BLD STRIP.AUTO-MCNC: 116 MG/DL (ref 65–100)

## 2018-01-12 PROCEDURE — 82962 GLUCOSE BLOOD TEST: CPT

## 2018-01-12 PROCEDURE — 74011636637 HC RX REV CODE- 636/637: Performed by: EMERGENCY MEDICINE

## 2018-01-12 PROCEDURE — 99283 EMERGENCY DEPT VISIT LOW MDM: CPT | Performed by: EMERGENCY MEDICINE

## 2018-01-12 RX ORDER — HYDROCHLOROTHIAZIDE 25 MG/1
25 TABLET ORAL DAILY
COMMUNITY
End: 2018-03-12 | Stop reason: SDUPTHER

## 2018-01-12 RX ORDER — PREDNISONE 20 MG/1
40 TABLET ORAL DAILY
Qty: 14 TAB | Refills: 0 | Status: SHIPPED | OUTPATIENT
Start: 2018-01-12 | End: 2018-01-19

## 2018-01-12 RX ORDER — PREDNISONE 20 MG/1
60 TABLET ORAL
Status: COMPLETED | OUTPATIENT
Start: 2018-01-12 | End: 2018-01-12

## 2018-01-12 RX ADMIN — PREDNISONE 60 MG: 20 TABLET ORAL at 21:47

## 2018-01-13 NOTE — ED NOTES
Verified with Dr Mikel Alvarez that prednisone and POC BS is the only treatment he would like prior to discharge.

## 2018-01-13 NOTE — DISCHARGE INSTRUCTIONS

## 2018-01-13 NOTE — ED NOTES
Pt started taking flomax last Thursday, allergic reaction started Monday. Pt noticed swelling in the face, tightness in chest, chest pains midsternal, sob. Headache, dizziness, confusion. Pt had surgery to remove kidney stones last Thursday. Pt states she had kidney stones removed in November and had the same symptoms after that surgery and flomax usage as well. Pt denies syncope, abdominal pain, n/v/d, constipation, urinary complaints, fever, cough, congestion. nonlabored breathing, breath sounds clear and equal bilaterally. nontender abdomen. +bowel sounds.

## 2018-01-13 NOTE — ED PROVIDER NOTES
Patient is a 36 y.o. female presenting with allergic reaction. The history is provided by the patient. Allergic Reaction    This is a recurrent problem. The current episode started more than 2 days ago. The problem has been gradually worsening. Ingested substance: pt believes it is secondary to Flomax. Ingestion amount is known. She has not vomited. Pertinent negatives include no shortness of breath. Past Medical History:   Diagnosis Date    Adverse effect of anesthesia     delayed awakening per pt following D&C    Anemia     not requiring blood transfusions per pt    Angioedema     related to reaction to Lisinopril    Breast lump on left side at 2 o'clock position     no intervention required at this time    Chronic cholecystitis 3/28/2017    3/28/17 s/p lap saumya; Dr Fe Gorman: [address]. Electronically signed out on 3/29/2017 11:23 by SY Hercules M.D. Microscopic Description  Microscopic examination reveals gallbladder with focal chronic inflammation. Dr. Juliet Quiroz concurs.      Claustrophobia     Former cigarette smoker     GERD (gastroesophageal reflux disease)     managed with OTC PRN meds    History of kidney stones     X4- all with surgical intervention    Hypercholesterolemia     controlled well with meds    Hypertension     medication controlled    Kidney stone     Menorrhagia with irregular cycle     Morbid obesity ([address] 03/27/2017    BMI 42.4    Prediabetes 2/13/2016    on metformin as preventative     Type 2 diabetes mellitus ([address]     oral gents only- takes BS prn- avg bs 90--- denies hypoglycemia/ Last A1c 6.0 in 10/2017    Vertigo     manged with PRN meds    Vitamin D deficiency     no current meds       Past Surgical History:   Procedure Laterality Date    FRAGMENT KIDNEY STONE/ ESWL      HX DILATION AND CURETTAGE      HX ENDOSCOPY      HX LAP CHOLECYSTECTOMY  03/28/2017    HX LITHOTRIPSY       x 4    HX ORTHOPAEDIC Left hand- tendon repair    HX OTHER SURGICAL Bilateral     sweat gland removal under both arms    HX TUBAL LIGATION  07/25/2005         Family History:   Problem Relation Age of Onset    Heart Disease Maternal Grandmother     Hypertension Mother     Other Mother      Randi Balo disease    Hypertension Father     Other Father      on blood thinners for blood clots    Hypertension Brother     Colon Cancer Other     No Known Problems Brother        Social History     Social History    Marital status:      Spouse name: N/A    Number of children: N/A    Years of education: N/A     Occupational History    Not on file. Social History Main Topics    Smoking status: Former Smoker     Packs/day: 1.00     Years: 0.50     Quit date: 1/1/2009    Smokeless tobacco: Never Used    Alcohol use No    Drug use: No    Sexual activity: Yes     Partners: Male     Birth control/ protection: None     Other Topics Concern    Not on file     Social History Narrative         ALLERGIES: Lisinopril; Ace inhibitors; Bactrim [sulfamethoxazole-trimethoprim]; Norvasc [amlodipine]; and Sulfa (sulfonamide antibiotics)    Review of Systems   Constitutional: Negative for chills and fever. HENT: Positive for facial swelling. Respiratory: Positive for cough. Negative for choking, chest tightness and shortness of breath. All other systems reviewed and are negative. Vitals:    01/12/18 1950   BP: 158/85   Pulse: 80   Resp: 17   Temp: 97.7 °F (36.5 °C)   SpO2: 99%   Weight: 109.8 kg (242 lb)   Height: 5' 4\" (1.626 m)            Physical Exam   Constitutional: She is oriented to person, place, and time. She appears well-developed and well-nourished. No distress. HENT:   Head: Normocephalic and atraumatic. Right Ear: External ear normal.   Left Ear: External ear normal.   Nose: Nose normal.   Mouth/Throat: Oropharynx is clear and moist. No oropharyngeal exudate.    Angioedema is noted around both eyes with no significant swelling of the eyelids. Oropharynx is clear no evidence of edema to the tongue or the posterior pharynx   Eyes: Conjunctivae and EOM are normal. Pupils are equal, round, and reactive to light. Neck: Normal range of motion. Neck supple. No JVD present. No thyromegaly present. Pulmonary/Chest: Effort normal and breath sounds normal. No stridor. No respiratory distress. She has no wheezes. Musculoskeletal: Normal range of motion. Lymphadenopathy:     She has no cervical adenopathy. Neurological: She is alert and oriented to person, place, and time. Skin: Skin is warm and dry. No urticaria noted   Psychiatric: She has a normal mood and affect. Her behavior is normal.   Nursing note and vitals reviewed.        MDM  Number of Diagnoses or Management Options  Risk of Complications, Morbidity, and/or Mortality  Presenting problems: low  Diagnostic procedures: minimal  Management options: low    Patient Progress  Patient progress: stable    ED Course       Procedures

## 2018-01-13 NOTE — ED TRIAGE NOTES
Pt noticed swelling around her eyes several days ago, today it is worse. PT states she is feeling short of breath. Pt is certain she his allergic to flomax as this is the second time this has happened. Pt states she took benadryl once this am and no relief. Pt has tried no other OTC meds. Pt does not appear in distress or short of breath as she is texting on her phone during triage.

## 2018-03-12 PROBLEM — J31.0 RHINOSINUSITIS: Status: ACTIVE | Noted: 2018-03-12

## 2018-03-12 PROBLEM — F41.1 GAD (GENERALIZED ANXIETY DISORDER): Status: ACTIVE | Noted: 2018-03-12

## 2018-03-12 PROBLEM — J32.9 RHINOSINUSITIS: Status: ACTIVE | Noted: 2018-03-12

## 2018-03-12 PROBLEM — J30.89 ENVIRONMENTAL AND SEASONAL ALLERGIES: Status: ACTIVE | Noted: 2018-03-12

## 2018-06-22 ENCOUNTER — HOSPITAL ENCOUNTER (OUTPATIENT)
Dept: MAMMOGRAPHY | Age: 43
Discharge: HOME | End: 2018-06-22
Attending: OBSTETRICS & GYNECOLOGY
Payer: COMMERCIAL

## 2018-06-22 DIAGNOSIS — Z12.39 SCREENING FOR BREAST CANCER: ICD-10-CM

## 2018-06-22 PROCEDURE — 77067 SCR MAMMO BI INCL CAD: CPT

## 2018-07-04 PROBLEM — M77.32 HEEL SPUR, LEFT: Status: ACTIVE | Noted: 2018-07-04

## 2018-07-09 PROBLEM — N87.0 CIN I (CERVICAL INTRAEPITHELIAL NEOPLASIA I): Status: ACTIVE | Noted: 2018-07-09

## 2018-07-25 ENCOUNTER — HOSPITAL ENCOUNTER (OUTPATIENT)
Dept: PHYSICAL THERAPY | Age: 43
Discharge: HOME | End: 2018-07-25
Attending: PODIATRIST
Payer: COMMERCIAL

## 2018-07-25 DIAGNOSIS — M77.32 HEEL SPUR, LEFT: ICD-10-CM

## 2018-07-25 DIAGNOSIS — M72.2 PLANTAR FASCIITIS OF LEFT FOOT: ICD-10-CM

## 2018-07-25 PROCEDURE — 97140 MANUAL THERAPY 1/> REGIONS: CPT

## 2018-07-25 PROCEDURE — 97161 PT EVAL LOW COMPLEX 20 MIN: CPT

## 2018-07-25 NOTE — THERAPY EVALUATION
Jeremías Pearl  : 1975  Payor: [facility] STATE / Plan: [address] / Product Type: PPO /  [facility]  [address], [place] [address] [address] [facility]  [phone]   [phone]         OUTPATIENT PHYSICAL THERAPY:Initial Assessment 2018    ICD-10: Treatment Diagnosis:   Difficulty in walking, not elsewhere classified (R26.2)   Muscle weakness (generalized) (M62.81)   Plantar fascial fibromatosis (M72.2)    PRECAUTIONS/ALLERGIES:  Lisinopril; Ace inhibitors; Bactrim [sulfamethoxazole-trimethoprim]; Flomax [tamsulosin]; Norvasc [amlodipine]; and Sulfa (sulfonamide antibiotics)     FALL RISK SCORE: 1 (? 5 = High Risk)    MD ORDERS: Eval and Treat MEDICAL/REFERRING DIAGNOSIS:  Plantar fasciitis of left foot [M72.2]  Heel spur, left [M77.32]    DATE OF ONSET: 1 year ago    REFERRING PHYSICIAN: Zuleika Reina DPM    RETURN PHYSICIAN APPOINTMENT: 3 weeks     INITIAL ASSESSMENT:  Ms. Jeremías Pearl has attended 1 physical therapy session including initial evaluation as of 2018. Jeremías Pearl demonstrates decreased strength, decreased ROM, decreased tolerance of ADLs, decreased functional mobility, and decreased activity tolerance, all consistent with S/S of plantar fasciitis. Pt had decreased 1st ray mobility in L foot accompanied by positive Windlass test. Pt had palpable trigger points throughout L proximal gastroc region. Increased tone throughout R LE noted as well. Pt did not have ankle swelling nor positive special tests indicating ankle involvement. Recommending skilled PT: manual therapeutic techniques (as appropriate), therapeutic exercises and activities, balance and comprehensive home exercises program to address current impairment. Jeremías Pearl will benefit from skilled PT (medically necessary) to address above deficits affecting participation in basic ADLs and overall functional tolerance.       PROBLEM LIST (Impacting functional limitations):  1. Decreased Strength  2. Decreased ADL/Functional Activities  3. Decreased Transfer Abilities  4. Decreased Ambulation Ability/Technique  5. Decreased Balance  6. Increased Pain  7. Decreased Activity Tolerance  8. Decreased Gregg with Home Exercise Program INTERVENTIONS PLANNED:  1. Balance Exercise  2. Bed Mobility  3. Cold  4. Cryotherapy  5. Family Education  6. Gait Training  7. Heat  8. Home Exercise Program (HEP)  9. Manual Therapy  10. Neuromuscular Re-education/Strengthening  11. Range of Motion (ROM)  12. Therapeutic Activites  13. Therapeutic Exercise/Strengthening  14. Transfer Training  15. Ultrasound (US)   TREATMENT PLAN:  Effective Dates: 7/25/2018 TO 10/24/2018 (90 days). Frequency/Duration: 2 times a week for 90 Days    SHORT-TERM FUNCTIONAL GOALS: Time Frame: 12 weeks  Dread Rooney will be compliant with home exercise program within 4 weeks in order to improve active participation with management of patient's symptoms and/or functional deficits. Dread Rooney will report <=3/10 pain with walking >15 minutes in order to participate in daily exercise and daily activities. Dread Rooney will be able to stand >=15 minutes with <2/10 pain to feet in order to participate in household duties without issues/compromise. Dread Rooney will be report improved score on the Foot and Ankle Ability Measure from 41 to 51 to indicate improvement in functional independence. Dread Rooney will improve ankle strength to >=4+/5 to improve tolerance of ADLs and improve overall functional mobility. REHABILITATION POTENTIAL FOR STATED GOALS: Good    Regarding Lynsey Winston's therapy, I certify that the treatment plan above will be carried out by a therapist or under their direction.     Thank you for this referral,  Eli Bowens, PT, DPT       Referring Physician Signature: Falguni Reina DPM              Date                    HISTORY:  All history obtained on 7/26/18 unless otherwise noted. PATIENT STATED GOAL:   -Pt states she would like to return to walking and performing physical activity with decreased levels of pain. HISTORY OF PRESENT INJURY/ILLNESS (REASON FOR REFERRAL):  Pt states the pain started about a year ago when she started going to the gym. Pt states her achilles heel was bothering her and was instructed by physician to stop walking on the treadmill and begin using the bicycle. Pt states the pain then started in her arch and after imaging in March found out she had a small heel spur. Pt notes she is having significant numbness in L foot and some in R foot which is due to neuropathy according to her physician. Pt states she occasionally gets shooting pain from her L arch up to around her knee. Pt rates pain10/10 pain at this moment and describes it as sharp and throbbing. Pt states the pain can get as high as 10/10 and as low as 5/10. Pt states she is having the most difficulty standing and walking for long periods of time, standing on it first thing in the morning, coming up on her toes, moving her foot in different directions, ascending/descending stairs. PAST MEDICAL HISTORY/COMORBIDITIES:  Ms. Dumont  has a past medical history of Abnormal Papanicolaou smear of cervix; Adverse effect of anesthesia; Anemia; Angioedema; Breast lump on left side at 2 o'clock position; Chronic cholecystitis (3/28/2017); Claustrophobia; Former cigarette smoker; GERD (gastroesophageal reflux disease); History of kidney stones; Hypercholesterolemia; Hypertension; Kidney stone; Menorrhagia with irregular cycle; Morbid obesity ([address] (03/27/2017); Prediabetes (2/13/2016); Type 2 diabetes mellitus ([address] Vertigo; and Vitamin D deficiency.   Ms. Dumont  has a past surgical history that includes hx tubal ligation (07/25/2005); hx dilation and curettage; hx lap cholecystectomy (03/28/2017); hx other surgical (Bilateral); fragment kidney stone/ eswl; hx endoscopy; hx orthopaedic (Left); and hx lithotripsy. Active Ambulatory Problems     Diagnosis Date Noted    Kidney stones 02/10/2016    Nocturia 02/10/2016    Urinary urgency 02/10/2016    ACE inhibitor-aggravated angioedema 02/13/2016    Type 2 diabetes mellitus with hyperglycemia, without long-term current use of insulin ([facility] 02/13/2016    RUQ pain 03/01/2017    Gastroesophageal reflux disease with esophagitis 03/01/2017    Chronic cholecystitis 03/28/2017    Hypercholesterolemia     Hypertension     Constipation 01/11/2016    Gastroesophageal reflux disease without esophagitis 01/11/2016    Nausea 01/11/2016    Morbid obesity with BMI of 40.0-44.9, adult ([facility] 07/29/2017    Chronic bilateral low back pain without sciatica 07/29/2017    Kidney stone 11/26/2017    Left foot pain 11/26/2017    Environmental and seasonal allergies 03/12/2018    VIRAL (generalized anxiety disorder) 03/12/2018    Rhinosinusitis 03/12/2018    Heel spur, left 07/04/2018    SANTO I (cervical intraepithelial neoplasia I) 07/09/2018     Resolved Ambulatory Problems     Diagnosis Date Noted    No Resolved Ambulatory Problems     Past Medical History:   Diagnosis Date    Abnormal Papanicolaou smear of cervix     Adverse effect of anesthesia     Anemia     Angioedema     Breast lump on left side at 2 o'clock position     Chronic cholecystitis 3/28/2017    Claustrophobia     Former cigarette smoker     GERD (gastroesophageal reflux disease)     History of kidney stones     Hypercholesterolemia     Hypertension     Kidney stone     Menorrhagia with irregular cycle     Morbid obesity ([facility] 03/27/2017    Prediabetes 2/13/2016    Type 2 diabetes mellitus (HCC)     Vertigo     Vitamin D deficiency          Social History     Social History    Marital status:      Spouse name: N/A    Number of children: N/A    Years of education: N/A     Occupational History    Not on file.      Social History Main Topics    Smoking status: Former Smoker     Packs/day: 1.00     Years: 0.50     Quit date: 1/1/2009    Smokeless tobacco: Never Used    Alcohol use No    Drug use: No    Sexual activity: Yes     Partners: Male     Birth control/ protection: None      Comment: tubal     Other Topics Concern    Caffeine Concern No    Exercise Yes    Seat Belt Yes    Self-Exams No     Social History Narrative    Abuse: Feels safe at home, no history of physical abuse, no history of sexual abuse           PRIOR LEVEL OF FUNCTION/WOR/ACTIVITY:  Pt states she has been suffering for a long period of time. Pt notes it has recently gotten worse and is impacting her ability to perform daily activities. CURRENT MEDICATIONS:    Current Outpatient Prescriptions:     diclofenac (VOLTAREN) 1 % gel, Apply  to affected area two (2) times a day., Disp: 100 g, Rfl: 2    acyclovir (ZOVIRAX) 400 mg tablet, Take 1 Tab by mouth three (3) times daily for 14 days. , Disp: 42 Tab, Rfl: 6    metFORMIN (GLUCOPHAGE) 1,000 mg tablet, Take 1 Tab by mouth two (2) times daily (with meals). Indications: type 2 diabetes mellitus, Disp: 90 Tab, Rfl: 3    ibuprofen (MOTRIN) 800 mg tablet, Take 1 Tab by mouth every six (6) hours as needed for Pain., Disp: 90 Tab, Rfl: 1    pravastatin (PRAVACHOL) 40 mg tablet, Take 1 Tab by mouth nightly., Disp: 90 Tab, Rfl: 1    hydroCHLOROthiazide (HYDRODIURIL) 25 mg tablet, Take 1 Tab by mouth daily. , Disp: 90 Tab, Rfl: 1    fluticasone (FLONASE) 50 mcg/actuation nasal spray, 2 Sprays by Both Nostrils route daily. , Disp: 3 Bottle, Rfl: 1    loratadine (CLARITIN) 10 mg tablet, Take 1 Tab by mouth daily. , Disp: 90 Tab, Rfl: 3    metroNIDAZOLE (FLAGYL) 500 mg tablet, Take 1 Tab by mouth two (2) times a day for 10 days. , Disp: 20 Tab, Rfl: 0     Date Last Reviewed:  7/26/2018   Number of Personal Factors/Comorbidities that affect the Plan of Care: 1-2: MODERATE COMPLEXITY   EXAMINATION:   OBSERVATION/ORTHOSTATIC POSTURAL ASSESSMENT:Assessed @ Initial Visit:   Pt sits with forward head and rounded shoulders which indicate tight anterior chest musculature, upper trapezius, and levator scapula and weak posterior scapula musculature and deep cervical flexors. Pt displays decreased core motor control indicating weak core and low back musculature. PALPATION: Assessed @ Initial Visit: Tenderness to L plantar fascia, arch, medial heel. Decreased L 1st ray mobility. L proximal lateral gastroc. Increased tone throughout R gastroc and pronounced R achilles tendon.      MEASUREMENTS:          Date: 7/25/18  Date:  Date:     Right Left Right Left Right Left   Ankle Circumference 25cm 26cm       Figure 8 52cm 53cm         AROM/PROM         Joint: Date: 7/25/18  Date:  Date:    Active LE ROM Right Left Right Left Right Left   Hip Flexion [facility]       Hip Extension [facility]       Hip IR [facility]       Hip ER [facility]       Knee Extension [facility] WFL       Knee Flexion WFL WFL       Ankle DF -2 degrees -2 degrees       Ankle PF 65 degrees 60 degrees       Ankle IV 25 degrees 25 degrees       Ankle EV 20 degrees 20 degrees  -pain in arch         STRENGTH         Joint: Date:  7/25/18  Date:  Date:     Right Left Right Left Right Left   Hip Abduction 4/5 4/5       Hip Adduction 4/5 4/5       Hip IR 4/5 4/5       Hip ER 4/5 4/5       Hip Flexion 4+/5 4+/5       Knee Extension 4+/5 4+/5       Knee Flexion 4+/5 4+/5       Ankle DF 4+/5 4/5       Ankle PF 4+/5 4/5       Ankle IV 4+/5 4-/5       Ankle EV 4+/5 4-/5  -pain felt along big toe         SPECIAL TESTS: Assessed @ Initial Visit:    -Talar tilt: Negative   -Anterior drawer: Negative   -Windlass test for Plantar Fasciitis: Positive L    NEUROLOGICAL SCREEN: Assessed @ Initial Visit:    -Pt notes she occasionally has tingling in B feet     FUNCTIONAL MOBILITY:  Assessed @ Initial Visit:    -Affecting participation in basic ADLs and functional tasks.   -Limited tolerance of walking and standing -Ambulation/Gait: Pt ambulates with slight R lateral trunk lean. -Bed mobility: Pt reports no pain while in bed but extreme pain when first getting out of bed in the morning.    -Stairs: Pt reports difficulty with stairs due to increased pain in L foot. -Transfers: Independent   -Wheelchair: N/A    BALANCE:    SLS: Date: 7/25/18 Date: Date:   Right: NT     Left: NT          Body Structures Involved:  1. Bones  2. Joints  3. Muscles  4. Ligaments Body Functions Affected:  1. Sensory/Pain  2. Neuromusculoskeletal  3. Movement Related Activities and Participation Affected:  1. Mobility  2. Self Care  3. Domestic Life  4. Interpersonal Interactions and Relationships  5. Community, Social and [place]   Number of elements that affect the Plan of Care: 4+: HIGH COMPLEXITY   CLINICAL PRESENTATION:   Presentation: Stable and uncomplicated: LOW COMPLEXITY   CLINICAL DECISION MAKING:   TOOL USED:   -FOOT AND ANKLE ABILITY MEASURE (FAAM)  Score:  Initial: 41/84 Most Recent: X (Date: -- )   Interpretation of Score: For the \"Activities of Daily Living\", there are 21 questions each scored on a 5 point scale with 0 representing \"Unable to do\" and 4 representing \"No difficulty\". The lower the score, the greater the functional disability. 84/84 represents no disability. Minimal detectable change is 5.7 points. With the addition of the 8 questions in the \"Sports Subscale,\" there are 29 questions, each scored on a 5 point scale with 0 representing \"Unable to do\" and 4 representing \"No difficulty\". The lower the score, the greater the functional disability. 116/116 represents no disability. Minimal detectable change is 12.3 points.     Activities of Daily Living:  Score 84 83-68 67-51 50-34 33-18 17-1 0   Modifier CH CI CJ CK CL CM CN     Activities of Daily Living + Sports Subscale:  Score 116 115-94 93-71 70-47 46-24 23-1 0   Modifier CH CI CJ CK CL CM CN       MEDICAL NECESSITY:   · Skilled intervention continues to be required due to above deficits affecting participation in basic ADLs and overall functional tolerance. REASON FOR SERVICES/ OTHER COMMENTS:   · Patient continues to require skilled intervention due to  above deficits affecting participation in basic ADLs and overall functional tolerance. Use of outcome tool(s) and clinical judgement create a POC that gives a: Clear prediction of patient's progress: LOW COMPLEXITY   TREATMENT:   (In addition to Assessment/Re-Assessment sessions the following treatments were rendered)  PRE-TREATMENT SUBJECTIVE COMMENTS: See Assessment above    THERAPEUTIC EXERCISE: (0 min) Exercises per grid below to improve mobility, strength and balance. Required minimal visual, verbal and manual cues to promote proper body alignment, promote proper body posture and promote proper body breathing techniques. Progressed repetitions as indicated. Date:7/25/18 Date:   Pt education                        MANUAL THERAPY: (10 minutes): Joint mobilization, Soft tissue mobilization and Manipulation was utilized and necessary because of the patient's restricted joint motion, painful spasm, loss of articular motion and restricted motion of soft tissue. Pt supine with LEs supported by wedge for ice massage to L plantar fascia to reduce inflammation and promote blood flow. MODALITIES: (0 minutes):      NOT TODAY       TREATMENT/SESSION ASSESSMENT: Dahlia Arce verbalized understanding of HEP and role of PT/POC. Patient provided with and educated on HEP. Patient provided with written instructions and pictures for personal use. · Pain: Initial: 10/10 Post Session:  10/10 ·   Compliance with Program/Exercises: Will assess as treatment progresses. · Recommendations/Intent for next treatment session: \"Next visit will focus on advancements to more challenging activities and reduction in assistance provided\".     TOTAL TREATMENT DURATION:  PT Patient Time In/Time Out  Time In: 1100  Time Out: Chapo Banegas, DPT

## 2018-07-25 NOTE — PROGRESS NOTES
Ambulatory/Rehab Services H2 Model Falls Risk Assessment    Risk Factor Pts. ·   Confusion/Disorientation/Impulsivity  []    4 ·   Symptomatic Depression  []   2 ·   Altered Elimination  []   1 ·   Dizziness/Vertigo  []   1 ·   Gender (Male)  []   1 ·   Any administered antiepileptics (anticonvulsants):  []   2 ·   Any administered benzodiazepines:  []   1 ·   Visual Impairment (specify):  []   1 ·   Portable Oxygen Use  []   1 ·   Orthostatic ? BP  []   1 ·   History of Recent Falls (within 3 mos.)  []   5     Ability to Rise from Chair (choose one) Pts. ·   Ability to rise in a single movement  []   0 ·   Pushes up, successful in one attempt  [x]   1 ·   Multiple attempts, but successful  []   3 ·   Unable to rise without assistance  []   4   Total: (5 or greater = High Risk) 1     Falls Prevention Plan:   []                Physical Limitations to Exercise (specify):   []                Mobility Assistance Device (type):   []                Exercise/Equipment Adaptation (specify):    ©2010 [facility] [address] Patent #[id number].  Federal Law prohibits the replication, distribution or use without written permission from [facility] [employer]

## 2018-07-27 ENCOUNTER — HOSPITAL ENCOUNTER (OUTPATIENT)
Dept: MRI IMAGING | Age: 43
Discharge: HOME | End: 2018-07-27
Attending: PODIATRIST
Payer: COMMERCIAL

## 2018-07-27 ENCOUNTER — HOSPITAL ENCOUNTER (OUTPATIENT)
Dept: PHYSICAL THERAPY | Age: 43
Discharge: HOME | End: 2018-07-27
Attending: PODIATRIST
Payer: COMMERCIAL

## 2018-07-27 DIAGNOSIS — G89.29 HEEL PAIN, CHRONIC, LEFT: ICD-10-CM

## 2018-07-27 DIAGNOSIS — M25.572 CHRONIC PAIN OF LEFT ANKLE: ICD-10-CM

## 2018-07-27 DIAGNOSIS — M79.672 HEEL PAIN, CHRONIC, LEFT: ICD-10-CM

## 2018-07-27 DIAGNOSIS — G89.29 CHRONIC PAIN OF LEFT ANKLE: ICD-10-CM

## 2018-07-27 DIAGNOSIS — G57.52 TARSAL TUNNEL SYNDROME, LEFT: ICD-10-CM

## 2018-07-27 PROCEDURE — 97140 MANUAL THERAPY 1/> REGIONS: CPT

## 2018-07-27 PROCEDURE — 97110 THERAPEUTIC EXERCISES: CPT

## 2018-07-27 PROCEDURE — 73721 MRI JNT OF LWR EXTRE W/O DYE: CPT

## 2018-07-27 NOTE — PROGRESS NOTES
John Hurt  : 1975  Payor: [facility] STATE / Plan: [address] / Product Type: PPO /  [facility]  [address], [place] [address] [facility]  [phone]   [phone]         OUTPATIENT PHYSICAL THERAPY:Daily Note 2018    ICD-10: Treatment Diagnosis:   Difficulty in walking, not elsewhere classified (R26.2)   Muscle weakness (generalized) (M62.81)   Plantar fascial fibromatosis (M72.2)    PRECAUTIONS/ALLERGIES:  Lisinopril; Ace inhibitors; Bactrim [sulfamethoxazole-trimethoprim]; Flomax [tamsulosin]; Norvasc [amlodipine]; and Sulfa (sulfonamide antibiotics)     FALL RISK SCORE: 1 (? 5 = High Risk)    MD ORDERS: Eval and Treat MEDICAL/REFERRING DIAGNOSIS:  heel spur    DATE OF ONSET: 1 year ago    REFERRING PHYSICIAN: Pattis, Bethann Severance, DPM    RETURN PHYSICIAN APPOINTMENT: 3 weeks     INITIAL ASSESSMENT:  Ms. John Hurt has attended 1 physical therapy session including initial evaluation as of 2018. John Hurt demonstrates decreased strength, decreased ROM, decreased tolerance of ADLs, decreased functional mobility, and decreased activity tolerance, all consistent with S/S of plantar fasciitis. Pt had decreased 1st ray mobility in L foot accompanied by positive Windlass test. Pt had palpable trigger points throughout L proximal gastroc region. Increased tone throughout R LE noted as well. Pt did not have ankle swelling nor positive special tests indicating ankle involvement. Recommending skilled PT: manual therapeutic techniques (as appropriate), therapeutic exercises and activities, balance and comprehensive home exercises program to address current impairment. John Hurt will benefit from skilled PT (medically necessary) to address above deficits affecting participation in basic ADLs and overall functional tolerance. PROBLEM LIST (Impacting functional limitations):  1. Decreased Strength  2.  Decreased ADL/Functional Activities  3. Decreased Transfer Abilities  4. Decreased Ambulation Ability/Technique  5. Decreased Balance  6. Increased Pain  7. Decreased Activity Tolerance  8. Decreased [place] with Home Exercise Program INTERVENTIONS PLANNED:  1. Balance Exercise  2. Bed Mobility  3. Cold  4. Cryotherapy  5. Family Education  6. Gait Training  7. Heat  8. Home Exercise Program (HEP)  9. Manual Therapy  10. Neuromuscular Re-education/Strengthening  11. Range of Motion (ROM)  12. Therapeutic Activites  13. Therapeutic Exercise/Strengthening  14. Transfer Training  15. Ultrasound (US)   TREATMENT PLAN:  Effective Dates: 7/25/2018 TO 10/24/2018 (90 days). Frequency/Duration: 2 times a week for 90 Days    SHORT-TERM FUNCTIONAL GOALS: Time Frame: 12 weeks  Chris Quezada will be compliant with home exercise program within 4 weeks in order to improve active participation with management of patient's symptoms and/or functional deficits. Chris Quezada will report <=3/10 pain with walking >15 minutes in order to participate in daily exercise and daily activities. Chris Quezada will be able to stand >=15 minutes with <2/10 pain to feet in order to participate in household duties without issues/compromise. Chris Quezada will be report improved score on the Foot and Ankle Ability Measure from 41 to 51 to indicate improvement in functional independence. Chris Quezada will improve ankle strength to >=4+/5 to improve tolerance of ADLs and improve overall functional mobility. REHABILITATION POTENTIAL FOR STATED GOALS: Good    Regarding Vivian Winston's therapy, I certify that the treatment plan above will be carried out by a therapist or under their direction. Thank you for this referral,  Migel Tongp, PT, DPT       Referring Physician Signature: Bethany Reina, DPM              Date                    HISTORY:  All history obtained on 7/26/18 unless otherwise noted.    PATIENT STATED GOAL:   -Pt states she would like to return to walking and performing physical activity with decreased levels of pain. HISTORY OF PRESENT INJURY/ILLNESS (REASON FOR REFERRAL):  Pt states the pain started about a year ago when she started going to the gym. Pt states her achilles heel was bothering her and was instructed by physician to stop walking on the treadmill and begin using the bicycle. Pt states the pain then started in her arch and after imaging in March found out she had a small heel spur. Pt notes she is having significant numbness in L foot and some in R foot which is due to neuropathy according to her physician. Pt states she occasionally gets shooting pain from her L arch up to around her knee. Pt rates pain10/10 pain at this moment and describes it as sharp and throbbing. Pt states the pain can get as high as 10/10 and as low as 5/10. Pt states she is having the most difficulty standing and walking for long periods of time, standing on it first thing in the morning, coming up on her toes, moving her foot in different directions, ascending/descending stairs. PAST MEDICAL HISTORY/COMORBIDITIES:  Ms. Mendel Rayas  has a past medical history of Abnormal Papanicolaou smear of cervix; Adverse effect of anesthesia; Anemia; Angioedema; Breast lump on left side at 2 o'clock position; Chronic cholecystitis (3/28/2017); Claustrophobia; Former cigarette smoker; GERD (gastroesophageal reflux disease); History of kidney stones; Hypercholesterolemia; Hypertension; Kidney stone; Menorrhagia with irregular cycle; Morbid obesity ([address] (03/27/2017); Prediabetes (2/13/2016); Type 2 diabetes mellitus ([address] Vertigo; and Vitamin D deficiency.   Ms. Mendel Rayas  has a past surgical history that includes hx tubal ligation (07/25/2005); hx dilation and curettage; hx lap cholecystectomy (03/28/2017); hx other surgical (Bilateral); fragment kidney stone/ eswl; hx endoscopy; hx orthopaedic (Left); and hx lithotripsy. Active Ambulatory Problems     Diagnosis Date Noted    Kidney stones 02/10/2016    Nocturia 02/10/2016    Urinary urgency 02/10/2016    ACE inhibitor-aggravated angioedema 02/13/2016    Type 2 diabetes mellitus with hyperglycemia, without long-term current use of insulin ([facility] 02/13/2016    RUQ pain 03/01/2017    Gastroesophageal reflux disease with esophagitis 03/01/2017    Chronic cholecystitis 03/28/2017    Hypercholesterolemia     Hypertension     Constipation 01/11/2016    Gastroesophageal reflux disease without esophagitis 01/11/2016    Nausea 01/11/2016    Morbid obesity with BMI of 40.0-44.9, adult ([facility] 07/29/2017    Chronic bilateral low back pain without sciatica 07/29/2017    Kidney stone 11/26/2017    Left foot pain 11/26/2017    Environmental and seasonal allergies 03/12/2018    VIRAL (generalized anxiety disorder) 03/12/2018    Rhinosinusitis 03/12/2018    Heel spur, left 07/04/2018    SANTO I (cervical intraepithelial neoplasia I) 07/09/2018     Resolved Ambulatory Problems     Diagnosis Date Noted    No Resolved Ambulatory Problems     Past Medical History:   Diagnosis Date    Abnormal Papanicolaou smear of cervix     Adverse effect of anesthesia     Anemia     Angioedema     Breast lump on left side at 2 o'clock position     Chronic cholecystitis 3/28/2017    Claustrophobia     Former cigarette smoker     GERD (gastroesophageal reflux disease)     History of kidney stones     Hypercholesterolemia     Hypertension     Kidney stone     Menorrhagia with irregular cycle     Morbid obesity ([facility] 03/27/2017    Prediabetes 2/13/2016    Type 2 diabetes mellitus (HCC)     Vertigo     Vitamin D deficiency          Social History     Social History    Marital status:      Spouse name: N/A    Number of children: N/A    Years of education: N/A     Occupational History    Not on file.      Social History Main Topics    Smoking status: Former Smoker Packs/day: 1.00     Years: 0.50     Quit date: 1/1/2009    Smokeless tobacco: Never Used    Alcohol use No    Drug use: No    Sexual activity: Yes     Partners: Male     Birth control/ protection: None      Comment: tubal     Other Topics Concern    Caffeine Concern No    Exercise Yes    Seat Belt Yes    Self-Exams No     Social History Narrative    Abuse: Feels safe at home, no history of physical abuse, no history of sexual abuse           PRIOR LEVEL OF FUNCTION/WOR/ACTIVITY:  Pt states she has been suffering for a long period of time. Pt notes it has recently gotten worse and is impacting her ability to perform daily activities. CURRENT MEDICATIONS:    Current Outpatient Prescriptions:     diclofenac (VOLTAREN) 1 % gel, Apply  to affected area two (2) times a day., Disp: 100 g, Rfl: 2    metFORMIN (GLUCOPHAGE) 1,000 mg tablet, Take 1 Tab by mouth two (2) times daily (with meals). Indications: type 2 diabetes mellitus, Disp: 90 Tab, Rfl: 3    ibuprofen (MOTRIN) 800 mg tablet, Take 1 Tab by mouth every six (6) hours as needed for Pain., Disp: 90 Tab, Rfl: 1    pravastatin (PRAVACHOL) 40 mg tablet, Take 1 Tab by mouth nightly., Disp: 90 Tab, Rfl: 1    hydroCHLOROthiazide (HYDRODIURIL) 25 mg tablet, Take 1 Tab by mouth daily. , Disp: 90 Tab, Rfl: 1    fluticasone (FLONASE) 50 mcg/actuation nasal spray, 2 Sprays by Both Nostrils route daily. , Disp: 3 Bottle, Rfl: 1    loratadine (CLARITIN) 10 mg tablet, Take 1 Tab by mouth daily. , Disp: 90 Tab, Rfl: 3    metroNIDAZOLE (FLAGYL) 500 mg tablet, Take 1 Tab by mouth two (2) times a day for 10 days. , Disp: 20 Tab, Rfl: 0     Date Last Reviewed:  7/27/2018   Number of Personal Factors/Comorbidities that affect the Plan of Care: 1-2: MODERATE COMPLEXITY   EXAMINATION:   OBSERVATION/ORTHOSTATIC POSTURAL ASSESSMENT:Assessed @ Initial Visit:   Pt sits with forward head and rounded shoulders which indicate tight anterior chest musculature, upper trapezius, and levator scapula and weak posterior scapula musculature and deep cervical flexors. Pt displays decreased core motor control indicating weak core and low back musculature. PALPATION: Assessed @ Initial Visit: Tenderness to L plantar fascia, arch, medial heel. Decreased L 1st ray mobility. L proximal lateral gastroc. Increased tone throughout R gastroc and pronounced R achilles tendon. MEASUREMENTS:          Date: 7/25/18  Date:  Date:     Right Left Right Left Right Left   Ankle Circumference 25cm 26cm       Figure 8 52cm 53cm         AROM/PROM         Joint: Date: 7/25/18  Date:  Date:    Active LE ROM Right Left Right Left Right Left   Hip Flexion [facility]       Hip Extension [facility]       Hip IR [facility]       Hip ER [facility] WFL       Knee Extension [facility] WFL       Knee Flexion WFL WFL       Ankle DF -2 degrees -2 degrees       Ankle PF 65 degrees 60 degrees       Ankle IV 25 degrees 25 degrees       Ankle EV 20 degrees 20 degrees  -pain in arch         STRENGTH         Joint: Date:  7/25/18  Date:  Date:     Right Left Right Left Right Left   Hip Abduction 4/5 4/5       Hip Adduction 4/5 4/5       Hip IR 4/5 4/5       Hip ER 4/5 4/5       Hip Flexion 4+/5 4+/5       Knee Extension 4+/5 4+/5       Knee Flexion 4+/5 4+/5       Ankle DF 4+/5 4/5       Ankle PF 4+/5 4/5       Ankle IV 4+/5 4-/5       Ankle EV 4+/5 4-/5  -pain felt along big toe         SPECIAL TESTS: Assessed @ Initial Visit:    -Talar tilt: Negative   -Anterior drawer: Negative   -Windlass test for Plantar Fasciitis: Positive L    NEUROLOGICAL SCREEN: Assessed @ Initial Visit:    -Pt notes she occasionally has tingling in B feet     FUNCTIONAL MOBILITY:  Assessed @ Initial Visit:    -Affecting participation in basic ADLs and functional tasks.   -Limited tolerance of walking and standing   -Ambulation/Gait: Pt ambulates with slight R lateral trunk lean.     -Bed mobility: Pt reports no pain while in bed but extreme pain when first getting out of bed in the morning.    -Stairs: Pt reports difficulty with stairs due to increased pain in L foot. -Transfers: Independent   -Wheelchair: N/A    BALANCE:    SLS: Date: 7/25/18 Date: Date:   Right: NT     Left: NT          Body Structures Involved:  1. Bones  2. Joints  3. Muscles  4. Ligaments Body Functions Affected:  1. Sensory/Pain  2. Neuromusculoskeletal  3. Movement Related Activities and Participation Affected:  1. Mobility  2. Self Care  3. Domestic Life  4. Interpersonal Interactions and Relationships  5. Community, Social and [place]   Number of elements that affect the Plan of Care: 4+: HIGH COMPLEXITY   CLINICAL PRESENTATION:   Presentation: Stable and uncomplicated: LOW COMPLEXITY   CLINICAL DECISION MAKING:   TOOL USED:   -FOOT AND ANKLE ABILITY MEASURE (FAAM)  Score:  Initial: 41/84 Most Recent: X (Date: -- )   Interpretation of Score: For the \"Activities of Daily Living\", there are 21 questions each scored on a 5 point scale with 0 representing \"Unable to do\" and 4 representing \"No difficulty\". The lower the score, the greater the functional disability. 84/84 represents no disability. Minimal detectable change is 5.7 points. With the addition of the 8 questions in the \"Sports Subscale,\" there are 29 questions, each scored on a 5 point scale with 0 representing \"Unable to do\" and 4 representing \"No difficulty\". The lower the score, the greater the functional disability. 116/116 represents no disability. Minimal detectable change is 12.3 points. Activities of Daily Living:  Score 84 83-68 67-51 50-34 33-18 17-1 0   Modifier CH CI CJ CK CL CM CN     Activities of Daily Living + Sports Subscale:  Score 116 115-94 93-71 70-47 46-24 23-1 0   Modifier CH CI CJ CK CL CM CN       MEDICAL NECESSITY:   · Skilled intervention continues to be required due to above deficits affecting participation in basic ADLs and overall functional tolerance.     REASON FOR SERVICES/ OTHER COMMENTS:   · Patient continues to require skilled intervention due to  above deficits affecting participation in basic ADLs and overall functional tolerance. Use of outcome tool(s) and clinical judgement create a POC that gives a: Clear prediction of patient's progress: LOW COMPLEXITY   TREATMENT:   (In addition to Assessment/Re-Assessment sessions the following treatments were rendered)  PRE-TREATMENT SUBJECTIVE COMMENTS: Pt states she has been wearing her brace which is helping with her pain. Pt notes she has also been doing her HEP and rolling the bottom of her foot with a frozen waterbottle which has decreased symptoms slightly. Pt states she will be getting a foot brace for sleeping to decrease symptoms in the morning. Pt rates pain 2/10 today. THERAPEUTIC EXERCISE: (30 min) Exercises per grid below to improve mobility, strength and balance. Required minimal visual, verbal and manual cues to promote proper body alignment, promote proper body posture and promote proper body breathing techniques. Progressed repetitions as indicated. Date:7/27/18 Date:   Pt education HEP, Anatomy, POC    Physiostep 12min  -level 3    BAPs (PF/DF) 1x20 reps    BAPs (IV/EV) 1x20 reps    Resisted ankle PF 2x10 reps L only  -yellow TB    Resisted ankle DF 2x10 reps L only  -yellow TB    Seated great toe extension 1x15 reps    Towel pick ups 2x10 reps        MANUAL THERAPY: (25 minutes): Joint mobilization, Soft tissue mobilization and Manipulation was utilized and necessary because of the patient's restricted joint motion, painful spasm, loss of articular motion and restricted motion of soft tissue. Pt supine with LEs supported by wedge for STM to plantar aspect of foot, achilles tendon, and gastroc muscle. Grade II & III mobilizations to L 1st ray to improve mobility. MODALITIES: (0 minutes):      NOT TODAY     TREATMENT/SESSION ASSESSMENT: Alta Burgess tolerated STM very well with no complaints of tenderness.  Pt did not have any pain with pressure during mobilizations and STM. Pt reported discomfort in arch during ankle inversion and eversion exercise with resistance. Pt provided with resistance band and educated to continue performing exercises at home. Pt reported 1/10 pain at the end of treatment session. · Pain: Initial: 2/10 Post Session:  10/10 ·   Compliance with Program/Exercises: Will assess as treatment progresses. · Recommendations/Intent for next treatment session: \"Next visit will focus on advancements to more challenging activities and reduction in assistance provided\".     TOTAL TREATMENT DURATION:  PT Patient Time In/Time Out  Time In: 1300  Time Out: [address], PT, DPT

## 2018-08-03 ENCOUNTER — HOSPITAL ENCOUNTER (OUTPATIENT)
Dept: PHYSICAL THERAPY | Age: 43
Discharge: HOME | End: 2018-08-03
Attending: PODIATRIST
Payer: COMMERCIAL

## 2018-08-03 PROCEDURE — 97140 MANUAL THERAPY 1/> REGIONS: CPT

## 2018-08-03 PROCEDURE — 97110 THERAPEUTIC EXERCISES: CPT

## 2018-08-03 NOTE — PROGRESS NOTES
John Hurt  : 1975  Payor: [facility] STATE / Plan: [id number] / Product Type: PPO /  [facility]  [address], [place] [address] [facility]  [phone]   [phone]         OUTPATIENT PHYSICAL THERAPY:Daily Note 8/3/2018    ICD-10: Treatment Diagnosis:   Difficulty in walking, not elsewhere classified (R26.2)   Muscle weakness (generalized) (M62.81)   Plantar fascial fibromatosis (M72.2)    PRECAUTIONS/ALLERGIES:  Lisinopril; Ace inhibitors; Bactrim [sulfamethoxazole-trimethoprim]; Flomax [tamsulosin]; Norvasc [amlodipine]; and Sulfa (sulfonamide antibiotics)     FALL RISK SCORE: 1 (? 5 = High Risk)    MD ORDERS: Eval and Treat MEDICAL/REFERRING DIAGNOSIS:  heel spur    DATE OF ONSET: 1 year ago    REFERRING PHYSICIAN: Pattis, Bethann Severance, DPM    RETURN PHYSICIAN APPOINTMENT: 3 weeks     INITIAL ASSESSMENT:  Ms. John Hurt has attended 1 physical therapy session including initial evaluation as of 2018. John Hurt demonstrates decreased strength, decreased ROM, decreased tolerance of ADLs, decreased functional mobility, and decreased activity tolerance, all consistent with S/S of plantar fasciitis. Pt had decreased 1st ray mobility in L foot accompanied by positive Windlass test. Pt had palpable trigger points throughout L proximal gastroc region. Increased tone throughout R LE noted as well. Pt did not have ankle swelling nor positive special tests indicating ankle involvement. Recommending skilled PT: manual therapeutic techniques (as appropriate), therapeutic exercises and activities, balance and comprehensive home exercises program to address current impairment. John Hurt will benefit from skilled PT (medically necessary) to address above deficits affecting participation in basic ADLs and overall functional tolerance. PROBLEM LIST (Impacting functional limitations):  1. Decreased Strength  2.  Decreased ADL/Functional Activities  3. Decreased Transfer Abilities  4. Decreased Ambulation Ability/Technique  5. Decreased Balance  6. Increased Pain  7. Decreased Activity Tolerance  8. Decreased [place] with Home Exercise Program INTERVENTIONS PLANNED:  1. Balance Exercise  2. Bed Mobility  3. Cold  4. Cryotherapy  5. Family Education  6. Gait Training  7. Heat  8. Home Exercise Program (HEP)  9. Manual Therapy  10. Neuromuscular Re-education/Strengthening  11. Range of Motion (ROM)  12. Therapeutic Activites  13. Therapeutic Exercise/Strengthening  14. Transfer Training  15. Ultrasound (US)   TREATMENT PLAN:  Effective Dates: 7/25/2018 TO 10/24/2018 (90 days). Frequency/Duration: 2 times a week for 90 Days    SHORT-TERM FUNCTIONAL GOALS: Time Frame: 12 weeks  Dread Rooney will be compliant with home exercise program within 4 weeks in order to improve active participation with management of patient's symptoms and/or functional deficits. Dread Rooney will report <=3/10 pain with walking >15 minutes in order to participate in daily exercise and daily activities. Dread Rooney will be able to stand >=15 minutes with <2/10 pain to feet in order to participate in household duties without issues/compromise. Dread Rooney will be report improved score on the Foot and Ankle Ability Measure from 41 to 51 to indicate improvement in functional independence. Dread Rooney will improve ankle strength to >=4+/5 to improve tolerance of ADLs and improve overall functional mobility. REHABILITATION POTENTIAL FOR STATED GOALS: Good    Regarding Lynsey FAY Catrachito's therapy, I certify that the treatment plan above will be carried out by a therapist or under their direction. Thank you for this referral,  Eli Bowens, PT, DPT       Referring Physician Signature: Falguni Reina DPM              Date                    HISTORY:  All history obtained on 7/26/18 unless otherwise noted.    PATIENT STATED GOAL:   -Pt states she would like to return to walking and performing physical activity with decreased levels of pain. HISTORY OF PRESENT INJURY/ILLNESS (REASON FOR REFERRAL):  Pt states the pain started about a year ago when she started going to the gym. Pt states her achilles heel was bothering her and was instructed by physician to stop walking on the treadmill and begin using the bicycle. Pt states the pain then started in her arch and after imaging in March found out she had a small heel spur. Pt notes she is having significant numbness in L foot and some in R foot which is due to neuropathy according to her physician. Pt states she occasionally gets shooting pain from her L arch up to around her knee. Pt rates pain10/10 pain at this moment and describes it as sharp and throbbing. Pt states the pain can get as high as 10/10 and as low as 5/10. Pt states she is having the most difficulty standing and walking for long periods of time, standing on it first thing in the morning, coming up on her toes, moving her foot in different directions, ascending/descending stairs. PAST MEDICAL HISTORY/COMORBIDITIES:  Ms. Abdirizak Moya  has a past medical history of Abnormal Papanicolaou smear of cervix; Adverse effect of anesthesia; Anemia; Angioedema; Breast lump on left side at 2 o'clock position; Chronic cholecystitis (3/28/2017); Claustrophobia; Former cigarette smoker; GERD (gastroesophageal reflux disease); History of kidney stones; Hypercholesterolemia; Hypertension; Kidney stone; Menorrhagia with irregular cycle; Morbid obesity ([address] (03/27/2017); Prediabetes (2/13/2016); Type 2 diabetes mellitus ([address] Vertigo; and Vitamin D deficiency.   Ms. Abdirizak Moya  has a past surgical history that includes hx tubal ligation (07/25/2005); hx dilation and curettage; hx lap cholecystectomy (03/28/2017); hx other surgical (Bilateral); fragment kidney stone/ eswl; hx endoscopy; hx orthopaedic (Left); and hx lithotripsy. Active Ambulatory Problems     Diagnosis Date Noted    Kidney stones 02/10/2016    Nocturia 02/10/2016    Urinary urgency 02/10/2016    ACE inhibitor-aggravated angioedema 02/13/2016    Type 2 diabetes mellitus with hyperglycemia, without long-term current use of insulin ([facility] 02/13/2016    RUQ pain 03/01/2017    Gastroesophageal reflux disease with esophagitis 03/01/2017    Chronic cholecystitis 03/28/2017    Hypercholesterolemia     Hypertension     Constipation 01/11/2016    Gastroesophageal reflux disease without esophagitis 01/11/2016    Nausea 01/11/2016    Morbid obesity with BMI of 40.0-44.9, adult ([facility] 07/29/2017    Chronic bilateral low back pain without sciatica 07/29/2017    Kidney stone 11/26/2017    Left foot pain 11/26/2017    Environmental and seasonal allergies 03/12/2018    VIRAL (generalized anxiety disorder) 03/12/2018    Rhinosinusitis 03/12/2018    Heel spur, left 07/04/2018    SANTO I (cervical intraepithelial neoplasia I) 07/09/2018     Resolved Ambulatory Problems     Diagnosis Date Noted    No Resolved Ambulatory Problems     Past Medical History:   Diagnosis Date    Abnormal Papanicolaou smear of cervix     Adverse effect of anesthesia     Anemia     Angioedema     Breast lump on left side at 2 o'clock position     Chronic cholecystitis 3/28/2017    Claustrophobia     Former cigarette smoker     GERD (gastroesophageal reflux disease)     History of kidney stones     Hypercholesterolemia     Hypertension     Kidney stone     Menorrhagia with irregular cycle     Morbid obesity [facility] 03/27/2017    Prediabetes 2/13/2016    Type 2 diabetes mellitus (HCC)     Vertigo     Vitamin D deficiency          Social History     Social History    Marital status:      Spouse name: N/A    Number of children: N/A    Years of education: N/A     Occupational History    Not on file.      Social History Main Topics    Smoking status: Former Smoker Packs/day: 1.00     Years: 0.50     Quit date: 1/1/2009    Smokeless tobacco: Never Used    Alcohol use No    Drug use: No    Sexual activity: Yes     Partners: Male     Birth control/ protection: None      Comment: tubal     Other Topics Concern    Caffeine Concern No    Exercise Yes    Seat Belt Yes    Self-Exams No     Social History Narrative    Abuse: Feels safe at home, no history of physical abuse, no history of sexual abuse           PRIOR LEVEL OF FUNCTION/WOR/ACTIVITY:  Pt states she has been suffering for a long period of time. Pt notes it has recently gotten worse and is impacting her ability to perform daily activities. CURRENT MEDICATIONS:    Current Outpatient Prescriptions:     gabapentin (NEURONTIN) 100 mg capsule, Take 1 Cap by mouth three (3) times daily. , Disp: 30 Cap, Rfl: 0    diclofenac (VOLTAREN) 1 % gel, Apply  to affected area two (2) times a day., Disp: 100 g, Rfl: 2    metFORMIN (GLUCOPHAGE) 1,000 mg tablet, Take 1 Tab by mouth two (2) times daily (with meals). Indications: type 2 diabetes mellitus, Disp: 90 Tab, Rfl: 3    ibuprofen (MOTRIN) 800 mg tablet, Take 1 Tab by mouth every six (6) hours as needed for Pain., Disp: 90 Tab, Rfl: 1    pravastatin (PRAVACHOL) 40 mg tablet, Take 1 Tab by mouth nightly., Disp: 90 Tab, Rfl: 1    hydroCHLOROthiazide (HYDRODIURIL) 25 mg tablet, Take 1 Tab by mouth daily. , Disp: 90 Tab, Rfl: 1    fluticasone (FLONASE) 50 mcg/actuation nasal spray, 2 Sprays by Both Nostrils route daily. , Disp: 3 Bottle, Rfl: 1    loratadine (CLARITIN) 10 mg tablet, Take 1 Tab by mouth daily. , Disp: 90 Tab, Rfl: 3    metroNIDAZOLE (FLAGYL) 500 mg tablet, Take 1 Tab by mouth two (2) times a day for 10 days. , Disp: 20 Tab, Rfl: 0     Date Last Reviewed:  8/3/2018   Number of Personal Factors/Comorbidities that affect the Plan of Care: 1-2: MODERATE COMPLEXITY   EXAMINATION:   OBSERVATION/ORTHOSTATIC POSTURAL ASSESSMENT:Assessed @ Initial Visit:   Pt sits with forward head and rounded shoulders which indicate tight anterior chest musculature, upper trapezius, and levator scapula and weak posterior scapula musculature and deep cervical flexors. Pt displays decreased core motor control indicating weak core and low back musculature. PALPATION: Assessed @ Initial Visit: Tenderness to L plantar fascia, arch, medial heel. Decreased L 1st ray mobility. L proximal lateral gastroc. Increased tone throughout R gastroc and pronounced R achilles tendon.      MEASUREMENTS:          Date: 7/25/18  Date:  Date:     Right Left Right Left Right Left   Ankle Circumference 25cm 26cm       Figure 8 52cm 53cm         AROM/PROM         Joint: Date: 7/25/18  Date:  Date:    Active LE ROM Right Left Right Left Right Left   Hip Flexion [facility]       Hip Extension [facility]       Hip IR [facility]       Hip ER [facility]       Knee Extension [facility] WFL       Knee Flexion WFL WFL       Ankle DF -2 degrees -2 degrees       Ankle PF 65 degrees 60 degrees       Ankle IV 25 degrees 25 degrees       Ankle EV 20 degrees 20 degrees  -pain in arch         STRENGTH         Joint: Date:  7/25/18  Date:  Date:     Right Left Right Left Right Left   Hip Abduction 4/5 4/5       Hip Adduction 4/5 4/5       Hip IR 4/5 4/5       Hip ER 4/5 4/5       Hip Flexion 4+/5 4+/5       Knee Extension 4+/5 4+/5       Knee Flexion 4+/5 4+/5       Ankle DF 4+/5 4/5       Ankle PF 4+/5 4/5       Ankle IV 4+/5 4-/5       Ankle EV 4+/5 4-/5  -pain felt along big toe         SPECIAL TESTS: Assessed @ Initial Visit:    -Talar tilt: Negative   -Anterior drawer: Negative   -Windlass test for Plantar Fasciitis: Positive L    NEUROLOGICAL SCREEN: Assessed @ Initial Visit:    -Pt notes she occasionally has tingling in B feet     FUNCTIONAL MOBILITY:  Assessed @ Initial Visit:    -Affecting participation in basic ADLs and functional tasks.   -Limited tolerance of walking and standing   -Ambulation/Gait: Pt ambulates with slight R lateral trunk lean. -Bed mobility: Pt reports no pain while in bed but extreme pain when first getting out of bed in the morning.    -Stairs: Pt reports difficulty with stairs due to increased pain in L foot. -Transfers: Independent   -Wheelchair: N/A    BALANCE:    SLS: Date: 7/25/18 Date: Date:   Right: NT     Left: NT          Body Structures Involved:  1. Bones  2. Joints  3. Muscles  4. Ligaments Body Functions Affected:  1. Sensory/Pain  2. Neuromusculoskeletal  3. Movement Related Activities and Participation Affected:  1. Mobility  2. Self Care  3. Domestic Life  4. Interpersonal Interactions and Relationships  5. Community, Social and [place]   Number of elements that affect the Plan of Care: 4+: HIGH COMPLEXITY   CLINICAL PRESENTATION:   Presentation: Stable and uncomplicated: LOW COMPLEXITY   CLINICAL DECISION MAKING:   TOOL USED:   -FOOT AND ANKLE ABILITY MEASURE (FAAM)  Score:  Initial: 41/84 Most Recent: X (Date: -- )   Interpretation of Score: For the \"Activities of Daily Living\", there are 21 questions each scored on a 5 point scale with 0 representing \"Unable to do\" and 4 representing \"No difficulty\". The lower the score, the greater the functional disability. 84/84 represents no disability. Minimal detectable change is 5.7 points. With the addition of the 8 questions in the \"Sports Subscale,\" there are 29 questions, each scored on a 5 point scale with 0 representing \"Unable to do\" and 4 representing \"No difficulty\". The lower the score, the greater the functional disability. 116/116 represents no disability. Minimal detectable change is 12.3 points.     Activities of Daily Living:  Score 84 83-68 67-51 50-34 33-18 17-1 0   Modifier CH CI CJ CK CL CM CN     Activities of Daily Living + Sports Subscale:  Score 116 115-94 93-71 70-47 46-24 23-1 0   Modifier CH CI CJ CK CL CM CN       MEDICAL NECESSITY:   · Skilled intervention continues to be required due to above deficits affecting participation in basic ADLs and overall functional tolerance. REASON FOR SERVICES/ OTHER COMMENTS:   · Patient continues to require skilled intervention due to  above deficits affecting participation in basic ADLs and overall functional tolerance. Use of outcome tool(s) and clinical judgement create a POC that gives a: Clear prediction of patient's progress: LOW COMPLEXITY   TREATMENT:   (In addition to Assessment/Re-Assessment sessions the following treatments were rendered)  PRE-TREATMENT SUBJECTIVE COMMENTS: Pt states she received MRI results which indicate complete tearing of ATFL and CFL and was put in a walking foot until she could see a specialist. Pt provided us with copy of MRI. Pt states now that she is in the boot she is having more pain in her ankle and up her leg. Pt states her hip and knee are also bothering her. Pt notes her ankle did not bother her prior to MRI and boot but is now aware of the pain due to walking in the boot. Pt rates pain 2/10    THERAPEUTIC EXERCISE: (12 min) Exercises per grid below to improve mobility, strength and balance. Required minimal visual, verbal and manual cues to promote proper body alignment, promote proper body posture and promote proper body breathing techniques. Progressed repetitions as indicated. Date:7/27/18 Date: 8/3/18   Pt education HEP, Anatomy, POC    Physiostep 12min  -level 3 12min  Level 3  Nu-Step   BAPs (PF/DF) 1x20 reps    BAPs (IV/EV) 1x20 reps    Resisted ankle PF 2x10 reps L only  -yellow TB    Resisted ankle DF 2x10 reps L only  -yellow TB    Seated great toe extension 1x15 reps    Towel pick ups 2x10 reps        MANUAL THERAPY: (33 minutes): Joint mobilization, Soft tissue mobilization and Manipulation was utilized and necessary because of the patient's restricted joint motion, painful spasm, loss of articular motion and restricted motion of soft tissue.  Pt supine with LEs supported by wedge for STM to plantar aspect of foot, achilles tendon, peroneal musculature, and gastroc muscle. Grade II & III mobilizations to L 1st ray to improve mobility. Kinesio tape to L ankle for additional support to joint. MODALITIES: (0 minutes):      NOT TODAY     TREATMENT/SESSION ASSESSMENT: Mattie Smith tolerated STM very well slight complaints of tenderness around peroneal musculature and gastroc region. Pt did not have tenderness around lateral maleolus or surrounding tissue. All ankle special testing was performed again with no indication of pain or tightness based on PROM. No swelling or bruising present along lateral aspect of L foot. Pt reported 1/10 pain at the end of treatment session. Pt educated to keep boot on until she could see specialist.    · Pain: Initial: 2/10 Post Session:  1/10 ·   Compliance with Program/Exercises: Will assess as treatment progresses. · Recommendations/Intent for next treatment session: \"Next visit will focus on advancements to more challenging activities and reduction in assistance provided\".     TOTAL TREATMENT DURATION:  PT Patient Time In/Time Out  Time In: 1300  Time Out: [address] Nick John DPT

## 2018-08-08 ENCOUNTER — HOSPITAL ENCOUNTER (OUTPATIENT)
Dept: PHYSICAL THERAPY | Age: 43
Discharge: HOME | End: 2018-08-08
Attending: PODIATRIST
Payer: COMMERCIAL

## 2018-08-08 PROCEDURE — 97140 MANUAL THERAPY 1/> REGIONS: CPT

## 2018-08-08 PROCEDURE — 97110 THERAPEUTIC EXERCISES: CPT

## 2018-08-10 ENCOUNTER — HOSPITAL ENCOUNTER (OUTPATIENT)
Dept: PHYSICAL THERAPY | Age: 43
Discharge: HOME | End: 2018-08-10
Attending: PODIATRIST
Payer: COMMERCIAL

## 2018-08-10 NOTE — PROGRESS NOTES
Mattie Smith  : 1975  Primary: [facility]  Secondary:  [address]  at [facility]  [address], [facility], [address]  [phone]   [phone]      8/10/2018   Patient called to cancel due to having no ride. Will follow up with the patient at the next visit.   Catrina Herrmann, PTA

## 2018-08-17 ENCOUNTER — HOSPITAL ENCOUNTER (OUTPATIENT)
Dept: PHYSICAL THERAPY | Age: 43
Discharge: HOME | End: 2018-08-17
Attending: PODIATRIST
Payer: COMMERCIAL

## 2018-08-17 PROCEDURE — 97110 THERAPEUTIC EXERCISES: CPT

## 2018-08-17 NOTE — PROGRESS NOTES
Chirag Graham  : 1975  Payor: [facility] STATE / Plan: [id number] / Product Type: PPO /  [facility]rapy Center at [facility]  [address], [address], [place] [address] [address] [facility]  [phone]   [phone]         OUTPATIENT PHYSICAL THERAPY:Daily Note 2018    ICD-10: Treatment Diagnosis:   Difficulty in walking, not elsewhere classified (R26.2)   Muscle weakness (generalized) (M62.81)   Plantar fascial fibromatosis (M72.2)    PRECAUTIONS/ALLERGIES:  Lisinopril; Ace inhibitors; Bactrim [sulfamethoxazole-trimethoprim]; Flomax [tamsulosin]; Norvasc [amlodipine]; and Sulfa (sulfonamide antibiotics)     FALL RISK SCORE: 1 (? 5 = High Risk)    MD ORDERS: Eval and Treat MEDICAL/REFERRING DIAGNOSIS:  heel spur    DATE OF ONSET: 1 year ago    REFERRING PHYSICIAN: Steve Reina DPM    RETURN PHYSICIAN APPOINTMENT: 3 weeks     INITIAL ASSESSMENT:  Ms. Chirag Graham has attended 1 physical therapy session including initial evaluation as of 2018. Chirag Graham demonstrates decreased strength, decreased ROM, decreased tolerance of ADLs, decreased functional mobility, and decreased activity tolerance, all consistent with S/S of plantar fasciitis. Pt had decreased 1st ray mobility in L foot accompanied by positive Windlass test. Pt had palpable trigger points throughout L proximal gastroc region. Increased tone throughout R LE noted as well. Pt did not have ankle swelling nor positive special tests indicating ankle involvement. Recommending skilled PT: manual therapeutic techniques (as appropriate), therapeutic exercises and activities, balance and comprehensive home exercises program to address current impairment. Chirag Graham will benefit from skilled PT (medically necessary) to address above deficits affecting participation in basic ADLs and overall functional tolerance. PROBLEM LIST (Impacting functional limitations):  1. Decreased Strength  2.  Decreased ADL/Functional Activities  3. Decreased Transfer Abilities  4. Decreased Ambulation Ability/Technique  5. Decreased Balance  6. Increased Pain  7. Decreased Activity Tolerance  8. Decreased [place] with Home Exercise Program INTERVENTIONS PLANNED:  1. Balance Exercise  2. Bed Mobility  3. Cold  4. Cryotherapy  5. Family Education  6. Gait Training  7. Heat  8. Home Exercise Program (HEP)  9. Manual Therapy  10. Neuromuscular Re-education/Strengthening  11. Range of Motion (ROM)  12. Therapeutic Activites  13. Therapeutic Exercise/Strengthening  14. Transfer Training  15. Ultrasound (US)   TREATMENT PLAN:  Effective Dates: 7/25/2018 TO 10/24/2018 (90 days). Frequency/Duration: 2 times a week for 90 Days    SHORT-TERM FUNCTIONAL GOALS: Time Frame: 12 weeks  [employer] will be compliant with home exercise program within 4 weeks in order to improve active participation with management of patient's symptoms and/or functional deficits. [employer] will report <=3/10 pain with walking >15 minutes in order to participate in daily exercise and daily activities. [employer] will be able to stand >=15 minutes with <2/10 pain to feet in order to participate in household duties without issues/compromise. [employer] will be report improved score on the Foot and Ankle Ability Measure from 41 to 51 to indicate improvement in functional independence. [employer] will improve ankle strength to >=4+/5 to improve tolerance of ADLs and improve overall functional mobility. REHABILITATION POTENTIAL FOR STATED GOALS: Good    Regarding Qamar Winston's therapy, I certify that the treatment plan above will be carried out by a therapist or under their direction. Thank you for this referral,  Yomi Pruitt, BRIE, DPT       Referring Physician Signature: Kristopher Reina DPM              Date                    HISTORY:  All history obtained on 7/26/18 unless otherwise noted.    PATIENT STATED GOAL:   -Pt states she would like to return to walking and performing physical activity with decreased levels of pain. HISTORY OF PRESENT INJURY/ILLNESS (REASON FOR REFERRAL):  Pt states the pain started about a year ago when she started going to the gym. Pt states her achilles heel was bothering her and was instructed by physician to stop walking on the treadmill and begin using the bicycle. Pt states the pain then started in her arch and after imaging in March found out she had a small heel spur. Pt notes she is having significant numbness in L foot and some in R foot which is due to neuropathy according to her physician. Pt states she occasionally gets shooting pain from her L arch up to around her knee. Pt rates pain10/10 pain at this moment and describes it as sharp and throbbing. Pt states the pain can get as high as 10/10 and as low as 5/10. Pt states she is having the most difficulty standing and walking for long periods of time, standing on it first thing in the morning, coming up on her toes, moving her foot in different directions, ascending/descending stairs. PAST MEDICAL HISTORY/COMORBIDITIES:  Ms. Dumont  has a past medical history of Hypercholesterolemia; Hypertension; and Kidney stone. Ms. Dumont  has a past surgical history that includes hx tubal ligation (07/25/2005); hx dilation and curettage; hx lap cholecystectomy (03/28/2017); hx other surgical (Bilateral); fragment kidney stone/ eswl; hx endoscopy; hx orthopaedic (Left); and hx lithotripsy.     Active Ambulatory Problems     Diagnosis Date Noted    ACE inhibitor-aggravated angioedema 02/13/2016    Type 2 diabetes mellitus with hyperglycemia, without long-term current use of insulin ([facility] [address] 02/13/2016    Hypercholesterolemia     Hypertension     Gastroesophageal reflux disease without esophagitis 01/11/2016    Morbid obesity with BMI of 40.0-44.9, adult ([facility] [address] 07/29/2017    Chronic bilateral low back pain without sciatica 07/29/2017    Kidney stone 11/26/2017    Environmental and seasonal allergies 03/12/2018    VIRAL (generalized anxiety disorder) 03/12/2018    Heel spur, left 07/04/2018    SANTO I (cervical intraepithelial neoplasia I) 07/09/2018     Resolved Ambulatory Problems     Diagnosis Date Noted    No Resolved Ambulatory Problems     Past Medical History:   Diagnosis Date    Hypercholesterolemia     Hypertension     Kidney stone          Social History     Social History    Marital status:      Spouse name: N/A    Number of children: N/A    Years of education: N/A     Occupational History    Not on file. Social History Main Topics    Smoking status: Former Smoker     Packs/day: 1.00     Years: 0.50     Quit date: 1/1/2009    Smokeless tobacco: Never Used    Alcohol use No    Drug use: No    Sexual activity: Yes     Partners: Male     Birth control/ protection: None      Comment: tubal     Other Topics Concern    Caffeine Concern No    Exercise Yes    Seat Belt Yes    Self-Exams No     Social History Narrative    Abuse: Feels safe at home, no history of physical abuse, no history of sexual abuse           PRIOR LEVEL OF FUNCTION/WOR/ACTIVITY:  Pt states she has been suffering for a long period of time. Pt notes it has recently gotten worse and is impacting her ability to perform daily activities. CURRENT MEDICATIONS:    Current Outpatient Prescriptions:     betamethasone valerate (VALISONE) 0.1 % topical cream, Apply  to affected area two (2) times a day., Disp: 15 g, Rfl: 0    metFORMIN (GLUCOPHAGE) 1,000 mg tablet, Take 1 Tab by mouth two (2) times daily (with meals).  Indications: type 2 diabetes mellitus, Disp: 90 Tab, Rfl: 3    ibuprofen (MOTRIN) 800 mg tablet, Take 1 Tab by mouth every six (6) hours as needed for Pain., Disp: 90 Tab, Rfl: 1    pravastatin (PRAVACHOL) 40 mg tablet, Take 1 Tab by mouth nightly., Disp: 90 Tab, Rfl: 1    hydroCHLOROthiazide (HYDRODIURIL) 25 mg tablet, Take 1 Tab by mouth daily. , Disp: 90 Tab, Rfl: 1    fluticasone (FLONASE) 50 mcg/actuation nasal spray, 2 Sprays by Both Nostrils route daily. , Disp: 3 Bottle, Rfl: 1    loratadine (CLARITIN) 10 mg tablet, Take 1 Tab by mouth daily. , Disp: 90 Tab, Rfl: 3     Date Last Reviewed:  8/17/2018   Number of Personal Factors/Comorbidities that affect the Plan of Care: 1-2: MODERATE COMPLEXITY   EXAMINATION:   OBSERVATION/ORTHOSTATIC POSTURAL ASSESSMENT:Assessed @ Initial Visit:   Pt sits with forward head and rounded shoulders which indicate tight anterior chest musculature, upper trapezius, and levator scapula and weak posterior scapula musculature and deep cervical flexors. Pt displays decreased core motor control indicating weak core and low back musculature. PALPATION: Assessed @ Initial Visit: Tenderness to L plantar fascia, arch, medial heel. Decreased L 1st ray mobility. L proximal lateral gastroc. Increased tone throughout R gastroc and pronounced R achilles tendon.      MEASUREMENTS:          Date: 7/25/18  Date:  Date:     Right Left Right Left Right Left   Ankle Circumference 25cm 26cm       Figure 8 52cm 53cm         AROM/PROM         Joint: Date: 7/25/18  Date:  Date:    Active LE ROM Right Left Right Left Right Left   Hip Flexion [facility]       Hip Extension [facility]       Hip IR [facility]       Hip ER [facility]       Knee Extension [facility] WFL       Knee Flexion [facility] WF       Ankle DF -2 degrees -2 degrees       Ankle PF 65 degrees 60 degrees       Ankle IV 25 degrees 25 degrees       Ankle EV 20 degrees 20 degrees  -pain in arch         STRENGTH         Joint: Date:  7/25/18  Date:  Date:     Right Left Right Left Right Left   Hip Abduction 4/5 4/5       Hip Adduction 4/5 4/5       Hip IR 4/5 4/5       Hip ER 4/5 4/5       Hip Flexion 4+/5 4+/5       Knee Extension 4+/5 4+/5       Knee Flexion 4+/5 4+/5       Ankle DF 4+/5 4/5       Ankle PF 4+/5 4/5       Ankle IV 4+/5 4-/5       Ankle EV 4+/5 4-/5  -pain felt along big toe         SPECIAL TESTS: Assessed @ Initial Visit:    -Talar tilt: Negative   -Anterior drawer: Negative   -Windlass test for Plantar Fasciitis: Positive L    NEUROLOGICAL SCREEN: Assessed @ Initial Visit:    -Pt notes she occasionally has tingling in B feet     FUNCTIONAL MOBILITY:  Assessed @ Initial Visit:    -Affecting participation in basic ADLs and functional tasks.   -Limited tolerance of walking and standing   -Ambulation/Gait: Pt ambulates with slight R lateral trunk lean. -Bed mobility: Pt reports no pain while in bed but extreme pain when first getting out of bed in the morning.    -Stairs: Pt reports difficulty with stairs due to increased pain in L foot. -Transfers: Independent   -Wheelchair: N/A    BALANCE:    SLS: Date: 7/25/18 Date: Date:   Right: NT     Left: NT          Body Structures Involved:  1. Bones  2. Joints  3. Muscles  4. Ligaments Body Functions Affected:  1. Sensory/Pain  2. Neuromusculoskeletal  3. Movement Related Activities and Participation Affected:  1. Mobility  2. Self Care  3. Domestic Life  4. Interpersonal Interactions and Relationships  5. Community, Social and [place]   Number of elements that affect the Plan of Care: 4+: HIGH COMPLEXITY   CLINICAL PRESENTATION:   Presentation: Stable and uncomplicated: LOW COMPLEXITY   CLINICAL DECISION MAKING:   TOOL USED:   -FOOT AND ANKLE ABILITY MEASURE (FAAM)  Score:  Initial: 41/84 Most Recent: X (Date: -- )   Interpretation of Score: For the \"Activities of Daily Living\", there are 21 questions each scored on a 5 point scale with 0 representing \"Unable to do\" and 4 representing \"No difficulty\". The lower the score, the greater the functional disability. 84/84 represents no disability. Minimal detectable change is 5.7 points.   With the addition of the 8 questions in the \"Sports Subscale,\" there are 29 questions, each scored on a 5 point scale with 0 representing \"Unable to do\" and 4 representing \"No difficulty\". The lower the score, the greater the functional disability. 116/116 represents no disability. Minimal detectable change is 12.3 points. Activities of Daily Living:  Score 84 83-68 67-51 50-34 33-18 17-1 0   Modifier CH CI CJ CK CL CM CN     Activities of Daily Living + Sports Subscale:  Score 116 115-94 93-71 70-47 46-24 23-1 0   Modifier CH CI CJ CK CL CM CN       MEDICAL NECESSITY:   · Skilled intervention continues to be required due to above deficits affecting participation in basic ADLs and overall functional tolerance. REASON FOR SERVICES/ OTHER COMMENTS:   · Patient continues to require skilled intervention due to  above deficits affecting participation in basic ADLs and overall functional tolerance. Use of outcome tool(s) and clinical judgement create a POC that gives a: Clear prediction of patient's progress: LOW COMPLEXITY   TREATMENT:   (In addition to Assessment/Re-Assessment sessions the following treatments were rendered)  PRE-TREATMENT SUBJECTIVE COMMENTS: Patient reports she went to physician and was told she required surgery on L ankle. Pt stated she was not given details but told it would be reconstructive surgery. Pt was told not to continue with walking boot. Pt will call office today to schedule surgery. Pt reported 4/10 pain in ankle     THERAPEUTIC EXERCISE: (40 min) Exercises per grid below to improve mobility, strength and balance. Required minimal visual, verbal and manual cues to promote proper body alignment, promote proper body posture and promote proper body breathing techniques. Progressed repetitions as indicated.       Date:7/27/18  Date: 8/3/18   Date: 8-8-18  Date: 8/17/18   Pt education HEP, Anatomy, POC      Nu-Step    12 min  -level 4   Physiostep 12min  -level 3 12min  Level 3  Nu-Step 15 min   Level 3  Nustep     BAPs (PF/DF) 1x20 reps      BAPs (IV/EV) 1x20 reps      Resisted ankle PF 2x10 reps L only  -yellow TB   2x10 reps L only  -yellow TB Resisted ankle DF 2x10 reps L only  -yellow TB   2x10 reps L only  -yellow TB   Resisted ankle EV    2x10 reps L only  -yellow TB   Resisted ankle IV    2x10 reps L only  -yellow TB   Seated great toe extension 1x15 reps      Towel pick ups 2x10 reps      Standing hip abduction    1x20 reps  -blue airex   Standing hip flexion    1x20 reps  -blue airex   Standing hip extension    1x20 reps  -blue airex   Standing marching    2x10 reps  -blue airex   Tandem balance    3x30s B              MANUAL THERAPY: (0 minutes): Joint mobilization, Soft tissue mobilization and Manipulation was utilized and necessary because of the patient's restricted joint motion, painful spasm, loss of articular motion and restricted motion of soft tissue. Pt supine with LEs supported by wedge for STM to plantar aspect of foot, achilles tendon, peroneal musculature, and gastroc muscle. Grade II & III mobilizations to L 1st ray to improve mobility. Kinesio tape to L ankle for additional support to joint. MODALITIES: (0 minutes):      *  Cold Pack Therapy in order to reduce inflammation and edema to L foot while supine and propped on wedge. Skin was clear and intact . TREATMENT/SESSION ASSESSMENT: Tate Armstrong reported slight pain in ankle during resisted exercises and required verbal cues to slow down standing exercises. Pt tolerated standing balance well. Will continue to work on balance and LE strengthening to prep for surgery. Waiting for pt to find out surgery date before scheduling more appointments for therapy. Pt reported 0-1/10 pain at the end of treatment session. · Pain: Initial: 0/10 Post Session: 0-1/10 ·   Compliance with Program/Exercises: Will assess as treatment progresses. · Recommendations/Intent for next treatment session: \"Next visit will focus on advancements to more challenging activities and reduction in assistance provided\".     TOTAL TREATMENT DURATION:  PT Patient Time In/Time Out  Time In: 0805  Time Out: [address]  Gerry Favre, PT, DPT

## 2018-09-01 NOTE — ED NOTES
Found pt with li of food on her bed. States she ate a couple bites of a sandwich and several fries. Instructed pt not to eat anything else.
I have reviewed discharge instructions with the patient. The patient verbalized understanding. Opportunity provided for questions and clarification. Pt exited via wheelchair to family member's car.
Pt reports hx kidney stones. Pt encouraged to provide urine specimen.
Urine hcg neg.
ER physician

## 2018-09-12 ENCOUNTER — ANESTHESIA EVENT (OUTPATIENT)
Dept: SURGERY | Age: 43
End: 2018-09-12
Payer: COMMERCIAL

## 2018-09-12 ENCOUNTER — HOSPITAL ENCOUNTER (OUTPATIENT)
Age: 43
Setting detail: OUTPATIENT SURGERY
Discharge: HOME | End: 2018-09-12
Attending: ORTHOPAEDIC SURGERY | Admitting: ORTHOPAEDIC SURGERY
Payer: COMMERCIAL

## 2018-09-12 ENCOUNTER — ANESTHESIA (OUTPATIENT)
Dept: SURGERY | Age: 43
End: 2018-09-12
Payer: COMMERCIAL

## 2018-09-12 VITALS
OXYGEN SATURATION: 100 % | WEIGHT: 240 LBS | RESPIRATION RATE: 15 BRPM | TEMPERATURE: 97.4 F | HEIGHT: 64 IN | SYSTOLIC BLOOD PRESSURE: 141 MMHG | HEART RATE: 70 BPM | DIASTOLIC BLOOD PRESSURE: 67 MMHG | BODY MASS INDEX: 40.97 KG/M2

## 2018-09-12 LAB
GLUCOSE BLD STRIP.AUTO-MCNC: 98 MG/DL (ref 65–100)
POTASSIUM BLD-SCNC: 4.3 MMOL/L (ref 3.5–5.1)

## 2018-09-12 PROCEDURE — 77030002966 HC SUT PDS J&J -A: Performed by: ORTHOPAEDIC SURGERY

## 2018-09-12 PROCEDURE — 74011250636 HC RX REV CODE- 250/636: Performed by: ORTHOPAEDIC SURGERY

## 2018-09-12 PROCEDURE — 76942 ECHO GUIDE FOR BIOPSY: CPT | Performed by: ORTHOPAEDIC SURGERY

## 2018-09-12 PROCEDURE — 76210000020 HC REC RM PH II FIRST 0.5 HR: Performed by: ORTHOPAEDIC SURGERY

## 2018-09-12 PROCEDURE — 77030002933 HC SUT MCRYL J&J -A: Performed by: ORTHOPAEDIC SURGERY

## 2018-09-12 PROCEDURE — 77030003602 HC NDL NRV BLK BBMI -B: Performed by: ANESTHESIOLOGY

## 2018-09-12 PROCEDURE — 74011250636 HC RX REV CODE- 250/636

## 2018-09-12 PROCEDURE — 76010000161 HC OR TIME 1 TO 1.5 HR INTENSV-TIER 1: Performed by: ORTHOPAEDIC SURGERY

## 2018-09-12 PROCEDURE — 77030018836 HC SOL IRR NACL ICUM -A: Performed by: ORTHOPAEDIC SURGERY

## 2018-09-12 PROCEDURE — 76010010054 HC POST OP PAIN BLOCK: Performed by: ORTHOPAEDIC SURGERY

## 2018-09-12 PROCEDURE — 77030021122 HC SPLNT MAT FST BSNM -A: Performed by: ORTHOPAEDIC SURGERY

## 2018-09-12 PROCEDURE — 76210000063 HC OR PH I REC FIRST 0.5 HR: Performed by: ORTHOPAEDIC SURGERY

## 2018-09-12 PROCEDURE — 84132 ASSAY OF SERUM POTASSIUM: CPT

## 2018-09-12 PROCEDURE — 77030000032 HC CUF TRNQT ZIMM -B: Performed by: ORTHOPAEDIC SURGERY

## 2018-09-12 PROCEDURE — 76060000033 HC ANESTHESIA 1 TO 1.5 HR: Performed by: ORTHOPAEDIC SURGERY

## 2018-09-12 PROCEDURE — 77030002916 HC SUT ETHLN J&J -A: Performed by: ORTHOPAEDIC SURGERY

## 2018-09-12 PROCEDURE — 74011250636 HC RX REV CODE- 250/636: Performed by: ANESTHESIOLOGY

## 2018-09-12 PROCEDURE — 82962 GLUCOSE BLOOD TEST: CPT

## 2018-09-12 RX ORDER — SODIUM CHLORIDE 0.9 % (FLUSH) 0.9 %
5-10 SYRINGE (ML) INJECTION AS NEEDED
Status: DISCONTINUED | OUTPATIENT
Start: 2018-09-12 | End: 2018-09-12 | Stop reason: HOSPADM

## 2018-09-12 RX ORDER — SODIUM CHLORIDE, SODIUM LACTATE, POTASSIUM CHLORIDE, CALCIUM CHLORIDE 600; 310; 30; 20 MG/100ML; MG/100ML; MG/100ML; MG/100ML
75 INJECTION, SOLUTION INTRAVENOUS CONTINUOUS
Status: DISCONTINUED | OUTPATIENT
Start: 2018-09-12 | End: 2018-09-12 | Stop reason: HOSPADM

## 2018-09-12 RX ORDER — LIDOCAINE HYDROCHLORIDE 20 MG/ML
INJECTION, SOLUTION EPIDURAL; INFILTRATION; INTRACAUDAL; PERINEURAL AS NEEDED
Status: DISCONTINUED | OUTPATIENT
Start: 2018-09-12 | End: 2018-09-12 | Stop reason: HOSPADM

## 2018-09-12 RX ORDER — MIDAZOLAM HYDROCHLORIDE 1 MG/ML
2 INJECTION, SOLUTION INTRAMUSCULAR; INTRAVENOUS ONCE
Status: COMPLETED | OUTPATIENT
Start: 2018-09-12 | End: 2018-09-12

## 2018-09-12 RX ORDER — PROPOFOL 10 MG/ML
INJECTION, EMULSION INTRAVENOUS
Status: DISCONTINUED | OUTPATIENT
Start: 2018-09-12 | End: 2018-09-12 | Stop reason: HOSPADM

## 2018-09-12 RX ORDER — CEFAZOLIN SODIUM/WATER 2 G/20 ML
2 SYRINGE (ML) INTRAVENOUS
Status: COMPLETED | OUTPATIENT
Start: 2018-09-12 | End: 2018-09-12

## 2018-09-12 RX ORDER — PROPOFOL 10 MG/ML
INJECTION, EMULSION INTRAVENOUS AS NEEDED
Status: DISCONTINUED | OUTPATIENT
Start: 2018-09-12 | End: 2018-09-12 | Stop reason: HOSPADM

## 2018-09-12 RX ORDER — HYDROMORPHONE HYDROCHLORIDE 2 MG/ML
0.5 INJECTION, SOLUTION INTRAMUSCULAR; INTRAVENOUS; SUBCUTANEOUS
Status: DISCONTINUED | OUTPATIENT
Start: 2018-09-12 | End: 2018-09-12 | Stop reason: HOSPADM

## 2018-09-12 RX ORDER — OXYCODONE AND ACETAMINOPHEN 10; 325 MG/1; MG/1
1 TABLET ORAL AS NEEDED
Status: DISCONTINUED | OUTPATIENT
Start: 2018-09-12 | End: 2018-09-12 | Stop reason: HOSPADM

## 2018-09-12 RX ORDER — OXYCODONE HYDROCHLORIDE 5 MG/1
5 TABLET ORAL
Status: DISCONTINUED | OUTPATIENT
Start: 2018-09-12 | End: 2018-09-12 | Stop reason: HOSPADM

## 2018-09-12 RX ORDER — FENTANYL CITRATE 50 UG/ML
100 INJECTION, SOLUTION INTRAMUSCULAR; INTRAVENOUS ONCE
Status: COMPLETED | OUTPATIENT
Start: 2018-09-12 | End: 2018-09-12

## 2018-09-12 RX ADMIN — LIDOCAINE HYDROCHLORIDE 100 MG: 20 INJECTION, SOLUTION EPIDURAL; INFILTRATION; INTRACAUDAL; PERINEURAL at 11:18

## 2018-09-12 RX ADMIN — PROPOFOL 50 MG: 10 INJECTION, EMULSION INTRAVENOUS at 11:18

## 2018-09-12 RX ADMIN — MIDAZOLAM HYDROCHLORIDE 2 MG: 1 INJECTION, SOLUTION INTRAMUSCULAR; INTRAVENOUS at 10:58

## 2018-09-12 RX ADMIN — FENTANYL CITRATE 100 MCG: 50 INJECTION INTRAMUSCULAR; INTRAVENOUS at 10:58

## 2018-09-12 RX ADMIN — SODIUM CHLORIDE, SODIUM LACTATE, POTASSIUM CHLORIDE, AND CALCIUM CHLORIDE 75 ML/HR: 600; 310; 30; 20 INJECTION, SOLUTION INTRAVENOUS at 09:45

## 2018-09-12 RX ADMIN — Medication 2 G: at 11:19

## 2018-09-12 RX ADMIN — PROPOFOL 160 MCG/KG/MIN: 10 INJECTION, EMULSION INTRAVENOUS at 11:18

## 2018-09-12 NOTE — ANESTHESIA POSTPROCEDURE EVALUATION
Post-Anesthesia Evaluation and Assessment Patient: Fiordaliza Godinez MRN: [id number]  SSN: [id number] YOB: 1975  Age: 39 y.o. Sex: female Cardiovascular Function/Vital Signs Visit Vitals  /77 (BP 1 Location: Left arm, BP Patient Position: Supine)  Pulse 87  Temp 36.3 °C (97.4 °F)  Resp 15  Ht 5' 4\" (1.626 m)  Wt 108.9 kg (240 lb)  SpO2 100%  BMI 41.2 kg/m2 Patient is status post total IV anesthesia, general - backup anesthesia for Procedure(s): LEFT PERONEAL TENDON RECON / LATERAL ANKLE LIGAMENT RECONSTRUCTION. Nausea/Vomiting: None Postoperative hydration reviewed and adequate. Pain: 
Pain Scale 1: Numeric (0 - 10) (09/12/18 1224) Pain Intensity 1: 0 (09/12/18 1224) Managed Neurological Status:  
Neuro (WDL): Exceptions to WDL (09/12/18 1224) Neuro LLE Motor Response: Numbness; Pharmacologically paralyzed (09/12/18 1224) At baseline Mental Status and Level of Consciousness: Arousable Pulmonary Status:  
O2 Device: Room air (09/12/18 1229) Adequate oxygenation and airway patent Complications related to anesthesia: None Post-anesthesia assessment completed. No concerns Signed By: Jaswinder Gomez MD   
 September 12, 2018

## 2018-09-12 NOTE — DISCHARGE INSTRUCTIONS
INSTRUCTIONS FOLLOWING FOOT SURGERY    ACTIVITY  Elevate foot. No Ice  No weight bearing. Use crutches or knee walker until seen in follow up appointment  Don't put anything into the splint to relieve itching etc. Take one 325mg aspirin daily if okay with your medical doctor and you have no GI ulcer. Get up and out of bed frequently. While in bed move the legs as much as possible)    DIET  Day of Surgery: Clear liquids until no nausea or vomiting; then light, bland diet (Baked chicken, plain rice, grits, scrambled egg, toast). Nothing Greasy, fried or spicy today  Advance to regular diet on second day, unless your doctor orders otherwise. PAIN  Take pain medications as directed by your doctor. Call your doctor if pain is NOT relieved by medication. PAIN MED SIDE EFFECTS  Constipation: Lots of fluids, try prune juice, then OTC stool softeners if necessary  Nausea: Take medication with food. DRESSING CARE Keep dry and in place until follow up appointment    CALL YOUR DOCTOR IF YOU HAVE  Excessive bleeding that does not stop after holding mild pressure over the area. Temperature of 101 degrees or above. Redness, excessive swelling or bruising, and/or green or yellow, smelly discharge from incision. Loss of sensation - cold, white or blue toes. AFTER ANESTHESIA  For the first 24 hours and while taking narcotics for pain: DO NOT Drive, Drink Alcoholic beverages, or make important Decisions. Be aware of dizziness following anesthesia and while taking pain medication. Preventing Infection at Home  We care about preventing infection and avoiding the spread of germs - not only when you are in the hospital but also when you return home. When you return home from the hospital, its important to take the following steps to help prevent infection and avoid spreading germs that could infect you and others. Ask everyone in your home to follow these guidelines, too.     Clean Your Hands  · Clean your hands whenever your hands are visibly dirty, before you eat, before or after touching your mouth, nose or eyes, and before preparing food. Clean them after contact with body fluids, using the restroom, touching animals or changing diapers. · When washing hands, wet them with warm water and work up a lather. Rub hands for at least 15 seconds, then rinse them and pat them dry with a clean towel or paper towel. · When using hand sanitizers, it should take about 15 seconds to rub your hands dry. If not, you probably didnt apply enough . Cover Your Sneeze or Cough  Germs are released into the air whenever you sneeze or cough. To prevent the spread of infection:  · Turn away from other people before coughing or sneezing. · Cover your mouth or nose with a tissue when you cough or sneeze. Put the tissue in the trash. · If you dont have a tissue, cough or sneeze into your upper sleeve, not your hands. · Always clean your hands after coughing or sneezing. Care for Wounds  Your skin is your bodys first line of defense against germs, but an open wound leaves an easy way for germs to enter your body. To prevent infection:  · Clean your hands before and after changing wound dressings, and wear gloves to change dressings if recommended by your doctor. · Take special care with IV lines or other devices inserted into the body. If you must touch them, clean your hands first.  · Follow any specific instructions from your doctor to care for your wounds. Contact your doctor if you experience any signs of infection, such as fever or increased redness at the surgical or wound site. Keep a Clean Home  · Clean or wipe commonly touched hard surfaces like door handles, sinks, tabletops, phones and TV remotes. · Use products labeled disinfectant to kill harmful bacteria and viruses. · Use a clean cloth or paper towel to clean and dry surfaces.  Wiping surfaces with a dirty dishcloth, sponge or towel will only spread germs.  · Never share toothbrushes, sebastian, drinking glasses, utensils, razor blades, face cloths or bath towels to avoid spreading germs. · Be sure that the linens that you sleep on are clean. · Keep pets away from wounds and wash your hands after touching pets, their toys or bedding. We care about you and your health. Remember, preventing infections is a team effort between you, your family, friends and health care providers. DISCHARGE SUMMARY from Nurse    PATIENT INSTRUCTIONS:    After general anesthesia or intravenous sedation, for 24 hours or while taking prescription Narcotics:  · Limit your activities  · Do not drive and operate hazardous machinery  · Do not make important personal or business decisions  · Do  not drink alcoholic beverages  · If you have not urinated within 8 hours after discharge, please contact your surgeon on call. *  Please give a list of your current medications to your Primary Care Provider. *  Please update this list whenever your medications are discontinued, doses are      changed, or new medications (including over-the-counter products) are added. *  Please carry medication information at all times in case of emergency situations. These are general instructions for a healthy lifestyle:    No smoking/ No tobacco products/ Avoid exposure to second hand smoke    Surgeon General's Warning:  Quitting smoking now greatly reduces serious risk to your health. Obesity, smoking, and sedentary lifestyle greatly increases your risk for illness    A healthy diet, regular physical exercise & weight monitoring are important for maintaining a healthy lifestyle    You may be retaining fluid if you have a history of heart failure or if you experience any of the following symptoms:  Weight gain of 3 pounds or more overnight or 5 pounds in a week, increased swelling in our hands or feet or shortness of breath while lying flat in bed.   Please call your doctor as soon as you notice any of these symptoms; do not wait until your next office visit. Recognize signs and symptoms of STROKE:    F-face looks uneven    A-arms unable to move or move unevenly    S-speech slurred or non-existent    T-time-call 911 as soon as signs and symptoms begin-DO NOT go       Back to bed or wait to see if you get better-TIME IS BRAIN. Learning About How to Use Crutches  Your Care Instructions  Crutches can help you walk when you have an injured hip, leg, knee, ankle, or foot. Your doctor will tell you how much weight--if any--you can put on your leg. Be sure your crutches fit you. When you stand up in your normal posture, there should be space for two or three fingers between the top of the crutch and your armpit. When you let your hands hang down, the hand  should be at your wrists. When you put your hands on the hand , your elbows should be slightly bent. To stay safe when using crutches:  · Look straight ahead, not down at your feet. · Clear away small rugs, cords, or anything else that could cause you to trip, slip, or fall. · Be very careful around pets and small children. They can get in your path when you least expect it. · Be sure the rubber tips on your crutches are clean and in good condition to help prevent slipping. · Avoid slick conditions, such as wet floors and snowy or icy driveways. In bad weather, be especially careful on curbs and steps. How to use crutches  Getting ready to walk    1. Bend your elbows slightly. Press the padded top parts of the crutches against your sides, under your armpits. 2. If you have been told not to put any weight on your injured leg, keep that leg bent and off the ground. Walking with crutches    1. Put both crutches about 12 inches in front of you. 2. Put your weight on the handgrips, not on the pads under your arms. (Constant pressure against your underarms can cause numbness.) Swing your body forward.  (If you have been told not to put any weight on your injured leg, keep that leg bent and off the ground.)  3. To complete the step, put your weight on the strong leg. 4. Move your crutches about 12 inches in front of you, and start the next step. 5. When you're confident using the crutches, you can move the crutches and your injured leg at the same time. Then push straight down on the crutches as you step past the crutches with your strong leg, as you would in normal walking. 6. Take small steps. 7. Use ramps and elevators when you can. Sitting down    1. To sit, back up to the chair. Use one hand to hold both crutches by the handgrips, beside your injured leg. With the other hand, hold onto the seat and slowly lower yourself onto the chair. 2. Lay the crutches on the ground near your chair. If you prop them up, they may fall over. Getting up from a chair    1. To get up from a chair,  the crutches and put them in one hand beside your injured leg. 2. Put your weight on the handgrips of the crutches and on your strong leg to stand up. Walking up stairs    1. To go up stairs, step up with your strong leg and then bring the crutches and your injured leg to the upper step. 2. For stairs that have handrails: Put both crutches under the arm opposite the handrail. Use the hand opposite the handrail to hold both crutches by the handgrips. 3. Hold onto the handrail as you go up. Put your strong leg on the step first when you go up. Walking down stairs    1. To go down stairs, put your crutches and injured leg on the lower step. 2. Bring your strong leg to the lower step. This saying may help you remember: \"Up with the good, down with the bad. \"  3. For stairs that have handrails: Put both crutches under the arm opposite the handrail. Use the hand opposite the handrail to hold both crutches by the handgrips. Hold onto the handrail as you go down.  Follow the same process you use for stairs: Lead with your crutches and injured leg on the way down.  Follow-up care is a key part of your treatment and safety. Be sure to make and go to all appointments, and call your doctor if you are having problems. It's also a good idea to know your test results and keep a list of the medicines you take. Where can you learn more? Go to http://gwen-brennan.info/. Enter Y886 in the search box to learn more about \"Learning About How to Use Crutches. \"  Current as of: August 4, 2016  Content Version: 11.2  © 9855-1531 [employer]. Care instructions adapted under license by [employer] (which disclaims liability or warranty for this information). If you have questions about a medical condition or this instruction, always ask your healthcare professional. [address] any warranty or liability for your use of this information.

## 2018-09-12 NOTE — PERIOP NOTES
PACU DISCHARGE NOTE Vital signs stable, pain well controlled, alert and oriented times three or at baseline, no anesthetic complications. IV removed with catheter tip intact. Patient reports she has crutches at home. All toes pink and warm with brisk cap refill. Written and verbal discharge instructions given, including pain control, dressing care and follow up appointment. Written crutch instructions included. Spouse, Hannah Fernando verbalized understanding . All questions answered prior to discharge. Dr Emma Ly okay to discharge at this time. Pt and all belongings taken via wheelchair and safely put in vehicle.

## 2018-09-12 NOTE — H&P
Outpatient Surgery History and Physical: 
Elliott Renner CHIEF COMPLAINT:   LE 
 
PE: 
  
Visit Vitals  /62 (BP 1 Location: Left arm, BP Patient Position: Supine)  Pulse 83  Temp 98.2 °F (36.8 °C)  Resp 18  Ht 5' 4\" (1.626 m)  Wt 108.9 kg (240 lb)  SpO2 100%  BMI 41.2 kg/m2 Heart:  No noted problems Lungs:  No noted problems Past Medical History:   
Patient Active Problem List  
 Diagnosis  SANTO I (cervical intraepithelial neoplasia I) On colpo done by [facility] on 4/19/18  Heel spur, left  Environmental and seasonal allergies  VIRAL (generalized anxiety disorder)  Kidney stone  Morbid obesity with BMI of 40.0-44.9, adult ([facility]  Chronic bilateral low back pain without sciatica  Hypercholesterolemia  Hypertension  ACE inhibitor-aggravated angioedema  Type 2 diabetes mellitus with hyperglycemia, without long-term current use of insulin ([facility]  Gastroesophageal reflux disease without esophagitis Last Assessment & Plan:  
Patient's reflux symptoms are refractory to multiple medications(Zantac, omeprazole twice a day, Nexium twice daily, Prevacid). -Since Carafate is partially relieving her symptoms I advised patient to continue it for now. -She has had decrease in the burning sensation omeprazole, so I will restart omeprazole 40 mg twice a day. -I will also schedule patient for EGD for further evaluation of refractory GERD 
-Based on the patient's history is possible she may have a nonacid reflux. If EGD is negative and she continues to have symptoms in spite of about medications I will consider starting patient on baclofen 10 mg by mouth 2-3 times a day 
-Patient was also counseled about antireflux measures Surgical History:  
Past Surgical History:  
Procedure Laterality Date  FRAGMENT KIDNEY STONE/ ESWL    
 HX DILATION AND CURETTAGE    
 HX ENDOSCOPY    
 HX LAP CHOLECYSTECTOMY  03/28/2017  HX LITHOTRIPSY x 4  
 HX ORTHOPAEDIC Left   
 hand- tendon repair  HX OTHER SURGICAL Bilateral   
 sweat gland removal under both arms  HX TUBAL LIGATION  07/25/2005 Social History: Patient  reports that she quit smoking about 9 years ago. She has a 0.50 pack-year smoking history. She has never used smokeless tobacco. She reports that she does not drink alcohol or use illicit drugs. Family History:  
Family History Problem Relation Age of Onset  Heart Disease Maternal Grandmother  Hypertension Mother Jessi. Other Mother Arnaud Lev disease  Hypertension Father  Other Father on blood thinners for blood clots  Hypertension Brother  Colon Cancer Other  No Known Problems Brother  Breast Cancer Neg Hx   
 Ovarian Cancer Neg Hx  Uterine Cancer Neg Hx Allergies: Reviewed per EMR Allergies Allergen Reactions  Lisinopril Anaphylaxis  Ace Inhibitors Angioedema  Bactrim [Sulfamethoxazole-Trimethoprim] Hives  Flomax [Tamsulosin] Swelling  Norvasc [Amlodipine] Other (comments) Pt denies any allergic reaction to Norvasc  Sulfa (Sulfonamide Antibiotics) Hives Medications: No current facility-administered medications on file prior to encounter. Current Outpatient Prescriptions on File Prior to Encounter Medication Sig  
 betamethasone valerate (VALISONE) 0.1 % topical cream Apply  to affected area two (2) times a day.  metFORMIN (GLUCOPHAGE) 1,000 mg tablet Take 1 Tab by mouth two (2) times daily (with meals). Indications: type 2 diabetes mellitus  ibuprofen (MOTRIN) 800 mg tablet Take 1 Tab by mouth every six (6) hours as needed for Pain.  pravastatin (PRAVACHOL) 40 mg tablet Take 1 Tab by mouth nightly.  hydroCHLOROthiazide (HYDRODIURIL) 25 mg tablet Take 1 Tab by mouth daily.  fluticasone (FLONASE) 50 mcg/actuation nasal spray 2 Sprays by Both Nostrils route daily.  loratadine (CLARITIN) 10 mg tablet Take 1 Tab by mouth daily. (Patient taking differently: Take 10 mg by mouth daily as needed.) The surgery is planned for the LE. History and physical have been reviewed. There have been no significant  changes since the completion of the updated history and physical. 
 
Necessity for the procedure/care is still present and the updated history and physical office notes outline the full long term history of the problem. Please see the updated office notes for the full musculoskeletal examination and surgical plan.  
 
Signed By: Nico Mcconnell MD   
 September 12, 2018 10:23 AM

## 2018-09-12 NOTE — ANESTHESIA PROCEDURE NOTES
Peripheral Block Start time: 9/12/2018 10:58 AM 
End time: 9/12/2018 11:01 AM 
Performed by: Vladislav Magaña Authorized by: Vladislav Magaña  
 
 
Pre-procedure: Indications: at surgeon's request and post-op pain management Preanesthetic Checklist: patient identified, risks and benefits discussed, site marked, timeout performed, anesthesia consent given and patient being monitored Timeout Time: 10:58 Block Type:  
Block Type:  Popliteal 
Laterality:  Left Monitoring:  Standard ASA monitoring, continuous pulse ox, frequent vital sign checks, heart rate, oxygen and responsive to questions Injection Technique:  Single shot Procedures: ultrasound guided and nerve stimulator Patient Position: right lateral decubitus Prep: chlorhexidine Location:  Lower thigh Needle Type:  Stimuplex Needle Gauge:  20 G Needle Localization:  Nerve stimulator and ultrasound guidance Motor Response: minimal motor response >0.4 mA Medication Injected:  0.5% 
ropivacaine Volume (mL):  33 
 
Assessment: 
Number of attempts:  1 Injection Assessment:  Incremental injection every 5 mL, local visualized surrounding nerve on ultrasound, negative aspiration for blood, no paresthesia and ultrasound image on chart Patient tolerance:  Patient tolerated the procedure well with no immediate complications

## 2018-09-12 NOTE — OP NOTES
[facility] REPORT    Daniela Levin  MR#: [id number]  : 1975  ACCOUNT #: [de-identified]   DATE OF SERVICE: 2018    PREOPERATIVE DIAGNOSES:  1. Left peroneus brevis tendon tear. 2.  Left lateral ankle ligament instability. POSTOPERATIVE DIAGNOSES:  1. Left peroneus brevis tendon tear. 2.  Left lateral ankle ligament instability. PROCEDURES PERFORMED:   1. Left peroneus brevis reconstruction, lateral ankle ligament reconstruction. SURGEON:  Tru Bullock MD    ANESTHESIA:  Regional.    FLUIDS:  Crystalloid. ESTIMATED BLOOD LOSS:  Minimal.    SPECIMENS REMOVED:  None. DRAINS:  None. COMPLICATIONS:  None. TOURNIQUET TIME:  Less than 45 minutes. FINDINGS:  Intraoperatively, the patient had tearing of the peroneus brevis from 1 cm proximal to the lateral malleolus to 4 cm distal.  Fairly significant tear of the tendon, but I was able to get a good repair. No osteochondral defect or abnormalities noted in the ankle. SURGERY IN DETAIL:  After successful induction of regional anesthesia, the left leg was scrubbed, prepped and draped in the usual sterile fashion. Antibiotic issued. Timeout procedure and identification of surgical markings was done by me. Left leg was addressed with a lateral approach. Findings noted above were seen. I was able do a peroneal tendon reconstruction without difficulty. The peroneal tendon was reconstructed with 3-0 PDS. 3-0 PDS Was also used to reconstruct the sheath. The left leg was then addressed with an anterolateral approach. Anterolateral ankle ligament reconstruction was performed with 0 PDS. Monocryl and Ethilon for the skin. The patient was placed in a sterile dressing and a short leg splint, and seemed to tolerate the procedure well.       Eran Tyler MD       MET / DN  D: 2018 12:24     T: 2018 12:41  JOB #: [id number]

## 2018-09-12 NOTE — IP AVS SNAPSHOT
[address] 
[phone] Patient: Selena Davis MRN: [id number] XPH:[id number] About your hospitalization You were admitted on:  September 12, 2018 You last received care in the:  [facility] OP PACU You were discharged on:  September 12, 2018 Why you were hospitalized Your primary diagnosis was:  Not on File Follow-up Information Follow up With Details Comments Contact Info Candace Valero MD  Keep as scheduled [address] [address] 
[phone] Discharge Orders None A check keila indicates which time of day the medication should be taken. My Medications ASK your doctor about these medications Instructions Each Dose to Equal  
 Morning Noon Evening Bedtime  
 betamethasone valerate 0.1 % topical cream  
Commonly known as:  Marricarda Travis Your last dose was: Your next dose is:    
   
   
 Apply  to affected area two (2) times a day. fluticasone 50 mcg/actuation nasal spray Commonly known as:  Terri Frpaulo Your last dose was: Your next dose is: 2 Sprays by Both Nostrils route daily. 2 Spray  
    
   
   
   
  
 gabapentin 100 mg capsule Commonly known as:  NEURONTIN Your last dose was: Your next dose is: Take 100 mg by mouth two (2) times a day. 100 mg  
    
   
   
   
  
 hydroCHLOROthiazide 25 mg tablet Commonly known as:  HYDRODIURIL Your last dose was: Your next dose is: Take 1 Tab by mouth daily. 25 mg  
    
   
   
   
  
 ibuprofen 800 mg tablet Commonly known as:  MOTRIN Your last dose was: Your next dose is: Take 1 Tab by mouth every six (6) hours as needed for Pain. 800 mg  
    
   
   
   
  
 loratadine 10 mg tablet Commonly known as:  Riyadieter Pereiraman Your last dose was: Your next dose is: Take 1 Tab by mouth daily. 10 mg  
    
   
   
   
  
 metFORMIN 1,000 mg tablet Commonly known as:  GLUCOPHAGE Your last dose was: Your next dose is: Take 1 Tab by mouth two (2) times daily (with meals). Indications: type 2 diabetes mellitus 1000 mg  
    
   
   
   
  
 multivitamin tablet Commonly known as:  ONE A DAY Your last dose was: Your next dose is: Take 1 Tab by mouth daily. 1 Tab  
    
   
   
   
  
 pravastatin 40 mg tablet Commonly known as:  PRAVACHOL Your last dose was: Your next dose is: Take 1 Tab by mouth nightly. 40 mg Discharge Instructions INSTRUCTIONS FOLLOWING FOOT SURGERY 
 
ACTIVITY Elevate foot. No Ice No weight bearing. Use crutches or knee walker until seen in follow up appointment Don't put anything into the splint to relieve itching etc. Take one 325mg aspirin daily if okay with your medical doctor and you have no GI ulcer. Get up and out of bed frequently. While in bed move the legs as much as possible) DIET Day of Surgery: Clear liquids until no nausea or vomiting; then light, bland diet (Baked chicken, plain rice, grits, scrambled egg, toast). Nothing Greasy, fried or spicy today Advance to regular diet on second day, unless your doctor orders otherwise. PAIN Take pain medications as directed by your doctor. Call your doctor if pain is NOT relieved by medication. PAIN MED SIDE EFFECTS Constipation: Lots of fluids, try prune juice, then OTC stool softeners if necessary Nausea: Take medication with food. DRESSING CARE Keep dry and in place until follow up appointment CALL YOUR DOCTOR IF YOU HAVE Excessive bleeding that does not stop after holding mild pressure over the area. Temperature of 101 degrees or above.  
Redness, excessive swelling or bruising, and/or green or yellow, smelly discharge from incision. Loss of sensation - cold, white or blue toes. AFTER ANESTHESIA For the first 24 hours and while taking narcotics for pain: DO NOT Drive, Drink Alcoholic beverages, or make important Decisions. Be aware of dizziness following anesthesia and while taking pain medication. Preventing Infection at Home We care about preventing infection and avoiding the spread of germs  not only when you are in the hospital but also when you return home. When you return home from the hospital, its important to take the following steps to help prevent infection and avoid spreading germs that could infect you and others. Ask everyone in your home to follow these guidelines, too. Clean Your Hands · Clean your hands whenever your hands are visibly dirty, before you eat, before or after touching your mouth, nose or eyes, and before preparing food. Clean them after contact with body fluids, using the restroom, touching animals or changing diapers. · When washing hands, wet them with warm water and work up a lather. Rub hands for at least 15 seconds, then rinse them and pat them dry with a clean towel or paper towel. · When using hand sanitizers, it should take about 15 seconds to rub your hands dry. If not, you probably didnt apply enough . Cover Your Sneeze or Cough Germs are released into the air whenever you sneeze or cough. To prevent the spread of infection: · Turn away from other people before coughing or sneezing. · Cover your mouth or nose with a tissue when you cough or sneeze. Put the tissue in the trash. · If you dont have a tissue, cough or sneeze into your upper sleeve, not your hands. · Always clean your hands after coughing or sneezing. Care for Wounds Your skin is your bodys first line of defense against germs, but an open wound leaves an easy way for germs to enter your body. To prevent infection: · Clean your hands before and after changing wound dressings, and wear gloves to change dressings if recommended by your doctor. · Take special care with IV lines or other devices inserted into the body. If you must touch them, clean your hands first. 
· Follow any specific instructions from your doctor to care for your wounds. Contact your doctor if you experience any signs of infection, such as fever or increased redness at the surgical or wound site. Keep a [address] · Clean or wipe commonly touched hard surfaces like door handles, sinks, tabletops, phones and TV remotes. · Use products labeled disinfectant to kill harmful bacteria and viruses. · Use a clean cloth or paper towel to clean and dry surfaces. Wiping surfaces with a dirty dishcloth, sponge or towel will only spread germs. · Never share toothbrushes, sebastian, drinking glasses, utensils, razor blades, face cloths or bath towels to avoid spreading germs. · Be sure that the linens that you sleep on are clean. · Keep pets away from wounds and wash your hands after touching pets, their toys or bedding. We care about you and your health. Remember, preventing infections is a team effort between you, your family, friends and health care providers. DISCHARGE SUMMARY from Nurse PATIENT INSTRUCTIONS: 
 
After general anesthesia or intravenous sedation, for 24 hours or while taking prescription Narcotics: · Limit your activities · Do not drive and operate hazardous machinery · Do not make important personal or business decisions · Do  not drink alcoholic beverages · If you have not urinated within 8 hours after discharge, please contact your surgeon on call. *  Please give a list of your current medications to your Primary Care Provider. *  Please update this list whenever your medications are discontinued, doses are 
    changed, or new medications (including over-the-counter products) are added. *  Please carry medication information at all times in case of emergency situations. These are general instructions for a healthy lifestyle: No smoking/ No tobacco products/ Avoid exposure to second hand smoke Surgeon General's Warning:  Quitting smoking now greatly reduces serious risk to your health. Obesity, smoking, and sedentary lifestyle greatly increases your risk for illness A healthy diet, regular physical exercise & weight monitoring are important for maintaining a healthy lifestyle You may be retaining fluid if you have a history of heart failure or if you experience any of the following symptoms:  Weight gain of 3 pounds or more overnight or 5 pounds in a week, increased swelling in our hands or feet or shortness of breath while lying flat in bed. Please call your doctor as soon as you notice any of these symptoms; do not wait until your next office visit. Recognize signs and symptoms of STROKE: 
 
F-face looks uneven A-arms unable to move or move unevenly S-speech slurred or non-existent T-time-call 911 as soon as signs and symptoms begin-DO NOT go Back to bed or wait to see if you get better-TIME IS BRAIN. Learning About How to Use Crutches Your Care Instructions Crutches can help you walk when you have an injured hip, leg, knee, ankle, or foot. Your doctor will tell you how much weightif anyyou can put on your leg. Be sure your crutches fit you. When you stand up in your normal posture, there should be space for two or three fingers between the top of the crutch and your armpit. When you let your hands hang down, the hand  should be at your wrists. When you put your hands on the hand , your elbows should be slightly bent. To stay safe when using crutches: 
· Look straight ahead, not down at your feet. · Clear away small rugs, cords, or anything else that could cause you to trip, slip, or fall. · Be very careful around pets and small children. They can get in your path when you least expect it. · Be sure the rubber tips on your crutches are clean and in good condition to help prevent slipping. · Avoid slick conditions, such as wet floors and snowy or icy driveways. In bad weather, be especially careful on curbs and steps. How to use crutches Getting ready to walk 1. Bend your elbows slightly. Press the padded top parts of the crutches against your sides, under your armpits. 2. If you have been told not to put any weight on your injured leg, keep that leg bent and off the ground. Walking with crutches 1. Put both crutches about 12 inches in front of you. 2. Put your weight on the handgrips, not on the pads under your arms. (Constant pressure against your underarms can cause numbness.) Swing your body forward. (If you have been told not to put any weight on your injured leg, keep that leg bent and off the ground.) 3. To complete the step, put your weight on the strong leg. 4. Move your crutches about 12 inches in front of you, and start the next step. 5. When you're confident using the crutches, you can move the crutches and your injured leg at the same time. Then push straight down on the crutches as you step past the crutches with your strong leg, as you would in normal walking. 6. Take small steps. 7. Use ramps and elevators when you can. Sitting down 1. To sit, back up to the chair. Use one hand to hold both crutches by the handgrips, beside your injured leg. With the other hand, hold onto the seat and slowly lower yourself onto the chair. 2. Lay the crutches on the ground near your chair. If you prop them up, they may fall over. Getting up from a chair 1. To get up from a chair,  the crutches and put them in one hand beside your injured leg. 2. Put your weight on the handgrips of the crutches and on your strong leg to stand up. Walking up stairs 1. To go up stairs, step up with your strong leg and then bring the crutches and your injured leg to the upper step. 2. For stairs that have handrails: Put both crutches under the arm opposite the handrail. Use the hand opposite the handrail to hold both crutches by the handgrips. 3. Hold onto the handrail as you go up. Put your strong leg on the step first when you go up. Walking down stairs 1. To go down stairs, put your crutches and injured leg on the lower step. 2. Bring your strong leg to the lower step. This saying may help you remember: \"Up with the good, down with the bad. \" 3. For stairs that have handrails: Put both crutches under the arm opposite the handrail. Use the hand opposite the handrail to hold both crutches by the handgrips. Hold onto the handrail as you go down. Follow the same process you use for stairs: Lead with your crutches and injured leg on the way down. Follow-up care is a key part of your treatment and safety. Be sure to make and go to all appointments, and call your doctor if you are having problems. It's also a good idea to know your test results and keep a list of the medicines you take. Where can you learn more? Go to http://gwen-brennan.info/. Enter P113 in the search box to learn more about \"Learning About How to Use Crutches. \" Current as of: August 4, 2016 Content Version: 11.2 © 4132-1655 [employer]. Care instructions adapted under license by [employer] (which disclaims liability or warranty for this information). If you have questions about a medical condition or this instruction, always ask your healthcare professional. [address] any warranty or liability for your use of this information. Introducing \[facility]\"" & HEALTH SERVICES! Dear Dave Hahn: Thank you for requesting a [facility] [employer] account. Our records indicate that you already have an active [employer] account.   You can access your account anytime at [email or web address] Did you know that you can access your hospital and ER discharge instructions at any time in [employer]? You can also review all of your test results from your hospital stay or ER visit. Additional Information If you have questions, please visit the Frequently Asked Questions section of the [employer] website at [email or web address] Remember, [employer] is NOT to be used for urgent needs. For medical emergencies, dial 911. Now available from your iPhone and Android! Introducing Fred Tilley As a [employer] patient, I wanted to make you aware of our electronic visit tool called Fred Tilley. [employer] 24/7 allows you to connect within minutes with a medical provider 24 hours a day, seven days a week via a mobile device or tablet or logging into a secure website from your computer. You can access Fred Tilley from anywhere in the [place]. A virtual visit might be right for you when you have a simple condition and feel like you just dont want to get out of bed, or cant get away from work for an appointment, when your regular [employer] provider is not available (evenings, weekends or holidays), or when youre out of town and need minor care. Electronic visits cost only $49 and if the [employer] 24/7 provider determines a prescription is needed to treat your condition, one can be electronically transmitted to a nearby pharmacy*. Please take a moment to enroll today if you have not already done so. The enrollment process is free and takes just a few minutes. To enroll, please download the [employer] 24/7 natasha to your tablet or phone, or visit [email or web address] to enroll on your computer.    
And, as an [address] [place] [address] patient with a [employer] account, the results of your visits will be scanned into your electronic medical record and your primary care provider will be able to view the scanned results. We urge you to continue to see your regular [facility] provider for your ongoing medical care. And while your primary care provider may not be the one available when you seek a Ideabove virtual visit, the peace of mind you get from getting a real diagnosis real time can be priceless. For more information on Ideabove, view our Frequently Asked Questions (FAQs) at [email or web address]. Sincerely, 
 
Evette Huff MD 
Chief Medical Officer [facility] Keisha Alcantara *:  certain medications cannot be prescribed via Ideabove Providers Seen During Your Hospitalization Provider Specialty Primary office phone Butch Parikh MD Orthopedic Surgery [phone] Your Primary Care Physician (PCP) Primary Care Physician Office Phone Office Fax [address] [facility], [address] [place] [address] [phone] You are allergic to the following Allergen Reactions Lisinopril Anaphylaxis Ace Inhibitors Angioedema Bactrim (Sulfamethoxazole-Trimethoprim) Hives Flomax (Tamsulosin) Swelling Norvasc (Amlodipine) Other (comments) Pt denies any allergic reaction to Norvasc Sulfa (Sulfonamide Antibiotics) Hives Recent Documentation Height Weight BMI OB Status Smoking Status 1.626 m 108.9 kg 41.2 kg/m2 Ablation Former Smoker Emergency Contacts Name Discharge Info Relation Home Work Mobile [employer] CAREGIVER [3] Spouse [3] [phone] Patient Belongings The following personal items are in your possession at time of discharge: 
  Dental Appliances: None  Visual Aid: None      Home Medications: None   Jewelry: None  Clothing: Shirt, Pants, Footwear, Undergarments, Socks    Other Valuables: None Please provide this summary of care documentation to your next provider. Signatures-by signing, you are acknowledging that this After Visit Summary has been reviewed with you and you have received a copy. Patient Signature:  ____________________________________________________________ Date:  ____________________________________________________________  
  
Miah [place] Provider Signature:  ____________________________________________________________ Date:  ____________________________________________________________

## 2018-09-12 NOTE — ANESTHESIA PREPROCEDURE EVALUATION
Anesthetic History No history of anesthetic complications Review of Systems / Medical History Patient summary reviewed and pertinent labs reviewed Pulmonary Within defined limits Neuro/Psych Within defined limits Cardiovascular Hypertension: well controlled Exercise tolerance: <4 METS 
  
GI/Hepatic/Renal 
  
GERD: well controlled Renal disease: stones Endo/Other Diabetes Morbid obesity Other Findings Physical Exam 
 
Airway Mallampati: II 
TM Distance: > 6 cm Neck ROM: normal range of motion Mouth opening: Normal 
 
 Cardiovascular Rhythm: regular Dental 
 
 
  
Pulmonary Abdominal 
GI exam deferred Other Findings Anesthetic Plan ASA: 3 Anesthesia type: total IV anesthesia and general - backup - femoral single shot and popliteal fossa block Induction: Intravenous Anesthetic plan and risks discussed with: Patient

## 2018-09-12 NOTE — ANESTHESIA PROCEDURE NOTES
Peripheral Block Start time: 9/12/2018 11:02 AM 
End time: 9/12/2018 11:04 AM 
Performed by: Dani Nguyễn Authorized by: Dani Nguyễn  
 
 
Pre-procedure: Indications: at surgeon's request and post-op pain management Preanesthetic Checklist: patient identified, risks and benefits discussed, site marked, timeout performed, anesthesia consent given and patient being monitored Timeout Time: 11:02 Block Type:  
Block Type: Adductor canal 
Laterality:  Left Monitoring:  Standard ASA monitoring, continuous pulse ox, frequent vital sign checks, heart rate, oxygen and responsive to questions Injection Technique:  Single shot Procedures: ultrasound guided Patient Position: supine Location:  Mid thigh Needle Type:  Stimuplex Needle Gauge:  20 G Needle Localization:  Anatomical landmarks and ultrasound guidance Medication Injected:  0.5% 
ropivacaine Volume (mL):  17 Assessment: 
Number of attempts:  1 Injection Assessment:  Incremental injection every 5 mL, local visualized surrounding nerve on ultrasound, negative aspiration for blood, no intravascular symptoms, no paresthesia and ultrasound image on chart Patient tolerance:  Patient tolerated the procedure well with no immediate complications

## 2018-09-15 ENCOUNTER — HOSPITAL ENCOUNTER (EMERGENCY)
Age: 43
Discharge: HOME | End: 2018-09-15
Attending: EMERGENCY MEDICINE
Payer: COMMERCIAL

## 2018-09-15 VITALS
OXYGEN SATURATION: 98 % | HEART RATE: 86 BPM | RESPIRATION RATE: 16 BRPM | TEMPERATURE: 98 F | SYSTOLIC BLOOD PRESSURE: 142 MMHG | DIASTOLIC BLOOD PRESSURE: 81 MMHG

## 2018-09-15 DIAGNOSIS — M79.672 LEFT FOOT PAIN: Primary | ICD-10-CM

## 2018-09-15 PROCEDURE — 96372 THER/PROPH/DIAG INJ SC/IM: CPT | Performed by: EMERGENCY MEDICINE

## 2018-09-15 PROCEDURE — 74011250636 HC RX REV CODE- 250/636: Performed by: EMERGENCY MEDICINE

## 2018-09-15 PROCEDURE — 74011250637 HC RX REV CODE- 250/637: Performed by: EMERGENCY MEDICINE

## 2018-09-15 PROCEDURE — 99283 EMERGENCY DEPT VISIT LOW MDM: CPT | Performed by: EMERGENCY MEDICINE

## 2018-09-15 RX ORDER — HYDROMORPHONE HYDROCHLORIDE 2 MG/ML
1 INJECTION, SOLUTION INTRAMUSCULAR; INTRAVENOUS; SUBCUTANEOUS ONCE
Status: COMPLETED | OUTPATIENT
Start: 2018-09-15 | End: 2018-09-15

## 2018-09-15 RX ORDER — MORPHINE SULFATE 15 MG/1
15 TABLET ORAL
Qty: 19 TAB | Refills: 0 | Status: SHIPPED | OUTPATIENT
Start: 2018-09-15 | End: 2018-11-21

## 2018-09-15 RX ORDER — KETOROLAC TROMETHAMINE 30 MG/ML
15 INJECTION, SOLUTION INTRAMUSCULAR; INTRAVENOUS
Status: COMPLETED | OUTPATIENT
Start: 2018-09-15 | End: 2018-09-15

## 2018-09-15 RX ORDER — HYDROCODONE BITARTRATE AND ACETAMINOPHEN 10; 325 MG/1; MG/1
1 TABLET ORAL
Qty: 19 TAB | Refills: 0 | Status: SHIPPED | OUTPATIENT
Start: 2018-09-15 | End: 2018-09-15

## 2018-09-15 RX ORDER — ONDANSETRON 4 MG/1
4 TABLET, ORALLY DISINTEGRATING ORAL
Status: COMPLETED | OUTPATIENT
Start: 2018-09-15 | End: 2018-09-15

## 2018-09-15 RX ADMIN — HYDROMORPHONE HYDROCHLORIDE 1 MG: 2 INJECTION, SOLUTION INTRAMUSCULAR; INTRAVENOUS; SUBCUTANEOUS at 11:10

## 2018-09-15 RX ADMIN — ONDANSETRON 4 MG: 4 TABLET, ORALLY DISINTEGRATING ORAL at 11:09

## 2018-09-15 RX ADMIN — KETOROLAC TROMETHAMINE 15 MG: 30 INJECTION, SOLUTION INTRAMUSCULAR at 11:09

## 2018-09-15 NOTE — ED PROVIDER NOTES
[facility] Emergency Department Seen  @ [facility] EMERGENCY DEPT in room ER04/04 Chief Complaint Patient presents with  Post OP Complication HPI:  
Elliott Renner is a 39 y.o. female who complaints of left foot pain. Patient had tendon transfer on Wednesday. He was given 40 pain pills. Since her block or all she's had increased pain. And is going to run out of medicine before Monday. Call the office and I advised her to come to the emergency department to be evaluated for additional pain pills. No fever or chills. No nausea or vomiting. No numbness or tingling. Historian: patient Review of Systems: No fever chills nausea vomiting. Past Medical History: 
Primary Care Doctor: Adán Camargo NP Past Medical History:  
Diagnosis Date  Diabetes ([facility] [address] does not check sugar-regularly  Hypercholesterolemia   
 controlled well with meds  Hypertension   
 medication controlled  Kidney stone Past Surgical History:  
Procedure Laterality Date  FRAGMENT KIDNEY STONE/ ESWL    
 HX DILATION AND CURETTAGE    
 HX ENDOSCOPY    
 HX LAP CHOLECYSTECTOMY  03/28/2017  HX LITHOTRIPSY    
  x 4  
 HX ORTHOPAEDIC Left   
 hand- tendon repair  HX OTHER SURGICAL Bilateral   
 sweat gland removal under both arms  HX TUBAL LIGATION  07/25/2005 Social History Social History  Marital status:  Spouse name: N/A  
 Number of children: N/A  
 Years of education: N/A Social History Main Topics  Smoking status: Former Smoker Packs/day: 1.00 Years: 0.50 Quit date: 1/1/2009  Smokeless tobacco: Never Used  Alcohol use No  
 Drug use: No  
 Sexual activity: Yes  
  Partners: Male Birth control/ protection: None Comment: tubal  
 
Other Topics Concern  Caffeine Concern No  
 Exercise Yes [address] [facility] Yes  Self-Exams No  
 
Social History Narrative Abuse: Feels safe at home, no history of physical abuse, no history of sexual abuse Previous Medications BETAMETHASONE VALERATE (VALISONE) 0.1 % TOPICAL CREAM    Apply  to affected area two (2) times a day. FLUTICASONE (FLONASE) 50 MCG/ACTUATION NASAL SPRAY    2 Sprays by Both Nostrils route daily. GABAPENTIN (NEURONTIN) 100 MG CAPSULE    Take 100 mg by mouth two (2) times a day. HYDROCHLOROTHIAZIDE (HYDRODIURIL) 25 MG TABLET    Take 1 Tab by mouth daily. IBUPROFEN (MOTRIN) 800 MG TABLET    Take 1 Tab by mouth every six (6) hours as needed for Pain. LORATADINE (CLARITIN) 10 MG TABLET    Take 1 Tab by mouth daily. METFORMIN (GLUCOPHAGE) 1,000 MG TABLET    Take 1 Tab by mouth two (2) times daily (with meals). Indications: type 2 diabetes mellitus MULTIVITAMIN (ONE A DAY) TABLET    Take 1 Tab by mouth daily. PRAVASTATIN (PRAVACHOL) 40 MG TABLET    Take 1 Tab by mouth nightly. Allergies Allergen Reactions  Lisinopril Anaphylaxis  Ace Inhibitors Angioedema  Bactrim [Sulfamethoxazole-Trimethoprim] Hives  Flomax [Tamsulosin] Swelling  Norvasc [Amlodipine] Other (comments) Pt denies any allergic reaction to Norvasc  Sulfa (Sulfonamide Antibiotics) Hives Physical Exam:   
Vitals:  
 09/15/18 1024 BP: 160/88 Pulse: 93 Resp: 16 Temp: 98 °F (36.7 °C) SpO2: 98% Vital signs were reviewed. Pulse oximetry interpretation: normal 
 
Constitutional: well appearing, nontoxic, Musculoskeletal: Left foot in splint, toes are pink. Movement intact. Skin: dry, no rashes,  
______________________________________________________________________ 
ED Evaluation No current facility-administered medications for this encounter. Current Outpatient Prescriptions Medication Sig  
 gabapentin (NEURONTIN) 100 mg capsule Take 100 mg by mouth two (2) times a day.  multivitamin (ONE A DAY) tablet Take 1 Tab by mouth daily.  betamethasone valerate (VALISONE) 0.1 % topical cream Apply  to affected area two (2) times a day.  metFORMIN (GLUCOPHAGE) 1,000 mg tablet Take 1 Tab by mouth two (2) times daily (with meals). Indications: type 2 diabetes mellitus  ibuprofen (MOTRIN) 800 mg tablet Take 1 Tab by mouth every six (6) hours as needed for Pain.  pravastatin (PRAVACHOL) 40 mg tablet Take 1 Tab by mouth nightly.  hydroCHLOROthiazide (HYDRODIURIL) 25 mg tablet Take 1 Tab by mouth daily.  fluticasone (FLONASE) 50 mcg/actuation nasal spray 2 Sprays by Both Nostrils route daily.  loratadine (CLARITIN) 10 mg tablet Take 1 Tab by mouth daily. (Patient taking differently: Take 10 mg by mouth daily as needed.)  
 
 
================================================================== 
ASSESSMENT: 79-year-old female status post foot surgery. Increased pain. PLAN: short course of pain medicine. Discharge home 
_____________________________________________________________________ Condition:  good Disposition:  home Diagnosis:  Left foot pain Uriah Galo MD; 9/15/2018 @10:27 AM=========================================== 
 
ED Course

## 2018-09-15 NOTE — ED TRIAGE NOTES
Pt presents to ED after having tendon surgery last wednesday. Pt states she was given 40 pain tablets and is out of medication. Surgeon was Dr. Anali Clemons.  
 
Shaquille Garcia RN

## 2018-09-15 NOTE — ED NOTES
I have reviewed discharge instructions with the patient. The patient verbalized understanding. Patient left ED via Discharge Method: ambulatory to Home with her male friend at bedside. . 
 
Opportunity for questions and clarification provided. Patient given 1 scripts. To continue your aftercare when you leave the hospital, you may receive an automated call from our care team to check in on how you are doing. This is a free service and part of our promise to provide the best care and service to meet your aftercare needs.  If you have questions, or wish to unsubscribe from this service please call [phone]. Thank you for Choosing our [facility] Emergency Department.

## 2018-09-19 ENCOUNTER — HOSPITAL ENCOUNTER (OUTPATIENT)
Dept: ULTRASOUND IMAGING | Age: 43
Discharge: HOME | End: 2018-09-19
Attending: ORTHOPAEDIC SURGERY
Payer: COMMERCIAL

## 2018-09-19 DIAGNOSIS — M79.605 PAIN AND SWELLING OF LEFT LOWER EXTREMITY: ICD-10-CM

## 2018-09-19 DIAGNOSIS — M79.89 PAIN AND SWELLING OF LEFT LOWER EXTREMITY: ICD-10-CM

## 2018-09-19 DIAGNOSIS — M76.62 ACHILLES TENDINITIS OF LEFT LOWER EXTREMITY: ICD-10-CM

## 2018-09-19 PROCEDURE — 93971 EXTREMITY STUDY: CPT

## 2018-10-24 ENCOUNTER — HOSPITAL ENCOUNTER (OUTPATIENT)
Dept: PHYSICAL THERAPY | Age: 43
Discharge: HOME | End: 2018-10-24
Payer: COMMERCIAL

## 2018-10-24 PROCEDURE — 97161 PT EVAL LOW COMPLEX 20 MIN: CPT

## 2018-10-24 PROCEDURE — 97110 THERAPEUTIC EXERCISES: CPT

## 2018-10-24 NOTE — THERAPY DISCHARGE
Brooke Gannon  : 1975  Payor: [address] / Plan: [address] / Product Type: PPO /  [address] [place]  at [facility]  [address], [address], [address] [place], [address] [facility]  [phone]   [phone]         OUTPATIENT PHYSICAL THERAPY:Discontinuation Summary 10/24/2018    ICD-10: Treatment Diagnosis:   Difficulty in walking, not elsewhere classified (R26.2)   Muscle weakness (generalized) (M62.81)   Plantar fascial fibromatosis (M72.2)    PRECAUTIONS/ALLERGIES:  Lisinopril; Ace inhibitors; Bactrim [sulfamethoxazole-trimethoprim]; Flomax [tamsulosin]; Norvasc [amlodipine]; and Sulfa (sulfonamide antibiotics)     FALL RISK SCORE: 1 (? 5 = High Risk)    MD ORDERS: Eval and Treat MEDICAL/REFERRING DIAGNOSIS:  heel spur    DATE OF ONSET: 1 year ago    REFERRING PHYSICIAN: Elsy Reina DPM    RETURN PHYSICIAN APPOINTMENT: 3 weeks     INITIAL ASSESSMENT: Brooke Gannon has been seen in physical therapy from for 5 visits. Pt discontinuing therapy at this time due having surgery in ankle. Treatment has been discontinued at this time due to patient failing to return for additional treatment. The below goals were met prior to discontinuation. Some goals were not met due to discontinuation of therapy. Thank you for this referral.          PROBLEM LIST (Impacting functional limitations):  1. Decreased Strength  2. Decreased ADL/Functional Activities  3. Decreased Transfer Abilities  4. Decreased Ambulation Ability/Technique  5. Decreased Balance  6. Increased Pain  7. Decreased Activity Tolerance  8. Decreased [place] with Home Exercise Program INTERVENTIONS PLANNED:  1. Balance Exercise  2. Bed Mobility  3. Cold  4. Cryotherapy  5. Family Education  6. Gait Training  7. Heat  8. Home Exercise Program (HEP)  9. Manual Therapy  10. Neuromuscular Re-education/Strengthening  11. Range of Motion (ROM)  12. Therapeutic Activites  13.  Therapeutic Exercise/Strengthening  14. Transfer Training  15. Ultrasound (US)   TREATMENT PLAN:  Effective Dates: 7/25/2018 TO 10/24/2018 (90 days). Frequency/Duration: 2 times a week for 90 Days    SHORT-TERM FUNCTIONAL GOALS: Time Frame: 12 weeks-NOT MET DUE TO HAVING SURGERY FOR ANKLE  Chelsy Villar will be compliant with home exercise program within 4 weeks in order to improve active participation with management of patient's symptoms and/or functional deficits. Chelsy Villar will report <=3/10 pain with walking >15 minutes in order to participate in daily exercise and daily activities. Chelsy Villar will be able to stand >=15 minutes with <2/10 pain to feet in order to participate in household duties without issues/compromise. Chelsy Villar will be report improved score on the Foot and Ankle Ability Measure from 41 to 51 to indicate improvement in functional independence. Chelsy Villar will improve ankle strength to >=4+/5 to improve tolerance of ADLs and improve overall functional mobility.     Thank you for this referral,  Leander Haile, PT, DPT

## 2018-10-24 NOTE — THERAPY EVALUATION
Arnaud Ahmadi  : 1975  Payor: [id number] [facility] / Plan: [address] / Product Type: PPO /  Therapy Center at [address] [facility] [address], [address], [place] [address] [address] [facility]  [phone]   [phone]         OUTPATIENT PHYSICAL THERAPY:Initial Assessment 10/24/2018    ICD-10: Treatment Diagnosis:  Difficulty in walking, not elsewhere classified (R26.2)  Pain in left ankle and joints of left foot (M25.572)  Stiffness of left ankle, not elsewhere classified (W73.426)    PRECAUTIONS/ALLERGIES:  Lisinopril; Ace inhibitors; Bactrim [sulfamethoxazole-trimethoprim]; Flomax [tamsulosin]; Norvasc [amlodipine]; and Sulfa (sulfonamide antibiotics)     FALL RISK SCORE: 1 (? 5 = High Risk)    MD ORDERS: Eval and Treat MEDICAL/REFERRING DIAGNOSIS:  Strain of muscle(s) and tendon(s) of peroneal muscle group at lower leg level, left leg, initial encounter [K30.374R]  Left ankle instability [M25.372]    DATE OF ONSET: 18  REFERRING PHYSICIAN: Miles Blanca MD    RETURN PHYSICIAN APPOINTMENT: TBD by patient     INITIAL ASSESSMENT:  Ms. Arnaud Ahmadi has attended 1 physical therapy session including initial evaluation as of 10/24/2018. Arnaud Ahmadi demonstrates decreased L LE strength, decreased L ankle AROM, decreased tolerance of ADLs, decreased functional mobility, and decreased activity tolerance, all consistent with S/S of decreased L LE weakness and ankle stiffness following L peroneal tendon and ATFL repair. According to their responses on the Foot and Ankle Ability Measure, Arnaud Ahmadi is moderately limited by their ankle pain and dysfunction in their ability to participate in ADLs and their overall functional tolerance. Pt would like to get back to PLOF and exercising.  Recommending skilled PT: manual therapeutic techniques (as appropriate), therapeutic exercises and activities, balance and comprehensive home exercises program to address current impairment. Jenifer Preciado will benefit from skilled PT (medically necessary) to address above deficits affecting participation in basic ADLs and overall functional tolerance. PROBLEM LIST (Impacting functional limitations):  1. Decreased Strength  2. Decreased ADL/Functional Activities  3. Decreased Transfer Abilities  4. Decreased Ambulation Ability/Technique  5. Decreased Balance  6. Increased Pain  7. Decreased Activity Tolerance  8. Decreased [place] with Home Exercise Program INTERVENTIONS PLANNED:  1. Balance Exercise  2. Bed Mobility  3. Cold  4. Cryotherapy  5. Family Education  6. Gait Training  7. Heat  8. Home Exercise Program (HEP)  9. Manual Therapy  10. Neuromuscular Re-education/Strengthening  11. Range of Motion (ROM)  12. Therapeutic Activites  13. Therapeutic Exercise/Strengthening  14. Transfer Training  15. Ultrasound (US)  16. Aquatic therapy   TREATMENT PLAN:  Effective Dates: 10/24/2018 TO 1/22/2019 (90 days). Frequency/Duration: up to 3 times a week for 90 Days    SHORT-TERM FUNCTIONAL GOALS: Time Frame: 4 weeks  Jenifer Preciado will be compliant with home exercise program within 4 weeks in order to improve active participation with management of patient's symptoms and/or functional deficits. Jenifer Preciado will report <=3/10 pain with walking >15 minutes in order to participate in daily exercise and daily activities. Jenifer Preciado will be able to stand >=15 minutes with <2/10 pain to feet in order to participate in household duties without issues/compromise. Jenifer Preciado will report being able to sleep through the night without waking up secondary to L foot pain. Jenifer Preciado  will improve L ankle dorsiflexion AROM from -3cm to 0cm in order to show improvement in ankle ROM and tolerance for functional activity.    Jenifer Preciado will be report improved score on the Foot and Ankle Ability Measure from 59 to 64 to indicate improvement in functional independence. DISCHARGE GOALS: Time Frame: 12 weeks  Billy Silva will be independent with home exercise program within 8 weeks in order to improve independence with management of patient's symptoms and/or functional deficits. Billy Silva will report <=2/10 pain with participation in activities of daily living and overall functional mobility including walking and stair ambulation. Billy Silva will be able to stand >=30 minutes without reports of increased foot/ankle pain. Billy Silva will be report improved score on the Foot and Ankle Ability Measure from 59 to 70 to indicate improvement in functional independence. Billy Silva will improve ankle strength to >=4+/5 to improve tolerance of ADLs and improve overall functional mobility. REHABILITATION POTENTIAL FOR STATED GOALS: Good    Regarding Magalie Winston's therapy, I certify that the treatment plan above will be carried out by a therapist or under their direction. Thank you for this referral,  Pritesh Chu, PT, DPT       Referring Physician Signature: Vicki Alicia MD              Date                    HISTORY:  All history obtained on 10/24/2018 unless otherwise noted. PATIENT STATED GOAL:   -Pt states she would like to return to her PLOF. HISTORY OF PRESENT INJURY/ILLNESS (REASON FOR REFERRAL):   Pt states she had surgery on her L ankle 9/11/18 for a L peroneal tendon and ATFL repair. Pt has been wearing her boot since the surgery and needs to wear it till 8 weeks post op. Pt reports she has only been wearing it when she leaves her house. Pt notes pain when sleeping due to her pain. She states she is having a hard time turning over in bed, getting in and out of bed, performing all transfers, getting in and out of car, putting socks on, standing in shower, sitting for long periods of time. Pt is not currently icing for pain but is taking Advil to help with the pain.      Pt states the pain can get as high as 6/10 and as low as 2/10. PAST MEDICAL HISTORY/COMORBIDITIES:  Ms. Coco Guillermo  has a past medical history of Diabetes ([address] Hypercholesterolemia, Hypertension, and Kidney stone. Ms. Coco Guillermo  has a past surgical history that includes hx tubal ligation (07/25/2005); hx dilation and curettage; hx lap cholecystectomy (03/28/2017); fragment kidney stone/ eswl; hx endoscopy; hx other surgical (Bilateral); hx lithotripsy; hx orthopaedic (Left); LEFT PERONEAL TENDON RECON / LATERAL ANKLE LIGAMENT RECONSTRUCTION (Left, 9/12/2018); LEFT LITHOTRIPSY EXTRACORPOREAL SHOCKWAVE (Left, 1/4/2018); RIGHT LITHOTRIPSY EXTRACORPOREAL SHOCKWAVE ESWL  REQUEST TO FOLLOW (Right, 11/30/2017); CYSTOSCOPY LEFT URETEROSCOPY/LEFT STENT PLACEMENT/ STONE EXTRACTION (Left, 7/14/2017); CHOLECYSTECTOMY LAPAROSCOPIC (N/A, 3/28/2017); DILATATION AND CURETTAGE HYSTEROSCOPY ENDOMETRIAL ABLATION/NOVASURE (N/A, 2/25/2016); and LITHOTRIPSY EXTRACORPOREAL SHOCKWAVE ESWL (Left, 5/14/2011).     Active Ambulatory Problems     Diagnosis Date Noted    ACE inhibitor-aggravated angioedema 02/13/2016    Type 2 diabetes mellitus with hyperglycemia, without long-term current use of insulin ([address] 02/13/2016    Hypercholesterolemia     Hypertension     Gastroesophageal reflux disease without esophagitis 01/11/2016    Morbid obesity with BMI of 40.0-44.9, adult ([address] 07/29/2017    Chronic bilateral low back pain without sciatica 07/29/2017    Kidney stone 11/26/2017    Environmental and seasonal allergies 03/12/2018    VIRAL (generalized anxiety disorder) 03/12/2018    Heel spur, left 07/04/2018    SANTO I (cervical intraepithelial neoplasia I) 07/09/2018     Resolved Ambulatory Problems     Diagnosis Date Noted    No Resolved Ambulatory Problems     Past Medical History:   Diagnosis Date    Diabetes ([address]     Hypercholesterolemia     Hypertension     Kidney stone        SOCIAL HISTORY/LIVING ENVIRONMENT:   Pt lives at home with her . Social History     Socioeconomic History    Marital status:      Spouse name: Not on file    Number of children: Not on file    Years of education: Not on file    Highest education level: Not on file   Social Needs    Financial resource strain: Not on file    Food insecurity - worry: Not on file    Food insecurity - inability: Not on file   [employer] needs - medical: Not on file   [employer] needs - non-medical: Not on file   Occupational History    Not on file   Tobacco Use    Smoking status: Former Smoker     Packs/day: 1.00     Years: 0.50     Pack years: 0.50     Last attempt to quit: 2009     Years since quittin.8    Smokeless tobacco: Never Used   Substance and Sexual Activity    Alcohol use: No    Drug use: No    Sexual activity: Yes     Partners: Male     Birth control/protection: None     Comment: tubal   Other Topics Concern     Service Not Asked    Blood Transfusions Not Asked    Caffeine Concern No    Occupational Exposure Not Asked    Hobby Hazards Not Asked    Sleep Concern Not Asked    Stress Concern Not Asked    Weight Concern Not Asked    Special Diet Not Asked    Back Care Not Asked    Exercise Yes    Bike Helmet Not Asked    Seat Belt Yes    Self-Exams No   Social History Narrative    Abuse: Feels safe at home, no history of physical abuse, no history of sexual abuse       PRIOR LEVEL OF FUNCTION/WOR/ACTIVITY:  Pt independent with all ADLs and functional mobility. CURRENT MEDICATIONS:    Current Outpatient Medications:     pravastatin (PRAVACHOL) 40 mg tablet, Take 1 Tab by mouth nightly for 30 days. , Disp: 30 Tab, Rfl: 0    morphine IR (MS IR) 15 mg tablet, Take 1 Tab by mouth every four (4) hours as needed for Pain. Max Daily Amount: 90 mg.  Indications: Severe Pain, Disp: 19 Tab, Rfl: 0    gabapentin (NEURONTIN) 100 mg capsule, Take 100 mg by mouth two (2) times a day., Disp: , Rfl:     multivitamin (ONE A DAY) tablet, Take 1 Tab by mouth daily. , Disp: , Rfl:     betamethasone valerate (VALISONE) 0.1 % topical cream, Apply  to affected area two (2) times a day., Disp: 15 g, Rfl: 0    metFORMIN (GLUCOPHAGE) 1,000 mg tablet, Take 1 Tab by mouth two (2) times daily (with meals). Indications: type 2 diabetes mellitus, Disp: 90 Tab, Rfl: 3    ibuprofen (MOTRIN) 800 mg tablet, Take 1 Tab by mouth every six (6) hours as needed for Pain., Disp: 90 Tab, Rfl: 1    hydroCHLOROthiazide (HYDRODIURIL) 25 mg tablet, Take 1 Tab by mouth daily. , Disp: 90 Tab, Rfl: 1    fluticasone (FLONASE) 50 mcg/actuation nasal spray, 2 Sprays by Both Nostrils route daily. , Disp: 3 Bottle, Rfl: 1    loratadine (CLARITIN) 10 mg tablet, Take 1 Tab by mouth daily. (Patient taking differently: Take 10 mg by mouth daily as needed.), Disp: 90 Tab, Rfl: 3     Date Last Reviewed:  10/24/2018   Number of Personal Factors/Comorbidities that affect the Plan of Care: 0: LOW COMPLEXITY   EXAMINATION:   OBSERVATION/ORTHOSTATIC POSTURAL ASSESSMENT: Assessed @ Initial Visit:    -Pt sits with forward head and rounded shoulders which indicate tight anterior chest musculature, upper trapezius, and levator scapula and weak posterior scapula musculature and deep cervical flexors.  Pt displays decreased core motor control indicating weak core and low back musculature.    -Pt seated with L foot in boot      BALANCE (SLS) Date: 10/24/2018    Date: Date:   Right 35s     Left NT         MEASUREMENTS:          Date: 10/24/2018  Date:  Date:     Right Left Right Left Right Left   Ankle Circumference 24 26       Figure 8 52 54         AROM/PROM         Joint: Date:10/24/2018  Date:  Date:    Active LE ROM Right Left Right Left Right Left   Hip Flexion [facility]       Hip Extension [facility]       Hip IR [facility]       Hip ER [facility]       Knee Extension [facility]       Knee Flexion [facility]       Ankle DF +3 degrees -5 degrees        Ankle PF 70 degrees 50 degrees       Ankle IV 35 degrees 5 degrees       Ankle EV 30 degrees 1 degrees       Passive LE ROM         Ankle PF +3 degrees        Ankle DF 70 degrees NT       Ankle IV 38 degrees NT       Ankle EV 30 degrees NT         STRENGTH         Joint: Date: 10/24/2018  Date:  Date:     Right Left Right Left Right Left   Hip Abduction 4/5 4/5       Hip Adduction 4/5 4/5       Hip IR 4/5 4/5       Hip ER 4/5 4/5       Hip Flexion 4/5 4/5       Knee Extension 4+/5 4+/5       Knee Flexion 4+/5 4+/5       Ankle DF 4+/5 3+/5       Ankle PF 4+/5 3+/5       Ankle IV 4+/5 3+/5       Ankle EV 4+/5 3+/5           PALPATION Date: 10/24/2018   TTP L lateral peroneal muscle bellies, tendons, ATFL, distal lateral malleolus, along incision   TONE L gastroc      NEUROLOGICAL SCREEN: Assessed @ Initial Visit    -RADIATING SYMPTOMS: NO     -DERMATOMES: Normal and equal    MYOTOMES Date:   10/24/2018  Date:  Date:     Right Left Right Left Right Left   L2 & L3   (Hip Flexors) 5/5 4/5       L3-L4  (Knee Extensors) 5/5 5/5       L4  (Ankle DFs) 5/5 4/5       L5  (Hallux Ext) 5/5 4/5       L5-S1  (Ankle PFs) 5/5 4/5       S1-S2  (Ankle EVs) 5/5 4/5         REFLEXES: Date: 10/24/2018  Date:  Date:     Right Left Right Left Right Left   L4  (Quadriceps) NT NT       S1  (Achilles) NT NT           FUNCTIONAL MOBILITY:  Assessed @ Initial Visit:    -Affecting participation in basic ADLs and functional tasks.   -Limited tolerance of walking and standing   -Ambulation/Gait: L Boot, decreased stance time L LE   -Bed mobility: Difficult with turning and sit to stand due to pain in L ankle   -Stairs:    -Transfers:    -Wheelchair:  N/A     Body Structures Involved:  1. Bones  2. Joints  3. Muscles  4. Ligaments Body Functions Affected:  1. Sensory/Pain  2. Neuromusculoskeletal  3. Movement Related Activities and Participation Affected:  1. Mobility  2. Self Care  3. Domestic Life  4. Interpersonal Interactions and Relationships  5.  Community, Social and [place]   Number of elements that affect the Plan of Care: 4+: HIGH COMPLEXITY   CLINICAL PRESENTATION:   Presentation: Stable and uncomplicated: LOW COMPLEXITY   CLINICAL DECISION MAKING:   TOOL USED:   -FOOT AND ANKLE ABILITY MEASURE (FAAM)  Score:  Initial: 59 Most Recent: X (Date: -- )   Interpretation of Score: For the \"Activities of Daily Living\", there are 21 questions each scored on a 5 point scale with 0 representing \"Unable to do\" and 4 representing \"No difficulty\". The lower the score, the greater the functional disability. 84/84 represents no disability. Minimal detectable change is 5.7 points. With the addition of the 8 questions in the \"Sports Subscale,\" there are 29 questions, each scored on a 5 point scale with 0 representing \"Unable to do\" and 4 representing \"No difficulty\". The lower the score, the greater the functional disability. 116/116 represents no disability. Minimal detectable change is 12.3 points. Activities of Daily Living:  Score 84 83-68 67-51 50-34 33-18 17-1 0   Modifier CH CI CJ CK CL CM CN     Activities of Daily Living + Sports Subscale:  Score 116 115-94 93-71 70-47 46-24 23-1 0   Modifier CH CI CJ CK CL CM CN       MEDICAL NECESSITY:  · Skilled intervention continues to be required due to above deficits affecting participation in basic ADLs and overall functional tolerance. REASON FOR SERVICES/ OTHER COMMENTS:  · Patient continues to require skilled intervention due to  above deficits affecting participation in basic ADLs and overall functional tolerance. Use of outcome tool(s) and clinical judgement create a POC that gives a: Clear prediction of patient's progress: LOW COMPLEXITY   TREATMENT:   (In addition to Assessment/Re-Assessment sessions the following treatments were rendered)  PRE-TREATMENT SUBJECTIVE COMMENTS: See assessment above. THERAPEUTIC EXERCISE: (10 min) Exercises per grid below to improve mobility, strength and balance.   Required minimal visual, verbal and manual cues to promote proper body alignment, promote proper body posture and promote proper body breathing techniques. Progressed repetitions as indicated. Date: 10/24/18 Date:    Pt education, POC, HEP, surgery details    Ankle PF/DF 1x10 reps    Ankle IV/EV 1x10 reps    Ankle circles CC/CCW 1x10 reps             MANUAL THERAPY: (0 minutes): Joint mobilization, Soft tissue mobilization and Manipulation was utilized and necessary because of the patient's restricted joint motion, painful spasm, loss of articular motion and restricted motion of soft tissue. MODALITIES: (0 minutes):      NOT TODAY     TREATMENT/SESSION ASSESSMENT: Armando Ballesteros verbalized understanding of HEP and role of PT/POC. Patient provided with and educated on HEP including L ankle AROM. Patient provided with written instructions and pictures for personal use. · Pain: Initial: 4/10 Post Session:  4/10 ·   Compliance with Program/Exercises: Will assess as treatment progresses. · Recommendations/Intent for next treatment session: \"Next visit will focus on advancements to more challenging activities and reduction in assistance provided\".     TOTAL TREATMENT DURATION:  PT Patient Time In/Time Out  Time In: 1430  Time Out: [address] Diane Gallardo, DPT

## 2018-10-24 NOTE — PROGRESS NOTES
Ambulatory/Rehab Services H2 Model Falls Risk Assessment    Risk Factor Pts. ·   Confusion/Disorientation/Impulsivity  []    4 ·   Symptomatic Depression  []   2 ·   Altered Elimination  []   1 ·   Dizziness/Vertigo  []   1 ·   Gender (Male)  []   1 ·   Any administered antiepileptics (anticonvulsants):  []   2 ·   Any administered benzodiazepines:  []   1 ·   Visual Impairment (specify):  []   1 ·   Portable Oxygen Use  []   1 ·   Orthostatic ? BP  []   1 ·   History of Recent Falls (within 3 mos.)  []   5     Ability to Rise from Chair (choose one) Pts. ·   Ability to rise in a single movement  []   0 ·   Pushes up, successful in one attempt  [x]   1 ·   Multiple attempts, but successful  []   3 ·   Unable to rise without assistance  []   4   Total: (5 or greater = High Risk) 1     Falls Prevention Plan:   []                Physical Limitations to Exercise (specify):   []                Mobility Assistance Device (type):   []                Exercise/Equipment Adaptation (specify):    ©2010 [facility] of [address] Patent #[id number].  Federal Law prohibits the replication, distribution or use without written permission from [facility] of [address] [place] [address]

## 2018-10-29 ENCOUNTER — HOSPITAL ENCOUNTER (OUTPATIENT)
Dept: PHYSICAL THERAPY | Age: 43
Discharge: HOME | End: 2018-10-29
Payer: COMMERCIAL

## 2018-10-29 PROCEDURE — 97140 MANUAL THERAPY 1/> REGIONS: CPT

## 2018-10-29 PROCEDURE — 97110 THERAPEUTIC EXERCISES: CPT

## 2018-10-29 NOTE — PROGRESS NOTES
Keren Jhaveri  : 1975  Payor: [id number] / Plan: [address] / Product Type: PPO /  [facility]  [address], [address], [place], [facility] [address] [facility]  [phone]   [phone]         OUTPATIENT PHYSICAL THERAPY:Daily Note 10/29/2018    ICD-10: Treatment Diagnosis:  Difficulty in walking, not elsewhere classified (R26.2)  Pain in left ankle and joints of left foot (M25.572)  Stiffness of left ankle, not elsewhere classified (F99.991)    PRECAUTIONS/ALLERGIES:  Lisinopril; Ace inhibitors; Bactrim [sulfamethoxazole-trimethoprim]; Flomax [tamsulosin]; Norvasc [amlodipine]; and Sulfa (sulfonamide antibiotics)     FALL RISK SCORE: 1 (? 5 = High Risk)    MD ORDERS: Eval and Treat MEDICAL/REFERRING DIAGNOSIS:  Left ankle instability [M25.372]  Strain of muscle(s) and tendon(s) of peroneal muscle group at lower leg level, left leg, initial encounter [X02.874X]    DATE OF ONSET: 18  REFERRING PHYSICIAN: Meera Contreras MD    RETURN PHYSICIAN APPOINTMENT: TBD by patient     INITIAL ASSESSMENT:  Ms. Keren Jhaveri has attended 1 physical therapy session including initial evaluation as of 10/24/2018. Keren Jhaveri demonstrates decreased L LE strength, decreased L ankle AROM, decreased tolerance of ADLs, decreased functional mobility, and decreased activity tolerance, all consistent with S/S of decreased L LE weakness and ankle stiffness following L peroneal tendon and ATFL repair. According to their responses on the Foot and Ankle Ability Measure, Keren Jhaveri is moderately limited by their ankle pain and dysfunction in their ability to participate in ADLs and their overall functional tolerance. Pt would like to get back to PLOF and exercising. Recommending skilled PT: manual therapeutic techniques (as appropriate), therapeutic exercises and activities, balance and comprehensive home exercises program to address current impairment. Lizbeth Olivier will benefit from skilled PT (medically necessary) to address above deficits affecting participation in basic ADLs and overall functional tolerance. PROBLEM LIST (Impacting functional limitations):  1. Decreased Strength  2. Decreased ADL/Functional Activities  3. Decreased Transfer Abilities  4. Decreased Ambulation Ability/Technique  5. Decreased Balance  6. Increased Pain  7. Decreased Activity Tolerance  8. Decreased [place] with Home Exercise Program INTERVENTIONS PLANNED:  1. Balance Exercise  2. Bed Mobility  3. Cold  4. Cryotherapy  5. Family Education  6. Gait Training  7. Heat  8. Home Exercise Program (HEP)  9. Manual Therapy  10. Neuromuscular Re-education/Strengthening  11. Range of Motion (ROM)  12. Therapeutic Activites  13. Therapeutic Exercise/Strengthening  14. Transfer Training  15. Ultrasound (US)  16. Aquatic therapy   TREATMENT PLAN:  Effective Dates: 10/24/2018 TO 1/22/2019 (90 days). Frequency/Duration: up to 3 times a week for 90 Days    SHORT-TERM FUNCTIONAL GOALS: Time Frame: 4 weeks  Lizbeth Olivier will be compliant with home exercise program within 4 weeks in order to improve active participation with management of patient's symptoms and/or functional deficits. Lizbeth Olivier will report <=3/10 pain with walking >15 minutes in order to participate in daily exercise and daily activities. Lizbeth Olivier will be able to stand >=15 minutes with <2/10 pain to feet in order to participate in household duties without issues/compromise. Lizbeth Olivier will report being able to sleep through the night without waking up secondary to L foot pain. Lizbeth Olivier  will improve L ankle dorsiflexion AROM from -3cm to 0cm in order to show improvement in ankle ROM and tolerance for functional activity.    Lizbeth Olivier will be report improved score on the Foot and Ankle Ability Measure from 59 to 64 to indicate improvement in functional independence. DISCHARGE GOALS: Time Frame: 12 weeks  Benny Gayle will be independent with home exercise program within 8 weeks in order to improve independence with management of patient's symptoms and/or functional deficits. Benny Gayle will report <=2/10 pain with participation in activities of daily living and overall functional mobility including walking and stair ambulation. Benny Gayle will be able to stand >=30 minutes without reports of increased foot/ankle pain. Benny Gayle will be report improved score on the Foot and Ankle Ability Measure from 59 to 70 to indicate improvement in functional independence. Benny Gayle will improve ankle strength to >=4+/5 to improve tolerance of ADLs and improve overall functional mobility. REHABILITATION POTENTIAL FOR STATED GOALS: Good    Regarding Carlo Winston's therapy, I certify that the treatment plan above will be carried out by a therapist or under their direction. Thank you for this referral,  Stacia Dee, PT, DPT       Referring Physician Signature: Noble Gunderson MD              Date                    HISTORY:  All history obtained on 10/24/2018 unless otherwise noted. PATIENT STATED GOAL:   -Pt states she would like to return to her PLOF. HISTORY OF PRESENT INJURY/ILLNESS (REASON FOR REFERRAL):   Pt states she had surgery on her L ankle 9/11/18 for a L peroneal tendon and ATFL repair. Pt has been wearing her boot since the surgery and needs to wear it till 8 weeks post op. Pt reports she has only been wearing it when she leaves her house. Pt notes pain when sleeping due to her pain. She states she is having a hard time turning over in bed, getting in and out of bed, performing all transfers, getting in and out of car, putting socks on, standing in shower, sitting for long periods of time. Pt is not currently icing for pain but is taking Advil to help with the pain.      Pt states the pain can get as high as 6/10 and as low as 2/10. PAST MEDICAL HISTORY/COMORBIDITIES:  Ms. Guera Alanis  has a past medical history of Diabetes ([address] Hypercholesterolemia, Hypertension, and Kidney stone. Ms. Guera Alanis  has a past surgical history that includes hx tubal ligation (07/25/2005); hx dilation and curettage; hx lap cholecystectomy (03/28/2017); fragment kidney stone/ eswl; hx endoscopy; hx other surgical (Bilateral); hx lithotripsy; hx orthopaedic (Left); LEFT PERONEAL TENDON RECON / LATERAL ANKLE LIGAMENT RECONSTRUCTION (Left, 9/12/2018); LEFT LITHOTRIPSY EXTRACORPOREAL SHOCKWAVE (Left, 1/4/2018); RIGHT LITHOTRIPSY EXTRACORPOREAL SHOCKWAVE ESWL  REQUEST TO FOLLOW (Right, 11/30/2017); CYSTOSCOPY LEFT URETEROSCOPY/LEFT STENT PLACEMENT/ STONE EXTRACTION (Left, 7/14/2017); CHOLECYSTECTOMY LAPAROSCOPIC (N/A, 3/28/2017); DILATATION AND CURETTAGE HYSTEROSCOPY ENDOMETRIAL ABLATION/NOVASURE (N/A, 2/25/2016); and LITHOTRIPSY EXTRACORPOREAL SHOCKWAVE ESWL (Left, 5/14/2011).     Active Ambulatory Problems     Diagnosis Date Noted    ACE inhibitor-aggravated angioedema 02/13/2016    Type 2 diabetes mellitus with hyperglycemia, without long-term current use of insulin ([address] 02/13/2016    Hypercholesterolemia     Hypertension     Gastroesophageal reflux disease without esophagitis 01/11/2016    Morbid obesity with BMI of 40.0-44.9, adult ([address] 07/29/2017    Chronic bilateral low back pain without sciatica 07/29/2017    Kidney stone 11/26/2017    Environmental and seasonal allergies 03/12/2018    VIRAL (generalized anxiety disorder) 03/12/2018    Heel spur, left 07/04/2018    SANTO I (cervical intraepithelial neoplasia I) 07/09/2018     Resolved Ambulatory Problems     Diagnosis Date Noted    No Resolved Ambulatory Problems     Past Medical History:   Diagnosis Date    Diabetes ([address]     Hypercholesterolemia     Hypertension     Kidney stone        SOCIAL HISTORY/LIVING ENVIRONMENT:   Pt lives at home with her . Social History     Socioeconomic History    Marital status:      Spouse name: Not on file    Number of children: Not on file    Years of education: Not on file    Highest education level: Not on file   Social Needs    Financial resource strain: Not on file    Food insecurity - worry: Not on file    Food insecurity - inability: Not on file   [employer] needs - medical: Not on file   [employer] needs - non-medical: Not on file   Occupational History    Not on file   Tobacco Use    Smoking status: Former Smoker     Packs/day: 1.00     Years: 0.50     Pack years: 0.50     Last attempt to quit: 2009     Years since quittin.8    Smokeless tobacco: Never Used   Substance and Sexual Activity    Alcohol use: No    Drug use: No    Sexual activity: Yes     Partners: Male     Birth control/protection: None     Comment: tubal   Other Topics Concern     Service Not Asked    Blood Transfusions Not Asked    Caffeine Concern No    Occupational Exposure Not Asked    Hobby Hazards Not Asked    Sleep Concern Not Asked    Stress Concern Not Asked    Weight Concern Not Asked    Special Diet Not Asked    Back Care Not Asked    Exercise Yes    Bike Helmet Not Asked    Seat Belt Yes    Self-Exams No   Social History Narrative    Abuse: Feels safe at home, no history of physical abuse, no history of sexual abuse       PRIOR LEVEL OF FUNCTION/WOR/ACTIVITY:  Pt independent with all ADLs and functional mobility. CURRENT MEDICATIONS:    Current Outpatient Medications:     pravastatin (PRAVACHOL) 40 mg tablet, Take 1 Tab by mouth nightly for 30 days. , Disp: 30 Tab, Rfl: 2    morphine IR (MS IR) 15 mg tablet, Take 1 Tab by mouth every four (4) hours as needed for Pain. Max Daily Amount: 90 mg.  Indications: Severe Pain, Disp: 19 Tab, Rfl: 0    gabapentin (NEURONTIN) 100 mg capsule, Take 100 mg by mouth two (2) times a day., Disp: , Rfl:     multivitamin (ONE A DAY) tablet, Take 1 Tab by mouth daily. , Disp: , Rfl:     betamethasone valerate (VALISONE) 0.1 % topical cream, Apply  to affected area two (2) times a day., Disp: 15 g, Rfl: 0    metFORMIN (GLUCOPHAGE) 1,000 mg tablet, Take 1 Tab by mouth two (2) times daily (with meals). Indications: type 2 diabetes mellitus, Disp: 90 Tab, Rfl: 3    ibuprofen (MOTRIN) 800 mg tablet, Take 1 Tab by mouth every six (6) hours as needed for Pain., Disp: 90 Tab, Rfl: 1    hydroCHLOROthiazide (HYDRODIURIL) 25 mg tablet, Take 1 Tab by mouth daily. , Disp: 90 Tab, Rfl: 1    fluticasone (FLONASE) 50 mcg/actuation nasal spray, 2 Sprays by Both Nostrils route daily. , Disp: 3 Bottle, Rfl: 1    loratadine (CLARITIN) 10 mg tablet, Take 1 Tab by mouth daily. (Patient taking differently: Take 10 mg by mouth daily as needed.), Disp: 90 Tab, Rfl: 3     Date Last Reviewed:  10/29/2018   Number of Personal Factors/Comorbidities that affect the Plan of Care: 0: LOW COMPLEXITY   EXAMINATION:   OBSERVATION/ORTHOSTATIC POSTURAL ASSESSMENT: Assessed @ Initial Visit:    -Pt sits with forward head and rounded shoulders which indicate tight anterior chest musculature, upper trapezius, and levator scapula and weak posterior scapula musculature and deep cervical flexors.  Pt displays decreased core motor control indicating weak core and low back musculature.    -Pt seated with L foot in boot      BALANCE (SLS) Date: 10/24/2018    Date: Date:   Right 35s     Left NT         MEASUREMENTS:          Date: 10/24/2018  Date:  Date:     Right Left Right Left Right Left   Ankle Circumference 24 26       Figure 8 52 54         AROM/PROM         Joint: Date:10/24/2018  Date:  Date:    Active LE ROM Right Left Right Left Right Left   Hip Flexion [facility]       Hip Extension [facility]       Hip IR [facility]       Hip ER [facility]       Knee Extension [facility]       Knee Flexion [facility]       Ankle DF +3 degrees -5 degrees        Ankle PF 70 degrees 50 degrees       Ankle IV 35 degrees 5 degrees       Ankle EV 30 degrees 1 degrees       Passive LE ROM         Ankle PF +3 degrees        Ankle DF 70 degrees NT       Ankle IV 38 degrees NT       Ankle EV 30 degrees NT         STRENGTH         Joint: Date: 10/24/2018  Date:  Date:     Right Left Right Left Right Left   Hip Abduction 4/5 4/5       Hip Adduction 4/5 4/5       Hip IR 4/5 4/5       Hip ER 4/5 4/5       Hip Flexion 4/5 4/5       Knee Extension 4+/5 4+/5       Knee Flexion 4+/5 4+/5       Ankle DF 4+/5 3+/5       Ankle PF 4+/5 3+/5       Ankle IV 4+/5 3+/5       Ankle EV 4+/5 3+/5           PALPATION Date: 10/24/2018   TTP L lateral peroneal muscle bellies, tendons, ATFL, distal lateral malleolus, along incision   TONE L gastroc      NEUROLOGICAL SCREEN: Assessed @ Initial Visit    -RADIATING SYMPTOMS: NO     -DERMATOMES: Normal and equal    MYOTOMES Date:   10/24/2018  Date:  Date:     Right Left Right Left Right Left   L2 & L3   (Hip Flexors) 5/5 4/5       L3-L4  (Knee Extensors) 5/5 5/5       L4  (Ankle DFs) 5/5 4/5       L5  (Hallux Ext) 5/5 4/5       L5-S1  (Ankle PFs) 5/5 4/5       S1-S2  (Ankle EVs) 5/5 4/5         REFLEXES: Date: 10/24/2018  Date:  Date:     Right Left Right Left Right Left   L4  (Quadriceps) NT NT       S1  (Achilles) NT NT           FUNCTIONAL MOBILITY:  Assessed @ Initial Visit:    -Affecting participation in basic ADLs and functional tasks.   -Limited tolerance of walking and standing   -Ambulation/Gait: L Boot, decreased stance time L LE   -Bed mobility: Difficult with turning and sit to stand due to pain in L ankle   -Stairs:    -Transfers:    -Wheelchair:  N/A     Body Structures Involved:  1. Bones  2. Joints  3. Muscles  4. Ligaments Body Functions Affected:  1. Sensory/Pain  2. Neuromusculoskeletal  3. Movement Related Activities and Participation Affected:  1. Mobility  2. Self Care  3. Domestic Life  4. Interpersonal Interactions and Relationships  5.  Community, Social and [place]   Number of elements that affect the Plan of Care: 4+: HIGH COMPLEXITY   CLINICAL PRESENTATION:   Presentation: Stable and uncomplicated: LOW COMPLEXITY   CLINICAL DECISION MAKING:   TOOL USED:   -FOOT AND ANKLE ABILITY MEASURE (FAAM)  Score:  Initial: 59 Most Recent: X (Date: -- )   Interpretation of Score: For the \"Activities of Daily Living\", there are 21 questions each scored on a 5 point scale with 0 representing \"Unable to do\" and 4 representing \"No difficulty\". The lower the score, the greater the functional disability. 84/84 represents no disability. Minimal detectable change is 5.7 points. With the addition of the 8 questions in the \"Sports Subscale,\" there are 29 questions, each scored on a 5 point scale with 0 representing \"Unable to do\" and 4 representing \"No difficulty\". The lower the score, the greater the functional disability. 116/116 represents no disability. Minimal detectable change is 12.3 points. Activities of Daily Living:  Score 84 83-68 67-51 50-34 33-18 17-1 0   Modifier CH CI CJ CK CL CM CN     Activities of Daily Living + Sports Subscale:  Score 116 115-94 93-71 70-47 46-24 23-1 0   Modifier CH CI CJ CK CL CM CN       MEDICAL NECESSITY:  · Skilled intervention continues to be required due to above deficits affecting participation in basic ADLs and overall functional tolerance. REASON FOR SERVICES/ OTHER COMMENTS:  · Patient continues to require skilled intervention due to  above deficits affecting participation in basic ADLs and overall functional tolerance. Use of outcome tool(s) and clinical judgement create a POC that gives a: Clear prediction of patient's progress: LOW COMPLEXITY   TREATMENT:   (In addition to Assessment/Re-Assessment sessions the following treatments were rendered)  PRE-TREATMENT SUBJECTIVE COMMENTS: Pt complains of 4/10 pain in L ankle today. She reports she is performing all exercises at home and is not using boot when ambulating in her home.      THERAPEUTIC EXERCISE: (20 min) Exercises per grid below to improve mobility, strength and balance. Required minimal visual, verbal and manual cues to promote proper body alignment, promote proper body posture and promote proper body breathing techniques. Progressed repetitions as indicated. Date: 10/24/18 Date:    Pt education, POC, HEP, surgery details    Ankle PF/DF 1x10 reps 1x10 reps   Ankle IV/EV 1x10 reps 1x10 reps   Ankle circles CC/CCW 1x10 reps 1x10 reps   BAPS (PF/DF, EV/IV, CC/CCW)  1x20 reps ea L only   Nu-step  12min  Level 3                                MANUAL THERAPY: (33 minutes): Joint mobilization, Soft tissue mobilization and Manipulation was utilized and necessary because of the patient's restricted joint motion, painful spasm, loss of articular motion and restricted motion of soft tissue. Pt supine for STM to L ankle to decrease swelling. Scar massage due to increased sensitivity. STM to L gastroc/soleus/ achilles, and peroneal muscle belly. Manual resisted ankle DF, PF, IV, EV. MODALITIES: (0 minutes):      NOT TODAY     TREATMENT/SESSION ASSESSMENT: Ransom Trangjessica reported slight pain during manual resistance but stated it was tolerable. Patient provided with and educated on HEP including L ankle AROM with yellow TB. Patient provided with written instructions and pictures for personal use. Pt instructed to bring sneaker and brace to begin weight bearing in parallel bars next visit. Pt rated fatigue but no change in pain levels at the end of treatment session. · Pain: Initial: 4/10 Post Session:  4/10 ·   Compliance with Program/Exercises: Will assess as treatment progresses. · Recommendations/Intent for next treatment session: \"Next visit will focus on advancements to more challenging activities and reduction in assistance provided\".     TOTAL TREATMENT DURATION:  PT Patient Time In/Time Out  Time In: 1430  Time Out: [id number]isnerosT

## 2018-10-31 ENCOUNTER — HOSPITAL ENCOUNTER (OUTPATIENT)
Dept: PHYSICAL THERAPY | Age: 43
Discharge: HOME | End: 2018-10-31
Payer: COMMERCIAL

## 2018-10-31 NOTE — PROGRESS NOTES
Floyd Castaneda  : 1975  Primary: [facility]  Secondary:  [address]  at [facility]  [address], [address] [facility] [place], [address] [place] [address] [address] [facility]  [phone]   [phone]      OUTPATIENT DAILY NOTE    NAME/AGE/GENDER: Floyd Castaneda is a 39 y.o. female. DATE: 10/31/2018    SUBJECTIVE:  Patient called earlier today to cancel for appointment today due to having a conflicting doctor's appointment. Will plan to follow up on next scheduled visit.     Mateo Verdugo, PT, DPT

## 2018-11-01 ENCOUNTER — HOSPITAL ENCOUNTER (OUTPATIENT)
Dept: PHYSICAL THERAPY | Age: 43
Discharge: HOME | End: 2018-11-01

## 2018-11-06 ENCOUNTER — APPOINTMENT (OUTPATIENT)
Dept: PHYSICAL THERAPY | Age: 43
End: 2018-11-06

## 2018-11-07 ENCOUNTER — APPOINTMENT (OUTPATIENT)
Dept: PHYSICAL THERAPY | Age: 43
End: 2018-11-07

## 2018-11-09 ENCOUNTER — APPOINTMENT (OUTPATIENT)
Dept: PHYSICAL THERAPY | Age: 43
End: 2018-11-09

## 2018-11-12 ENCOUNTER — APPOINTMENT (OUTPATIENT)
Dept: PHYSICAL THERAPY | Age: 43
End: 2018-11-12

## 2018-11-14 ENCOUNTER — APPOINTMENT (OUTPATIENT)
Dept: PHYSICAL THERAPY | Age: 43
End: 2018-11-14

## 2018-11-15 ENCOUNTER — APPOINTMENT (OUTPATIENT)
Dept: PHYSICAL THERAPY | Age: 43
End: 2018-11-15

## 2018-11-15 NOTE — THERAPY DISCHARGE
Billy Silva  : 1975  Payor: [address] / Plan: [address] / Product Type: PPO /  Therapy Center at [facility]  [address], [address], [place], [facility]  [phone]   [phone]         OUTPATIENT PHYSICAL THERAPY:Discontinuation Summary 11/15/2018    ICD-10: Treatment Diagnosis:  Difficulty in walking, not elsewhere classified (R26.2)  Pain in left ankle and joints of left foot (M25.572)  Stiffness of left ankle, not elsewhere classified (Q87.337)    PRECAUTIONS/ALLERGIES:  Lisinopril; Ace inhibitors; Bactrim [sulfamethoxazole-trimethoprim]; Flomax [tamsulosin]; Norvasc [amlodipine]; and Sulfa (sulfonamide antibiotics)     FALL RISK SCORE: 1 (? 5 = High Risk)    MD ORDERS: Eval and Treat MEDICAL/REFERRING DIAGNOSIS:  Left ankle instability [M25.372]  Strain of muscle(s) and tendon(s) of peroneal muscle group at lower leg level, left leg, initial encounter [P95.154U]    DATE OF ONSET: 18  REFERRING PHYSICIAN: Vicki Alicia MD    RETURN PHYSICIAN APPOINTMENT: TBD by patient     INITIAL ASSESSMENT: Billy Silva has been seen in physical therapy from 10/24/18 to 18 for 2 visits. Treatment has been discontinued at this time due to patient failing to return for additional treatment. The below goals were met prior to discontinuation. Some goals were not met due to failure to return for further treatment. Pt called requesting to be discharged due to needing to return to work. Thank you for this referral.      PROBLEM LIST (Impacting functional limitations):  1. Decreased Strength  2. Decreased ADL/Functional Activities  3. Decreased Transfer Abilities  4. Decreased Ambulation Ability/Technique  5. Decreased Balance  6. Increased Pain  7. Decreased Activity Tolerance  8. Decreased [place] with Home Exercise Program INTERVENTIONS PLANNED:  1. Balance Exercise  2. Bed Mobility  3. Cold  4. Cryotherapy  5.  Family Education  6. Gait Training  7. Heat  8. Home Exercise Program (HEP)  9. Manual Therapy  10. Neuromuscular Re-education/Strengthening  11. Range of Motion (ROM)  12. Therapeutic Activites  13. Therapeutic Exercise/Strengthening  14. Transfer Training  15. Ultrasound (US)  16. Aquatic therapy   TREATMENT PLAN:  Effective Dates: 10/24/2018 TO 1/22/2019 (90 days). Frequency/Duration: up to 3 times a week for 90 Days    SHORT-TERM FUNCTIONAL GOALS: Time Frame: 4 weeks  Leesa Castillo will be compliant with home exercise program within 4 weeks in order to improve active participation with management of patient's symptoms and/or functional deficits. Leesa Castillo will report <=3/10 pain with walking >15 minutes in order to participate in daily exercise and daily activities. Leesa Castillo will be able to stand >=15 minutes with <2/10 pain to feet in order to participate in household duties without issues/compromise. Leesa Castillo will report being able to sleep through the night without waking up secondary to L foot pain. Leesa Castillo  will improve L ankle dorsiflexion AROM from -3cm to 0cm in order to show improvement in ankle ROM and tolerance for functional activity. Leesa Castillo will be report improved score on the Foot and Ankle Ability Measure from 59 to 64 to indicate improvement in functional independence. DISCHARGE GOALS: Time Frame: 12 weeks  Leesa Castillo will be independent with home exercise program within 8 weeks in order to improve independence with management of patient's symptoms and/or functional deficits. Leesa Castillo will report <=2/10 pain with participation in activities of daily living and overall functional mobility including walking and stair ambulation. Leesa Castillo will be able to stand >=30 minutes without reports of increased foot/ankle pain.   Leesa Castillo will be report improved score on the Foot and Ankle Ability Measure from 59 to 70 to indicate improvement in functional independence. Jennifer Issa will improve ankle strength to >=4+/5 to improve tolerance of ADLs and improve overall functional mobility.

## 2018-12-05 ENCOUNTER — APPOINTMENT (OUTPATIENT)
Dept: CT IMAGING | Age: 43
End: 2018-12-05
Attending: EMERGENCY MEDICINE
Payer: COMMERCIAL

## 2018-12-05 ENCOUNTER — HOSPITAL ENCOUNTER (EMERGENCY)
Age: 43
Discharge: HOME | End: 2018-12-05
Attending: EMERGENCY MEDICINE
Payer: COMMERCIAL

## 2018-12-05 VITALS
HEIGHT: 64 IN | TEMPERATURE: 98.4 F | WEIGHT: 245 LBS | SYSTOLIC BLOOD PRESSURE: 140 MMHG | DIASTOLIC BLOOD PRESSURE: 72 MMHG | OXYGEN SATURATION: 95 % | BODY MASS INDEX: 41.83 KG/M2 | RESPIRATION RATE: 16 BRPM | HEART RATE: 88 BPM

## 2018-12-05 DIAGNOSIS — N13.2 URETERAL STONE WITH HYDRONEPHROSIS: Primary | ICD-10-CM

## 2018-12-05 LAB
ALBUMIN SERPL-MCNC: 3.3 G/DL (ref 3.5–5)
ALBUMIN/GLOB SERPL: 0.7 {RATIO}
ALP SERPL-CCNC: 78 U/L (ref 50–130)
ALT SERPL-CCNC: 12 U/L (ref 12–65)
ANION GAP SERPL CALC-SCNC: 9 MMOL/L
AST SERPL-CCNC: 12 U/L (ref 15–37)
BACTERIA URNS QL MICRO: ABNORMAL /HPF
BASOPHILS # BLD: 0 K/UL (ref 0–0.2)
BASOPHILS NFR BLD: 0 % (ref 0–2)
BILIRUB SERPL-MCNC: 0.2 MG/DL (ref 0.2–1.1)
BUN SERPL-MCNC: 9 MG/DL (ref 6–23)
CALCIUM SERPL-MCNC: 8.7 MG/DL (ref 8.3–10.4)
CASTS URNS QL MICRO: ABNORMAL /LPF
CHLORIDE SERPL-SCNC: 101 MMOL/L (ref 98–107)
CO2 SERPL-SCNC: 30 MMOL/L (ref 21–32)
CREAT SERPL-MCNC: 0.76 MG/DL (ref 0.6–1)
DIFFERENTIAL METHOD BLD: ABNORMAL
EOSINOPHIL # BLD: 0.1 K/UL (ref 0–0.8)
EOSINOPHIL NFR BLD: 2 % (ref 0.5–7.8)
EPI CELLS #/AREA URNS HPF: ABNORMAL /HPF
ERYTHROCYTE [DISTWIDTH] IN BLOOD BY AUTOMATED COUNT: 13.6 % (ref 11.9–14.6)
GLOBULIN SER CALC-MCNC: 4.9 G/DL (ref 2.3–3.5)
GLUCOSE SERPL-MCNC: 112 MG/DL (ref 65–100)
HCG UR QL: NEGATIVE
HCT VFR BLD AUTO: 34 % (ref 35.8–46.3)
HGB BLD-MCNC: 10.7 G/DL (ref 11.7–15.4)
IMM GRANULOCYTES # BLD: 0 K/UL (ref 0–0.5)
IMM GRANULOCYTES NFR BLD AUTO: 0 % (ref 0–5)
LYMPHOCYTES # BLD: 1.8 K/UL (ref 0.5–4.6)
LYMPHOCYTES NFR BLD: 24 % (ref 13–44)
MCH RBC QN AUTO: 26.9 PG (ref 26.1–32.9)
MCHC RBC AUTO-ENTMCNC: 31.5 G/DL (ref 31.4–35)
MCV RBC AUTO: 85.4 FL (ref 79.6–97.8)
MONOCYTES # BLD: 0.4 K/UL (ref 0.1–1.3)
MONOCYTES NFR BLD: 5 % (ref 4–12)
NEUTS SEG # BLD: 5 K/UL (ref 1.7–8.2)
NEUTS SEG NFR BLD: 68 % (ref 43–78)
NRBC # BLD: 0 K/UL (ref 0–0.2)
PLATELET # BLD AUTO: 308 K/UL (ref 150–450)
PMV BLD AUTO: 9.2 FL (ref 9.4–12.3)
POTASSIUM SERPL-SCNC: 3.3 MMOL/L (ref 3.5–5.1)
PROT SERPL-MCNC: 8.2 G/DL
RBC # BLD AUTO: 3.98 M/UL (ref 4.05–5.2)
RBC #/AREA URNS HPF: >100 /HPF
SODIUM SERPL-SCNC: 140 MMOL/L (ref 136–145)
WBC # BLD AUTO: 7.3 K/UL (ref 4.3–11.1)
WBC URNS QL MICRO: ABNORMAL /HPF

## 2018-12-05 PROCEDURE — 99284 EMERGENCY DEPT VISIT MOD MDM: CPT | Performed by: EMERGENCY MEDICINE

## 2018-12-05 PROCEDURE — 96374 THER/PROPH/DIAG INJ IV PUSH: CPT | Performed by: EMERGENCY MEDICINE

## 2018-12-05 PROCEDURE — 74176 CT ABD & PELVIS W/O CONTRAST: CPT

## 2018-12-05 PROCEDURE — 74011250636 HC RX REV CODE- 250/636: Performed by: EMERGENCY MEDICINE

## 2018-12-05 PROCEDURE — 81025 URINE PREGNANCY TEST: CPT

## 2018-12-05 PROCEDURE — 74011250637 HC RX REV CODE- 250/637: Performed by: EMERGENCY MEDICINE

## 2018-12-05 PROCEDURE — 85025 COMPLETE CBC W/AUTO DIFF WBC: CPT

## 2018-12-05 PROCEDURE — 96375 TX/PRO/DX INJ NEW DRUG ADDON: CPT | Performed by: EMERGENCY MEDICINE

## 2018-12-05 PROCEDURE — 81003 URINALYSIS AUTO W/O SCOPE: CPT | Performed by: EMERGENCY MEDICINE

## 2018-12-05 PROCEDURE — 80053 COMPREHEN METABOLIC PANEL: CPT

## 2018-12-05 PROCEDURE — 96361 HYDRATE IV INFUSION ADD-ON: CPT | Performed by: EMERGENCY MEDICINE

## 2018-12-05 PROCEDURE — 81015 MICROSCOPIC EXAM OF URINE: CPT

## 2018-12-05 RX ORDER — DIPHENHYDRAMINE HYDROCHLORIDE 50 MG/ML
12.5 INJECTION, SOLUTION INTRAMUSCULAR; INTRAVENOUS ONCE
Status: COMPLETED | OUTPATIENT
Start: 2018-12-05 | End: 2018-12-05

## 2018-12-05 RX ORDER — MAG HYDROX/ALUMINUM HYD/SIMETH 200-200-20
30 SUSPENSION, ORAL (FINAL DOSE FORM) ORAL
Status: COMPLETED | OUTPATIENT
Start: 2018-12-05 | End: 2018-12-05

## 2018-12-05 RX ORDER — ONDANSETRON 2 MG/ML
4 INJECTION INTRAMUSCULAR; INTRAVENOUS
Status: COMPLETED | OUTPATIENT
Start: 2018-12-05 | End: 2018-12-05

## 2018-12-05 RX ORDER — MORPHINE SULFATE 10 MG/ML
6 INJECTION, SOLUTION INTRAMUSCULAR; INTRAVENOUS
Status: COMPLETED | OUTPATIENT
Start: 2018-12-05 | End: 2018-12-05

## 2018-12-05 RX ORDER — HYDROCODONE BITARTRATE AND ACETAMINOPHEN 10; 325 MG/1; MG/1
1 TABLET ORAL
Qty: 15 TAB | Refills: 0 | Status: SHIPPED | OUTPATIENT
Start: 2018-12-05 | End: 2018-12-09

## 2018-12-05 RX ORDER — ONDANSETRON 4 MG/1
4 TABLET, ORALLY DISINTEGRATING ORAL
Qty: 20 TAB | Refills: 0 | Status: SHIPPED | OUTPATIENT
Start: 2018-12-05 | End: 2018-12-30

## 2018-12-05 RX ORDER — KETOROLAC TROMETHAMINE 30 MG/ML
30 INJECTION, SOLUTION INTRAMUSCULAR; INTRAVENOUS
Status: COMPLETED | OUTPATIENT
Start: 2018-12-05 | End: 2018-12-05

## 2018-12-05 RX ADMIN — MORPHINE SULFATE 6 MG: 10 INJECTION INTRAVENOUS at 16:37

## 2018-12-05 RX ADMIN — SODIUM CHLORIDE 1000 ML: 900 INJECTION, SOLUTION INTRAVENOUS at 14:19

## 2018-12-05 RX ADMIN — DIPHENHYDRAMINE HYDROCHLORIDE 12.5 MG: 50 INJECTION, SOLUTION INTRAMUSCULAR; INTRAVENOUS at 16:57

## 2018-12-05 RX ADMIN — ALUMINUM HYDROXIDE, MAGNESIUM HYDROXIDE, AND SIMETHICONE 30 ML: 200; 200; 20 SUSPENSION ORAL at 17:28

## 2018-12-05 RX ADMIN — KETOROLAC TROMETHAMINE 30 MG: 30 INJECTION, SOLUTION INTRAMUSCULAR at 14:50

## 2018-12-05 RX ADMIN — ONDANSETRON 4 MG: 2 INJECTION INTRAMUSCULAR; INTRAVENOUS at 14:50

## 2018-12-05 NOTE — DISCHARGE INSTRUCTIONS
Learning About Hydronephrosis  What is hydronephrosis? Hydronephrosis is swelling of the kidneys. It is caused by a buildup of urine. This condition can happen if a tube that drains urine from your kidneys is blocked. The blockage can come from within the urinary tract or from pressure outside of the tract. Pregnancy is an example of an outside (external) cause. This condition is often caused by a blockage such as a kidney stone, tumor, or blood clot. It also can be caused by a problem in your urinary system that you were born with (congenital problem). What are the symptoms? Some of the common symptoms are:  · Pain in one or both sides. · Stomach pain. · Blood in your urine. Some people have no symptoms. How is it diagnosed? Your doctor will do an ultrasound to look for a blockage in your urinary system. An ultrasound allows your doctor to see a picture of the organs and other structures in your belly (abdomen). You also may need blood and urine tests. How is it treated? Your treatment depends on the cause of the swelling. If it is caused by a blockage, your treatment will depend on the type of blockage you have. If the blockage is caused by a kidney stone, you may wait for the stone to pass. If hydronephrosis happens during pregnancy, it usually clears up on its own. You may need to have urine drained from your bladder or kidneys. A urinary catheter is a small, flexible tube that can be inserted through the urethra and into the bladder, allowing urine to drain. A nephrostomy catheter is a thin tube placed into your kidney to drain urine. Sometimes surgery is needed to clear the blockage. If you have a blockage, you should begin to feel better after the blockage is gone. Many people recover and have no long-term problems. But some may have kidney damage. If hydronephrosis was left untreated for a long time, the damage can be severe. Severe damage will require further treatment.   Follow-up care is a key part of your treatment and safety. Be sure to make and go to all appointments, and call your doctor if you are having problems. It's also a good idea to know your test results and keep a list of the medicines you take. Where can you learn more? Go to http://gwen-brennan.info/. Enter S386 in the search box to learn more about \"Learning About Hydronephrosis. \"  Current as of: March 15, 2018  Content Version: 11.8  © 6396-7167 [employer], [employer]. Care instructions adapted under license by [employer] (which disclaims liability or warranty for this information). If you have questions about a medical condition or this instruction, always ask your healthcare professional. [address] any warranty or liability for your use of this information.

## 2018-12-05 NOTE — ED NOTES
I have reviewed discharge instructions with the patient. The patient verbalized understanding. Patient left ED via Discharge Method: ambulatory to Home with spouse. Opportunity for questions and clarification provided. Patient given 2 scripts. To continue your aftercare when you leave the hospital, you may receive an automated call from our care team to check in on how you are doing. This is a free service and part of our promise to provide the best care and service to meet your aftercare needs.  If you have questions, or wish to unsubscribe from this service please call [phone]. Thank you for Choosing our [facility] Emergency Department.

## 2018-12-05 NOTE — ED PROVIDER NOTES
Patient with history of multiple kidney stones in the past.  Last one was 4 months ago requiring surgery. Woke this morning with sharp left flank pain and abdominal pain. Has some nausea and vomiting with it. No dysuria or hematuria. Has some difficulty with urination. Feels similar to prior episodes. The history is provided by the patient. No  was used. Flank Pain This is a new problem. The current episode started 6 to 12 hours ago. The problem has not changed since onset. The problem occurs constantly. Patient reports not work related injury. The pain is associated with no known injury. The pain is present in the left side. The quality of the pain is described as aching, sharp and similar to previous episodes. The pain radiates to the left groin. The pain is moderate. Associated symptoms include abdominal pain. Pertinent negatives include no chest pain, no fever, no numbness, no headaches, no abdominal swelling, no bowel incontinence, no perianal numbness, no bladder incontinence, no dysuria, no pelvic pain, no leg pain, no paresthesias and no weakness. She has tried nothing for the symptoms. Risk factors include history of kidney stones. Past Medical History:  
Diagnosis Date  Diabetes ([facility] [address] does not check sugar-regularly  Hypercholesterolemia   
 controlled well with meds  Hypertension   
 medication controlled  Kidney stone Past Surgical History:  
Procedure Laterality Date  FRAGMENT KIDNEY STONE/ ESWL    
 HX DILATION AND CURETTAGE    
 HX ENDOSCOPY    
 HX LAP CHOLECYSTECTOMY  03/28/2017  HX LITHOTRIPSY    
  x 4  
 HX ORTHOPAEDIC Left   
 hand- tendon repair  HX OTHER SURGICAL Bilateral   
 sweat gland removal under both arms  HX TUBAL LIGATION  07/25/2005 Family History:  
Problem Relation Age of Onset  Heart Disease Maternal Grandmother  Hypertension Mother [facility] Other Mother [place] disease  Hypertension Father  Other Father on blood thinners for blood clots  Hypertension Brother  Colon Cancer Other  No Known Problems Brother  Breast Cancer Neg Hx   
 Ovarian Cancer Neg Hx  Uterine Cancer Neg Hx Social History Socioeconomic History  Marital status:  Spouse name: Not on file  Number of children: Not on file  Years of education: Not on file  Highest education level: Not on file Social Needs  Financial resource strain: Not on file  Food insecurity - worry: Not on file  Food insecurity - inability: Not on file  Transportation needs - medical: Not on file  Transportation needs - non-medical: Not on file Occupational History  Not on file Tobacco Use  Smoking status: Former Smoker Packs/day: 1.00 Years: 0.50 Pack years: 0.50 Last attempt to quit: 2009 Years since quittin.9  Smokeless tobacco: Never Used Substance and Sexual Activity  Alcohol use: No  
 Drug use: No  
 Sexual activity: Yes  
  Partners: Male Birth control/protection: None Comment: tubal  
Other Topics Concern [address] Service Not Asked  Blood Transfusions Not Asked  Caffeine Concern No  
 Occupational Exposure Not Asked [address] Hazards Not Asked  Sleep Concern Not Asked  Stress Concern Not Asked  Weight Concern Not Asked  Special Diet Not Asked  Back Care Not Asked  Exercise Yes  Bike Helmet Not Asked  [address] Yes  Self-Exams No  
Social History Narrative Abuse: Feels safe at home, no history of physical abuse, no history of sexual abuse ALLERGIES: Lisinopril; Ace inhibitors; Bactrim [sulfamethoxazole-trimethoprim]; Flomax [tamsulosin]; Norvasc [amlodipine]; and Sulfa (sulfonamide antibiotics) Review of Systems Constitutional: Negative for chills and fever. HENT: Negative for rhinorrhea and sore throat. Eyes: Negative for pain and redness. Respiratory: Negative for chest tightness, shortness of breath and wheezing. Cardiovascular: Negative for chest pain and leg swelling. Gastrointestinal: Positive for abdominal pain, nausea and vomiting. Negative for bowel incontinence and diarrhea. Genitourinary: Positive for flank pain. Negative for bladder incontinence, dysuria, pelvic pain, vaginal bleeding and vaginal discharge. Musculoskeletal: Negative for back pain, gait problem, neck pain and neck stiffness. Skin: Negative for color change and rash. Neurological: Negative for weakness, numbness, headaches and paresthesias. Vitals:  
 12/05/18 1400 BP: 159/83 Pulse: 90 Resp: 18 Temp: 98.4 °F (36.9 °C) SpO2: 98% Weight: 111.1 kg (245 lb) Height: 5' 4\" (1.626 m) Physical Exam  
Constitutional: She is oriented to person, place, and time. She appears well-developed and well-nourished. HENT:  
Head: Normocephalic and atraumatic. Neck: Normal range of motion. Neck supple. Cardiovascular: Normal rate and regular rhythm. Pulmonary/Chest: Effort normal and breath sounds normal.  
Abdominal: Soft. Bowel sounds are normal. There is tenderness (left flank). Musculoskeletal: Normal range of motion. She exhibits no edema. Neurological: She is alert and oriented to person, place, and time. Skin: Skin is warm and dry. MDM Number of Diagnoses or Management Options Diagnosis management comments: Patient with 4 mm left ureteral stone. We'll discharge with urology follow-up and medicine. Amount and/or Complexity of Data Reviewed Clinical lab tests: ordered and reviewed Tests in the radiology section of CPT®: ordered and reviewed Tests in the medicine section of CPT®: ordered and reviewed Patient Progress Patient progress: stable Procedures CT UROGRAM WO CONT (Final result) Result time 12/05/18 16:12:28 Final result by Ashok Lesches, MD (12/05/18 16:12:28) Impression:  
 IMPRESSION: Sandie Lainez is 4 mm stone in the lower left ureter, several centimeters 
above the bladder creating mild hydroureteronephrosis. Small bilateral punctate 
kidney stones are present. Narrative:  
 CT ABDOMEN AND PELVIS WITHOUT CONTRAST. HISTORY:  Left flank pain COMPARISON: November 2014. TECHNIQUE:  5mm axial images from above the kidneys through the bladder base 
using renal stone protocol.  Radiation dose reduction techniques were used for 
this study.  Our CT scanners use one or more of the following:  Automated 
exposure control, adjustment of the mA and or kV according to patient size, 
iterative reconstruction. FINDINGS:   
Abdomen:  Small punctate stones in each kidney. Mild left hydroureteronephrosis. No acute perinephric or periureteral stranding densities.   
 
Included portion of the liver and spleen unremarkable. There are cholecystectomy 
clips.  Aorta normal caliber. No free air. Pelvis:  A 4 mm stone lower left ureter, several centimeters above the bladder.  
  
  
  
   
 
Results Include: 
 
Recent Results (from the past 24 hour(s)) CBC WITH AUTOMATED DIFF Collection Time: 12/05/18  2:54 PM  
Result Value Ref Range WBC 7.3 4.3 - 11.1 K/uL  
 RBC 3.98 (L) 4.05 - 5.2 M/uL  
 HGB 10.7 (L) 11.7 - 15.4 g/dL HCT 34.0 (L) 35.8 - 46.3 % MCV 85.4 79.6 - 97.8 FL  
 MCH 26.9 26.1 - 32.9 PG  
 MCHC 31.5 31.4 - 35.0 g/dL  
 RDW 13.6 11.9 - 14.6 % PLATELET 142 460 - 535 K/uL MPV 9.2 (L) 9.4 - 12.3 FL ABSOLUTE NRBC 0.00 0.0 - 0.2 K/uL  
 DF AUTOMATED NEUTROPHILS 68 43 - 78 % LYMPHOCYTES 24 13 - 44 % MONOCYTES 5 4.0 - 12.0 % EOSINOPHILS 2 0.5 - 7.8 % BASOPHILS 0 0.0 - 2.0 % IMMATURE GRANULOCYTES 0 0.0 - 5.0 %  
 ABS. NEUTROPHILS 5.0 1.7 - 8.2 K/UL  
 ABS. LYMPHOCYTES 1.8 0.5 - 4.6 K/UL  
 ABS. MONOCYTES 0.4 0.1 - 1.3 K/UL  
 ABS. EOSINOPHILS 0.1 0.0 - 0.8 K/UL  
 ABS. BASOPHILS 0.0 0.0 - 0.2 K/UL ABS. IMM. GRANS. 0.0 0.0 - 0.5 K/UL METABOLIC PANEL, COMPREHENSIVE Collection Time: 12/05/18  2:54 PM  
Result Value Ref Range Sodium 140 136 - 145 mmol/L Potassium 3.3 (L) 3.5 - 5.1 mmol/L Chloride 101 98 - 107 mmol/L  
 CO2 30 21 - 32 mmol/L Anion gap 9 mmol/L Glucose 112 (H) 65 - 100 mg/dL BUN 9 6 - 23 MG/DL Creatinine 0.76 0.6 - 1.0 MG/DL  
 GFR est AA >60 >60 ml/min/1.73m2 GFR est non-AA >60 ml/min/1.73m2 Calcium 8.7 8.3 - 10.4 MG/DL Bilirubin, total 0.2 0.2 - 1.1 MG/DL  
 ALT (SGPT) 12 12 - 65 U/L  
 AST (SGOT) 12 (L) 15 - 37 U/L Alk. phosphatase 78 50 - 130 U/L Protein, total 8.2 g/dL Albumin 3.3 (L) 3.5 - 5.0 g/dL Globulin 4.9 (H) 2.3 - 3.5 g/dL A-G Ratio 0.7 HCG URINE, QL. - POC Collection Time: 12/05/18  2:59 PM  
Result Value Ref Range Pregnancy test,urine (POC) NEGATIVE  NEG    
URINE MICROSCOPIC Collection Time: 12/05/18  3:02 PM  
Result Value Ref Range WBC 5-10 0 /hpf  
 RBC >100 (H) 0 /hpf Epithelial cells 10-20 0 /hpf Bacteria TRACE 0 /hpf Casts 3-5 0 /lpf

## 2018-12-05 NOTE — LETTER
[facility] EMERGENCY DEPT 
[address] [place] [id number] [phone] Work/School Note Date: 12/5/2018 To Whom It May concern: 
 
Jen Padron was seen and treated today in the emergency room by the following provider(s): 
No providers found. Jen Padron may return to work on 12/7/2018.  
 
Sincerely,

## 2018-12-07 ENCOUNTER — HOSPITAL ENCOUNTER (OUTPATIENT)
Age: 43
Setting detail: OBSERVATION
Discharge: HOME | End: 2018-12-09
Attending: UROLOGY | Admitting: UROLOGY
Payer: COMMERCIAL

## 2018-12-07 ENCOUNTER — ANESTHESIA EVENT (OUTPATIENT)
Dept: SURGERY | Age: 43
End: 2018-12-07
Payer: COMMERCIAL

## 2018-12-07 DIAGNOSIS — N20.1 URETERAL STONE: Primary | ICD-10-CM

## 2018-12-07 LAB
ANION GAP SERPL CALC-SCNC: 6 MMOL/L (ref 7–16)
ARTERIAL PATENCY WRIST A: YES
BASE EXCESS BLD CALC-SCNC: 6 MMOL/L
BDY SITE: ABNORMAL
BODY TEMPERATURE: 98.6
BUN SERPL-MCNC: 8 MG/DL (ref 6–23)
CALCIUM SERPL-MCNC: 8.9 MG/DL (ref 8.3–10.4)
CHLORIDE SERPL-SCNC: 99 MMOL/L (ref 98–107)
CO2 BLD-SCNC: 33 MMOL/L
CO2 SERPL-SCNC: 33 MMOL/L (ref 21–32)
COLLECT TIME,HTIME: 2052
CREAT SERPL-MCNC: 0.72 MG/DL (ref 0.6–1)
ERYTHROCYTE [DISTWIDTH] IN BLOOD BY AUTOMATED COUNT: 13.6 % (ref 11.9–14.6)
FLOW RATE ISTAT,IFRATE: 2 L/MIN
GAS FLOW.O2 O2 DELIVERY SYS: ABNORMAL L/MIN
GLUCOSE BLD STRIP.AUTO-MCNC: 158 MG/DL (ref 65–100)
GLUCOSE SERPL-MCNC: 80 MG/DL (ref 65–100)
HCO3 BLD-SCNC: 31.5 MMOL/L (ref 22–26)
HCT VFR BLD AUTO: 31.1 % (ref 35.8–46.3)
HGB BLD-MCNC: 9.6 G/DL (ref 11.7–15.4)
MCH RBC QN AUTO: 26.9 PG (ref 26.1–32.9)
MCHC RBC AUTO-ENTMCNC: 30.9 G/DL (ref 31.4–35)
MCV RBC AUTO: 87.1 FL (ref 79.6–97.8)
NRBC # BLD: 0 K/UL (ref 0–0.2)
PCO2 BLD: 48.1 MMHG (ref 35–45)
PH BLD: 7.42 [PH] (ref 7.35–7.45)
PLATELET # BLD AUTO: 278 K/UL (ref 150–450)
PMV BLD AUTO: 9 FL (ref 9.4–12.3)
PO2 BLD: 117 MMHG (ref 75–100)
POTASSIUM SERPL-SCNC: 3.1 MMOL/L (ref 3.5–5.1)
RBC # BLD AUTO: 3.57 M/UL (ref 4.05–5.2)
SAO2 % BLD: 99 % (ref 95–98)
SERVICE CMNT-IMP: ABNORMAL
SODIUM SERPL-SCNC: 138 MMOL/L (ref 136–145)
SPECIMEN TYPE: ABNORMAL
WBC # BLD AUTO: 6.2 K/UL (ref 4.3–11.1)

## 2018-12-07 PROCEDURE — 36415 COLL VENOUS BLD VENIPUNCTURE: CPT

## 2018-12-07 PROCEDURE — 96374 THER/PROPH/DIAG INJ IV PUSH: CPT

## 2018-12-07 PROCEDURE — 96375 TX/PRO/DX INJ NEW DRUG ADDON: CPT

## 2018-12-07 PROCEDURE — 74011250637 HC RX REV CODE- 250/637: Performed by: NURSE PRACTITIONER

## 2018-12-07 PROCEDURE — 82803 BLOOD GASES ANY COMBINATION: CPT

## 2018-12-07 PROCEDURE — 74011250636 HC RX REV CODE- 250/636: Performed by: NURSE PRACTITIONER

## 2018-12-07 PROCEDURE — 74011250636 HC RX REV CODE- 250/636: Performed by: UROLOGY

## 2018-12-07 PROCEDURE — 85027 COMPLETE CBC AUTOMATED: CPT

## 2018-12-07 PROCEDURE — 76937 US GUIDE VASCULAR ACCESS: CPT

## 2018-12-07 PROCEDURE — 99218 HC RM OBSERVATION: CPT

## 2018-12-07 PROCEDURE — 36600 WITHDRAWAL OF ARTERIAL BLOOD: CPT

## 2018-12-07 PROCEDURE — 82962 GLUCOSE BLOOD TEST: CPT

## 2018-12-07 PROCEDURE — 80048 BASIC METABOLIC PNL TOTAL CA: CPT

## 2018-12-07 PROCEDURE — 96376 TX/PRO/DX INJ SAME DRUG ADON: CPT

## 2018-12-07 RX ORDER — SODIUM CHLORIDE 0.9 % (FLUSH) 0.9 %
5-10 SYRINGE (ML) INJECTION EVERY 8 HOURS
Status: DISCONTINUED | OUTPATIENT
Start: 2018-12-07 | End: 2018-12-09 | Stop reason: HOSPADM

## 2018-12-07 RX ORDER — SODIUM CHLORIDE 0.9 % (FLUSH) 0.9 %
5-10 SYRINGE (ML) INJECTION AS NEEDED
Status: DISCONTINUED | OUTPATIENT
Start: 2018-12-07 | End: 2018-12-09 | Stop reason: HOSPADM

## 2018-12-07 RX ORDER — DIPHENHYDRAMINE HYDROCHLORIDE 50 MG/ML
25 INJECTION, SOLUTION INTRAMUSCULAR; INTRAVENOUS
Status: COMPLETED | OUTPATIENT
Start: 2018-12-07 | End: 2018-12-07

## 2018-12-07 RX ORDER — HYDROCODONE BITARTRATE AND ACETAMINOPHEN 5; 325 MG/1; MG/1
1 TABLET ORAL
Status: DISCONTINUED | OUTPATIENT
Start: 2018-12-07 | End: 2018-12-09 | Stop reason: HOSPADM

## 2018-12-07 RX ORDER — SODIUM CHLORIDE 9 MG/ML
125 INJECTION, SOLUTION INTRAVENOUS CONTINUOUS
Status: DISCONTINUED | OUTPATIENT
Start: 2018-12-07 | End: 2018-12-09 | Stop reason: HOSPADM

## 2018-12-07 RX ORDER — HYDROMORPHONE HYDROCHLORIDE 1 MG/ML
1 INJECTION, SOLUTION INTRAMUSCULAR; INTRAVENOUS; SUBCUTANEOUS
Status: DISCONTINUED | OUTPATIENT
Start: 2018-12-07 | End: 2018-12-07

## 2018-12-07 RX ORDER — ACETAMINOPHEN 325 MG/1
650 TABLET ORAL
Status: DISCONTINUED | OUTPATIENT
Start: 2018-12-07 | End: 2018-12-09 | Stop reason: HOSPADM

## 2018-12-07 RX ORDER — NALOXONE HYDROCHLORIDE 0.4 MG/ML
0.4 INJECTION, SOLUTION INTRAMUSCULAR; INTRAVENOUS; SUBCUTANEOUS AS NEEDED
Status: DISCONTINUED | OUTPATIENT
Start: 2018-12-07 | End: 2018-12-09 | Stop reason: HOSPADM

## 2018-12-07 RX ORDER — ONDANSETRON 2 MG/ML
4 INJECTION INTRAMUSCULAR; INTRAVENOUS
Status: DISCONTINUED | OUTPATIENT
Start: 2018-12-07 | End: 2018-12-09 | Stop reason: HOSPADM

## 2018-12-07 RX ADMIN — SODIUM CHLORIDE 125 ML/HR: 900 INJECTION, SOLUTION INTRAVENOUS at 14:28

## 2018-12-07 RX ADMIN — DIPHENHYDRAMINE HYDROCHLORIDE 25 MG: 50 INJECTION, SOLUTION INTRAMUSCULAR; INTRAVENOUS at 21:00

## 2018-12-07 RX ADMIN — HYDROMORPHONE HYDROCHLORIDE 1 MG: 1 INJECTION, SOLUTION INTRAMUSCULAR; INTRAVENOUS; SUBCUTANEOUS at 14:08

## 2018-12-07 RX ADMIN — Medication 10 ML: at 22:19

## 2018-12-07 RX ADMIN — HYDROCODONE BITARTRATE AND ACETAMINOPHEN 1 TABLET: 5; 325 TABLET ORAL at 17:16

## 2018-12-07 RX ADMIN — HYDROMORPHONE HYDROCHLORIDE 1 MG: 1 INJECTION, SOLUTION INTRAMUSCULAR; INTRAVENOUS; SUBCUTANEOUS at 19:40

## 2018-12-07 RX ADMIN — Medication 10 ML: at 14:08

## 2018-12-07 NOTE — H&P
As per Dr. Ramiro Cheadle, Admit pt as observation. Start IV hydration, urine strain and pain control. She will have cysto/L ureteroscopy/laser lithotripsy and L ureteral stent insertion tomorrow morning, NPO at MN. IV ancef on call to OR. [facility] [address], [address] 
[phone] 
  
Keren Jhaveri : 1975 
  
    
Chief Complaint Patient presents with  Kidney Stone  
    4 mm left ureteral stone---review CT urogram.  KUB today.  
  
  
HPI  
  
Keren Jhaveri is a 39 y.o. female hx of stones Metabolic workup shows Urine output low and oxalate/ca-oxalate high. She has been started on Urocit K, one/day. ones. Went to ER on 18 with left flank pain. CT urogram: IMPRESSION:  There is 4 mm stone in the lower left ureter, several centimeters 
above the bladder creating mild hydroureteronephrosis. Small bilateral punctate 
kidney stones are present. No hematuria or fever. KUB today, 18: faint 4 mm ca2++ left pelvis. She still has left flank pain. She is allergic to Flomax, causes swelling.  
  
Past Medical History:  
Diagnosis Date  Diabetes ([address]    
  does not check sugar-regularly  Hypercholesterolemia    
  controlled well with meds  Hypertension    
  medication controlled  Kidney stone    
  
     
Past Surgical History:  
Procedure Laterality Date  FRAGMENT KIDNEY STONE/ ESWL      
 HX DILATION AND CURETTAGE      
 HX ENDOSCOPY      
 HX LAP CHOLECYSTECTOMY   2017  HX LITHOTRIPSY      
   x 4  
 HX ORTHOPAEDIC Left    
  hand- tendon repair  HX OTHER SURGICAL Bilateral    
  sweat gland removal under both arms  HX TUBAL LIGATION   2005  
  
      
Current Outpatient Medications Medication Sig Dispense Refill  promethazine (PHENERGAN) 25 mg tablet Take 1 Tab by mouth every six (6) hours as needed for Nausea.  15 Tab 0  
 HYDROcodone-acetaminophen (NORCO)  mg tablet Take 1 Tab by mouth every six (6) hours as needed for Pain. Max Daily Amount: 4 Tabs. 15 Tab 0  
 ondansetron (ZOFRAN ODT) 4 mg disintegrating tablet Take 1 Tab by mouth every eight (8) hours as needed for Nausea. 20 Tab 0  
 hydroCHLOROthiazide (HYDRODIURIL) 25 mg tablet Take 1 Tab by mouth daily. 90 Tab 1  
 gabapentin (NEURONTIN) 100 mg capsule Take 100 mg by mouth two (2) times a day.      
 multivitamin (ONE A DAY) tablet Take 1 Tab by mouth daily.      
 betamethasone valerate (VALISONE) 0.1 % topical cream Apply  to affected area two (2) times a day. 15 g 0  
 metFORMIN (GLUCOPHAGE) 1,000 mg tablet Take 1 Tab by mouth two (2) times daily (with meals). Indications: type 2 diabetes mellitus 90 Tab 3  ibuprofen (MOTRIN) 800 mg tablet Take 1 Tab by mouth every six (6) hours as needed for Pain. 90 Tab 1  pravastatin (PRAVACHOL) 40 mg tablet Take 1 Tab by mouth nightly for 30 days. 30 Tab 2  
  
     
Allergies Allergen Reactions  Lisinopril Anaphylaxis  Ace Inhibitors Angioedema  Bactrim [Sulfamethoxazole-Trimethoprim] Hives  Flomax [Tamsulosin] Swelling  Norvasc [Amlodipine] Other (comments)  
    Pt denies any allergic reaction to Norvasc  Sulfa (Sulfonamide Antibiotics) Hives  
  
Social History  
  
     
Socioeconomic History  Marital status:   
    Spouse name: Not on file  Number of children: Not on file  Years of education: Not on file  Highest education level: Not on file Social Needs  Financial resource strain: Not on file  Food insecurity - worry: Not on file  Food insecurity - inability: Not on file  Transportation needs - medical: Not on file  Transportation needs - non-medical: Not on file Occupational History  Not on file Tobacco Use  Smoking status: Former Smoker  
    Packs/day: 1.00  
    Years: 0.50  
    Pack years: 0.50  
    Last attempt to quit: 2009  
    Years since quittin.9  Smokeless tobacco: Never Used Substance and Sexual Activity  Alcohol use: No  
 Drug use: No  
 Sexual activity: Yes  
    Partners: Male  
    Birth control/protection: None  
    Comment: tubal  
Other Topics Concern [address] Service Not Asked  Blood Transfusions Not Asked  Caffeine Concern No  
 Occupational Exposure Not Asked [place] Hazards Not Asked  Sleep Concern Not Asked  Stress Concern Not Asked  Weight Concern Not Asked  Special Diet Not Asked  Back Care Not Asked  Exercise Yes  Bike Helmet Not Asked [address] Yes  Self-Exams No  
Social History Narrative  
  Abuse: Feels safe at home, no history of physical abuse, no history of sexual abuse  
  
     
Family History Problem Relation Age of Onset  Heart Disease Maternal Grandmother    
 Hypertension Mother    
 Other Mother    
      Nga Barrett disease  Hypertension Father    
 Other Father    
      on blood thinners for blood clots  Hypertension Brother    
 Colon Cancer Other    
 No Known Problems Brother    
 Breast Cancer Neg Hx    
 Ovarian Cancer Neg Hx    
 Uterine Cancer Neg Hx    
  
  
Review of Systems Constitutional:   Negative for fever and headaches. ENT:  Negative for high frequency hearing loss. Respiratory:  Negative for shortness of breath. Cardiovascular:  Negative for chest pain. GI:  Negative for abdominal pain. Genitourinary: Positive for history of urolithiasis. Negative for urinary burning and hematuria. Musculoskeletal:  Negative for back pain. Neurological:  Negative for numbness. Psychological:  Negative for depression. Endocrine:  Negative for fatigue. Hem/Lymphatic:  Negative for easy bruising. 
  
  
Visit Vitals Temp 98.7 °F (37.1 °C) (Tympanic) Ht 5' 4\" (1.626 m) Wt 245 lb (111.1 kg) BMI 42.05 kg/m² Physical Exam 
General  
Mental Status - Patient is alert and oriented X3.  Build & Nutrition - Well nourished. 
  
  
Chest and Lung Exam  
 Chest and lung exam reveals  - normal excursion with symmetric chest walls, quiet, even and easy respiratory effort with no use of accessory muscles and on auscultation, normal breath sounds, no adventitious sounds and normal vocal resonance. 
  
  
Cardiovascular  
Cardiovascular examination reveals  - normal heart sounds, regular rate and rhythm with no murmurs. 
  
  
Abdomen  
Palpation/Percussion: Palpation and Percussion of the abdomen reveal - Non Tender, No Rebound tenderness, No Rigidity (guarding), No hepatosplenomegaly, No Palpable abdominal masses and Soft. Hernia - Bilateral - No Hernia(s) present. 
  
  
Urinalysis UA - Dipstick Results for orders placed or performed in visit on 07/31/18 AMB POC URINALYSIS DIP STICK AUTO W/ MICRO (PGU)     Status: None Result Value Ref Range Status  
  Glucose (UA POC) Negative Negative mg/dL Final  
  Bilirubin (UA POC) Negative Negative Final  
  Ketones (UA POC) Negative Negative Final  
  Specific gravity (UA POC) 1.025 1.001 - 1.035 Final  
  Blood (UA POC) Negative Negative Final  
  pH (UA POC) 7 4.6 - 8.0 Final  
  Protein (UA POC) Negative Negative Final  
  Urobilinogen (POC) 0.2 mg/dL   Final  
  Nitrites (UA POC) Negative Negative Final  
  Leukocyte esterase (UA POC) Negative Negative Final  
  
  
UA - Micro WBC - 0 
RBC - 0 Bacteria - 0 Epith - 0 
  
Physical Exam 
  
Assessment and Plan 
    ICD-10-CM ICD-9-CM    
1. Left ureteral stone N20.1 592.1 AMB POC URINALYSIS DIP STICK AUTO W/ MICRO (PGU)  
      AMB POC XRAY ABDOMEN 1 VIEW 2. Kidney stones N20.0 592.0    
3. Ureteral stone N20.1 592.1 promethazine (PHENERGAN) 25 mg tablet  
  
  
     
Orders Placed This Encounter  KUB  
    Order Specific Question:   Reason for Exam  
    Answer:   ureteral stone  
    Order Specific Question:   Is Patient Allergic to Contrast Dye?  
    Answer:   Unknown  
    Order Specific Question:   Is Patient Pregnant?  
    Answer:   Unknown  AMB POC URINALYSIS DIP STICK AUTO W/ MICRO (PGU)  promethazine (PHENERGAN) 25 mg tablet  
    Sig: Take 1 Tab by mouth every six (6) hours as needed for Nausea.  
    Dispense:  15 Tab  
    Refill:  0  
discussed conservative vs definitive tx .has left distal ureteral stone. She would like to proceed with left URS, laser lithotripsy, stent. Risks of bleeding, infection, stricture, retained fragments discussed.   
Will admit today for pan control.  
  
Follow-up Disposition: 
Return for call Story to schedule surgery, Admit to hospital.

## 2018-12-07 NOTE — PROGRESS NOTES
Initial visit by  to convey care and concern and encourage patient that  services are available if desired. No needs were voiced during the visit. Provided business card for future reference. Omi Magaña MDiv Board Certified [employer]

## 2018-12-07 NOTE — PROGRESS NOTES
Chart screened by  for discharge planning. No needs identified at this time. Please consult  if any new issues arise. Care Management Interventions PCP Verified by CM: Yes Transition of Care Consult (CM Consult): Discharge Planning Current Support Network: Lives with Spouse Confirm Follow Up Transport: Family Plan discussed with Pt/Family/Caregiver: Yes Freedom of Choice Offered: Yes Discharge Location Discharge Placement: Home

## 2018-12-07 NOTE — ANESTHESIA PREPROCEDURE EVALUATION
Anesthetic History No history of anesthetic complications Review of Systems / Medical History Patient summary reviewed and pertinent labs reviewed Pulmonary Within defined limits Neuro/Psych Within defined limits Cardiovascular Hypertension: well controlled Hyperlipidemia Exercise tolerance: <4 METS 
  
GI/Hepatic/Renal 
  
GERD: well controlled Renal disease: stones Endo/Other Diabetes: well controlled, type 2 Morbid obesity Other Findings Physical Exam 
 
Airway Mallampati: II 
TM Distance: > 6 cm Neck ROM: normal range of motion Mouth opening: Normal 
 
 Cardiovascular Regular rate and rhythm,  S1 and S2 normal,  no murmur, click, rub, or gallop Rhythm: regular Dental 
No notable dental hx Pulmonary Breath sounds clear to auscultation Abdominal 
GI exam deferred Other Findings Anesthetic Plan ASA: 3 Anesthesia type: general - femoral single shot and popliteal fossa block Induction: Intravenous Anesthetic plan and risks discussed with: Patient and Spouse

## 2018-12-07 NOTE — PROGRESS NOTES
12/07/18 1459 Skin Integumentary Skin Integumentary (WDL) WDL Skin Color Appropriate for ethnicity Skin Condition/Temp Warm;Dry Skin Integrity Tattoos (comment) Primary Nurse Chelo Ware RN and Jose Pickett RN performed a dual skin assessment on this patient No impairment noted Eder score is 22

## 2018-12-08 ENCOUNTER — ANESTHESIA (OUTPATIENT)
Dept: SURGERY | Age: 43
End: 2018-12-08
Payer: COMMERCIAL

## 2018-12-08 LAB
ATRIAL RATE: 87 BPM
CALCULATED P AXIS, ECG09: 46 DEGREES
CALCULATED R AXIS, ECG10: 8 DEGREES
CALCULATED T AXIS, ECG11: 39 DEGREES
DIAGNOSIS, 93000: NORMAL
GLUCOSE BLD STRIP.AUTO-MCNC: 157 MG/DL (ref 65–100)
GLUCOSE BLD STRIP.AUTO-MCNC: 184 MG/DL (ref 65–100)
GLUCOSE BLD STRIP.AUTO-MCNC: 94 MG/DL (ref 65–100)
P-R INTERVAL, ECG05: 168 MS
Q-T INTERVAL, ECG07: 374 MS
QRS DURATION, ECG06: 84 MS
QTC CALCULATION (BEZET), ECG08: 450 MS
VENTRICULAR RATE, ECG03: 87 BPM

## 2018-12-08 PROCEDURE — 74011250636 HC RX REV CODE- 250/636: Performed by: UROLOGY

## 2018-12-08 PROCEDURE — 90686 IIV4 VACC NO PRSV 0.5 ML IM: CPT | Performed by: UROLOGY

## 2018-12-08 PROCEDURE — 74011250636 HC RX REV CODE- 250/636: Performed by: NURSE PRACTITIONER

## 2018-12-08 PROCEDURE — 90471 IMMUNIZATION ADMIN: CPT

## 2018-12-08 PROCEDURE — 77030019927 HC TBNG IRR CYSTO BAXT -A: Performed by: UROLOGY

## 2018-12-08 PROCEDURE — 99218 HC RM OBSERVATION: CPT

## 2018-12-08 PROCEDURE — C1758 CATHETER, URETERAL: HCPCS | Performed by: UROLOGY

## 2018-12-08 PROCEDURE — 77030018832 HC SOL IRR H20 ICUM -A: Performed by: UROLOGY

## 2018-12-08 PROCEDURE — 74011250636 HC RX REV CODE- 250/636

## 2018-12-08 PROCEDURE — 76060000033 HC ANESTHESIA 1 TO 1.5 HR: Performed by: UROLOGY

## 2018-12-08 PROCEDURE — 74011250637 HC RX REV CODE- 250/637: Performed by: NURSE PRACTITIONER

## 2018-12-08 PROCEDURE — 74011250637 HC RX REV CODE- 250/637: Performed by: UROLOGY

## 2018-12-08 PROCEDURE — 76010000160 HC OR TIME 0.5 TO 1 HR INTENSV-TIER 1: Performed by: UROLOGY

## 2018-12-08 PROCEDURE — 74011250636 HC RX REV CODE- 250/636: Performed by: ANESTHESIOLOGY

## 2018-12-08 PROCEDURE — 93005 ELECTROCARDIOGRAM TRACING: CPT | Performed by: UROLOGY

## 2018-12-08 PROCEDURE — C1769 GUIDE WIRE: HCPCS | Performed by: UROLOGY

## 2018-12-08 PROCEDURE — 77030033647: Performed by: UROLOGY

## 2018-12-08 PROCEDURE — 77030020263 HC SOL INJ SOD CL0.9% LFCR 1000ML

## 2018-12-08 PROCEDURE — 74011000250 HC RX REV CODE- 250

## 2018-12-08 PROCEDURE — C2617 STENT, NON-COR, TEM W/O DEL: HCPCS | Performed by: UROLOGY

## 2018-12-08 PROCEDURE — 74011636320 HC RX REV CODE- 636/320: Performed by: UROLOGY

## 2018-12-08 PROCEDURE — 82962 GLUCOSE BLOOD TEST: CPT

## 2018-12-08 PROCEDURE — 77030018846 HC SOL IRR STRL H20 ICUM -A: Performed by: UROLOGY

## 2018-12-08 PROCEDURE — 77030033269 HC SLV COMPR SCD KNE2 CARD -B: Performed by: UROLOGY

## 2018-12-08 PROCEDURE — 74011000250 HC RX REV CODE- 250: Performed by: UROLOGY

## 2018-12-08 PROCEDURE — 76210000006 HC OR PH I REC 0.5 TO 1 HR: Performed by: UROLOGY

## 2018-12-08 PROCEDURE — 77030037088 HC TUBE ENDOTRACH ORAL NSL COVD-A: Performed by: ANESTHESIOLOGY

## 2018-12-08 DEVICE — URETERAL STENT
Type: IMPLANTABLE DEVICE | Site: URETER | Status: FUNCTIONAL
Brand: PERCUFLEX™ PLUS

## 2018-12-08 RX ORDER — GLYCOPYRROLATE 0.2 MG/ML
INJECTION INTRAMUSCULAR; INTRAVENOUS AS NEEDED
Status: DISCONTINUED | OUTPATIENT
Start: 2018-12-08 | End: 2018-12-08 | Stop reason: HOSPADM

## 2018-12-08 RX ORDER — DEXAMETHASONE SODIUM PHOSPHATE 4 MG/ML
INJECTION, SOLUTION INTRA-ARTICULAR; INTRALESIONAL; INTRAMUSCULAR; INTRAVENOUS; SOFT TISSUE AS NEEDED
Status: DISCONTINUED | OUTPATIENT
Start: 2018-12-08 | End: 2018-12-08 | Stop reason: HOSPADM

## 2018-12-08 RX ORDER — MIDAZOLAM HYDROCHLORIDE 1 MG/ML
2 INJECTION, SOLUTION INTRAMUSCULAR; INTRAVENOUS
Status: COMPLETED | OUTPATIENT
Start: 2018-12-08 | End: 2018-12-08

## 2018-12-08 RX ORDER — HYDROMORPHONE HYDROCHLORIDE 2 MG/ML
0.5 INJECTION, SOLUTION INTRAMUSCULAR; INTRAVENOUS; SUBCUTANEOUS
Status: DISCONTINUED | OUTPATIENT
Start: 2018-12-08 | End: 2018-12-08

## 2018-12-08 RX ORDER — FENTANYL CITRATE 50 UG/ML
INJECTION, SOLUTION INTRAMUSCULAR; INTRAVENOUS AS NEEDED
Status: DISCONTINUED | OUTPATIENT
Start: 2018-12-08 | End: 2018-12-08 | Stop reason: HOSPADM

## 2018-12-08 RX ORDER — BUPIVACAINE HYDROCHLORIDE 5 MG/ML
INJECTION, SOLUTION EPIDURAL; INTRACAUDAL AS NEEDED
Status: DISCONTINUED | OUTPATIENT
Start: 2018-12-08 | End: 2018-12-08 | Stop reason: HOSPADM

## 2018-12-08 RX ORDER — METFORMIN HYDROCHLORIDE 500 MG/1
1000 TABLET ORAL 2 TIMES DAILY WITH MEALS
Status: DISCONTINUED | OUTPATIENT
Start: 2018-12-08 | End: 2018-12-09 | Stop reason: HOSPADM

## 2018-12-08 RX ORDER — DIPHENHYDRAMINE HCL 25 MG
25 CAPSULE ORAL
Status: DISCONTINUED | OUTPATIENT
Start: 2018-12-08 | End: 2018-12-09 | Stop reason: HOSPADM

## 2018-12-08 RX ORDER — ONDANSETRON 2 MG/ML
INJECTION INTRAMUSCULAR; INTRAVENOUS AS NEEDED
Status: DISCONTINUED | OUTPATIENT
Start: 2018-12-08 | End: 2018-12-08 | Stop reason: HOSPADM

## 2018-12-08 RX ORDER — SODIUM CHLORIDE, SODIUM LACTATE, POTASSIUM CHLORIDE, CALCIUM CHLORIDE 600; 310; 30; 20 MG/100ML; MG/100ML; MG/100ML; MG/100ML
75 INJECTION, SOLUTION INTRAVENOUS CONTINUOUS
Status: DISCONTINUED | OUTPATIENT
Start: 2018-12-08 | End: 2018-12-08 | Stop reason: HOSPADM

## 2018-12-08 RX ORDER — HYDROCODONE BITARTRATE AND ACETAMINOPHEN 5; 325 MG/1; MG/1
2 TABLET ORAL AS NEEDED
Status: DISCONTINUED | OUTPATIENT
Start: 2018-12-08 | End: 2018-12-08 | Stop reason: HOSPADM

## 2018-12-08 RX ORDER — OXYCODONE AND ACETAMINOPHEN 5; 325 MG/1; MG/1
1-2 TABLET ORAL
Qty: 20 TAB | Refills: 0 | Status: SHIPPED | OUTPATIENT
Start: 2018-12-08 | End: 2018-12-30

## 2018-12-08 RX ORDER — MORPHINE SULFATE 10 MG/ML
2 INJECTION, SOLUTION INTRAMUSCULAR; INTRAVENOUS AS NEEDED
Status: DISCONTINUED | OUTPATIENT
Start: 2018-12-08 | End: 2018-12-09 | Stop reason: HOSPADM

## 2018-12-08 RX ORDER — CEFAZOLIN SODIUM/WATER 2 G/20 ML
2 SYRINGE (ML) INTRAVENOUS ONCE
Status: COMPLETED | OUTPATIENT
Start: 2018-12-08 | End: 2018-12-08

## 2018-12-08 RX ORDER — OXYCODONE HYDROCHLORIDE 5 MG/1
5 TABLET ORAL
Status: DISCONTINUED | OUTPATIENT
Start: 2018-12-08 | End: 2018-12-08 | Stop reason: HOSPADM

## 2018-12-08 RX ORDER — NEOSTIGMINE METHYLSULFATE 1 MG/ML
INJECTION INTRAVENOUS AS NEEDED
Status: DISCONTINUED | OUTPATIENT
Start: 2018-12-08 | End: 2018-12-08 | Stop reason: HOSPADM

## 2018-12-08 RX ORDER — LIDOCAINE HYDROCHLORIDE 20 MG/ML
INJECTION, SOLUTION EPIDURAL; INFILTRATION; INTRACAUDAL; PERINEURAL AS NEEDED
Status: DISCONTINUED | OUTPATIENT
Start: 2018-12-08 | End: 2018-12-08 | Stop reason: HOSPADM

## 2018-12-08 RX ORDER — MIDAZOLAM HYDROCHLORIDE 1 MG/ML
5 INJECTION, SOLUTION INTRAMUSCULAR; INTRAVENOUS ONCE
Status: DISCONTINUED | OUTPATIENT
Start: 2018-12-08 | End: 2018-12-08 | Stop reason: HOSPADM

## 2018-12-08 RX ORDER — PHENAZOPYRIDINE HYDROCHLORIDE 200 MG/1
200 TABLET, FILM COATED ORAL
Qty: 30 TAB | Refills: 3 | Status: SHIPPED | OUTPATIENT
Start: 2018-12-08 | End: 2018-12-11

## 2018-12-08 RX ORDER — SUCCINYLCHOLINE CHLORIDE 20 MG/ML
INJECTION INTRAMUSCULAR; INTRAVENOUS AS NEEDED
Status: DISCONTINUED | OUTPATIENT
Start: 2018-12-08 | End: 2018-12-08 | Stop reason: HOSPADM

## 2018-12-08 RX ORDER — PROPOFOL 10 MG/ML
INJECTION, EMULSION INTRAVENOUS AS NEEDED
Status: DISCONTINUED | OUTPATIENT
Start: 2018-12-08 | End: 2018-12-08 | Stop reason: HOSPADM

## 2018-12-08 RX ORDER — FENTANYL CITRATE 50 UG/ML
100 INJECTION, SOLUTION INTRAMUSCULAR; INTRAVENOUS ONCE
Status: DISCONTINUED | OUTPATIENT
Start: 2018-12-08 | End: 2018-12-08 | Stop reason: HOSPADM

## 2018-12-08 RX ORDER — ROCURONIUM BROMIDE 10 MG/ML
INJECTION, SOLUTION INTRAVENOUS AS NEEDED
Status: DISCONTINUED | OUTPATIENT
Start: 2018-12-08 | End: 2018-12-08 | Stop reason: HOSPADM

## 2018-12-08 RX ORDER — LIDOCAINE HYDROCHLORIDE 10 MG/ML
0.1 INJECTION INFILTRATION; PERINEURAL AS NEEDED
Status: DISCONTINUED | OUTPATIENT
Start: 2018-12-08 | End: 2018-12-08 | Stop reason: HOSPADM

## 2018-12-08 RX ADMIN — Medication 10 ML: at 23:00

## 2018-12-08 RX ADMIN — ROCURONIUM BROMIDE 30 MG: 10 INJECTION, SOLUTION INTRAVENOUS at 09:38

## 2018-12-08 RX ADMIN — HYDROCODONE BITARTRATE AND ACETAMINOPHEN 1 TABLET: 5; 325 TABLET ORAL at 19:56

## 2018-12-08 RX ADMIN — LIDOCAINE HYDROCHLORIDE 100 MG: 20 INJECTION, SOLUTION EPIDURAL; INFILTRATION; INTRACAUDAL; PERINEURAL at 09:28

## 2018-12-08 RX ADMIN — FENTANYL CITRATE 50 MCG: 50 INJECTION, SOLUTION INTRAMUSCULAR; INTRAVENOUS at 09:28

## 2018-12-08 RX ADMIN — SODIUM CHLORIDE, SODIUM LACTATE, POTASSIUM CHLORIDE, AND CALCIUM CHLORIDE 75 ML/HR: 600; 310; 30; 20 INJECTION, SOLUTION INTRAVENOUS at 07:34

## 2018-12-08 RX ADMIN — SUCCINYLCHOLINE CHLORIDE 200 MG: 20 INJECTION INTRAMUSCULAR; INTRAVENOUS at 09:28

## 2018-12-08 RX ADMIN — DIPHENHYDRAMINE HYDROCHLORIDE 25 MG: 25 CAPSULE ORAL at 13:58

## 2018-12-08 RX ADMIN — HYDROCODONE BITARTRATE AND ACETAMINOPHEN 1 TABLET: 5; 325 TABLET ORAL at 12:23

## 2018-12-08 RX ADMIN — PROPOFOL 200 MG: 10 INJECTION, EMULSION INTRAVENOUS at 09:28

## 2018-12-08 RX ADMIN — Medication 10 ML: at 05:08

## 2018-12-08 RX ADMIN — DEXAMETHASONE SODIUM PHOSPHATE 10 MG: 4 INJECTION, SOLUTION INTRA-ARTICULAR; INTRALESIONAL; INTRAMUSCULAR; INTRAVENOUS; SOFT TISSUE at 09:34

## 2018-12-08 RX ADMIN — INFLUENZA VIRUS VACCINE 0.5 ML: 15; 15; 15; 15 SUSPENSION INTRAMUSCULAR at 12:41

## 2018-12-08 RX ADMIN — DIPHENHYDRAMINE HYDROCHLORIDE 25 MG: 25 CAPSULE ORAL at 04:58

## 2018-12-08 RX ADMIN — METFORMIN HYDROCHLORIDE 1000 MG: 500 TABLET ORAL at 22:57

## 2018-12-08 RX ADMIN — DIPHENHYDRAMINE HYDROCHLORIDE 25 MG: 25 CAPSULE ORAL at 19:56

## 2018-12-08 RX ADMIN — ONDANSETRON 4 MG: 2 INJECTION INTRAMUSCULAR; INTRAVENOUS at 10:01

## 2018-12-08 RX ADMIN — Medication 2 G: at 09:31

## 2018-12-08 RX ADMIN — MIDAZOLAM HYDROCHLORIDE 2 MG: 2 INJECTION, SOLUTION INTRAMUSCULAR; INTRAVENOUS at 09:02

## 2018-12-08 RX ADMIN — GLYCOPYRROLATE 0.2 MG: 0.2 INJECTION INTRAMUSCULAR; INTRAVENOUS at 10:16

## 2018-12-08 RX ADMIN — NEOSTIGMINE METHYLSULFATE 3 MG: 1 INJECTION INTRAVENOUS at 10:16

## 2018-12-08 RX ADMIN — MORPHINE SULFATE 2 MG: 10 INJECTION INTRAVENOUS at 13:58

## 2018-12-08 RX ADMIN — SODIUM CHLORIDE, SODIUM LACTATE, POTASSIUM CHLORIDE, AND CALCIUM CHLORIDE: 600; 310; 30; 20 INJECTION, SOLUTION INTRAVENOUS at 10:20

## 2018-12-08 NOTE — PROGRESS NOTES
[address] December 8, 2018 RE: Julia Latham To Whom It May Concern, This is to certify that Julia Latham may return to work on after follow up with Urologist this coming up week. Patient was hospitalized and had surgery from 12/7/18 through 12/8/18. Please feel free to contact my office if you have any questions or concerns. Thank you for your assistance in this matter.  
 
 
Sincerely, 
Dr Kellen SNOW

## 2018-12-08 NOTE — PERIOP NOTES
TRANSFER - IN REPORT: 
 
Verbal report received from [facility], [address] on [address]  being received from 6th floor,  for ordered procedure Report consisted of patients Situation, Background, Assessment and  
Recommendations(SBAR). Information from the following report(s) SBAR, Kardex, [facility], Recent Results and Procedure Verification was reviewed with the receiving nurse. Opportunity for questions and clarification was provided. Assessment completed upon patients arrival to unit and care assumed.

## 2018-12-08 NOTE — PERIOP NOTES
TRANSFER - OUT REPORT: 
 
Verbal report given to Silvia rn  on [place]  being transferred to [facility]-[id number]) for routine post - op Report consisted of patients Situation, Background, Assessment and  
Recommendations(SBAR). Information from the following report(s) OR Summary, Procedure Summary and MAR was reviewed with the receiving nurse. Lines:  
Peripheral IV 12/07/18 Left Forearm (Active) Site Assessment Clean, dry, & intact 12/8/2018 11:19 AM  
Phlebitis Assessment 0 12/8/2018 11:19 AM  
Infiltration Assessment 0 12/8/2018 11:19 AM  
Dressing Status Clean, dry, & intact 12/8/2018 11:19 AM  
Dressing Type Transparent 12/8/2018 11:19 AM  
Hub Color/Line Status Pink; Infusing 12/8/2018 11:19 AM  
  
 
Opportunity for questions and clarification was provided. Patient transported with: 
 O2 @ 0 liters

## 2018-12-08 NOTE — PROGRESS NOTES
Patient resting in bed, visitors at bedside. EKG negative. Patient still reports SOB. O2% >92 on 2L nasal cannula.

## 2018-12-08 NOTE — PROGRESS NOTES
Pt c/o pain 7/10, IV dilaudid 1 mg administered per MAR, pt appeared to have allergic reaction, she stated she felt hot, she became hypotensive, O2 SATs dropped in the 80s, tongue and lips swelled. Urology was called, orders for benadryl STAT and ABG received. Benadryl administered and VS returned to normal, ABG results were normal. Pt is currently resting quietly with son at bedside.

## 2018-12-08 NOTE — PROGRESS NOTES
Patient c/o SOB.  <90% on room air. Placed patient on 2L nc oxygen. O2 saturation 93%. MD paged. EKG ordered. Discharge cancelled at this time.

## 2018-12-08 NOTE — ANESTHESIA POSTPROCEDURE EVALUATION
Procedure(s): 
CYSTOSCOPY/LEFT RETROGRADE/ LEFT URETEROSCOPY/ LEFT LASER LITHO/ LEFT URETERAL STENT INSERTION. Anesthesia Post Evaluation Multimodal analgesia: multimodal analgesia not used between 6 hours prior to anesthesia start to PACU discharge Patient location during evaluation: bedside Patient participation: complete - patient participated Level of consciousness: awake and alert Pain score: 3 Pain management: adequate Airway patency: patent Anesthetic complications: no 
Cardiovascular status: acceptable and hemodynamically stable Respiratory status: acceptable Hydration status: acceptable Visit Vitals /74 Pulse 77 Temp 36.8 °C (98.3 °F) Resp 17 SpO2 100%

## 2018-12-08 NOTE — PROGRESS NOTES
Discharge instructions and prescriptions given and reviewed with pt, verbalizes understanding, pt discharged home with family. Patient to be discharged when tolerated something to eat and pain controlled.

## 2018-12-08 NOTE — BRIEF OP NOTE
BRIEF OPERATIVE NOTE Date of Procedure: 12/8/2018 Preoperative Diagnosis: left ureteral stone Postoperative Diagnosis: left ureteral stone Procedure(s): 
CYSTOSCOPY/LEFT RETROGRADE/ LEFT URETEROSCOPY/ LEFT LASER LITHO/ LEFT URETERAL STENT INSERTION Surgeon(s) and Role: Tray Santiago MD - Primary Surgical Assistant: none Surgical Staff: 
Circ-1: Monae Gutierrez RN Event Time In Time Out Incision Start 12/08/2018 2340 Incision Close 12/08/2018 1014 Anesthesia: General  
Estimated Blood Loss: none Specimens: * No specimens in log * Findings: see op note Complications: none Implants: * No implants in log *

## 2018-12-08 NOTE — H&P
Patient: Christian Correa MRN: [id number]  SSN: [id number] YOB: 1975  Age: 39 y.o. Sex: female History and Physical 
 
Christian Correa is a 39 y.o. female having Procedure(s): 
CYSTOSCOPY LEFT URETEROSCOPY LASER LITHO LEFT URETERAL STENT INSERTION/ 628. Allergies: Allergies Allergen Reactions  Dilaudid [Hydromorphone] Anaphylaxis Swollen tongue, lips, hypotension, low O2 SATS  Lisinopril Anaphylaxis  Ace Inhibitors Angioedema  Bactrim [Sulfamethoxazole-Trimethoprim] Hives  Flomax [Tamsulosin] Swelling  Norvasc [Amlodipine] Other (comments) Pt denies any allergic reaction to Norvasc  Sulfa (Sulfonamide Antibiotics) Hives Chief Complaint: *** History of Present Illness: *** Past Medical History:  
Diagnosis Date  Diabetes ([facility] [address] does not check sugar-regularly  Hypercholesterolemia   
 controlled well with meds  Hypertension   
 medication controlled  Kidney stone Past Surgical History:  
Procedure Laterality Date  FRAGMENT KIDNEY STONE/ ESWL    
 HX DILATION AND CURETTAGE    
 HX ENDOSCOPY    
 HX LAP CHOLECYSTECTOMY  2017  HX LITHOTRIPSY    
  x 4  
 HX ORTHOPAEDIC Left   
 hand- tendon repair  HX OTHER SURGICAL Bilateral   
 sweat gland removal under both arms  HX TUBAL LIGATION  2005 Family History Problem Relation Age of Onset  Heart Disease Maternal Grandmother  Hypertension Mother [facility] Other Mother Mark Tapia disease  Hypertension Father  Other Father on blood thinners for blood clots  Hypertension Brother  Colon Cancer Other  No Known Problems Brother  Breast Cancer Neg Hx   
 Ovarian Cancer Neg Hx  Uterine Cancer Neg Hx Social History Tobacco Use  Smoking status: Former Smoker Packs/day: 1.00 Years: 0.50 Pack years: 0.50 Last attempt to quit: 2009 Years since quittin.9  Smokeless tobacco: Never Used Substance Use Topics  Alcohol use: No  
  
 
Prior to Admission medications Medication Sig Start Date End Date Taking? Authorizing Provider  
promethazine (PHENERGAN) 25 mg tablet Take 1 Tab by mouth every six (6) hours as needed for Nausea. 12/7/18   Dustin Mancilla MD  
HYDROcodone-acetaminophen [facility])  mg tablet Take 1 Tab by mouth every six (6) hours as needed for Pain. Max Daily Amount: 4 Tabs. 12/5/18   Dulce Lainez III, MD  
ondansetron (ZOFRAN ODT) 4 mg disintegrating tablet Take 1 Tab by mouth every eight (8) hours as needed for Nausea. 12/5/18   Dulce Lainez III, MD  
hydroCHLOROthiazide (HYDRODIURIL) 25 mg tablet Take 1 Tab by mouth daily. 11/7/18   Rozina Hogan NP  
pravastatin (PRAVACHOL) 40 mg tablet Take 1 Tab by mouth nightly for 30 days. 10/29/18 11/28/18  Rozina Hogan NP  
gabapentin (NEURONTIN) 100 mg capsule Take 100 mg by mouth two (2) times a day. Provider, Historical  
multivitamin (ONE A DAY) tablet Take 1 Tab by mouth daily. Provider, Historical  
betamethasone valerate (VALISONE) 0.1 % topical cream Apply  to affected area two (2) times a day. 8/9/18   Javier Argueta MD  
metFORMIN (GLUCOPHAGE) 1,000 mg tablet Take 1 Tab by mouth two (2) times daily (with meals). Indications: type 2 diabetes mellitus 4/16/18   Winnie Galaviz NP  
ibuprofen (MOTRIN) 800 mg tablet Take 1 Tab by mouth every six (6) hours as needed for Pain. 4/16/18   Winnie Galaviz NP Visit Vitals /75 (BP 1 Location: Right arm, BP Patient Position: Supine) Pulse 78 Temp 37.6 °C (99.6 °F) Resp 18 SpO2 95% Assessment and Plan:  
Lay Anderson is a 39 y.o. female having Procedure(s): 
CYSTOSCOPY LEFT URETEROSCOPY LASER LITHO LEFT URETERAL STENT [facility]/ 099 for ***. Preanesthesia Evaluation Last edited 12/07/18 1940 by Brenda Johnson MD 
Date of Service 12/07/18 7353 Anesthetic History No history of anesthetic complications Review of Systems / Medical History Patient summary reviewed and pertinent labs reviewed Pulmonary Within defined limits Neuro/Psych Within defined limits Cardiovascular Hypertension: well controlled Hyperlipidemia Exercise tolerance: <4 METS 
  
GI/Hepatic/Renal 
  
GERD: well controlled Renal disease: stones Endo/Other Diabetes Morbid obesity Other Findings Physical Exam 
 
Airway Mallampati: II 
TM Distance: > 6 cm Neck ROM: normal range of motion Mouth opening: Normal 
 
 Cardiovascular Rhythm: regular Pertinent negatives: No murmur Dental 
No notable dental hx Pulmonary Breath sounds clear to auscultation Abdominal 
GI exam deferred Other Findings Anesthetic Plan ASA: 3 Anesthesia type: general - femoral single shot and popliteal fossa block Induction: Intravenous Anesthetic plan and risks discussed with: Patient Preanesthesia evaluation performed by Viktoria Doherty MD  
 Revision History Date/Time User Provider Type Action  
 > 12/07/18 1940 Sedlak, Linward Lennox, MD Physician Addend 12/07/18 1829 Viktoria Doherty MD Physician Sign Signed By: Chan Herring CRNA December 8, 2018

## 2018-12-08 NOTE — OP NOTES
[facility] REPORT    Moises Cruz  MR#: [id number]  : 1975  ACCOUNT #: [de-identified]   DATE OF SERVICE: 2018    PREOPERATIVE DIAGNOSIS:  Left ureteral calculus. POSTOPERATIVE DIAGNOSIS:  Left ureteral calculus. PROCEDURES PERFORMED:  Cystoscopy, left retrograde pyelogram, left ureteroscopy with holmium laser lithotripsy of ureteral stone and placement of left double-J ureteral stent. SURGEON:  Lianet Monterroso MD    FINDINGS:  A 5 mm stone in the left distal ureter. DESCRIPTION OF PROCEDURE:  The patient was given a general anesthetic, placed in the dorsal lithotomy position. She was prepped and draped in a sterile fashion. Fluoroscopy was performed with magnification. I did not see any calcifications in the pelvis, which would be consistent with stones. A 25-Turkish cystoscope was advanced into the urethra using the video camera and 30-degree lens. The bladder mucosa is normal.  Both ureteral orifices appeared normal.  A cone tipped catheter was used to perform a left retrograde pyelogram.    INTERPRETATION OF PYELOGRAM:  Contrast was injected through the catheter with the catheter occluding the left ureteral orifice. There is narrowing of the left distal ureter, about 5 cm above the ureterovesical junction. A 0.038 floppy tipped guidewire was passed up the left ureter into the left proximal ureter. I passed a 6.5 Turkish semirigid ureteroscope alongside the wire. The stone was encountered in the left distal ureter, about 4-5 cm above the bladder. It is somewhat impacted with surrounding mucosal edema. The stone measures about 5 mm in diameter. A 365 Micron holmium laser fiber was used at a setting of 0.5 joules and 5 Hz. The stone was gradually fragmented until all the pieces were 1-2 mm in diameter. No evidence of any ureteral trauma. The proximal ureter was somewhat dilated.     Wire was backloaded into the cystoscope and a 7-Swiss 24 cm long double-J stent was passed successfully and left in good position. Suture was left attached to the distal end of the stent. Marcaine was instilled in the bladder. Scope was removed. PLAN:  She will be discharged home. Return to the office in 3-5 days for stent removal.    ANESTHESIA:  General.    ASSISTANT:  None. SPECIMENS REMOVED:  None. IMPLANTS:  Left ureteral stent. ESTIMATED BLOOD LOSS:  None. COMPLICATIONS:  None.       MD Stephani Mccauley / [place]  D: 12/08/2018 10:30     T: 12/08/2018 13:49  JOB #: [id number]

## 2018-12-08 NOTE — DISCHARGE INSTRUCTIONS
Call for appt with NP in 3-5 days, KUB and stent removal, has stent with string. Ureteral Stent Placement: What to Expect at [facility]  A ureteral (say \"you-REE-ter-ul\") stent is a thin, hollow tube that is placed in the ureter to help urine pass from the kidney into the bladder. Ureters are the tubes that connect the kidneys to the bladder. You may have a small amount of blood in your urine for 1 to 3 days after the procedure. While the stent is in place, you may have to urinate more often, feel a sudden need to urinate, or feel like you cannot completely empty your bladder. You may feel some pain when you urinate or do strenuous activity. You also may notice a small amount of blood in your urine after strenuous activities. These side effects usually do not prevent people from doing their normal daily activities. You may have a string attached to your stent. Do not to pull the string unless the doctor tells you to. The doctor will use the string to pull out the stent when you no longer need it. After the procedure, urine may flow better from your kidneys to your bladder. A ureteral stent may be left in place for several days or for as long as several months. Your doctor will take it out when you no longer need it. Cystoscopy: What to Expect at [facility]  A cystoscopy is a procedure that lets a doctor look inside of the bladder and the urethra. The urethra is the tube that carries urine from the bladder to outside the body. The doctor uses a thin, lighted tube called a cystoscope to look for kidney or bladder stones, tumors, bleeding, or infection. After the cystoscopy, your urethra may be sore at first, and it may burn when you urinate for the first few days after the procedure. You may feel the need to urinate more often, and your urine may be pink. These symptoms should get better in 1 or 2 days.  You will probably be able to go back to work or most of your usual activities in 1 or 2 days. How can you care for yourself at home? Activity  1. Rest when you feel tired. Getting enough sleep will help you recover. 2. Try to walk each day. Start by walking a little more than you did the day before. Bit by bit, increase the amount you walk. Walking boosts blood flow and helps prevent pneumonia and constipation. 3. Avoid strenuous activities, such as bicycle riding, jogging, weight lifting, or aerobic exercise, until your doctor says it is okay. 4. Ask your doctor when you can drive again. 5. Most people are able to return to work within 1 or 2 days after the procedure. 6. You may shower and take baths as usual.  7. Ask your doctor when it is okay for you to have sex. Diet  · You can eat your normal diet. If your stomach is upset, try bland, low-fat foods like plain rice, broiled chicken, toast, and yogurt. · Drink plenty of fluids (unless your doctor tells you not to). Medicines  · Take pain medicines exactly as directed. ¨ If the doctor gave you a prescription medicine for pain, take it as prescribed. ¨ If you are not taking a prescription pain medicine, ask your doctor if you can take an over-the-counter medicine. · If you think your pain medicine is making you sick to your stomach:  ¨ Take your medicine after meals (unless your doctor has told you not to). ¨ Ask your doctor for a different pain medicine. · If your doctor prescribed antibiotics, take them as directed. Do not stop taking them just because you feel better. You need to take the full course of antibiotics. Follow-up care is a key part of your treatment and safety. Be sure to make and go to all appointments, and call your doctor if you are having problems. It's also a good idea to know your test results and keep a list of the medicines you take. When should you call for help? Call 911 anytime you think you may need emergency care. For example, call if:  · You passed out (lost consciousness).   · You have severe trouble breathing. · You have sudden chest pain and shortness of breath, or you cough up blood. · You have severe belly pain. Call your doctor now or seek immediate medical care if:  · You are sick to your stomach or cannot keep fluids down. · Your urine is still red or you see blood clots after you have urinated several times. · You have trouble passing urine or stool, especially if you have pain or swelling in your lower belly. · You have signs of a blood clot, such as:  ¨ Pain in your calf, back of the knee, thigh, or groin. ¨ Redness and swelling in your leg or groin. · You develop a fever or severe chills. · You have pain in your back just below your rib cage. This is called flank pain. Watch closely for changes in your health, and be sure to contact your doctor if:  · A burning feeling is normal for a day or two after the test, but call if it does not get better. · You have a frequent urge to urinate but can pass only small amounts of urine. · It is normal for the urine to have a pinkish color for a few days after the test, but call if it does not get better or if your urine is cloudy, smells bad, or has pus in it. After general anesthesia or intravenous sedation, for 24 hours or while taking prescription Narcotics:  · Limit your activities  · Do not drive and operate hazardous machinery  · Do not make important personal or business decisions  · Do  not drink alcoholic beverages  · If you have not urinated within 8 hours after discharge, please contact your surgeon on call. *  Please give a list of your current medications to your Primary Care Provider. *  Please update this list whenever your medications are discontinued, doses are      changed, or new medications (including over-the-counter products) are added. *  Please carry medication information at all times in case of emergency situations.       These are general instructions for a healthy lifestyle:  No smoking/ No tobacco products/ Avoid exposure to second hand smoke  Surgeon General's Warning:  Quitting smoking now greatly reduces serious risk to your health. Obesity, smoking, and sedentary lifestyle greatly increases your risk for illness  A healthy diet, regular physical exercise & weight monitoring are important for maintaining a healthy lifestyle    You may be retaining fluid if you have a history of heart failure or if you experience any of the following symptoms:  Weight gain of 3 pounds or more overnight or 5 pounds in a week, increased swelling in our hands or feet or shortness of breath while lying flat in bed. Please call your doctor as soon as you notice any of these symptoms; do not wait until your next office visit. Recognize signs and symptoms of STROKE:    F-face looks uneven  A-arms unable to move or move unevenly  S-speech slurred or non-existent  T-time-call 911 as soon as signs and symptoms begin-DO NOT go       Back to bed or wait to see if you get better-TIME IS BRAIN. ACTIVITY  · As tolerated and as directed by your doctor. · You may shower in 24 hours. Do not take a bath until cleared by MD.     DIET  · Clear liquids until no nausea or vomiting; then light diet for the first day. · Advance to regular diet on second day, unless your doctor orders otherwise. · If nausea and vomiting continues, call your doctor. PAIN  · Take pain medication as directed by your doctor. · Call your doctor if pain is NOT relieved by medication. CALL YOUR DOCTOR IF   · Excessive bleeding that does not stop after holding pressure over the area  · Temperature of 101 degrees F or above  · Excessive redness, swelling or bruising, and/ or green or yellow, smelly discharge from incision    AFTER ANESTHESIA   · For the first 24 hours: DO NOT Drive, Drink alcoholic beverages, or Make important decisions. · Be aware of dizziness following anesthesia and while taking pain medication.    · Limit your activities  · Do not operate hazardous machinery  · If you have not urinated within 8 hours after discharge, please contact your surgeon on call. *  Please give a list of your current medications to your Primary Care Provider. *  Please update this list whenever your medications are discontinued, doses are      changed, or new medications (including over-the-counter products) are added. *  Please carry medication information at all times in case of emergency situations. These are general instructions for a healthy lifestyle:  No smoking/ No tobacco products/ Avoid exposure to second hand smoke  Surgeon General's Warning:  Quitting smoking now greatly reduces serious risk to your health. Obesity, smoking, and sedentary lifestyle greatly increases your risk for illness  A healthy diet, regular physical exercise & weight monitoring are important for maintaining a healthy lifestyle    You may be retaining fluid if you have a history of heart failure or if you experience any of the following symptoms:  Weight gain of 3 pounds or more overnight or 5 pounds in a week, increased swelling in our hands or feet or shortness of breath while lying flat in bed. Please call your doctor as soon as you notice any of these symptoms; do not wait until your next office visit. Recognize signs and symptoms of STROKE:    F-face looks uneven  A-arms unable to move or move unevenly  S-speech slurred or non-existent  T-time-call 911 as soon as signs and symptoms begin-DO NOT go       Back to bed or wait to see if you get better-TIME IS BRAIN. ACTIVITY  · As tolerated and as directed by your doctor. · You may shower in 24 hours. Do not take a bath until cleared by MD.     DIET  · Clear liquids until no nausea or vomiting; then light diet for the first day. · Advance to regular diet on second day, unless your doctor orders otherwise. · If nausea and vomiting continues, call your doctor. PAIN  · Take pain medication as directed by your doctor.    · Call your doctor if pain is NOT relieved by medication. CALL YOUR DOCTOR IF   · Excessive bleeding that does not stop after holding pressure over the area  · Temperature of 101 degrees F or above  · Excessive redness, swelling or bruising, and/ or green or yellow, smelly discharge from incision    AFTER ANESTHESIA   · For the first 24 hours: DO NOT Drive, Drink alcoholic beverages, or Make important decisions. · Be aware of dizziness following anesthesia and while taking pain medication. · Limit your activities  · Do not operate hazardous machinery  · If you have not urinated within 8 hours after discharge, please contact your surgeon on call. *  Please give a list of your current medications to your Primary Care Provider. *  Please update this list whenever your medications are discontinued, doses are      changed, or new medications (including over-the-counter products) are added. *  Please carry medication information at all times in case of emergency situations. These are general instructions for a healthy lifestyle:  No smoking/ No tobacco products/ Avoid exposure to second hand smoke  Surgeon General's Warning:  Quitting smoking now greatly reduces serious risk to your health. Obesity, smoking, and sedentary lifestyle greatly increases your risk for illness  A healthy diet, regular physical exercise & weight monitoring are important for maintaining a healthy lifestyle    You may be retaining fluid if you have a history of heart failure or if you experience any of the following symptoms:  Weight gain of 3 pounds or more overnight or 5 pounds in a week, increased swelling in our hands or feet or shortness of breath while lying flat in bed. Please call your doctor as soon as you notice any of these symptoms; do not wait until your next office visit.     Recognize signs and symptoms of STROKE:    F-face looks uneven  A-arms unable to move or move unevenly  S-speech slurred or non-existent  T-time-call 911 as soon as signs and symptoms begin-DO NOT go       Back to bed or wait to see if you get better-TIME IS BRAIN.

## 2018-12-09 VITALS
OXYGEN SATURATION: 96 % | DIASTOLIC BLOOD PRESSURE: 91 MMHG | SYSTOLIC BLOOD PRESSURE: 152 MMHG | HEART RATE: 71 BPM | TEMPERATURE: 98.6 F | RESPIRATION RATE: 18 BRPM

## 2018-12-09 LAB
GLUCOSE BLD STRIP.AUTO-MCNC: 106 MG/DL (ref 65–100)
GLUCOSE BLD STRIP.AUTO-MCNC: 114 MG/DL (ref 65–100)

## 2018-12-09 PROCEDURE — 82962 GLUCOSE BLOOD TEST: CPT

## 2018-12-09 PROCEDURE — 74011250637 HC RX REV CODE- 250/637: Performed by: UROLOGY

## 2018-12-09 PROCEDURE — 99218 HC RM OBSERVATION: CPT

## 2018-12-09 PROCEDURE — 74011250637 HC RX REV CODE- 250/637: Performed by: NURSE PRACTITIONER

## 2018-12-09 RX ORDER — HYDROCHLOROTHIAZIDE 25 MG/1
25 TABLET ORAL DAILY
Status: DISCONTINUED | OUTPATIENT
Start: 2018-12-09 | End: 2018-12-09 | Stop reason: HOSPADM

## 2018-12-09 RX ORDER — PRAVASTATIN SODIUM 20 MG/1
40 TABLET ORAL
Status: DISCONTINUED | OUTPATIENT
Start: 2018-12-09 | End: 2018-12-09 | Stop reason: HOSPADM

## 2018-12-09 RX ADMIN — HYDROCODONE BITARTRATE AND ACETAMINOPHEN 1 TABLET: 5; 325 TABLET ORAL at 11:24

## 2018-12-09 RX ADMIN — DIPHENHYDRAMINE HYDROCHLORIDE 25 MG: 25 CAPSULE ORAL at 07:42

## 2018-12-09 RX ADMIN — HYDROCHLOROTHIAZIDE 25 MG: 25 TABLET ORAL at 08:38

## 2018-12-09 RX ADMIN — ACETAMINOPHEN 650 MG: 325 TABLET, FILM COATED ORAL at 08:40

## 2018-12-09 RX ADMIN — HYDROCODONE BITARTRATE AND ACETAMINOPHEN 1 TABLET: 5; 325 TABLET ORAL at 07:42

## 2018-12-09 RX ADMIN — METFORMIN HYDROCHLORIDE 1000 MG: 500 TABLET ORAL at 07:42

## 2018-12-09 RX ADMIN — Medication 10 ML: at 05:09

## 2018-12-09 NOTE — PROGRESS NOTES
Discharge paperwork received yesterday. No new updates to discharge instructions. PIV removed. Patient discharged to home with .

## 2018-12-09 NOTE — PROGRESS NOTES
Subjective:  
Daily Progress Note: 2018 12:10 PM 
No Complaints Objective:  
 
Visit Vitals BP (!) 152/91 (BP 1 Location: Right arm, BP Patient Position: At rest) Pulse 71 Temp 98.6 °F (37 °C) Resp 18 SpO2 96% O2 Flow Rate (L/min): 2 l/min O2 Device: Nasal cannula Temp (24hrs), Av.2 °F (36.8 °C), Min:97.6 °F (36.4 °C), Max:98.8 °F (37.1 °C) 
 
 
1901 -  0700 In: 1000 [I.V.:1000] Out: 0 No intake/output data recorded. [unfilled] [unfilled] [unfilled] Exam:  
 
 
Data Review Recent Results (from the past 24 hour(s)) EKG, 12 LEAD, INITIAL Collection Time: 18  2:11 PM  
Result Value Ref Range Ventricular Rate 87 BPM  
 Atrial Rate 87 BPM  
 P-R Interval 168 ms QRS Duration 84 ms Q-T Interval 374 ms QTC Calculation (Bezet) 450 ms Calculated P Axis 46 degrees Calculated R Axis 8 degrees Calculated T Axis 39 degrees Diagnosis Normal sinus rhythm Normal ECG When compared with ECG of 04-OCT-2016 21:48, No significant change was found Confirmed by Diya Valiente MD (), MARLENY NICHOLAS ([id number]) on 2018 3:30:58 PM 
  
GLUCOSE, POC Collection Time: 18  4:01 PM  
Result Value Ref Range Glucose (POC) 157 (H) 65 - 100 mg/dL GLUCOSE, POC Collection Time: 18  8:55 PM  
Result Value Ref Range Glucose (POC) 184 (H) 65 - 100 mg/dL GLUCOSE, POC Collection Time: 18  7:43 AM  
Result Value Ref Range Glucose (POC) 114 (H) 65 - 100 mg/dL GLUCOSE, POC Collection Time: 18 11:26 AM  
Result Value Ref Range Glucose (POC) 106 (H) 65 - 100 mg/dL Assessment Active Problems: 
  Ureteral stone (2018) Plan:  Had some chest pain yesterday, normal EKG. Also O23 sat < 90%. Currently no chest pain, O23 sat 96% on RA. OK for discharge, call for appt in 3-5 days to remove stent.

## 2018-12-11 ENCOUNTER — HOSPITAL ENCOUNTER (EMERGENCY)
Age: 43
Discharge: HOME | End: 2018-12-11
Attending: EMERGENCY MEDICINE
Payer: COMMERCIAL

## 2018-12-11 VITALS
SYSTOLIC BLOOD PRESSURE: 112 MMHG | HEART RATE: 85 BPM | DIASTOLIC BLOOD PRESSURE: 55 MMHG | WEIGHT: 253 LBS | RESPIRATION RATE: 20 BRPM | BODY MASS INDEX: 43.19 KG/M2 | HEIGHT: 64 IN | OXYGEN SATURATION: 98 % | TEMPERATURE: 98.2 F

## 2018-12-11 DIAGNOSIS — R10.9 ACUTE LEFT FLANK PAIN: Primary | ICD-10-CM

## 2018-12-11 LAB
ALBUMIN SERPL-MCNC: 3.2 G/DL (ref 3.5–5)
ALBUMIN/GLOB SERPL: 0.6 {RATIO}
ALP SERPL-CCNC: 87 U/L (ref 50–130)
ALT SERPL-CCNC: 11 U/L (ref 12–65)
ANION GAP SERPL CALC-SCNC: 11 MMOL/L
AST SERPL-CCNC: 15 U/L (ref 15–37)
BACTERIA URNS QL MICRO: ABNORMAL /HPF
BASOPHILS # BLD: 0 K/UL (ref 0–0.2)
BASOPHILS NFR BLD: 0 % (ref 0–2)
BILIRUB SERPL-MCNC: 0.4 MG/DL (ref 0.2–1.1)
BUN SERPL-MCNC: 9 MG/DL (ref 6–23)
CALCIUM SERPL-MCNC: 9.3 MG/DL (ref 8.3–10.4)
CASTS URNS QL MICRO: ABNORMAL /LPF
CHLORIDE SERPL-SCNC: 96 MMOL/L (ref 98–107)
CO2 SERPL-SCNC: 30 MMOL/L (ref 21–32)
CREAT SERPL-MCNC: 0.7 MG/DL (ref 0.6–1)
DIFFERENTIAL METHOD BLD: ABNORMAL
EOSINOPHIL # BLD: 0.1 K/UL (ref 0–0.8)
EOSINOPHIL NFR BLD: 2 % (ref 0.5–7.8)
EPI CELLS #/AREA URNS HPF: ABNORMAL /HPF
ERYTHROCYTE [DISTWIDTH] IN BLOOD BY AUTOMATED COUNT: 13.6 % (ref 11.9–14.6)
GLOBULIN SER CALC-MCNC: 5 G/DL (ref 2.3–3.5)
GLUCOSE SERPL-MCNC: 91 MG/DL (ref 65–100)
HCG UR QL: NEGATIVE
HCT VFR BLD AUTO: 34.4 % (ref 35.8–46.3)
HGB BLD-MCNC: 11 G/DL (ref 11.7–15.4)
IMM GRANULOCYTES # BLD: 0 K/UL (ref 0–0.5)
IMM GRANULOCYTES NFR BLD AUTO: 1 % (ref 0–5)
LIPASE SERPL-CCNC: 136 U/L (ref 73–393)
LYMPHOCYTES # BLD: 2.1 K/UL (ref 0.5–4.6)
LYMPHOCYTES NFR BLD: 28 % (ref 13–44)
MCH RBC QN AUTO: 26.7 PG (ref 26.1–32.9)
MCHC RBC AUTO-ENTMCNC: 32 G/DL (ref 31.4–35)
MCV RBC AUTO: 83.5 FL (ref 79.6–97.8)
MONOCYTES # BLD: 0.4 K/UL (ref 0.1–1.3)
MONOCYTES NFR BLD: 5 % (ref 4–12)
NEUTS SEG # BLD: 4.8 K/UL (ref 1.7–8.2)
NEUTS SEG NFR BLD: 65 % (ref 43–78)
NRBC # BLD: 0 K/UL (ref 0–0.2)
PLATELET # BLD AUTO: 329 K/UL (ref 150–450)
PMV BLD AUTO: 8.9 FL (ref 9.4–12.3)
POTASSIUM SERPL-SCNC: 3.3 MMOL/L (ref 3.5–5.1)
PROT SERPL-MCNC: 8.2 G/DL
RBC # BLD AUTO: 4.12 M/UL (ref 4.05–5.2)
RBC #/AREA URNS HPF: >100 /HPF
SODIUM SERPL-SCNC: 137 MMOL/L (ref 136–145)
WBC # BLD AUTO: 7.5 K/UL (ref 4.3–11.1)
WBC URNS QL MICRO: ABNORMAL /HPF

## 2018-12-11 PROCEDURE — 85025 COMPLETE CBC W/AUTO DIFF WBC: CPT

## 2018-12-11 PROCEDURE — 99284 EMERGENCY DEPT VISIT MOD MDM: CPT | Performed by: EMERGENCY MEDICINE

## 2018-12-11 PROCEDURE — 81015 MICROSCOPIC EXAM OF URINE: CPT

## 2018-12-11 PROCEDURE — 96375 TX/PRO/DX INJ NEW DRUG ADDON: CPT | Performed by: EMERGENCY MEDICINE

## 2018-12-11 PROCEDURE — 87086 URINE CULTURE/COLONY COUNT: CPT

## 2018-12-11 PROCEDURE — 83690 ASSAY OF LIPASE: CPT

## 2018-12-11 PROCEDURE — 80053 COMPREHEN METABOLIC PANEL: CPT

## 2018-12-11 PROCEDURE — 81025 URINE PREGNANCY TEST: CPT

## 2018-12-11 PROCEDURE — 81003 URINALYSIS AUTO W/O SCOPE: CPT | Performed by: EMERGENCY MEDICINE

## 2018-12-11 PROCEDURE — 74011250636 HC RX REV CODE- 250/636: Performed by: EMERGENCY MEDICINE

## 2018-12-11 PROCEDURE — 96374 THER/PROPH/DIAG INJ IV PUSH: CPT | Performed by: EMERGENCY MEDICINE

## 2018-12-11 RX ORDER — MORPHINE SULFATE 10 MG/ML
4 INJECTION, SOLUTION INTRAMUSCULAR; INTRAVENOUS
Status: COMPLETED | OUTPATIENT
Start: 2018-12-11 | End: 2018-12-11

## 2018-12-11 RX ORDER — KETOROLAC TROMETHAMINE 30 MG/ML
30 INJECTION, SOLUTION INTRAMUSCULAR; INTRAVENOUS
Status: COMPLETED | OUTPATIENT
Start: 2018-12-11 | End: 2018-12-11

## 2018-12-11 RX ORDER — ONDANSETRON 2 MG/ML
4 INJECTION INTRAMUSCULAR; INTRAVENOUS
Status: COMPLETED | OUTPATIENT
Start: 2018-12-11 | End: 2018-12-11

## 2018-12-11 RX ADMIN — ONDANSETRON 4 MG: 2 INJECTION INTRAMUSCULAR; INTRAVENOUS at 11:56

## 2018-12-11 RX ADMIN — KETOROLAC TROMETHAMINE 30 MG: 30 INJECTION, SOLUTION INTRAMUSCULAR at 11:59

## 2018-12-11 RX ADMIN — SODIUM CHLORIDE 1000 ML: 900 INJECTION, SOLUTION INTRAVENOUS at 12:03

## 2018-12-11 RX ADMIN — MORPHINE SULFATE 4 MG: 10 INJECTION INTRAVENOUS at 11:58

## 2018-12-11 NOTE — ED NOTES
I have reviewed discharge instructions with the patient. The patient verbalized understanding. Patient left ED via Discharge Method: ambulatory to Home with spouse). Opportunity for questions and clarification provided. Patient given 0 scripts. To continue your aftercare when you leave the hospital, you may receive an automated call from our care team to check in on how you are doing. This is a free service and part of our promise to provide the best care and service to meet your aftercare needs.  If you have questions, or wish to unsubscribe from this service please call [phone]. Thank you for Choosing our [facility] Emergency Department.

## 2018-12-11 NOTE — ED PROVIDER NOTES
Patient presents to the ER complaining of left flank pain. Has a history of kidney stones, status post recent lithotripsy and stent placement. Was seen by urology earlier this morning, had her stent removed. Persistent she's had worsening left flank pain. Reports one episode of nausea and vomiting. Denies fevers or chills. Reports she's taking pain medication at home without much improvement. Has been able to make urine since Stent removal 
 
 
The history is provided by the patient. Post OP Complication This is a new problem. The current episode started 1 to 2 hours ago. The problem occurs constantly. The problem has not changed since onset. Pertinent negatives include no abdominal pain. Past Medical History:  
Diagnosis Date  Diabetes ([facility] [address] does not check sugar-regularly  Hypercholesterolemia   
 controlled well with meds  Hypertension   
 medication controlled  Kidney stone Past Surgical History:  
Procedure Laterality Date  FRAGMENT KIDNEY STONE/ ESWL    
 HX DILATION AND CURETTAGE    
 HX ENDOSCOPY    
 HX LAP CHOLECYSTECTOMY  03/28/2017  HX LITHOTRIPSY    
  x 4  
 HX ORTHOPAEDIC Left   
 hand- tendon repair  HX OTHER SURGICAL Bilateral   
 sweat gland removal under both arms  HX TUBAL LIGATION  07/25/2005 Family History:  
Problem Relation Age of Onset  Heart Disease Maternal Grandmother  Hypertension Mother [facility] Other Mother Nga Barrett disease  Hypertension Father  Other Father on blood thinners for blood clots  Hypertension Brother  Colon Cancer Other  No Known Problems Brother  Breast Cancer Neg Hx   
 Ovarian Cancer Neg Hx  Uterine Cancer Neg Hx Social History Socioeconomic History  Marital status:  Spouse name: Not on file  Number of children: Not on file  Years of education: Not on file  Highest education level: Not on file Social Needs  Financial resource strain: Not on file  Food insecurity - worry: Not on file  Food insecurity - inability: Not on file  Transportation needs - medical: Not on file  Transportation needs - non-medical: Not on file Occupational History  Not on file Tobacco Use  Smoking status: Former Smoker Packs/day: 1.00 Years: 0.50 Pack years: 0.50 Last attempt to quit: 2009 Years since quittin.9  Smokeless tobacco: Never Used Substance and Sexual Activity  Alcohol use: No  
 Drug use: No  
 Sexual activity: Yes  
  Partners: Male Birth control/protection: None Comment: tubal  
Other Topics Concern [address] Service Not Asked  Blood Transfusions Not Asked  Caffeine Concern No  
 Occupational Exposure Not Asked [place] Hazards Not Asked  Sleep Concern Not Asked  Stress Concern Not Asked  Weight Concern Not Asked  Special Diet Not Asked  Back Care Not Asked  Exercise Yes  Bike Helmet Not Asked  [address] Yes  Self-Exams No  
Social History Narrative Abuse: Feels safe at home, no history of physical abuse, no history of sexual abuse ALLERGIES: Dilaudid [hydromorphone]; Lisinopril; Ace inhibitors; Bactrim [sulfamethoxazole-trimethoprim]; Flomax [tamsulosin]; Norvasc [amlodipine]; and Sulfa (sulfonamide antibiotics) Review of Systems Constitutional: Negative for fatigue, fever and unexpected weight change. HENT: Negative for dental problem. Eyes: Negative for photophobia and visual disturbance. Respiratory: Negative for choking and chest tightness. Cardiovascular: Negative for palpitations and leg swelling. Gastrointestinal: Negative for abdominal pain. Genitourinary: Positive for flank pain. Negative for frequency, genital sores and urgency. Musculoskeletal: Negative for back pain and gait problem. Neurological: Negative for light-headedness and numbness. Hematological: Negative for adenopathy. Does not bruise/bleed easily. Psychiatric/Behavioral: Negative for behavioral problems and confusion. All other systems reviewed and are negative. Vitals:  
 12/11/18 1110 BP: 164/46 Pulse: 75 Resp: 18 Temp: 98.2 °F (36.8 °C) SpO2: 98% Weight: 114.8 kg (253 lb) Height: 5' 4\" (1.626 m) Physical Exam  
Constitutional: She is oriented to person, place, and time. She appears well-developed and well-nourished. HENT:  
Head: Normocephalic. Eyes: Conjunctivae and EOM are normal. Pupils are equal, round, and reactive to light. Cardiovascular: Normal rate and regular rhythm. Exam reveals no friction rub. No murmur heard. Pulmonary/Chest: Effort normal and breath sounds normal. No respiratory distress. Abdominal: Soft. Bowel sounds are normal. There is CVA tenderness. Musculoskeletal: Normal range of motion. She exhibits no edema or deformity. Neurological: She is alert and oriented to person, place, and time. Nursing note and vitals reviewed. MDM Number of Diagnoses or Management Options Diagnosis management comments: We'll obtain basic labs as well as urinalysis 12:50 PM 
 
Labs currently stable. Urinalysis shows no obvious signs of  Infection Symptomatically patient appears improved. Appears stable for discharge and outpatient follow-up with urology Amount and/or Complexity of Data Reviewed Clinical lab tests: ordered and reviewed Review and summarize past medical records: yes Risk of Complications, Morbidity, and/or Mortality Presenting problems: moderate Diagnostic procedures: low Management options: low Patient Progress Patient progress: improved Procedures Results Include: 
 
Recent Results (from the past 24 hour(s)) AMB POC URINALYSIS DIP STICK AUTO W/ MICRO (PGU) Collection Time: 12/11/18  9:18 AM  
Result Value Ref Range Glucose (UA POC) Negative Negative mg/dL Bilirubin (UA POC) Negative Negative Ketones (UA POC) Negative Negative Specific gravity (UA POC) 1.020 1.001 - 1.035 Blood (UA POC) Large Negative pH (UA POC) 8.5 (A) 4.6 - 8.0 Protein (UA POC) 100  Negative Urobilinogen (POC) 0.2 mg/dL Nitrites (UA POC) Negative Negative Leukocyte esterase (UA POC) Small Negative HCG URINE, QL. - POC Collection Time: 12/11/18 11:25 AM  
Result Value Ref Range Pregnancy test,urine (POC) NEGATIVE  NEG    
URINE MICROSCOPIC Collection Time: 12/11/18 11:32 AM  
Result Value Ref Range WBC 10-20 0 /hpf  
 RBC >100 (H) 0 /hpf Epithelial cells 20-50 0 /hpf Bacteria TRACE 0 /hpf Casts 0-3 0 /lpf  
CBC WITH AUTOMATED DIFF Collection Time: 12/11/18 12:10 PM  
Result Value Ref Range WBC 7.5 4.3 - 11.1 K/uL  
 RBC 4.12 4.05 - 5.2 M/uL  
 HGB 11.0 (L) 11.7 - 15.4 g/dL HCT 34.4 (L) 35.8 - 46.3 % MCV 83.5 79.6 - 97.8 FL  
 MCH 26.7 26.1 - 32.9 PG  
 MCHC 32.0 31.4 - 35.0 g/dL  
 RDW 13.6 11.9 - 14.6 % PLATELET 822 154 - 057 K/uL MPV 8.9 (L) 9.4 - 12.3 FL ABSOLUTE NRBC 0.00 0.0 - 0.2 K/uL  
 DF AUTOMATED NEUTROPHILS 65 43 - 78 % LYMPHOCYTES 28 13 - 44 % MONOCYTES 5 4.0 - 12.0 % EOSINOPHILS 2 0.5 - 7.8 % BASOPHILS 0 0.0 - 2.0 % IMMATURE GRANULOCYTES 1 0.0 - 5.0 %  
 ABS. NEUTROPHILS 4.8 1.7 - 8.2 K/UL  
 ABS. LYMPHOCYTES 2.1 0.5 - 4.6 K/UL  
 ABS. MONOCYTES 0.4 0.1 - 1.3 K/UL  
 ABS. EOSINOPHILS 0.1 0.0 - 0.8 K/UL  
 ABS. BASOPHILS 0.0 0.0 - 0.2 K/UL  
 ABS. IMM. GRANS. 0.0 0.0 - 0.5 K/UL METABOLIC PANEL, COMPREHENSIVE Collection Time: 12/11/18 12:10 PM  
Result Value Ref Range Sodium 137 136 - 145 mmol/L Potassium 3.3 (L) 3.5 - 5.1 mmol/L Chloride 96 (L) 98 - 107 mmol/L  
 CO2 30 21 - 32 mmol/L Anion gap 11 mmol/L Glucose 91 65 - 100 mg/dL BUN 9 6 - 23 MG/DL Creatinine 0.70 0.6 - 1.0 MG/DL  
 GFR est AA >60 >60 ml/min/1.73m2 GFR est non-AA >60 ml/min/1.73m2 Calcium 9.3 8.3 - 10.4 MG/DL Bilirubin, total 0.4 0.2 - 1.1 MG/DL  
 ALT (SGPT) 11 (L) 12 - 65 U/L  
 AST (SGOT) 15 15 - 37 U/L Alk. phosphatase 87 50 - 130 U/L Protein, total 8.2 g/dL Albumin 3.2 (L) 3.5 - 5.0 g/dL Globulin 5.0 (H) 2.3 - 3.5 g/dL A-G Ratio 0.6 LIPASE Collection Time: 12/11/18 12:10 PM  
Result Value Ref Range Lipase 136 73 - 393 U/L Voice dictation software was used during the making of this note. This software is not perfect and grammatical and other typographical errors may be present. This note has been proofread, but may still contain errors.  
Lee Pittman MD; 12/11/2018 @12:50 PM  
===================================================================

## 2018-12-11 NOTE — DISCHARGE INSTRUCTIONS
Continue medications as prescribed  Drink plenty of fluids  Arrange follow-up with urology  Return to the ER for any new or worsening symptoms      Ureteroscopy: What to Expect at [facility]  For several hours after the procedure you may have a burning feeling when you urinate. This feeling should go away within a day. Drinking a lot of water can help. Your doctor also may advise you to take medicine that numbs the burning. This medicine is called phenazopyridine. It is available by prescription and over the counter. Brand names include Pyridium and Uristat. You may have some blood in your urine for 2 or 3 days. Your doctor may prescribe an antibiotic for a day or two. This will help prevent an infection. This care sheet gives you a general idea about how long it will take for you to recover. But each person recovers at a different pace. Follow the steps below to feel better as quickly as possible. How can you care for yourself at home? Activity    · You can go back to work and other activities the next day. Diet    · Try to drink two 8-ounce glasses of water each hour for 2 hours after the procedure. This may help ease the burning when you urinate. Medicines    · Your doctor will tell you if and when you can restart your medicines. He or she will also give you instructions about taking any new medicines.     · If you take blood thinners, such as warfarin (Coumadin), clopidogrel (Plavix), or aspirin, be sure to talk to your doctor. He or she will tell you if and when to start taking those medicines again. Make sure that you understand exactly what your doctor wants you to do.     · If you take medicine to stop the burning when you urinate, take it exactly as recommended. Be safe with medicines. Call your doctor if you think you are having a problem with your medicine.  You will get more details on the specific medicine your doctor recommends.     · If your doctor prescribed antibiotics, take them as directed. Do not stop taking them just because you feel better. You need to take the full course of antibiotics.     · Ask your doctor if you can take an over-the-counter pain medicine, such as acetaminophen (Tylenol), ibuprofen (Advil, Motrin), or naproxen (Aleve). Read and follow all instructions on the label. Do not take two or more pain medicines at the same time unless the doctor told you to. Many pain medicines have acetaminophen, which is Tylenol. Too much acetaminophen (Tylenol) can be harmful.    Heat    · Take a warm bath. This may soothe the burning.     · You also can hold a warm washcloth over your urethra for comfort. (The urethra is where your urine comes out.)   Follow-up care is a key part of your treatment and safety. Be sure to make and go to all appointments, and call your doctor if you are having problems. It's also a good idea to know your test results and keep a list of the medicines you take. When should you call for help? Call 911 anytime you think you may need emergency care. For example, call if:    · You passed out (lost consciousness).     · You have chest pain, are short of breath, or cough up blood.    Call your doctor now or seek immediate medical care if:    · You have pain that does not get better after you take pain medicine.     · You have new or more blood clots in your urine. (It is normal for the urine to be pink for a few days.)     · You cannot urinate.     · You have symptoms of a urinary tract infection. These may include:  ? Pain or burning when you urinate. ? A frequent need to urinate without being able to pass much urine. ? Pain in the flank, which is just below the rib cage and above the waist on either side of the back. ? Blood in the urine.   ? A fever.     · You are sick to your stomach or cannot drink fluids.     · You have signs of a blood clot in your leg (called a deep vein thrombosis), such as:  ? Pain in the calf, back of the knee, thigh, or groin.  ? Redness and swelling in your leg.    Watch closely for changes in your health, and be sure to contact your doctor if you are having any problems. Where can you learn more? Go to http://gwen-brennan.info/. Enter Q487 in the search box to learn more about \"Ureteroscopy: What to Expect at Home. \"  Current as of: March 15, 2018  Content Version: 11.8  © 7409-7369 [employer]. Care instructions adapted under license by [employer] (which disclaims liability or warranty for this information). If you have questions about a medical condition or this instruction, always ask your healthcare professional. [address] any warranty or liability for your use of this information. Flank Pain: Care Instructions  Your Care Instructions  Flank pain is pain on the side of the back just below the rib cage and above the waist. It can be on one or both sides. Flank pain has many possible causes, including a kidney stone, a urinary tract infection, or back strain. Flank pain may get better on its own. But don't ignore new symptoms, such as fever, nausea and vomiting, urination problems, pain that gets worse, and dizziness. These may be signs of a more serious problem. You may have to have tests or other treatment. Follow-up care is a key part of your treatment and safety. Be sure to make and go to all appointments, and call your doctor if you are having problems. It's also a good idea to know your test results and keep a list of the medicines you take. How can you care for yourself at home? · Rest until you feel better. · Take pain medicines exactly as directed. ? If the doctor gave you a prescription medicine for pain, take it as prescribed. ? If you are not taking a prescription pain medicine, ask your doctor if you can take an over-the-counter pain medicine, such as acetaminophen (Tylenol), ibuprofen (Advil, Motrin), or naproxen (Aleve).  Read and follow all instructions on the label. · If your doctor prescribed antibiotics, take them as directed. Do not stop taking them just because you feel better. You need to take the full course of antibiotics. · To apply heat, put a warm water bottle, a heating pad set on low, or a warm cloth on the painful area. Do not go to sleep with a heating pad on your skin. · To prevent dehydration, drink plenty of fluids, enough so that your urine is light yellow or clear like water. Choose water and other caffeine-free clear liquids until you feel better. If you have kidney, heart, or liver disease and have to limit fluids, talk with your doctor before you increase the amount of fluids you drink. When should you call for help? Call your doctor now or seek immediate medical care if:    · Your flank pain gets worse.     · You have new symptoms, such as fever, nausea, or vomiting.     · You have symptoms of a urinary problem. For example:  ? You have blood or pus in your urine. ? You have chills or body aches. ? It hurts to urinate. ? You have groin or belly pain.    Watch closely for changes in your health, and be sure to contact your doctor if you do not get better as expected. Where can you learn more? Go to http://gwen-brennan.info/. Enter S191 in the search box to learn more about \"Flank Pain: Care Instructions. \"  Current as of: November 20, 2017  Content Version: 11.8  © 2495-9076 [employer]. Care instructions adapted under license by [employer] (which disclaims liability or warranty for this information). If you have questions about a medical condition or this instruction, always ask your healthcare professional. [address] any warranty or liability for your use of this information.

## 2018-12-13 LAB
BACTERIA SPEC CULT: NORMAL
SERVICE CMNT-IMP: NORMAL

## 2018-12-19 ENCOUNTER — ANESTHESIA EVENT (OUTPATIENT)
Dept: SURGERY | Age: 43
End: 2018-12-19
Payer: COMMERCIAL

## 2018-12-19 RX ORDER — SODIUM CHLORIDE 0.9 % (FLUSH) 0.9 %
5-10 SYRINGE (ML) INJECTION EVERY 8 HOURS
Status: CANCELLED | OUTPATIENT
Start: 2018-12-19

## 2018-12-19 RX ORDER — SODIUM CHLORIDE 0.9 % (FLUSH) 0.9 %
5-10 SYRINGE (ML) INJECTION AS NEEDED
Status: CANCELLED | OUTPATIENT
Start: 2018-12-19

## 2018-12-20 ENCOUNTER — HOSPITAL ENCOUNTER (OUTPATIENT)
Age: 43
Setting detail: OUTPATIENT SURGERY
Discharge: HOME | End: 2018-12-20
Attending: UROLOGY | Admitting: UROLOGY
Payer: COMMERCIAL

## 2018-12-20 ENCOUNTER — ANESTHESIA (OUTPATIENT)
Dept: SURGERY | Age: 43
End: 2018-12-20
Payer: COMMERCIAL

## 2018-12-20 VITALS
TEMPERATURE: 97.7 F | HEIGHT: 64 IN | RESPIRATION RATE: 14 BRPM | DIASTOLIC BLOOD PRESSURE: 74 MMHG | OXYGEN SATURATION: 94 % | WEIGHT: 246.19 LBS | SYSTOLIC BLOOD PRESSURE: 143 MMHG | HEART RATE: 81 BPM | BODY MASS INDEX: 42.03 KG/M2

## 2018-12-20 DIAGNOSIS — N20.1 URETERAL STONE: Primary | ICD-10-CM

## 2018-12-20 PROCEDURE — 77030037088 HC TUBE ENDOTRACH ORAL NSL COVD-A: Performed by: ANESTHESIOLOGY

## 2018-12-20 PROCEDURE — 74011250636 HC RX REV CODE- 250/636: Performed by: UROLOGY

## 2018-12-20 PROCEDURE — 77030039425 HC BLD LARYNG TRULITE DISP TELE -A: Performed by: ANESTHESIOLOGY

## 2018-12-20 PROCEDURE — 76010000149 HC OR TIME 1 TO 1.5 HR: Performed by: UROLOGY

## 2018-12-20 PROCEDURE — 74011250636 HC RX REV CODE- 250/636

## 2018-12-20 PROCEDURE — 74011250637 HC RX REV CODE- 250/637: Performed by: ANESTHESIOLOGY

## 2018-12-20 PROCEDURE — 77030008771 HC TU NG SALEM SUMP -A: Performed by: ANESTHESIOLOGY

## 2018-12-20 PROCEDURE — 76210000006 HC OR PH I REC 0.5 TO 1 HR: Performed by: UROLOGY

## 2018-12-20 PROCEDURE — 74011250636 HC RX REV CODE- 250/636: Performed by: ANESTHESIOLOGY

## 2018-12-20 PROCEDURE — 76210000021 HC REC RM PH II 0.5 TO 1 HR: Performed by: UROLOGY

## 2018-12-20 PROCEDURE — 77030032490 HC SLV COMPR SCD KNE COVD -B: Performed by: UROLOGY

## 2018-12-20 PROCEDURE — 50590 FRAGMENTING OF KIDNEY STONE: CPT | Performed by: UROLOGY

## 2018-12-20 PROCEDURE — 77030020782 HC GWN BAIR PAWS FLX 3M -B: Performed by: ANESTHESIOLOGY

## 2018-12-20 PROCEDURE — 74011000250 HC RX REV CODE- 250

## 2018-12-20 PROCEDURE — 76060000033 HC ANESTHESIA 1 TO 1.5 HR: Performed by: UROLOGY

## 2018-12-20 RX ORDER — MIDAZOLAM HYDROCHLORIDE 1 MG/ML
2 INJECTION, SOLUTION INTRAMUSCULAR; INTRAVENOUS ONCE
Status: COMPLETED | OUTPATIENT
Start: 2018-12-20 | End: 2018-12-20

## 2018-12-20 RX ORDER — FENTANYL CITRATE 50 UG/ML
25 INJECTION, SOLUTION INTRAMUSCULAR; INTRAVENOUS ONCE
Status: DISCONTINUED | OUTPATIENT
Start: 2018-12-20 | End: 2018-12-20 | Stop reason: HOSPADM

## 2018-12-20 RX ORDER — CEFAZOLIN SODIUM/WATER 2 G/20 ML
2 SYRINGE (ML) INTRAVENOUS
Status: COMPLETED | OUTPATIENT
Start: 2018-12-20 | End: 2018-12-20

## 2018-12-20 RX ORDER — DEXAMETHASONE SODIUM PHOSPHATE 4 MG/ML
INJECTION, SOLUTION INTRA-ARTICULAR; INTRALESIONAL; INTRAMUSCULAR; INTRAVENOUS; SOFT TISSUE AS NEEDED
Status: DISCONTINUED | OUTPATIENT
Start: 2018-12-20 | End: 2018-12-20 | Stop reason: HOSPADM

## 2018-12-20 RX ORDER — FENTANYL CITRATE 50 UG/ML
INJECTION, SOLUTION INTRAMUSCULAR; INTRAVENOUS AS NEEDED
Status: DISCONTINUED | OUTPATIENT
Start: 2018-12-20 | End: 2018-12-20 | Stop reason: HOSPADM

## 2018-12-20 RX ORDER — HYDROCODONE BITARTRATE AND ACETAMINOPHEN 5; 325 MG/1; MG/1
1-2 TABLET ORAL
Qty: 20 TAB | Refills: 0 | Status: SHIPPED | OUTPATIENT
Start: 2018-12-20 | End: 2018-12-30

## 2018-12-20 RX ORDER — SODIUM CHLORIDE 9 MG/ML
50 INJECTION, SOLUTION INTRAVENOUS CONTINUOUS
Status: DISCONTINUED | OUTPATIENT
Start: 2018-12-20 | End: 2018-12-20 | Stop reason: HOSPADM

## 2018-12-20 RX ORDER — LIDOCAINE HYDROCHLORIDE 10 MG/ML
0.1 INJECTION INFILTRATION; PERINEURAL AS NEEDED
Status: DISCONTINUED | OUTPATIENT
Start: 2018-12-20 | End: 2018-12-20 | Stop reason: HOSPADM

## 2018-12-20 RX ORDER — PROPOFOL 10 MG/ML
INJECTION, EMULSION INTRAVENOUS AS NEEDED
Status: DISCONTINUED | OUTPATIENT
Start: 2018-12-20 | End: 2018-12-20 | Stop reason: HOSPADM

## 2018-12-20 RX ORDER — DIPHENHYDRAMINE HYDROCHLORIDE 50 MG/ML
12.5 INJECTION, SOLUTION INTRAMUSCULAR; INTRAVENOUS ONCE
Status: DISCONTINUED | OUTPATIENT
Start: 2018-12-20 | End: 2018-12-21 | Stop reason: HOSPADM

## 2018-12-20 RX ORDER — MIDAZOLAM HYDROCHLORIDE 1 MG/ML
2 INJECTION, SOLUTION INTRAMUSCULAR; INTRAVENOUS
Status: COMPLETED | OUTPATIENT
Start: 2018-12-20 | End: 2018-12-20

## 2018-12-20 RX ORDER — ROCURONIUM BROMIDE 10 MG/ML
INJECTION, SOLUTION INTRAVENOUS AS NEEDED
Status: DISCONTINUED | OUTPATIENT
Start: 2018-12-20 | End: 2018-12-20 | Stop reason: HOSPADM

## 2018-12-20 RX ORDER — FAMOTIDINE 20 MG/1
20 TABLET, FILM COATED ORAL ONCE
Status: COMPLETED | OUTPATIENT
Start: 2018-12-20 | End: 2018-12-20

## 2018-12-20 RX ORDER — LIDOCAINE HYDROCHLORIDE 20 MG/ML
INJECTION, SOLUTION EPIDURAL; INFILTRATION; INTRACAUDAL; PERINEURAL AS NEEDED
Status: DISCONTINUED | OUTPATIENT
Start: 2018-12-20 | End: 2018-12-20 | Stop reason: HOSPADM

## 2018-12-20 RX ORDER — SUCCINYLCHOLINE CHLORIDE 20 MG/ML
INJECTION INTRAMUSCULAR; INTRAVENOUS AS NEEDED
Status: DISCONTINUED | OUTPATIENT
Start: 2018-12-20 | End: 2018-12-20 | Stop reason: HOSPADM

## 2018-12-20 RX ORDER — ONDANSETRON 2 MG/ML
INJECTION INTRAMUSCULAR; INTRAVENOUS AS NEEDED
Status: DISCONTINUED | OUTPATIENT
Start: 2018-12-20 | End: 2018-12-20 | Stop reason: HOSPADM

## 2018-12-20 RX ORDER — SODIUM CHLORIDE, SODIUM LACTATE, POTASSIUM CHLORIDE, CALCIUM CHLORIDE 600; 310; 30; 20 MG/100ML; MG/100ML; MG/100ML; MG/100ML
100 INJECTION, SOLUTION INTRAVENOUS CONTINUOUS
Status: DISCONTINUED | OUTPATIENT
Start: 2018-12-20 | End: 2018-12-21 | Stop reason: HOSPADM

## 2018-12-20 RX ORDER — SODIUM CHLORIDE, SODIUM LACTATE, POTASSIUM CHLORIDE, CALCIUM CHLORIDE 600; 310; 30; 20 MG/100ML; MG/100ML; MG/100ML; MG/100ML
100 INJECTION, SOLUTION INTRAVENOUS CONTINUOUS
Status: DISCONTINUED | OUTPATIENT
Start: 2018-12-20 | End: 2018-12-20 | Stop reason: HOSPADM

## 2018-12-20 RX ADMIN — SODIUM CHLORIDE, SODIUM LACTATE, POTASSIUM CHLORIDE, AND CALCIUM CHLORIDE: 600; 310; 30; 20 INJECTION, SOLUTION INTRAVENOUS at 19:13

## 2018-12-20 RX ADMIN — MIDAZOLAM HYDROCHLORIDE 2 MG: 2 INJECTION, SOLUTION INTRAMUSCULAR; INTRAVENOUS at 16:53

## 2018-12-20 RX ADMIN — SUCCINYLCHOLINE CHLORIDE 200 MG: 20 INJECTION INTRAMUSCULAR; INTRAVENOUS at 19:19

## 2018-12-20 RX ADMIN — DEXAMETHASONE SODIUM PHOSPHATE 4 MG: 4 INJECTION, SOLUTION INTRA-ARTICULAR; INTRALESIONAL; INTRAMUSCULAR; INTRAVENOUS; SOFT TISSUE at 19:31

## 2018-12-20 RX ADMIN — LIDOCAINE HYDROCHLORIDE 100 MG: 20 INJECTION, SOLUTION EPIDURAL; INFILTRATION; INTRACAUDAL; PERINEURAL at 19:19

## 2018-12-20 RX ADMIN — MIDAZOLAM HYDROCHLORIDE 2 MG: 2 INJECTION, SOLUTION INTRAMUSCULAR; INTRAVENOUS at 16:14

## 2018-12-20 RX ADMIN — FENTANYL CITRATE 100 MCG: 50 INJECTION, SOLUTION INTRAMUSCULAR; INTRAVENOUS at 19:19

## 2018-12-20 RX ADMIN — ONDANSETRON 4 MG: 2 INJECTION INTRAMUSCULAR; INTRAVENOUS at 19:31

## 2018-12-20 RX ADMIN — Medication 2 G: at 19:29

## 2018-12-20 RX ADMIN — PROPOFOL 200 MG: 10 INJECTION, EMULSION INTRAVENOUS at 19:19

## 2018-12-20 RX ADMIN — ROCURONIUM BROMIDE 5 MG: 10 INJECTION, SOLUTION INTRAVENOUS at 19:19

## 2018-12-20 RX ADMIN — SODIUM CHLORIDE, SODIUM LACTATE, POTASSIUM CHLORIDE, AND CALCIUM CHLORIDE 1000 ML: 600; 310; 30; 20 INJECTION, SOLUTION INTRAVENOUS at 15:00

## 2018-12-20 RX ADMIN — FAMOTIDINE 20 MG: 20 TABLET ORAL at 15:21

## 2018-12-20 NOTE — H&P
Jenifer Preciado  : 1975         Chief Complaint   Patient presents with    Follow-up            HPI      Jenifer Preciado is a 39 y.o. female  Patient returns today after undergoing a left ureteroscopy holmium laser and stent placement. She is back today for stent removal.  Patient has angry about the many Dr. Amaya Carter mentioned to her going into doing a procedure to clear her of all the stones from her kidney. I explained to her that I would send a message to him and ask about what procedure he may be thinking about.     KUB today reveals the left stent to be in good placement. There are no stones along the course of either ureter. There is at least a 5 mm stone in the upper pole the right kidney but no other stones are seen. There are also gallbladder surgical clips.                Past Medical History:   Diagnosis Date    Diabetes ([facility] [place] [address]       does not check sugar-regularly    Hypercholesterolemia       controlled well with meds    Hypertension       medication controlled    Kidney stone              Past Surgical History:   Procedure Laterality Date    FRAGMENT KIDNEY STONE/ ESWL        HX DILATION AND CURETTAGE        HX ENDOSCOPY        HX LAP CHOLECYSTECTOMY   2017    HX LITHOTRIPSY          x 4    HX ORTHOPAEDIC Left       hand- tendon repair    HX OTHER SURGICAL Bilateral       sweat gland removal under both arms    HX TUBAL LIGATION   2005             Current Outpatient Medications   Medication Sig Dispense Refill    phenazopyridine (PYRIDIUM) 200 mg tablet Take 1 Tab by mouth three (3) times daily as needed for Pain (for painful urination) for up to 3 days. 30 Tab 3    oxyCODONE-acetaminophen (PERCOCET) 5-325 mg per tablet Take 1-2 Tabs by mouth every six (6) hours as needed for Pain. Max Daily Amount: 8 Tabs. 20 Tab 0    promethazine (PHENERGAN) 25 mg tablet Take 1 Tab by mouth every six (6) hours as needed for Nausea.  15 Tab 0    ondansetron (ZOFRAN ODT) 4 mg disintegrating tablet Take 1 Tab by mouth every eight (8) hours as needed for Nausea. 20 Tab 0    hydroCHLOROthiazide (HYDRODIURIL) 25 mg tablet Take 1 Tab by mouth daily. 90 Tab 1    gabapentin (NEURONTIN) 100 mg capsule Take 100 mg by mouth two (2) times a day.        multivitamin (ONE A DAY) tablet Take 1 Tab by mouth daily.        betamethasone valerate (VALISONE) 0.1 % topical cream Apply  to affected area two (2) times a day. 15 g 0    metFORMIN (GLUCOPHAGE) 1,000 mg tablet Take 1 Tab by mouth two (2) times daily (with meals). Indications: type 2 diabetes mellitus 90 Tab 3    ibuprofen (MOTRIN) 800 mg tablet Take 1 Tab by mouth every six (6) hours as needed for Pain. 90 Tab 1    pravastatin (PRAVACHOL) 40 mg tablet Take 1 Tab by mouth nightly for 30 days.  30 Tab 2            Allergies   Allergen Reactions    Dilaudid [Hydromorphone] Anaphylaxis       Swollen tongue, lips, hypotension, low O2 SATS    Lisinopril Anaphylaxis    Ace Inhibitors Angioedema    Bactrim [Sulfamethoxazole-Trimethoprim] Hives    Flomax [Tamsulosin] Swelling    Norvasc [Amlodipine] Other (comments)       Pt denies any allergic reaction to Norvasc    Sulfa (Sulfonamide Antibiotics) Hives      Social History            Socioeconomic History    Marital status:        Spouse name: Not on file    Number of children: Not on file    Years of education: Not on file    Highest education level: Not on file   Social Needs    Financial resource strain: Not on file    Food insecurity - worry: Not on file    Food insecurity - inability: Not on file   [place] needs - medical: Not on file   [place] needs - non-medical: Not on file   Occupational History    Not on file   Tobacco Use    Smoking status: Former Smoker       Packs/day: 1.00       Years: 0.50       Pack years: 0.50       Last attempt to quit: 2009       Years since quittin.9    Smokeless tobacco: Never Used   Substance and Sexual Activity    Alcohol use: No    Drug use: No    Sexual activity: Yes       Partners: Male       Birth control/protection: None       Comment: tubal   Other Topics Concern     Service Not Asked    Blood Transfusions Not Asked    Caffeine Concern No    Occupational Exposure Not Asked    Hobby Hazards Not Asked    Sleep Concern Not Asked    Stress Concern Not Asked    Weight Concern Not Asked    Special Diet Not Asked    Back Care Not Asked    Exercise Yes    Bike Helmet Not Asked    Seat Belt Yes    Self-Exams No   Social History Narrative     Abuse: Feels safe at home, no history of physical abuse, no history of sexual abuse            Family History   Problem Relation Age of Onset    Heart Disease Maternal Grandmother      Hypertension Mother      Other Mother           Fleurette Limber disease    Hypertension Father      Other Father           on blood thinners for blood clots    Hypertension Brother      Colon Cancer Other      No Known Problems Brother      Breast Cancer Neg Hx      Ovarian Cancer Neg Hx      Uterine Cancer Neg Hx           Review of Systems  Constitutional:   Negative for fever. GI: Positive for nausea, vomiting and abdominal pain. Genitourinary: Positive for hematuria and history of urolithiasis. Musculoskeletal:  Negative for back pain.             PHYSICAL EXAM     Visit Vitals  BP (!) 136/97   Pulse 94   Temp 98.5 °F (36.9 °C) (Tympanic)   Wt 253 lb (114.8 kg)   BMI 43.43 kg/m²         General appearance - alert, well appearing, and in no distress  Mental status - alert, oriented to person, place, and time  Abdomen - soft, nontender, nondistended, no masses or organomegaly  Musculoskeletal - no joint tenderness, deformity or swelling.      Physical Exam  General   Mental Status - Patient is alert and oriented X3. Build & Nutrition - Well nourished.       Chest and Lung Exam   Chest and lung exam reveals  - normal excursion with symmetric chest walls, quiet, even and easy respiratory effort with no use of accessory muscles and on auscultation, normal breath sounds, no adventitious sounds and normal vocal resonance. Cardiovascular   Cardiovascular examination reveals  - normal heart sounds, regular rate and rhythm with no murmurs. Abdomen   Palpation/Percussion: Palpation and Percussion of the abdomen reveal - Non Tender, No Rebound tenderness, No Rigidity (guarding), No hepatosplenomegaly, No Palpable abdominal masses and Soft. Hernia - Bilateral - No Hernia(s) present. Assessment and Plan      ICD-10-CM ICD-9-CM     1. Ureteral stone N20.1 592.1 AMB POC URINALYSIS DIP STICK AUTO W/ MICRO (PGU)         AMB POC XRAY ABDOMEN 1 VIEW   2. Renal stone N20.0 592.0        KUB see HPI for interpretation     PLAN:  Has nonobstructing right renal stone. Brought in for right renal ESWL.

## 2018-12-20 NOTE — ANESTHESIA PREPROCEDURE EVALUATION
Anesthetic History   No history of anesthetic complications            Review of Systems / Medical History  Patient summary reviewed and pertinent labs reviewed    Pulmonary  Within defined limits                 Neuro/Psych   Within defined limits           Cardiovascular    Hypertension          Hyperlipidemia    Exercise tolerance: >4 METS     GI/Hepatic/Renal         Renal disease: stones       Endo/Other    Diabetes: well controlled, type 2    Morbid obesity     Other Findings            Physical Exam    Airway  Mallampati: II  TM Distance: 4 - 6 cm  Neck ROM: normal range of motion   Mouth opening: Normal     Cardiovascular  Regular rate and rhythm,  S1 and S2 normal,  no murmur, click, rub, or gallop  Rhythm: regular  Rate: normal         Dental  No notable dental hx       Pulmonary  Breath sounds clear to auscultation               Abdominal  GI exam deferred       Other Findings            Anesthetic Plan    ASA: 2  Anesthesia type: general          Induction: Intravenous and RSI  Anesthetic plan and risks discussed with: Patient      10 am ty Shea has allergy to dilaudid

## 2018-12-21 NOTE — ANESTHESIA POSTPROCEDURE EVALUATION
Procedure(s):  LITHOTRIPSY EXTRACORPOREAL SHOCKWAVE ESWL RIGHT.     Anesthesia Post Evaluation      Multimodal analgesia: multimodal analgesia used between 6 hours prior to anesthesia start to PACU discharge  Patient location during evaluation: bedside  Patient participation: complete - patient participated  Level of consciousness: awake  Pain management: adequate  Airway patency: patent  Anesthetic complications: no  Cardiovascular status: acceptable and stable  Respiratory status: acceptable and room air  Hydration status: acceptable        Visit Vitals  /74   Pulse 81   Temp 36.5 °C (97.7 °F)   Resp 14   Ht 5' 4\" (1.626 m)   Wt 111.7 kg (246 lb 3 oz)   SpO2 94%   BMI 42.26 kg/m²

## 2018-12-21 NOTE — PERIOP NOTES
Preoperative flank skin condition: clear  Lead shield: {BSHSI YES/  Patient ear protection: {MODEL YES  Gel applied to patient's flank and Lithotripsy head: {MODEL YES  Starting power level: 3  Shock start time (first  fluoroscopy): 1929  Shock stop time (last lithotripsy shock): 2005  Ending power level: 11  Total shocks: 3000  Total fluoroscopy time: 2.25  Postoperative flank skin condition: pink

## 2018-12-21 NOTE — OP NOTES
[facility] REPORT    Aurea Jesus  MR#: [id number]  : 1975  ACCOUNT #: [de-identified]   DATE OF SERVICE: 2018    PREOPERATIVE DIAGNOSIS:  Right renal calculus. POSTOPERATIVE DIAGNOSIS:  Right renal calculus. PROCEDURE PERFORMED:  Right renal extracorporeal shock wave lithotripsy. SURGEON:  Suyapa Hollis. Patsy Alfredo., MD    FINDINGS:  A 5 mm stone in right mid kidney. DESCRIPTION OF PROCEDURE:  The patient was given a general anesthetic, placed in the supine position on the lithotripsy treatment table. The stone was visualized by fluoroscopy. This of a 5 mm calculus in the right mid kidney. Treatment was begun at a low treatment setting and gradually increased up to a power setting of 11 kV. The patient was given a total of 3000 shocks. The stone showed excellent pulverization. She will be discharged home and return to the office in 1 month for a KUB. She may need to have a metabolic workup. ANESTHESIA:  General.    ASSISTANT:  None. SPECIMENS REMOVED:  None. IMPLANTS:  None. ESTIMATED BLOOD LOSS:  None. COMPLICATIONS:  None.       MD Nelda Arellano / Issa Cordero  D: 2018 20:25     T: 2018 20:57  JOB #: [id number]

## 2018-12-21 NOTE — DISCHARGE INSTRUCTIONS
Lithotripsy: What to Expect at [address] is a way to treat kidney stones without surgery. It is also called extracorporeal shock wave lithotripsy, or ESWL. This treatment uses sound waves to break kidney stones into tiny pieces. These pieces can then pass out of the body in the urine. You may have a small amount of blood in your urine after this treatment. Your urine may be slightly pink or reddish. The blood in the urine often goes away after 2 days. You may have a plastic tube inside one of your ureters. Ureters are the tubes that connect the kidneys to the bladder. The plastic tube is called a stent. It takes urine from your kidney to your bladder. This lets the stone pass more easily. Your doctor may remove the stent in about a week or two. This care sheet gives you a general idea about how long it will take for you to recover. But each person recovers at a different pace. Follow the steps below to feel better as quickly as possible. How can you care for yourself at home? Activity    · Rest as much as you need to after you go home.     · You may do your regular activities. But avoid hard exercise or sports for a week. Wait until there is no blood in your urine and the stent is out. Diet    · You can eat your normal diet.     · Drink plenty of fluids, enough so that your urine is light yellow or clear like water. If you have kidney, heart, or liver disease and have to limit fluids, talk with your doctor before you increase the amount of fluids you drink. Medicines    · Your doctor will tell you if and when you can restart your medicines. He or she will also give you instructions about taking any new medicines.     · If you take blood thinners, such as warfarin (Coumadin), clopidogrel (Plavix), or aspirin, be sure to talk to your doctor. He or she will tell you if and when to start taking those medicines again.  Make sure that you understand exactly what your doctor wants you to do.     · Be safe with medicines. Read and follow all instructions on the label. ? If the doctor gave you a prescription medicine for pain, take it as prescribed. ? If you are not taking a prescription pain medicine, ask your doctor if you can take acetaminophen (Tylenol). Do not take ibuprofen (Advil, Motrin) or naproxen (Aleve), or similar medicines unless your doctor tells you to. ? Do not take two or more pain medicines at the same time unless the doctor told you to. Many pain medicines have acetaminophen, which is Tylenol. Too much acetaminophen (Tylenol) can be harmful. Other instructions    · Urinate through the strainer the doctor gives you. Save any stone pieces, including those that look like sand or gravel. Take these to your doctor. This will help your doctor find the cause of your stones. Follow-up care is a key part of your treatment and safety. Be sure to make and go to all appointments, and call your doctor if you are having problems. It's also a good idea to know your test results and keep a list of the medicines you take. When should you call for help? Call 911 anytime you think you may need emergency care. For example, call if:    · You passed out (lost consciousness).     · You have chest pain, are short of breath, or cough up blood.    Call your doctor now or seek immediate medical care if:    · You have pain that does not get better after you take pain medicine.     · You have new or more blood clots in your urine. (It is normal for the urine to be pink for a few days.)     · You cannot urinate.     · You have symptoms of a urinary tract infection. These may include:  ? Pain or burning when you urinate. ? A frequent need to urinate without being able to pass much urine. ? Pain in the flank, which is just below the rib cage and above the waist on either side of the back. ? Blood in the urine.   ? A fever.     · You are sick to your stomach or cannot drink fluids.     · You have signs of a blood clot in your leg (called a deep vein thrombosis), such as:  ? Pain in the calf, back of the knee, thigh, or groin. ? Redness and swelling in your leg.    Watch closely for any changes in your health, and be sure to contact your doctor if you have any problems. Where can you learn more? Go to http://gwen-brennan.info/. Enter W154 in the search box to learn more about \"Lithotripsy: What to Expect at Home. \"  Current as of: March 15, 2018  Content Version: 11.8  © 1273-5424 [employer]. Care instructions adapted under license by [employer] (which disclaims liability or warranty for this information). If you have questions about a medical condition or this instruction, always ask your healthcare professional. [address] any warranty or liability for your use of this information. After general anesthesia or intravenous sedation, for 24 hours or while taking prescription Narcotics:  · Limit your activities  · Do not drive and operate hazardous machinery  · Do not make important personal or business decisions  · Do  not drink alcoholic beverages  · If you have not urinated within 8 hours after discharge, please contact your surgeon on call. *  Please give a list of your current medications to your Primary Care Provider. *  Please update this list whenever your medications are discontinued, doses are      changed, or new medications (including over-the-counter products) are added. *  Please carry medication information at all times in case of emergency situations. These are general instructions for a healthy lifestyle:  No smoking/ No tobacco products/ Avoid exposure to second hand smoke  Surgeon General's Warning:  Quitting smoking now greatly reduces serious risk to your health.   Obesity, smoking, and sedentary lifestyle greatly increases your risk for illness  A healthy diet, regular physical exercise & weight monitoring are important for maintaining a healthy lifestyle    You may be retaining fluid if you have a history of heart failure or if you experience any of the following symptoms:  Weight gain of 3 pounds or more overnight or 5 pounds in a week, increased swelling in our hands or feet or shortness of breath while lying flat in bed. Please call your doctor as soon as you notice any of these symptoms; do not wait until your next office visit. Recognize signs and symptoms of STROKE:  F-face looks uneven  A-arms unable to move or move unevenly  S-speech slurred or non-existent  T-time-call 911 as soon as signs and symptoms begin-DO NOT go       Back to bed or wait to see if you get better-TIME IS BRAIN.

## 2018-12-30 ENCOUNTER — HOSPITAL ENCOUNTER (EMERGENCY)
Age: 43
Discharge: HOME | End: 2018-12-30
Attending: EMERGENCY MEDICINE
Payer: COMMERCIAL

## 2018-12-30 ENCOUNTER — APPOINTMENT (OUTPATIENT)
Dept: ULTRASOUND IMAGING | Age: 43
End: 2018-12-30
Attending: EMERGENCY MEDICINE
Payer: COMMERCIAL

## 2018-12-30 VITALS
OXYGEN SATURATION: 98 % | SYSTOLIC BLOOD PRESSURE: 159 MMHG | WEIGHT: 250 LBS | BODY MASS INDEX: 42.68 KG/M2 | HEART RATE: 85 BPM | TEMPERATURE: 98.4 F | HEIGHT: 64 IN | DIASTOLIC BLOOD PRESSURE: 71 MMHG | RESPIRATION RATE: 19 BRPM

## 2018-12-30 DIAGNOSIS — N20.1 URETERAL STONE: ICD-10-CM

## 2018-12-30 DIAGNOSIS — N20.1 LEFT URETERAL STONE: ICD-10-CM

## 2018-12-30 DIAGNOSIS — R10.9 ACUTE LEFT FLANK PAIN: Primary | ICD-10-CM

## 2018-12-30 LAB
BACTERIA URNS QL MICRO: 0 /HPF
CASTS URNS QL MICRO: 0 /LPF
CRYSTALS URNS QL MICRO: 0 /LPF
EPI CELLS #/AREA URNS HPF: NORMAL /HPF
HCG UR QL: NEGATIVE
MUCOUS THREADS URNS QL MICRO: 0 /LPF
RBC #/AREA URNS HPF: NORMAL /HPF
WBC URNS QL MICRO: NORMAL /HPF

## 2018-12-30 PROCEDURE — 99284 EMERGENCY DEPT VISIT MOD MDM: CPT | Performed by: EMERGENCY MEDICINE

## 2018-12-30 PROCEDURE — 96375 TX/PRO/DX INJ NEW DRUG ADDON: CPT | Performed by: EMERGENCY MEDICINE

## 2018-12-30 PROCEDURE — 81015 MICROSCOPIC EXAM OF URINE: CPT

## 2018-12-30 PROCEDURE — 81003 URINALYSIS AUTO W/O SCOPE: CPT | Performed by: EMERGENCY MEDICINE

## 2018-12-30 PROCEDURE — 74011250636 HC RX REV CODE- 250/636: Performed by: EMERGENCY MEDICINE

## 2018-12-30 PROCEDURE — 96374 THER/PROPH/DIAG INJ IV PUSH: CPT | Performed by: EMERGENCY MEDICINE

## 2018-12-30 PROCEDURE — 96376 TX/PRO/DX INJ SAME DRUG ADON: CPT | Performed by: EMERGENCY MEDICINE

## 2018-12-30 PROCEDURE — 76770 US EXAM ABDO BACK WALL COMP: CPT

## 2018-12-30 PROCEDURE — 96361 HYDRATE IV INFUSION ADD-ON: CPT | Performed by: EMERGENCY MEDICINE

## 2018-12-30 PROCEDURE — 81025 URINE PREGNANCY TEST: CPT

## 2018-12-30 RX ORDER — ONDANSETRON 4 MG/1
4 TABLET, ORALLY DISINTEGRATING ORAL
Qty: 10 TAB | Refills: 0 | Status: SHIPPED | OUTPATIENT
Start: 2018-12-30 | End: 2019-02-07

## 2018-12-30 RX ORDER — HYDROCODONE BITARTRATE AND ACETAMINOPHEN 5; 325 MG/1; MG/1
1-2 TABLET ORAL
Qty: 12 TAB | Refills: 0 | Status: SHIPPED | OUTPATIENT
Start: 2018-12-30 | End: 2019-01-31

## 2018-12-30 RX ORDER — SODIUM CHLORIDE 0.9 % (FLUSH) 0.9 %
5-10 SYRINGE (ML) INJECTION AS NEEDED
Status: DISCONTINUED | OUTPATIENT
Start: 2018-12-30 | End: 2018-12-30 | Stop reason: HOSPADM

## 2018-12-30 RX ORDER — MORPHINE SULFATE 4 MG/ML
4 INJECTION INTRAVENOUS
Status: COMPLETED | OUTPATIENT
Start: 2018-12-30 | End: 2018-12-30

## 2018-12-30 RX ORDER — SODIUM CHLORIDE 9 MG/ML
100 INJECTION, SOLUTION INTRAVENOUS CONTINUOUS
Status: DISCONTINUED | OUTPATIENT
Start: 2018-12-30 | End: 2018-12-30 | Stop reason: HOSPADM

## 2018-12-30 RX ORDER — SODIUM CHLORIDE 0.9 % (FLUSH) 0.9 %
5-10 SYRINGE (ML) INJECTION EVERY 8 HOURS
Status: DISCONTINUED | OUTPATIENT
Start: 2018-12-30 | End: 2018-12-30 | Stop reason: HOSPADM

## 2018-12-30 RX ORDER — ONDANSETRON 2 MG/ML
4 INJECTION INTRAMUSCULAR; INTRAVENOUS
Status: COMPLETED | OUTPATIENT
Start: 2018-12-30 | End: 2018-12-30

## 2018-12-30 RX ADMIN — MORPHINE SULFATE 4 MG: 4 INJECTION INTRAVENOUS at 17:18

## 2018-12-30 RX ADMIN — MORPHINE SULFATE 4 MG: 4 INJECTION INTRAVENOUS at 14:43

## 2018-12-30 RX ADMIN — SODIUM CHLORIDE 100 ML/HR: 900 INJECTION, SOLUTION INTRAVENOUS at 14:35

## 2018-12-30 RX ADMIN — ONDANSETRON 4 MG: 2 INJECTION INTRAMUSCULAR; INTRAVENOUS at 14:39

## 2018-12-30 NOTE — ED TRIAGE NOTES
PMD-[place]. Urologist-Dr Sofy Bustamante. Pt c/o left flank pain and nausea x 2 days ago. Recent hospitalization for kidney stones.

## 2018-12-30 NOTE — ED PROVIDER NOTES
HPI: 
39 female history of multiple prior kidney stone would reason lithotripsies, stent is here with acute onset of left flank pain. She is concerned she may have more stones. She denies any fever. She has nausea without vomiting and diarrhea which is typical for kidney stone. Take Motrins at home. No recent travel. No abnormal vaginal bleeding or discharge. ROS Constitutional: No fever, no chills Skin: no rash Eye: No vision changes ENMT:  
Respiratory: No shortness of breath, no cough Cardiovascular: No chest pain, no palpitations Gastrointestinal: No vomiting, no nausea, no diarrhea,  abdominal pain : No dysuria MSK: No back pain, no muscle pain, no joint pain Neuro: No headache, no change in mental status, no numbness, no tingling, no weakness Psych:  
Endocrine:  
All other review of systems positive per history of present illness and the above otherwise negative or noncontributory. Visit Vitals /62 (BP 1 Location: Left arm, BP Patient Position: Sitting) Pulse 93 Temp 98.7 °F (37.1 °C) Resp 16 Ht 5' 4\" (1.626 m) Wt 113.4 kg (250 lb) SpO2 99% BMI 42.91 kg/m² Past Medical History:  
Diagnosis Date  Diabetes ([facility] [address] does not check sugar-regularly  Hypercholesterolemia   
 controlled well with meds  Hypertension   
 medication controlled  Kidney stone Past Surgical History:  
Procedure Laterality Date  FRAGMENT KIDNEY STONE/ ESWL    
 HX DILATION AND CURETTAGE    
 HX ENDOSCOPY    
 HX LAP CHOLECYSTECTOMY  03/28/2017  HX LITHOTRIPSY    
  x 4  
 HX ORTHOPAEDIC Left   
 hand- tendon repair  HX OTHER SURGICAL Bilateral   
 sweat gland removal under both arms  HX TUBAL LIGATION  07/25/2005 Prior to Admission Medications Prescriptions Last Dose Informant Patient Reported? Taking? HYDROcodone-acetaminophen (NORCO) 5-325 mg per tablet   No No  
Sig: Take 1-2 Tabs by mouth every six (6) hours as needed for Pain.  Max Daily Amount: 8 Tabs. betamethasone valerate (VALISONE) 0.1 % topical cream   No No  
Sig: Apply  to affected area two (2) times a day.  
gabapentin (NEURONTIN) 100 mg capsule   Yes No  
Sig: Take 100 mg by mouth two (2) times a day. hydroCHLOROthiazide (HYDRODIURIL) 25 mg tablet   No No  
Sig: Take 1 Tab by mouth daily. ibuprofen (MOTRIN) 800 mg tablet   No No  
Sig: Take 1 Tab by mouth every six (6) hours as needed for Pain.  
metFORMIN (GLUCOPHAGE) 1,000 mg tablet   No No  
Sig: Take 1 Tab by mouth two (2) times daily (with meals). Indications: type 2 diabetes mellitus  
multivitamin (ONE A DAY) tablet   Yes No  
Sig: Take 1 Tab by mouth daily. ondansetron (ZOFRAN ODT) 4 mg disintegrating tablet   No No  
Sig: Take 1 Tab by mouth every eight (8) hours as needed for Nausea. oxyCODONE-acetaminophen (PERCOCET) 5-325 mg per tablet   No No  
Sig: Take 1-2 Tabs by mouth every six (6) hours as needed for Pain. Max Daily Amount: 8 Tabs. pravastatin (PRAVACHOL) 40 mg tablet   No No  
Sig: Take 1 Tab by mouth nightly for 30 days. promethazine (PHENERGAN) 25 mg tablet   No No  
Sig: Take 1 Tab by mouth every six (6) hours as needed for Nausea. Facility-Administered Medications: None Adult Exam  
General: alert, appear uncomfortable. Head: normocephalic, atraumatic ENT: moist mucous membranes Neck: supple, non-tender; full range of motion Cardiovascular: regular rate and rhythm, normal peripheral perfusion, no edema Respiratory:  normal respirations; no wheezing, rales or rhonchi Gastrointestinal: soft, non-tender; no rebound or guarding, no peritoneal signs, no distension Back: non-tender, full range of motion Musculoskeletal: normal range of motion, normal strength, no gross deformities Neurological: alert and oriented x 4, no gross focal deficits; normal speech Psychiatric: cooperative; appropriate mood and affect MDM:abdomen without pain. Likely stone.   Urine with blood, leukocyte esterase. Will be sent for micro-. We'll obtain an ultrasound for assessment of kidney stone. Pain medication IV morphine and IV Zofran given. Patient will be signed out to Dr. Bertha Haque pending US, labs and pain controlled. No results found. Recent Results (from the past 24 hour(s)) HCG URINE, QL. - POC Collection Time: 12/30/18  2:20 PM  
Result Value Ref Range Pregnancy test,urine (POC) NEGATIVE  NEG    
URINE MICROSCOPIC Collection Time: 12/30/18  2:22 PM  
Result Value Ref Range WBC 0-3 0 /hpf  
 RBC 0-3 0 /hpf Epithelial cells 0-3 0 /hpf Bacteria 0 0 /hpf Casts 0 0 /lpf Crystals, urine 0 0 /LPF Mucus 0 0 /lpf Dragon voice recognition software was used to create this note. Although the note has been reviewed and corrected where necessary, additional errors may have been overlooked and remain in the text. Us Retroperitoneum Comp Result Date: 12/30/2018 Renal Ultrasound 12/30/2018 4:33 PM Indication: Left flank pain, history of stone disease status post lithotripsy. Comparison: CT urogram 12/5/2018. Findings: Real-time gray-scale imaging of the kidneys. Right kidney measures 12.80 cm in length. Left kidney measures 12.4 cm in length. Both renal contours are with in normal limits. Renal echogenicity is normal with no mass, stone, or hydronephrosis. The ureters and adrenals are not visualized. The bladder is is midline with normal contours, no intraluminal finding. Incidental normal size follicular cyst at the left adnexa measuring 2.7 cm. .  
 
Impression: 1. Kidneys show no significant hydronephrosis. No significant residual stones identified as well.

## 2018-12-30 NOTE — ED NOTES
I have reviewed discharge instructions with the patient. The patient verbalized understanding. Patient left ED via Discharge Method: ambulatory to Home with (family). Opportunity for questions and clarification provided. Patient given 2 scripts. To continue your aftercare when you leave the hospital, you may receive an automated call from our care team to check in on how you are doing. This is a free service and part of our promise to provide the best care and service to meet your aftercare needs.  If you have questions, or wish to unsubscribe from this service please call [phone]. Thank you for Choosing our [facility].

## 2018-12-30 NOTE — DISCHARGE INSTRUCTIONS
Rest.  Extra fluids. Medication for pain and nausea if needed. Call your urologist in the morning for appointment or recheck. Recheck sooner for vomiting, worse pain, high fever. Kidney Stone: Care Instructions  Your Care Instructions    Kidney stones are formed when salts, minerals, and other substances normally found in the urine clump together. They can be as small as grains of sand or, rarely, as large as golf balls. While the stone is traveling through the ureter, which is the tube that carries urine from the kidney to the bladder, you will probably feel pain. The pain may be mild or very severe. You may also have some blood in your urine. As soon as the stone reaches the bladder, any intense pain should go away. If a stone is too large to pass on its own, you may need a medical procedure to help you pass the stone. The doctor has checked you carefully, but problems can develop later. If you notice any problems or new symptoms, get medical treatment right away. Follow-up care is a key part of your treatment and safety. Be sure to make and go to all appointments, and call your doctor if you are having problems. It's also a good idea to know your test results and keep a list of the medicines you take. How can you care for yourself at home? · Drink plenty of fluids, enough so that your urine is light yellow or clear like water. If you have kidney, heart, or liver disease and have to limit fluids, talk with your doctor before you increase the amount of fluids you drink. · Take pain medicines exactly as directed. Call your doctor if you think you are having a problem with your medicine. ? If the doctor gave you a prescription medicine for pain, take it as prescribed. ? If you are not taking a prescription pain medicine, ask your doctor if you can take an over-the-counter medicine. Read and follow all instructions on the label.   · Your doctor may ask you to strain your urine so that you can collect your kidney stone when it passes. You can use a kitchen strainer or a tea strainer to catch the stone. Store it in a plastic bag until you see your doctor again. Preventing future kidney stones  Some changes in your diet may help prevent kidney stones. Depending on the cause of your stones, your doctor may recommend that you:  · Drink plenty of fluids, enough so that your urine is light yellow or clear like water. If you have kidney, heart, or liver disease and have to limit fluids, talk with your doctor before you increase the amount of fluids you drink. · Limit coffee, tea, and alcohol. Also avoid grapefruit juice. · Do not take more than the recommended daily dose of vitamins C and D.  · Avoid antacids such as Gaviscon, Maalox, Mylanta, or Tums. · Limit the amount of salt (sodium) in your diet. · Eat a balanced diet that is not too high in protein. · Limit foods that are high in a substance called oxalate, which can cause kidney stones. These foods include dark green vegetables, rhubarb, chocolate, wheat bran, nuts, cranberries, and beans. When should you call for help? Call your doctor now or seek immediate medical care if:    · You cannot keep down fluids.     · Your pain gets worse.     · You have a fever or chills.     · You have new or worse pain in your back just below your rib cage (the flank area).     · You have new or more blood in your urine.    Watch closely for changes in your health, and be sure to contact your doctor if:    · You do not get better as expected. Where can you learn more? Go to http://gwen-brennan.info/. Enter K909 in the search box to learn more about \"Kidney Stone: Care Instructions. \"  Current as of: March 15, 2018  Content Version: 11.8  © 7667-9832 [employer]. Care instructions adapted under license by [employer] (which disclaims liability or warranty for this information).  If you have questions about a medical condition or this instruction, always ask your healthcare professional. [place] [address] any warranty or liability for your use of this information.

## 2019-01-28 PROBLEM — E11.40 TYPE 2 DIABETES MELLITUS WITH DIABETIC NEUROPATHY (HCC): Status: ACTIVE | Noted: 2019-01-28

## 2019-01-28 PROBLEM — E55.9 VITAMIN D INSUFFICIENCY: Status: ACTIVE | Noted: 2019-01-28

## 2019-01-31 ENCOUNTER — APPOINTMENT (OUTPATIENT)
Dept: CT IMAGING | Age: 44
End: 2019-01-31
Payer: COMMERCIAL

## 2019-01-31 ENCOUNTER — HOSPITAL ENCOUNTER (EMERGENCY)
Age: 44
Discharge: HOME | End: 2019-01-31
Payer: COMMERCIAL

## 2019-01-31 VITALS
DIASTOLIC BLOOD PRESSURE: 84 MMHG | TEMPERATURE: 98.6 F | SYSTOLIC BLOOD PRESSURE: 135 MMHG | BODY MASS INDEX: 42.68 KG/M2 | WEIGHT: 250 LBS | HEIGHT: 64 IN | OXYGEN SATURATION: 99 % | HEART RATE: 80 BPM | RESPIRATION RATE: 16 BRPM

## 2019-01-31 DIAGNOSIS — N20.1 URETERAL STONE: ICD-10-CM

## 2019-01-31 DIAGNOSIS — N20.0 RENAL LITHIASIS: ICD-10-CM

## 2019-01-31 LAB
ANION GAP SERPL CALC-SCNC: 9 MMOL/L
BASOPHILS # BLD: 0 K/UL (ref 0–0.2)
BASOPHILS NFR BLD: 0 % (ref 0–2)
BUN SERPL-MCNC: 13 MG/DL (ref 6–23)
CALCIUM SERPL-MCNC: 8.6 MG/DL (ref 8.3–10.4)
CHLORIDE SERPL-SCNC: 101 MMOL/L (ref 98–107)
CO2 SERPL-SCNC: 29 MMOL/L (ref 21–32)
CREAT SERPL-MCNC: 0.66 MG/DL (ref 0.6–1)
DIFFERENTIAL METHOD BLD: ABNORMAL
EOSINOPHIL # BLD: 0.1 K/UL (ref 0–0.8)
EOSINOPHIL NFR BLD: 1 % (ref 0.5–7.8)
ERYTHROCYTE [DISTWIDTH] IN BLOOD BY AUTOMATED COUNT: 14 % (ref 11.9–14.6)
GLUCOSE SERPL-MCNC: 92 MG/DL (ref 65–100)
HCT VFR BLD AUTO: 34 % (ref 35.8–46.3)
HGB BLD-MCNC: 10.6 G/DL (ref 11.7–15.4)
IMM GRANULOCYTES # BLD AUTO: 0 K/UL (ref 0–0.5)
IMM GRANULOCYTES NFR BLD AUTO: 0 % (ref 0–5)
LYMPHOCYTES # BLD: 2.3 K/UL (ref 0.5–4.6)
LYMPHOCYTES NFR BLD: 35 % (ref 13–44)
MCH RBC QN AUTO: 26.3 PG (ref 26.1–32.9)
MCHC RBC AUTO-ENTMCNC: 31.2 G/DL (ref 31.4–35)
MCV RBC AUTO: 84.4 FL (ref 79.6–97.8)
MONOCYTES # BLD: 0.5 K/UL (ref 0.1–1.3)
MONOCYTES NFR BLD: 7 % (ref 4–12)
NEUTS SEG # BLD: 3.7 K/UL (ref 1.7–8.2)
NEUTS SEG NFR BLD: 56 % (ref 43–78)
NRBC # BLD: 0 K/UL (ref 0–0.2)
PLATELET # BLD AUTO: 325 K/UL (ref 150–450)
PMV BLD AUTO: 8.9 FL (ref 9.4–12.3)
POTASSIUM SERPL-SCNC: 3.4 MMOL/L (ref 3.5–5.1)
RBC # BLD AUTO: 4.03 M/UL (ref 4.05–5.2)
SODIUM SERPL-SCNC: 139 MMOL/L (ref 136–145)
WBC # BLD AUTO: 6.5 K/UL (ref 4.3–11.1)

## 2019-01-31 PROCEDURE — 99283 EMERGENCY DEPT VISIT LOW MDM: CPT

## 2019-01-31 PROCEDURE — 74176 CT ABD & PELVIS W/O CONTRAST: CPT

## 2019-01-31 PROCEDURE — 74011250636 HC RX REV CODE- 250/636

## 2019-01-31 PROCEDURE — 96374 THER/PROPH/DIAG INJ IV PUSH: CPT

## 2019-01-31 PROCEDURE — 80048 BASIC METABOLIC PNL TOTAL CA: CPT

## 2019-01-31 PROCEDURE — 85025 COMPLETE CBC W/AUTO DIFF WBC: CPT

## 2019-01-31 PROCEDURE — 96376 TX/PRO/DX INJ SAME DRUG ADON: CPT

## 2019-01-31 RX ORDER — SODIUM CHLORIDE 9 MG/ML
1000 INJECTION, SOLUTION INTRAVENOUS ONCE
Status: COMPLETED | OUTPATIENT
Start: 2019-01-31 | End: 2019-01-31

## 2019-01-31 RX ORDER — KETOROLAC TROMETHAMINE 30 MG/ML
30 INJECTION, SOLUTION INTRAMUSCULAR; INTRAVENOUS
Status: COMPLETED | OUTPATIENT
Start: 2019-01-31 | End: 2019-01-31

## 2019-01-31 RX ORDER — HYDROCODONE BITARTRATE AND ACETAMINOPHEN 5; 325 MG/1; MG/1
1-2 TABLET ORAL
Qty: 12 TAB | Refills: 0 | Status: SHIPPED | OUTPATIENT
Start: 2019-01-31 | End: 2019-08-19

## 2019-01-31 RX ADMIN — KETOROLAC TROMETHAMINE 30 MG: 30 INJECTION, SOLUTION INTRAMUSCULAR at 13:14

## 2019-01-31 RX ADMIN — KETOROLAC TROMETHAMINE 30 MG: 30 INJECTION, SOLUTION INTRAMUSCULAR at 11:35

## 2019-01-31 RX ADMIN — SODIUM CHLORIDE 1000 ML: 900 INJECTION, SOLUTION INTRAVENOUS at 11:35

## 2019-01-31 NOTE — ED PROVIDER NOTES
62-year-old female left-sided flank pain history kidney stones. Patient had extracorporeal shockwave lithotripsy done last month first on this side. No fevers or chills no hematuria. Flank Pain This is a recurrent problem. The current episode started 2 days ago. The problem has not changed since onset. The problem occurs constantly. The pain is associated with no known injury. The pain is present in the left side. The quality of the pain is described as stabbing. The pain is at a severity of 8/10. The pain is moderate. Pertinent negatives include no chest pain, no fever, no numbness, no weight loss, no abdominal pain, no abdominal swelling, no bowel incontinence, no perianal numbness, no paresthesias, no paresis and no tingling. She has tried nothing for the symptoms. Past Medical History:  
Diagnosis Date  Diabetes ([facility] [address] does not check sugar-regularly  Hypercholesterolemia   
 controlled well with meds  Hypertension   
 medication controlled  Kidney stone Past Surgical History:  
Procedure Laterality Date  FRAGMENT KIDNEY STONE/ ESWL    
 HX DILATION AND CURETTAGE    
 HX ENDOSCOPY    
 HX LAP CHOLECYSTECTOMY  03/28/2017  HX LITHOTRIPSY    
  x 4  
 HX ORTHOPAEDIC Left   
 hand- tendon repair  HX OTHER SURGICAL Bilateral   
 sweat gland removal under both arms  HX TUBAL LIGATION  07/25/2005 Family History:  
Problem Relation Age of Onset  Heart Disease Maternal Grandmother  Hypertension Mother Phyllis Other Mother Earna Cari disease  Hypertension Father  Other Father on blood thinners for blood clots  Hypertension Brother  Colon Cancer Other  No Known Problems Brother  Breast Cancer Neg Hx   
 Ovarian Cancer Neg Hx  Uterine Cancer Neg Hx Social History Socioeconomic History  Marital status:  Spouse name: Not on file  Number of children: Not on file  Years of education: Not on file  Highest education level: Not on file Social Needs  Financial resource strain: Not on file  Food insecurity - worry: Not on file  Food insecurity - inability: Not on file  Transportation needs - medical: Not on file  Transportation needs - non-medical: Not on file Occupational History  Not on file Tobacco Use  Smoking status: Former Smoker Packs/day: 1.00 Years: 0.50 Pack years: 0.50 Last attempt to quit: 1/1/2009 Years since quitting: 10.0  Smokeless tobacco: Never Used Substance and Sexual Activity  Alcohol use: No  
 Drug use: No  
 Sexual activity: Yes  
  Partners: Male Birth control/protection: None Comment: tubal  
Other Topics Concern [address] Service Not Asked  Blood Transfusions Not Asked  Caffeine Concern No  
 Occupational Exposure Not Asked [place] Hazards Not Asked  Sleep Concern Not Asked  Stress Concern Not Asked  Weight Concern Not Asked  Special Diet Not Asked  Back Care Not Asked  Exercise Yes  Bike Helmet Not Asked [address] Yes  Self-Exams No  
Social History Narrative Abuse: Feels safe at home, no history of physical abuse, no history of sexual abuse ALLERGIES: Ace inhibitors; Dilaudid [hydromorphone]; Lisinopril; Bactrim [sulfamethoxazole-trimethoprim]; Flomax [tamsulosin]; Norvasc [amlodipine]; and Sulfa (sulfonamide antibiotics) Review of Systems Constitutional: Negative. Negative for activity change, fever and weight loss. HENT: Negative. Eyes: Negative. Respiratory: Negative. Cardiovascular: Negative. Negative for chest pain. Gastrointestinal: Negative. Negative for abdominal pain and bowel incontinence. Genitourinary: Positive for flank pain. Skin: Negative. Neurological: Negative. Negative for tingling, numbness and paresthesias. Psychiatric/Behavioral: Negative. All other systems reviewed and are negative. Vitals:  
 01/31/19 9074 BP: 142/90 Pulse: 93 Resp: 16 Temp: 98.6 °F (37 °C) SpO2: 99% Weight: 113.4 kg (250 lb) Height: 5' 4\" (1.626 m) Physical Exam  
Constitutional: She is oriented to person, place, and time. She appears well-developed and well-nourished. No distress. HENT:  
Head: Normocephalic and atraumatic. Right Ear: External ear normal.  
Left Ear: External ear normal.  
Nose: Nose normal.  
Mouth/Throat: Oropharynx is clear and moist. No oropharyngeal exudate. Eyes: Conjunctivae and EOM are normal. Pupils are equal, round, and reactive to light. Right eye exhibits no discharge. Left eye exhibits no discharge. No scleral icterus. Neck: Normal range of motion. Neck supple. No JVD present. No tracheal deviation present. Cardiovascular: Normal rate, regular rhythm and intact distal pulses. Pulmonary/Chest: Effort normal and breath sounds normal. No stridor. No respiratory distress. She has no wheezes. She exhibits no tenderness. Abdominal: Soft. Bowel sounds are normal. She exhibits no distension and no mass. There is no tenderness. Musculoskeletal: Normal range of motion. She exhibits no edema or tenderness. Neurological: She is alert and oriented to person, place, and time. No cranial nerve deficit. Skin: Skin is warm and dry. No rash noted. She is not diaphoretic. No erythema. No pallor. Psychiatric: She has a normal mood and affect. Her behavior is normal. Thought content normal.  
Nursing note and vitals reviewed. MDM Number of Diagnoses or Management Options Renal lithiasis: minor Diagnosis management comments: Assessment flank pain renal colic with renal lithiasis. Plan no hydronephrosis patient is quite comfortable in the emergency department no distress or signs of pain. Patient to contact Dr. Althea James today for follow-up. Amount and/or Complexity of Data Reviewed Clinical lab tests: ordered and reviewed Tests in the radiology section of CPT®: ordered and reviewed Tests in the medicine section of CPT®: ordered and reviewed Risk of Complications, Morbidity, and/or Mortality Presenting problems: moderate Diagnostic procedures: moderate Management options: moderate Patient Progress Patient progress: improved Procedures

## 2019-01-31 NOTE — DISCHARGE INSTRUCTIONS
Patient Education        Learning About Diet for Kidney Stone Prevention  What are kidney stones? Kidney stones are made of salts and minerals in the urine that form small \"yennifer. \" Stones can form in the kidneys and the ureters (the tubes that lead from the kidneys to the bladder). They can also form in the bladder. Stones may not cause a problem as long as they stay in the kidneys. But they can cause sudden, severe pain. Pain is most likely when the stones travel from the kidneys to the bladder. Kidney stones can cause bloody urine. Kidney stones often run in families. You are more likely to get them if you don't drink enough fluids, mainly water. Certain foods and drinks and some dietary supplements may also increase your risk for kidney stones if you consume too much of them. What can you do to prevent kidney stones? Changing what you eat may not prevent all types of kidney stones. But for people who have a history of certain kinds of kidney stones, some changes in diet may help. A dietitian can help you set up a meal plan that includes healthy, low-oxalate choices. Here are some general guidelines to get you started. Plan your meals and snacks around foods that are low in oxalate. These foods include:  · Corn, kale, parsnips, and squash,. · Beef, chicken, pork, turkey, and fish. · Milk, butter, cheese, and yogurt. You can eat certain foods that are medium-high in oxalate, but eat them only once in a while. These foods include:  · Bread. · Brown rice. · English muffins. · Figs. · Popcorn. · String beans. · Tomatoes. Limit very high-oxalate foods, including:  · Black tea. · Coffee. · Chocolate. · Dark green vegetables. · Nuts. Here are some other things you can do to help prevent kidney stones. · Drink plenty of fluids. If you have kidney, heart, or liver disease and have to limit fluids, talk with your doctor before you increase the amount of fluids you drink.   · Do not take more than the recommended daily dose of vitamins C and D.  · Limit the salt in your diet. · Eat a balanced diet that is not too high in protein. Follow-up care is a key part of your treatment and safety. Be sure to make and go to all appointments, and call your doctor if you are having problems. It's also a good idea to know your test results and keep a list of the medicines you take. Where can you learn more? Go to http://gwen-brennan.info/. Enter C138 in the search box to learn more about \"Learning About Diet for Kidney Stone Prevention. \"  Current as of: March 28, 2018  Content Version: 11.9  © 8695-0549 [employer]. Care instructions adapted under license by [employer] (which disclaims liability or warranty for this information). If you have questions about a medical condition or this instruction, always ask your healthcare professional. [address] any warranty or liability for your use of this information.

## 2019-01-31 NOTE — ED NOTES
I have reviewed discharge instructions with the patient. The patient verbalized understanding. Patient left ED via Discharge Method: ambulatory to Home with self. Opportunity for questions and clarification provided. Patient given 1 scripts. To continue your aftercare when you leave the hospital, you may receive an automated call from our care team to check in on how you are doing. This is a free service and part of our promise to provide the best care and service to meet your aftercare needs.  If you have questions, or wish to unsubscribe from this service please call [phone]. Thank you for Choosing our [facility] Emergency Department.

## 2019-01-31 NOTE — ED TRIAGE NOTES
Pt with hx of kidney stones. States left flank pain started yesterday around 1300. +nausea, no vomiting. Had ESWL last month on same side. Denies hematuria/dysuria.

## 2019-03-27 PROBLEM — E53.8 B12 DEFICIENCY: Status: ACTIVE | Noted: 2019-03-27

## 2019-04-09 PROBLEM — Z01.419 WOMEN'S ANNUAL ROUTINE GYNECOLOGICAL EXAMINATION: Status: ACTIVE | Noted: 2019-04-09

## 2019-05-24 ENCOUNTER — APPOINTMENT (OUTPATIENT)
Dept: ULTRASOUND IMAGING | Age: 44
End: 2019-05-24
Attending: EMERGENCY MEDICINE
Payer: COMMERCIAL

## 2019-05-24 ENCOUNTER — HOSPITAL ENCOUNTER (EMERGENCY)
Age: 44
Discharge: HOME | End: 2019-05-24
Attending: EMERGENCY MEDICINE
Payer: COMMERCIAL

## 2019-05-24 ENCOUNTER — APPOINTMENT (OUTPATIENT)
Dept: GENERAL RADIOLOGY | Age: 44
End: 2019-05-24
Attending: EMERGENCY MEDICINE
Payer: COMMERCIAL

## 2019-05-24 VITALS
RESPIRATION RATE: 18 BRPM | TEMPERATURE: 97.4 F | DIASTOLIC BLOOD PRESSURE: 71 MMHG | WEIGHT: 252 LBS | OXYGEN SATURATION: 97 % | HEART RATE: 74 BPM | SYSTOLIC BLOOD PRESSURE: 135 MMHG | HEIGHT: 64 IN | BODY MASS INDEX: 43.02 KG/M2

## 2019-05-24 DIAGNOSIS — N10 ACUTE PYELONEPHRITIS: Primary | ICD-10-CM

## 2019-05-24 LAB
ALBUMIN SERPL-MCNC: 3.4 G/DL (ref 3.5–5)
ALBUMIN/GLOB SERPL: 0.7 {RATIO} (ref 1.2–3.5)
ALP SERPL-CCNC: 70 U/L (ref 50–130)
ALT SERPL-CCNC: 11 U/L (ref 12–65)
ANION GAP SERPL CALC-SCNC: 9 MMOL/L (ref 7–16)
APPEARANCE UR: ABNORMAL
AST SERPL-CCNC: 14 U/L (ref 15–37)
BACTERIA URNS QL MICRO: ABNORMAL /HPF
BASOPHILS # BLD: 0 K/UL (ref 0–0.2)
BASOPHILS NFR BLD: 1 % (ref 0–2)
BILIRUB SERPL-MCNC: 0.2 MG/DL (ref 0.2–1.1)
BILIRUB UR QL: NEGATIVE
BUN SERPL-MCNC: 11 MG/DL (ref 6–23)
CALCIUM SERPL-MCNC: 8.9 MG/DL (ref 8.3–10.4)
CHLORIDE SERPL-SCNC: 103 MMOL/L (ref 98–107)
CO2 SERPL-SCNC: 28 MMOL/L (ref 21–32)
COLOR UR: YELLOW
CREAT SERPL-MCNC: 0.69 MG/DL (ref 0.6–1)
CRYSTALS URNS QL MICRO: ABNORMAL /LPF
DIFFERENTIAL METHOD BLD: ABNORMAL
EOSINOPHIL # BLD: 0.2 K/UL (ref 0–0.8)
EOSINOPHIL NFR BLD: 2 % (ref 0.5–7.8)
EPI CELLS #/AREA URNS HPF: ABNORMAL /HPF
ERYTHROCYTE [DISTWIDTH] IN BLOOD BY AUTOMATED COUNT: 14 % (ref 11.9–14.6)
GLOBULIN SER CALC-MCNC: 5 G/DL (ref 2.3–3.5)
GLUCOSE SERPL-MCNC: 95 MG/DL (ref 65–100)
GLUCOSE UR STRIP.AUTO-MCNC: NEGATIVE MG/DL
HCG UR QL: NEGATIVE
HCT VFR BLD AUTO: 33.2 % (ref 35.8–46.3)
HGB BLD-MCNC: 10.3 G/DL (ref 11.7–15.4)
HGB UR QL STRIP: NEGATIVE
IMM GRANULOCYTES # BLD AUTO: 0 K/UL (ref 0–0.5)
IMM GRANULOCYTES NFR BLD AUTO: 0 % (ref 0–5)
KETONES UR QL STRIP.AUTO: ABNORMAL MG/DL
LEUKOCYTE ESTERASE UR QL STRIP.AUTO: ABNORMAL
LYMPHOCYTES # BLD: 2.8 K/UL (ref 0.5–4.6)
LYMPHOCYTES NFR BLD: 40 % (ref 13–44)
MCH RBC QN AUTO: 26.5 PG (ref 26.1–32.9)
MCHC RBC AUTO-ENTMCNC: 31 G/DL (ref 31.4–35)
MCV RBC AUTO: 85.3 FL (ref 79.6–97.8)
MONOCYTES # BLD: 0.5 K/UL (ref 0.1–1.3)
MONOCYTES NFR BLD: 7 % (ref 4–12)
MUCOUS THREADS URNS QL MICRO: ABNORMAL /LPF
NEUTS SEG # BLD: 3.5 K/UL (ref 1.7–8.2)
NEUTS SEG NFR BLD: 50 % (ref 43–78)
NITRITE UR QL STRIP.AUTO: NEGATIVE
NRBC # BLD: 0 K/UL (ref 0–0.2)
OTHER OBSERVATIONS,UCOM: ABNORMAL
PH UR STRIP: 6 [PH] (ref 5–9)
PLATELET # BLD AUTO: 281 K/UL (ref 150–450)
PMV BLD AUTO: 9 FL (ref 9.4–12.3)
POTASSIUM SERPL-SCNC: 3.6 MMOL/L (ref 3.5–5.1)
PROT SERPL-MCNC: 8.4 G/DL (ref 6.3–8.2)
PROT UR STRIP-MCNC: ABNORMAL MG/DL
RBC # BLD AUTO: 3.89 M/UL (ref 4.05–5.2)
RBC #/AREA URNS HPF: ABNORMAL /HPF
SODIUM SERPL-SCNC: 140 MMOL/L (ref 136–145)
SP GR UR REFRACTOMETRY: 1.03 (ref 1–1.02)
UROBILINOGEN UR QL STRIP.AUTO: 1 EU/DL (ref 0.2–1)
WBC # BLD AUTO: 6.9 K/UL (ref 4.3–11.1)
WBC URNS QL MICRO: ABNORMAL /HPF

## 2019-05-24 PROCEDURE — 74018 RADEX ABDOMEN 1 VIEW: CPT

## 2019-05-24 PROCEDURE — 96375 TX/PRO/DX INJ NEW DRUG ADDON: CPT | Performed by: EMERGENCY MEDICINE

## 2019-05-24 PROCEDURE — 81001 URINALYSIS AUTO W/SCOPE: CPT

## 2019-05-24 PROCEDURE — 85025 COMPLETE CBC W/AUTO DIFF WBC: CPT

## 2019-05-24 PROCEDURE — 74011250636 HC RX REV CODE- 250/636: Performed by: EMERGENCY MEDICINE

## 2019-05-24 PROCEDURE — 80053 COMPREHEN METABOLIC PANEL: CPT

## 2019-05-24 PROCEDURE — 99284 EMERGENCY DEPT VISIT MOD MDM: CPT | Performed by: EMERGENCY MEDICINE

## 2019-05-24 PROCEDURE — 81025 URINE PREGNANCY TEST: CPT

## 2019-05-24 PROCEDURE — 76770 US EXAM ABDO BACK WALL COMP: CPT

## 2019-05-24 PROCEDURE — 81003 URINALYSIS AUTO W/O SCOPE: CPT | Performed by: EMERGENCY MEDICINE

## 2019-05-24 PROCEDURE — 74011000258 HC RX REV CODE- 258: Performed by: EMERGENCY MEDICINE

## 2019-05-24 PROCEDURE — 96365 THER/PROPH/DIAG IV INF INIT: CPT | Performed by: EMERGENCY MEDICINE

## 2019-05-24 RX ORDER — CEPHALEXIN 500 MG/1
500 CAPSULE ORAL 3 TIMES DAILY
Qty: 30 CAP | Refills: 0 | Status: SHIPPED | OUTPATIENT
Start: 2019-05-24 | End: 2019-06-03

## 2019-05-24 RX ORDER — KETOROLAC TROMETHAMINE 30 MG/ML
30 INJECTION, SOLUTION INTRAMUSCULAR; INTRAVENOUS ONCE
Status: COMPLETED | OUTPATIENT
Start: 2019-05-24 | End: 2019-05-24

## 2019-05-24 RX ADMIN — KETOROLAC TROMETHAMINE 30 MG: 30 INJECTION, SOLUTION INTRAMUSCULAR at 21:39

## 2019-05-24 RX ADMIN — CEFTRIAXONE 1 G: 1 INJECTION, POWDER, FOR SOLUTION INTRAMUSCULAR; INTRAVENOUS at 21:38

## 2019-05-24 RX ADMIN — SODIUM CHLORIDE 1000 ML: 900 INJECTION, SOLUTION INTRAVENOUS at 21:37

## 2019-05-24 NOTE — ED TRIAGE NOTES
Pt presents to the ED with L flank pain x 2 weeks,  Reports she has had 6 surgeries this year for kidney stone extraction.

## 2019-05-25 NOTE — ED PROVIDER NOTES
76-year-old female presenting for left flank pain. Said ongoing issues with kidney stones and thinks this might be what's going on in today. States his pain coming and going for 2 weeks. She finally got tired of it came in for evaluation. She's had 6 evaluations this year for renal stones. She's had some nausea but no vomiting. She denies fevers. She's been taking her home pain medications without relief.       Flank Pain           Past Medical History:   Diagnosis Date    Abnormal Papanicolaou smear of cervix     Diabetes ([facility] [address]     does not check sugar-regularly    Hypercholesterolemia     controlled well with meds    Hypertension     medication controlled    Kidney stone        Past Surgical History:   Procedure Laterality Date    FRAGMENT KIDNEY STONE/ ESWL      HX DILATION AND CURETTAGE      HX ENDOSCOPY      HX LAP CHOLECYSTECTOMY  03/28/2017    HX LITHOTRIPSY       x 4    HX ORTHOPAEDIC Left     hand- tendon repair    HX OTHER SURGICAL Bilateral     sweat gland removal under both arms    HX TUBAL LIGATION  07/25/2005    LAP,TUBAL CAUTERY           Family History:   Problem Relation Age of Onset    Heart Disease Maternal Grandmother     Hypertension Mother     Other Mother         Seth Goring disease    Hypertension Father     Other Father         on blood thinners for blood clots    Hypertension Brother     No Known Problems Brother     Breast Cancer Neg Hx     Ovarian Cancer Neg Hx     Uterine Cancer Neg Hx     Colon Cancer Neg Hx        Social History     Socioeconomic History    Marital status:      Spouse name: Not on file    Number of children: Not on file    Years of education: Not on file    Highest education level: Not on file   Occupational History    Not on file   Social Needs    Financial resource strain: Not on file    Food insecurity:     Worry: Not on file     Inability: Not on file    Transportation needs:     Medical: Not on file     Non-medical: Not on file   Tobacco Use    Smoking status: Former Smoker     Packs/day: 1.00     Years: 0.50     Pack years: 0.50     Last attempt to quit: 1/1/2009     Years since quitting: 10.3    Smokeless tobacco: Never Used   Substance and Sexual Activity    Alcohol use: No    Drug use: No    Sexual activity: Yes     Partners: Male     Birth control/protection: None     Comment: tubal   Lifestyle    Physical activity:     Days per week: Not on file     Minutes per session: Not on file    Stress: Not on file   Relationships    Social connections:     Talks on phone: Not on file     Gets together: Not on file     Attends L[employer] day service: Not on file     Active member of club or organization: Not on file     Attends meetings of clubs or organizations: Not on file     Relationship status: Not on file    Intimate partner violence:     Fear of current or ex partner: Not on file     Emotionally abused: Not on file     Physically abused: Not on file     Forced sexual activity: Not on file   Other Topics Concern     Service Not Asked    Blood Transfusions Not Asked    Caffeine Concern No    Occupational Exposure Not Asked   [place] Hazards Not Asked    Sleep Concern Not Asked    Stress Concern Not Asked    Weight Concern Not Asked    Special Diet Not Asked    Back Care Not Asked    Exercise Yes    Bike Helmet Not Asked    Seat Belt Yes    Self-Exams No   Social History Narrative    Abuse: Feels safe at home, no history of physical abuse, no history of sexual abuse         ALLERGIES: Ace inhibitors; Dilaudid [hydromorphone]; Lisinopril; Bactrim [sulfamethoxazole-trimethoprim]; Flomax [tamsulosin]; Norvasc [amlodipine]; and Sulfa (sulfonamide antibiotics)    Review of Systems   Genitourinary: Positive for flank pain. All other systems reviewed and are negative.       Vitals:    05/24/19 1951   BP: 180/84   Pulse: 95   Resp: 18   Temp: 98.2 °F (36.8 °C)   SpO2: 99%   Weight: 114.3 kg (252 lb)   Height: 5' 4\" (1.626 m)            Physical Exam   Constitutional: She is oriented to person, place, and time. She appears well-developed and well-nourished. HENT:   Head: Normocephalic and atraumatic. Eyes: Pupils are equal, round, and reactive to light. Conjunctivae are normal.   Neck: Neck supple. Cardiovascular: Normal rate and regular rhythm. Pulmonary/Chest: Effort normal and breath sounds normal.   Abdominal: Soft. Bowel sounds are normal.   Musculoskeletal: Normal range of motion. Neurological: She is alert and oriented to person, place, and time. Skin: Skin is warm and dry. Nursing note and vitals reviewed. MDM  Number of Diagnoses or Management Options  Acute pyelonephritis:   Diagnosis management comments: 16-year-old female presenting for recurrent left flank pain. Known history of kidney stones. Concerning for ureterolithiasis, back pain, urinary tract infection       Amount and/or Complexity of Data Reviewed  Clinical lab tests: ordered and reviewed  Tests in the radiology section of CPT®: ordered and reviewed  Tests in the medicine section of CPT®: reviewed and ordered  Independent visualization of images, tracings, or specimens: yes (Review the ultrasound)    Risk of Complications, Morbidity, and/or Mortality  Presenting problems: moderate  Diagnostic procedures: moderate  Management options: moderate  General comments: I personally reviewed the patient's vital signs, laboratory tests, and/or radiological findings. I discussed these findings with the patient and their significance. I answered all questions and gave the patient clear return precautions. The patient was discharged from the emergency department in stable condition        Patient Progress  Patient progress: improved    ED Course as of May 24 2214   Fri May 24, 2019   2044 No blood in the patient's urine. Trace leuk esterase.     [JS]   1833 Reviewed the patient's ultrasound which shows that the kidney stones have not changed and she has no hydronephrosis. This combined with a urinalysis which shows signs more of infection and kidney stone is consistent with a complicated UTI.     [JS]      ED Course User Index  [JS] Vani Santos MD       Procedures

## 2019-05-25 NOTE — DISCHARGE INSTRUCTIONS
Completed the full course of antibiotics. Currently there is no evidence that you have a kidney stone and its moved. I think this is more consistent with a urinary tract infection. As the antibiotics kick in her symptoms should improve. Continue using her home medications.

## 2019-07-29 ENCOUNTER — HOSPITAL ENCOUNTER (OUTPATIENT)
Dept: MAMMOGRAPHY | Age: 44
Discharge: HOME | End: 2019-07-29
Attending: NURSE PRACTITIONER
Payer: COMMERCIAL

## 2019-07-29 DIAGNOSIS — Z12.31 SCREENING MAMMOGRAM, ENCOUNTER FOR: ICD-10-CM

## 2019-07-29 PROCEDURE — 77067 SCR MAMMO BI INCL CAD: CPT

## 2019-11-10 ENCOUNTER — HOSPITAL ENCOUNTER (EMERGENCY)
Age: 44
Discharge: HOME | End: 2019-11-10
Payer: COMMERCIAL

## 2019-11-10 VITALS
RESPIRATION RATE: 18 BRPM | WEIGHT: 250 LBS | SYSTOLIC BLOOD PRESSURE: 142 MMHG | TEMPERATURE: 98 F | HEIGHT: 64 IN | DIASTOLIC BLOOD PRESSURE: 78 MMHG | HEART RATE: 85 BPM | BODY MASS INDEX: 42.68 KG/M2 | OXYGEN SATURATION: 99 %
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
## 2019-11-10 DIAGNOSIS — R07.89 MUSCULOSKELETAL CHEST PAIN: ICD-10-CM

## 2019-11-10 DIAGNOSIS — S16.1XXA STRAIN OF NECK MUSCLE, INITIAL ENCOUNTER: Primary | ICD-10-CM

## 2019-11-10 PROCEDURE — 99283 EMERGENCY DEPT VISIT LOW MDM: CPT

## 2019-11-10 RX ORDER — NAPROXEN 500 MG/1
500 TABLET ORAL 2 TIMES DAILY WITH MEALS
Qty: 20 TAB | Refills: 0 | Status: SHIPPED | OUTPATIENT
Start: 2019-11-10 | End: 2019-11-16

## 2019-11-10 RX ORDER — CYCLOBENZAPRINE HCL 10 MG
10 TABLET ORAL
Qty: 12 TAB | Refills: 0 | Status: SHIPPED | OUTPATIENT
Start: 2019-11-10 | End: 2019-11-16
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
## 2019-11-11 NOTE — ED NOTES
I have reviewed discharge instructions with the patient. The patient verbalized understanding. Patient left ED via Discharge Method: ambulatory to Home with self. Opportunity for questions and clarification provided. Patient given 2 scripts. To continue your aftercare when you leave the hospital, you may receive an automated call from our care team to check in on how you are doing. This is a free service and part of our promise to provide the best care and service to meet your aftercare needs.  If you have questions, or wish to unsubscribe from this service please call [phone]. Thank you for Choosing our [address] [place] [address] Emergency Department.

## 2019-11-16 ENCOUNTER — HOSPITAL ENCOUNTER (EMERGENCY)
Age: 44
Discharge: HOME | End: 2019-11-16
Attending: EMERGENCY MEDICINE
Payer: COMMERCIAL

## 2019-11-16 VITALS
BODY MASS INDEX: 42.68 KG/M2 | DIASTOLIC BLOOD PRESSURE: 76 MMHG | HEIGHT: 64 IN | TEMPERATURE: 97.8 F | WEIGHT: 250 LBS | SYSTOLIC BLOOD PRESSURE: 139 MMHG | RESPIRATION RATE: 18 BRPM | HEART RATE: 81 BPM | OXYGEN SATURATION: 97 %

## 2019-11-16 VITALS
SYSTOLIC BLOOD PRESSURE: 139 MMHG | TEMPERATURE: 98.8 F | DIASTOLIC BLOOD PRESSURE: 71 MMHG | OXYGEN SATURATION: 98 % | RESPIRATION RATE: 16 BRPM | HEART RATE: 94 BPM

## 2019-11-16 DIAGNOSIS — H57.89 PERIORBITAL SWELLING: Primary | ICD-10-CM

## 2019-11-16 DIAGNOSIS — H10.9 CONJUNCTIVITIS OF BOTH EYES, UNSPECIFIED CONJUNCTIVITIS TYPE: Primary | ICD-10-CM

## 2019-11-16 LAB
ANION GAP SERPL CALC-SCNC: 3 MMOL/L (ref 7–16)
BASOPHILS # BLD: 0 K/UL (ref 0–0.2)
BASOPHILS NFR BLD: 0 % (ref 0–2)
BUN SERPL-MCNC: 9 MG/DL (ref 6–23)
CALCIUM SERPL-MCNC: 8.9 MG/DL (ref 8.3–10.4)
CHLORIDE SERPL-SCNC: 105 MMOL/L (ref 98–107)
CO2 SERPL-SCNC: 29 MMOL/L (ref 21–32)
CREAT SERPL-MCNC: 0.71 MG/DL (ref 0.6–1)
CRP SERPL-MCNC: 1.6 MG/DL (ref 0–0.9)
DIFFERENTIAL METHOD BLD: ABNORMAL
EOSINOPHIL # BLD: 0.2 K/UL (ref 0–0.8)
EOSINOPHIL NFR BLD: 3 % (ref 0.5–7.8)
ERYTHROCYTE [DISTWIDTH] IN BLOOD BY AUTOMATED COUNT: 13.3 % (ref 11.9–14.6)
GLUCOSE SERPL-MCNC: 96 MG/DL (ref 65–100)
HCT VFR BLD AUTO: 34.8 % (ref 35.8–46.3)
HGB BLD-MCNC: 10.8 G/DL (ref 11.7–15.4)
IMM GRANULOCYTES # BLD AUTO: 0 K/UL (ref 0–0.5)
IMM GRANULOCYTES NFR BLD AUTO: 0 % (ref 0–5)
LYMPHOCYTES # BLD: 1.9 K/UL (ref 0.5–4.6)
LYMPHOCYTES NFR BLD: 37 % (ref 13–44)
MCH RBC QN AUTO: 26.7 PG (ref 26.1–32.9)
MCHC RBC AUTO-ENTMCNC: 31 G/DL (ref 31.4–35)
MCV RBC AUTO: 85.9 FL (ref 79.6–97.8)
MONOCYTES # BLD: 0.4 K/UL (ref 0.1–1.3)
MONOCYTES NFR BLD: 8 % (ref 4–12)
NEUTS SEG # BLD: 2.6 K/UL (ref 1.7–8.2)
NEUTS SEG NFR BLD: 52 % (ref 43–78)
NRBC # BLD: 0 K/UL (ref 0–0.2)
PLATELET # BLD AUTO: 283 K/UL (ref 150–450)
PMV BLD AUTO: 9.1 FL (ref 9.4–12.3)
POTASSIUM SERPL-SCNC: 4.1 MMOL/L (ref 3.5–5.1)
RBC # BLD AUTO: 4.05 M/UL (ref 4.05–5.2)
SODIUM SERPL-SCNC: 137 MMOL/L (ref 136–145)
WBC # BLD AUTO: 5.1 K/UL (ref 4.3–11.1)

## 2019-11-16 PROCEDURE — 99283 EMERGENCY DEPT VISIT LOW MDM: CPT | Performed by: EMERGENCY MEDICINE

## 2019-11-16 PROCEDURE — 86140 C-REACTIVE PROTEIN: CPT

## 2019-11-16 PROCEDURE — 74011250636 HC RX REV CODE- 250/636: Performed by: EMERGENCY MEDICINE

## 2019-11-16 PROCEDURE — 96376 TX/PRO/DX INJ SAME DRUG ADON: CPT | Performed by: EMERGENCY MEDICINE

## 2019-11-16 PROCEDURE — 74011000250 HC RX REV CODE- 250: Performed by: EMERGENCY MEDICINE

## 2019-11-16 PROCEDURE — 96374 THER/PROPH/DIAG INJ IV PUSH: CPT | Performed by: EMERGENCY MEDICINE

## 2019-11-16 PROCEDURE — 85025 COMPLETE CBC W/AUTO DIFF WBC: CPT

## 2019-11-16 PROCEDURE — 96375 TX/PRO/DX INJ NEW DRUG ADDON: CPT | Performed by: EMERGENCY MEDICINE

## 2019-11-16 PROCEDURE — 80048 BASIC METABOLIC PNL TOTAL CA: CPT

## 2019-11-16 RX ORDER — DIPHENHYDRAMINE HYDROCHLORIDE 50 MG/ML
25 INJECTION, SOLUTION INTRAMUSCULAR; INTRAVENOUS
Status: COMPLETED | OUTPATIENT
Start: 2019-11-16 | End: 2019-11-16

## 2019-11-16 RX ORDER — NEOMYCIN/POLYMYXIN B/HYDROCORT 3.5-10K-1
1 SUSPENSION, DROPS(FINAL DOSAGE FORM)(ML) OPHTHALMIC (EYE) 4 TIMES DAILY
Qty: 10 ML | Refills: 0 | Status: SHIPPED | OUTPATIENT
Start: 2019-11-16 | End: 2020-01-07

## 2019-11-16 RX ORDER — DEXAMETHASONE SODIUM PHOSPHATE 100 MG/10ML
10 INJECTION INTRAMUSCULAR; INTRAVENOUS
Status: COMPLETED | OUTPATIENT
Start: 2019-11-16 | End: 2019-11-16

## 2019-11-16 RX ORDER — PREDNISONE 5 MG/1
TABLET ORAL
Qty: 21 TAB | Refills: 0 | Status: SHIPPED | OUTPATIENT
Start: 2019-11-16 | End: 2020-01-07

## 2019-11-16 RX ADMIN — DIPHENHYDRAMINE HYDROCHLORIDE 25 MG: 50 INJECTION, SOLUTION INTRAMUSCULAR; INTRAVENOUS at 14:21

## 2019-11-16 RX ADMIN — FAMOTIDINE 20 MG: 10 INJECTION, SOLUTION INTRAVENOUS at 14:21

## 2019-11-16 RX ADMIN — DIPHENHYDRAMINE HYDROCHLORIDE 25 MG: 50 INJECTION, SOLUTION INTRAMUSCULAR; INTRAVENOUS at 16:29

## 2019-11-16 RX ADMIN — DEXAMETHASONE SODIUM PHOSPHATE 10 MG: 10 INJECTION INTRAMUSCULAR; INTRAVENOUS at 14:21

## 2019-11-16 NOTE — DISCHARGE INSTRUCTIONS
Cool compresses  Continue Benadryl 25 to 50 mg every 6-8 hours for swelling  Pepcid 20mg twice daily  Prednisone taper  Recheck with worsening, begin tomorrow  No eye make-up  Limit soap to fragrance and dye free

## 2019-11-16 NOTE — ED TRIAGE NOTES
Pt in states swelling to eyes lips and nose. States she was seen last night and given eye drops. States swelling is worse.

## 2019-11-16 NOTE — ED PROVIDER NOTES
Patient states she started having some irritation to both eyes last night around 9 PM, worse on the left. She woke up around 10 PM and had extensive swelling to both of her eyes, again worse on the left. She states she feels like something is in her eyes. She has been having extensive watering of both of her eyes. She denies any trauma or obvious precipitating event. She took Benadryl at home without any improvement. She states her vision is blurry in her left eye mainly due to its watering.            Past Medical History:   Diagnosis Date    Abnormal Papanicolaou smear of cervix     Diabetes ([facility] [address]     does not check sugar-regularly    GERD (gastroesophageal reflux disease)     Hypercholesterolemia     controlled well with meds    Hypertension     medication controlled    Kidney stone        Past Surgical History:   Procedure Laterality Date    FRAGMENT KIDNEY STONE/ ESWL      HX DILATION AND CURETTAGE      HX ENDOSCOPY      HX LAP CHOLECYSTECTOMY  03/28/2017    HX LITHOTRIPSY       x 4    HX ORTHOPAEDIC Left     hand- tendon repair    HX OTHER SURGICAL Bilateral     sweat gland removal under both arms    HX TUBAL LIGATION  07/25/2005    LAP,TUBAL CAUTERY           Family History:   Problem Relation Age of Onset    Heart Disease Maternal Grandmother     Diabetes Maternal Grandmother     Hypertension Mother     Other Mother         Letty Docker disease    Hypertension Father     Other Father         on blood thinners for blood clots    Hypertension Brother     No Known Problems Brother     Breast Cancer Neg Hx     Ovarian Cancer Neg Hx     Uterine Cancer Neg Hx     Colon Cancer Neg Hx        Social History     Socioeconomic History    Marital status:      Spouse name: Not on file    Number of children: Not on file    Years of education: Not on file    Highest education level: Not on file   Occupational History    Not on file   Social Needs    Financial resource strain: Not on file    Food insecurity:     Worry: Not on file     Inability: Not on file    Transportation needs:     Medical: Not on file     Non-medical: Not on file   Tobacco Use    Smoking status: Former Smoker     Packs/day: 1.00     Years: 0.50     Pack years: 0.50     Last attempt to quit: 1/1/2009     Years since quitting: 10.8    Smokeless tobacco: Never Used   Substance and Sexual Activity    Alcohol use: No    Drug use: No    Sexual activity: Yes     Partners: Male     Birth control/protection: None     Comment: tubal   Lifestyle    Physical activity:     Days per week: Not on file     Minutes per session: Not on file    Stress: Not on file   Relationships    Social connections:     Talks on phone: Not on file     Gets together: Not on file     Attends Pentecostal service: Not on file     Active member of club or organization: Not on file     Attends meetings of clubs or organizations: Not on file     Relationship status: Not on file    Intimate partner violence:     Fear of current or ex partner: Not on file     Emotionally abused: Not on file     Physically abused: Not on file     Forced sexual activity: Not on file   Other Topics Concern     Service Not Asked    Blood Transfusions Not Asked    Caffeine Concern No    Occupational Exposure Not Asked   [employer] Hazards Not Asked    Sleep Concern Not Asked    Stress Concern Not Asked    Weight Concern Not Asked    Special Diet Not Asked    Back Care Not Asked    Exercise Yes    Bike Helmet Not Asked    Seat Belt Yes    Self-Exams No   Social History Narrative    Abuse: Feels safe at home, no history of physical abuse, no history of sexual abuse         ALLERGIES: Ace inhibitors; Dilaudid [hydromorphone]; Lisinopril; Bactrim [sulfamethoxazole-trimethoprim]; Flomax [tamsulosin]; Norvasc [amlodipine]; and Sulfa (sulfonamide antibiotics)    Review of Systems   Constitutional: Negative for chills and fever.    Eyes: Positive for pain and itching. Gastrointestinal: Negative for nausea and vomiting. All other systems reviewed and are negative. Vitals:    11/16/19 0147   BP: 156/75   Pulse: 93   Resp: 18   Temp: 97.8 °F (36.6 °C)   SpO2: 100%   Weight: 113.4 kg (250 lb)   Height: 5' 4\" (1.626 m)            Physical Exam   Constitutional: She is oriented to person, place, and time. She appears well-developed and well-nourished. HENT:   Head: Normocephalic and atraumatic. Eyes: Pupils are equal, round, and reactive to light. Right eye exhibits no discharge. Left eye exhibits no discharge. Bilateral periorbital edema, worse on the left. She has mild scleral injection by laterally   Neck: Normal range of motion. Neck supple. Pulmonary/Chest: Effort normal. No respiratory distress. Musculoskeletal: She exhibits no edema or tenderness. Neurological: She is alert and oriented to person, place, and time. Skin: Skin is warm and dry. Psychiatric: She has a normal mood and affect. Her behavior is normal.   Nursing note and vitals reviewed.        MDM  Number of Diagnoses or Management Options  Conjunctivitis of both eyes, unspecified conjunctivitis type: new and does not require workup  Risk of Complications, Morbidity, and/or Mortality  Presenting problems: moderate  Diagnostic procedures: moderate  Management options: low    Patient Progress  Patient progress: stable         Procedures

## 2019-11-16 NOTE — ED PROVIDER NOTES
Is here with continued swelling around both orbits. Left somewhat more than right. Slight swelling to her nose and upper lip. Seen last night placed on eye medication for this. Continues at the same or slightly worse than it was last night so returns. No difficulty swallowing. No wheezing. Use the same eye care products for multiple years. No change in make-up and these products are also long-term use. No new medications. No history of similar. No generalized rash. No difficulty swallowing or wheezing. No generalized hives. No noted food provokers. The history is provided by the spouse and the patient. Facial Swelling    The incident occurred yesterday. Treatments tried: Incomplete benefit by Benadryl.         Past Medical History:   Diagnosis Date    Abnormal Papanicolaou smear of cervix     Diabetes ([facility] [address]     does not check sugar-regularly    GERD (gastroesophageal reflux disease)     Hypercholesterolemia     controlled well with meds    Hypertension     medication controlled    Kidney stone        Past Surgical History:   Procedure Laterality Date    FRAGMENT KIDNEY STONE/ ESWL      HX DILATION AND CURETTAGE      HX ENDOSCOPY      HX LAP CHOLECYSTECTOMY  03/28/2017    HX LITHOTRIPSY       x 4    HX ORTHOPAEDIC Left     hand- tendon repair    HX OTHER SURGICAL Bilateral     sweat gland removal under both arms    HX TUBAL LIGATION  07/25/2005    LAP,TUBAL CAUTERY           Family History:   Problem Relation Age of Onset    Heart Disease Maternal Grandmother     Diabetes Maternal Grandmother     Hypertension Mother     Other Mother         Dave Sensing disease    Hypertension Father     Other Father         on blood thinners for blood clots    Hypertension Brother     No Known Problems Brother     Breast Cancer Neg Hx     Ovarian Cancer Neg Hx     Uterine Cancer Neg Hx     Colon Cancer Neg Hx        Social History     Socioeconomic History    Marital status:  Spouse name: Not on file    Number of children: Not on file    Years of education: Not on file    Highest education level: Not on file   Occupational History    Not on file   Social Needs    Financial resource strain: Not on file    Food insecurity:     Worry: Not on file     Inability: Not on file    Transportation needs:     Medical: Not on file     Non-medical: Not on file   Tobacco Use    Smoking status: Former Smoker     Packs/day: 1.00     Years: 0.50     Pack years: 0.50     Last attempt to quit: 1/1/2009     Years since quitting: 10.8    Smokeless tobacco: Never Used   Substance and Sexual Activity    Alcohol use: No    Drug use: No    Sexual activity: Yes     Partners: Male     Birth control/protection: None     Comment: tubal   Lifestyle    Physical activity:     Days per week: Not on file     Minutes per session: Not on file    Stress: Not on file   Relationships    Social connections:     Talks on phone: Not on file     Gets together: Not on file     Attends Taoist service: Not on file     Active member of club or organization: Not on file     Attends meetings of clubs or organizations: Not on file     Relationship status: Not on file    Intimate partner violence:     Fear of current or ex partner: Not on file     Emotionally abused: Not on file     Physically abused: Not on file     Forced sexual activity: Not on file   Other Topics Concern     Service Not Asked    Blood Transfusions Not Asked    Caffeine Concern No    Occupational Exposure Not Asked   Mary Juan Hazards Not Asked    Sleep Concern Not Asked    Stress Concern Not Asked    Weight Concern Not Asked    Special Diet Not Asked    Back Care Not Asked    Exercise Yes    Bike Helmet Not Asked    Seat Belt Yes    Self-Exams No   Social History Narrative    Abuse: Feels safe at home, no history of physical abuse, no history of sexual abuse         ALLERGIES: Ace inhibitors; Dilaudid [hydromorphone];  Lisinopril; Bactrim [sulfamethoxazole-trimethoprim]; Flomax [tamsulosin]; Norvasc [amlodipine]; and Sulfa (sulfonamide antibiotics)    Review of Systems   Constitutional: Negative for diaphoresis and fever. HENT: Positive for facial swelling. Negative for ear discharge and ear pain. Respiratory: Negative for wheezing and stridor. Cardiovascular: Negative for chest pain. All other systems reviewed and are negative. Vitals:    11/16/19 1226   BP: 145/68   Pulse: 81   Resp: 18   Temp: 97.8 °F (36.6 °C)   SpO2: 100%            Physical Exam   Constitutional: She appears well-developed and well-nourished. No distress. HENT:   Head: Atraumatic. Right Ear: External ear normal.   Left Ear: External ear normal.   Mouth/Throat: No oropharyngeal exudate. Swelling left > right periorbit/ looks edema like and not cellulitic   Eyes: Pupils are equal, round, and reactive to light. EOM are normal. No scleral icterus. No evidence of conjunctivitis   Neck: Neck supple. No stridor   Cardiovascular: Normal rate. Pulmonary/Chest: Effort normal. No respiratory distress. She has no wheezes. Abdominal: Soft. Musculoskeletal: Normal range of motion. She exhibits no edema. Neurological: She is alert. No sensory deficit. She exhibits normal muscle tone. Skin: Skin is warm and dry. No hives/urticaria   Psychiatric: Thought content normal.   Nursing note and vitals reviewed. MDM  Number of Diagnoses or Management Options  Periorbital swelling:   Diagnosis management comments: Symptoms improve in our department with medications given. Feel as though this is more likely a topical induced hypersensitivity reaction. We will have her discontinue any products that she uses in the area. She is also to minimize any soap contact to this area. She is can be placed on a prednisone taper.        Amount and/or Complexity of Data Reviewed  Clinical lab tests: ordered and reviewed  Decide to obtain previous medical records or to obtain history from someone other than the patient: yes    Risk of Complications, Morbidity, and/or Mortality  Presenting problems: moderate  Diagnostic procedures: low    Patient Progress  Patient progress: stable         Procedures

## 2019-11-16 NOTE — LETTER
[address] [address] EMERGENCY DEPT 
[address] 
[place] [id number] [phone] Work/School Note Date: 11/16/2019 To Whom It May concern: 
 
Duy Sommer was seen and treated today in the emergency room by the following provider(s): 
Attending Provider: Shelly Miranda MD. Duy Sommer may return to work on 11/20/2019. Sincerely, Alicia Maya RN

## 2019-11-16 NOTE — ED TRIAGE NOTES
Pt states she went to bed and woke up with eye swelling and itching with some drainage. Also states she cannot see very well d/t blurriness and eyelid swelling.

## 2019-11-16 NOTE — DISCHARGE INSTRUCTIONS
Follow-up with your doctor as needed. Take over-the-counter Benadryl every 4 hours in addition to the prescribed antibiotics. Return to the emergency department if your symptoms worsen.

## 2019-11-16 NOTE — ED NOTES
I have reviewed discharge instructions with the patient. The patient verbalized understanding. Patient left ED via Discharge Method: ambulatory to Home with spouse. Opportunity for questions and clarification provided. Patient given 1 scripts. To continue your aftercare when you leave the hospital, you may receive an automated call from our care team to check in on how you are doing. This is a free service and part of our promise to provide the best care and service to meet your aftercare needs.  If you have questions, or wish to unsubscribe from this service please call [phone]. Thank you for Choosing our [facility] Emergency Department.

## 2020-01-06 ENCOUNTER — APPOINTMENT (OUTPATIENT)
Dept: CT IMAGING | Age: 45
End: 2020-01-06
Payer: COMMERCIAL

## 2020-01-06 ENCOUNTER — HOSPITAL ENCOUNTER (EMERGENCY)
Age: 45
Discharge: HOME | End: 2020-01-06
Payer: COMMERCIAL

## 2020-01-06 VITALS
HEART RATE: 85 BPM | TEMPERATURE: 98.2 F | SYSTOLIC BLOOD PRESSURE: 145 MMHG | OXYGEN SATURATION: 99 % | RESPIRATION RATE: 16 BRPM | DIASTOLIC BLOOD PRESSURE: 64 MMHG

## 2020-01-06 DIAGNOSIS — N39.0 URINARY TRACT INFECTION WITH HEMATURIA, SITE UNSPECIFIED: ICD-10-CM

## 2020-01-06 DIAGNOSIS — N23 RENAL COLIC: ICD-10-CM

## 2020-01-06 DIAGNOSIS — R10.9 ACUTE LEFT FLANK PAIN: Primary | ICD-10-CM

## 2020-01-06 DIAGNOSIS — R31.9 URINARY TRACT INFECTION WITH HEMATURIA, SITE UNSPECIFIED: ICD-10-CM

## 2020-01-06 DIAGNOSIS — N20.0 RENAL LITHIASIS: ICD-10-CM

## 2020-01-06 LAB
ALBUMIN SERPL-MCNC: 3.1 G/DL (ref 3.5–5)
ALBUMIN/GLOB SERPL: 0.6 {RATIO} (ref 1.2–3.5)
ALP SERPL-CCNC: 86 U/L (ref 50–130)
ALT SERPL-CCNC: 13 U/L (ref 12–65)
ANION GAP SERPL CALC-SCNC: 7 MMOL/L (ref 7–16)
AST SERPL-CCNC: 16 U/L (ref 15–37)
BASOPHILS # BLD: 0 K/UL (ref 0–0.2)
BASOPHILS NFR BLD: 0 % (ref 0–2)
BILIRUB SERPL-MCNC: 0.2 MG/DL (ref 0.2–1.1)
BUN SERPL-MCNC: 8 MG/DL (ref 6–23)
CALCIUM SERPL-MCNC: 9.7 MG/DL (ref 8.3–10.4)
CHLORIDE SERPL-SCNC: 101 MMOL/L (ref 98–107)
CO2 SERPL-SCNC: 30 MMOL/L (ref 21–32)
CREAT SERPL-MCNC: 0.78 MG/DL (ref 0.6–1)
DIFFERENTIAL METHOD BLD: ABNORMAL
EOSINOPHIL # BLD: 0.1 K/UL (ref 0–0.8)
EOSINOPHIL NFR BLD: 2 % (ref 0.5–7.8)
ERYTHROCYTE [DISTWIDTH] IN BLOOD BY AUTOMATED COUNT: 13.6 % (ref 11.9–14.6)
GLOBULIN SER CALC-MCNC: 5.4 G/DL (ref 2.3–3.5)
GLUCOSE SERPL-MCNC: 96 MG/DL (ref 65–100)
HCT VFR BLD AUTO: 35.7 % (ref 35.8–46.3)
HGB BLD-MCNC: 11.1 G/DL (ref 11.7–15.4)
IMM GRANULOCYTES # BLD AUTO: 0 K/UL (ref 0–0.5)
IMM GRANULOCYTES NFR BLD AUTO: 0 % (ref 0–5)
LYMPHOCYTES # BLD: 2.3 K/UL (ref 0.5–4.6)
LYMPHOCYTES NFR BLD: 31 % (ref 13–44)
MCH RBC QN AUTO: 26.3 PG (ref 26.1–32.9)
MCHC RBC AUTO-ENTMCNC: 31.1 G/DL (ref 31.4–35)
MCV RBC AUTO: 84.6 FL (ref 79.6–97.8)
MONOCYTES # BLD: 0.4 K/UL (ref 0.1–1.3)
MONOCYTES NFR BLD: 6 % (ref 4–12)
NEUTS SEG # BLD: 4.5 K/UL (ref 1.7–8.2)
NEUTS SEG NFR BLD: 61 % (ref 43–78)
NRBC # BLD: 0 K/UL (ref 0–0.2)
PLATELET # BLD AUTO: 316 K/UL (ref 150–450)
PMV BLD AUTO: 8.8 FL (ref 9.4–12.3)
POTASSIUM SERPL-SCNC: 3.7 MMOL/L (ref 3.5–5.1)
PROT SERPL-MCNC: 8.5 G/DL (ref 6.3–8.2)
RBC # BLD AUTO: 4.22 M/UL (ref 4.05–5.2)
SODIUM SERPL-SCNC: 138 MMOL/L (ref 136–145)
WBC # BLD AUTO: 7.4 K/UL (ref 4.3–11.1)

## 2020-01-06 PROCEDURE — 96375 TX/PRO/DX INJ NEW DRUG ADDON: CPT

## 2020-01-06 PROCEDURE — 96374 THER/PROPH/DIAG INJ IV PUSH: CPT

## 2020-01-06 PROCEDURE — 81003 URINALYSIS AUTO W/O SCOPE: CPT

## 2020-01-06 PROCEDURE — 99284 EMERGENCY DEPT VISIT MOD MDM: CPT

## 2020-01-06 PROCEDURE — 74011250636 HC RX REV CODE- 250/636

## 2020-01-06 PROCEDURE — 80053 COMPREHEN METABOLIC PANEL: CPT

## 2020-01-06 PROCEDURE — 74176 CT ABD & PELVIS W/O CONTRAST: CPT

## 2020-01-06 PROCEDURE — 85025 COMPLETE CBC W/AUTO DIFF WBC: CPT

## 2020-01-06 RX ORDER — KETOROLAC TROMETHAMINE 30 MG/ML
30 INJECTION, SOLUTION INTRAMUSCULAR; INTRAVENOUS
Status: COMPLETED | OUTPATIENT
Start: 2020-01-06 | End: 2020-01-06

## 2020-01-06 RX ORDER — SODIUM CHLORIDE 9 MG/ML
1000 INJECTION, SOLUTION INTRAVENOUS ONCE
Status: COMPLETED | OUTPATIENT
Start: 2020-01-06 | End: 2020-01-06

## 2020-01-06 RX ORDER — DIPHENHYDRAMINE HYDROCHLORIDE 50 MG/ML
25 INJECTION, SOLUTION INTRAMUSCULAR; INTRAVENOUS
Status: COMPLETED | OUTPATIENT
Start: 2020-01-06 | End: 2020-01-06

## 2020-01-06 RX ORDER — HYOSCYAMINE SULFATE 0.12 MG/1
0.12 TABLET SUBLINGUAL
Status: DISCONTINUED | OUTPATIENT
Start: 2020-01-06 | End: 2020-01-06

## 2020-01-06 RX ORDER — MORPHINE SULFATE 4 MG/ML
4 INJECTION INTRAVENOUS
Status: COMPLETED | OUTPATIENT
Start: 2020-01-06 | End: 2020-01-06

## 2020-01-06 RX ORDER — HYDROCODONE BITARTRATE AND ACETAMINOPHEN 7.5; 325 MG/1; MG/1
1 TABLET ORAL 2 TIMES DAILY
Qty: 12 TAB | Refills: 0 | Status: SHIPPED | OUTPATIENT
Start: 2020-01-06 | End: 2020-01-07

## 2020-01-06 RX ORDER — METOCLOPRAMIDE HYDROCHLORIDE 5 MG/ML
10 INJECTION INTRAMUSCULAR; INTRAVENOUS
Status: COMPLETED | OUTPATIENT
Start: 2020-01-06 | End: 2020-01-06

## 2020-01-06 RX ORDER — CEPHALEXIN 500 MG/1
500 CAPSULE ORAL 4 TIMES DAILY
Qty: 28 CAP | Refills: 0 | Status: SHIPPED | OUTPATIENT
Start: 2020-01-06 | End: 2020-01-10 | Stop reason: CLARIF

## 2020-01-06 RX ORDER — ONDANSETRON 2 MG/ML
4 INJECTION INTRAMUSCULAR; INTRAVENOUS
Status: COMPLETED | OUTPATIENT
Start: 2020-01-06 | End: 2020-01-06

## 2020-01-06 RX ADMIN — DIPHENHYDRAMINE HYDROCHLORIDE 25 MG: 50 INJECTION, SOLUTION INTRAMUSCULAR; INTRAVENOUS at 12:42

## 2020-01-06 RX ADMIN — MORPHINE SULFATE 4 MG: 4 INJECTION INTRAVENOUS at 11:46

## 2020-01-06 RX ADMIN — METOCLOPRAMIDE 10 MG: 5 INJECTION, SOLUTION INTRAMUSCULAR; INTRAVENOUS at 12:42

## 2020-01-06 RX ADMIN — KETOROLAC TROMETHAMINE 30 MG: 30 INJECTION, SOLUTION INTRAMUSCULAR at 10:21

## 2020-01-06 RX ADMIN — ONDANSETRON 4 MG: 2 INJECTION INTRAMUSCULAR; INTRAVENOUS at 10:19

## 2020-01-06 RX ADMIN — SODIUM CHLORIDE 1000 ML: 900 INJECTION, SOLUTION INTRAVENOUS at 10:19

## 2020-01-06 NOTE — ED NOTES
I have reviewed discharge instructions with the patient. The patient verbalized understanding. Patient left ED via Discharge Method: wheelchairto Home with spouse. Opportunity for questions and clarification provided. Patient given 2 scripts. To continue your aftercare when you leave the hospital, you may receive an automated call from our care team to check in on how you are doing. This is a free service and part of our promise to provide the best care and service to meet your aftercare needs.  If you have questions, or wish to unsubscribe from this service please call [phone]. Thank you for Choosing our [facility] Emergency Department.

## 2020-01-06 NOTE — ED TRIAGE NOTES
Pt to er c/o left flank pain for 3-4 days, pt c/o nausea.  Pt sts she did not take anything for the pain or nauasa

## 2020-01-06 NOTE — ED PROVIDER NOTES
79-year-old female complaint left flank pain. 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 Patient cannot find a position of comfort continues to move around the bed. Patient had a kidney stone 4 months ago. Flank Pain    This is a recurrent problem. The current episode started more than 2 days ago. The problem has been rapidly worsening. The problem occurs constantly. Patient reports not work related injury. The pain is associated with no known injury. The pain is present in the left side. The pain is severe. The symptoms are aggravated by certain positions. Pertinent negatives include no chest pain, no numbness, no paresthesias and no paresis. She has tried nothing for the symptoms.         Past Medical History:   Diagnosis Date    Abnormal Papanicolaou smear of cervix     Diabetes ([facility] [address]     does not check sugar-regularly    GERD (gastroesophageal reflux disease)     Hypercholesterolemia     controlled well with meds    Hypertension     medication controlled    Kidney stone        Past Surgical History:   Procedure Laterality Date    FRAGMENT KIDNEY STONE/ ESWL      HX DILATION AND CURETTAGE      HX ENDOSCOPY      HX LAP CHOLECYSTECTOMY  03/28/2017    HX LITHOTRIPSY       x 4    HX ORTHOPAEDIC Left     hand- tendon repair    HX OTHER SURGICAL Bilateral     sweat gland removal under both arms    HX TUBAL LIGATION  07/25/2005    LAP,TUBAL CAUTERY           Family History:   Problem Relation Age of Onset    Heart Disease Maternal Grandmother     Diabetes Maternal Grandmother     Hypertension Mother     Other Mother         Genevive Eisenmenger disease    Hypertension Father     Other Father         on blood thinners for blood clots    Hypertension Brother     No Known Problems Brother     Breast Cancer Neg Hx     Ovarian Cancer Neg Hx     Uterine Cancer Neg Hx     Colon Cancer Neg Hx        Social History Socioeconomic History    Marital status:      Spouse name: Not on file    Number of children: Not on file    Years of education: Not on file    Highest education level: Not on file   Occupational History    Not on file   Social Needs    Financial resource strain: Not on file    Food insecurity:     Worry: Not on file     Inability: Not on file    Transportation needs:     Medical: Not on file     Non-medical: Not on file   Tobacco Use    Smoking status: Former Smoker     Packs/day: 1.00     Years: 0.50     Pack years: 0.50     Last attempt to quit: 2009     Years since quittin.0    Smokeless tobacco: Never Used   Substance and Sexual Activity    Alcohol use: No    Drug use: No    Sexual activity: Yes     Partners: Male     Birth control/protection: None     Comment: tubal   Lifestyle    Physical activity:     Days per week: Not on file     Minutes per session: Not on file    Stress: Not on file   Relationships    Social connections:     Talks on phone: Not on file     Gets together: Not on file     Attends Sikh service: Not on file     Active member of club or organization: Not on file     Attends meetings of clubs or organizations: Not on file     Relationship status: Not on file    Intimate partner violence:     Fear of current or ex partner: Not on file     Emotionally abused: Not on file     Physically abused: Not on file     Forced sexual activity: Not on file   Other Topics Concern     Service Not Asked    Blood Transfusions Not Asked    Caffeine Concern No    Occupational Exposure Not Asked   Debe Paige Hazards Not Asked    Sleep Concern Not Asked    Stress Concern Not Asked    Weight Concern Not Asked    Special Diet Not Asked    Back Care Not Asked    Exercise Yes    Bike Helmet Not Asked    Seat Belt Yes    Self-Exams No   Social History Narrative    Abuse: Feels safe at home, no history of physical abuse, no history of sexual abuse ALLERGIES: Ace inhibitors; Dilaudid [hydromorphone]; Lisinopril; Bactrim [sulfamethoxazole-trimethoprim]; Flomax [tamsulosin]; Norvasc [amlodipine]; and Sulfa (sulfonamide antibiotics)    Review of Systems   Constitutional: Negative. Negative for activity change. HENT: Negative. Eyes: Negative. Respiratory: Negative. Cardiovascular: Negative. Negative for chest pain. Gastrointestinal: Negative. Genitourinary: Positive for flank pain. Skin: Negative. Neurological: Negative. Negative for numbness and paresthesias. Psychiatric/Behavioral: Negative. All other systems reviewed and are negative. Vitals:    01/06/20 0947   BP: 152/51   Pulse: 99   Resp: 18   Temp: 98.1 °F (36.7 °C)   SpO2: 98%            Physical Exam  Vitals signs and nursing note reviewed. Constitutional:       General: She is in acute distress. Appearance: She is well-developed. She is not diaphoretic. HENT:      Head: Normocephalic and atraumatic. Right Ear: External ear normal.      Left Ear: External ear normal.      Nose: Nose normal.      Mouth/Throat:      Pharynx: No oropharyngeal exudate. Eyes:      General: No scleral icterus. Right eye: No discharge. Left eye: No discharge. Conjunctiva/sclera: Conjunctivae normal.      Pupils: Pupils are equal, round, and reactive to light. Neck:      Musculoskeletal: Normal range of motion and neck supple. Vascular: No JVD. Trachea: No tracheal deviation. Cardiovascular:      Rate and Rhythm: Normal rate and regular rhythm. Pulmonary:      Effort: Pulmonary effort is normal. No respiratory distress. Breath sounds: Normal breath sounds. No stridor. No wheezing. Chest:      Chest wall: No tenderness. Abdominal:      General: Bowel sounds are normal. There is no distension. Palpations: Abdomen is soft. There is no mass. Tenderness: There is no tenderness. Musculoskeletal: Normal range of motion. General: No tenderness. Skin:     General: Skin is warm and dry. Coloration: Skin is not pale. Findings: No erythema or rash. Neurological:      Mental Status: She is alert and oriented to person, place, and time. Cranial Nerves: No cranial nerve deficit. Psychiatric:         Behavior: Behavior normal.         Thought Content: Thought content normal.          MDM  Number of Diagnoses or Management Options  Acute left flank pain: new and requires workup  Renal colic: new and requires workup  Renal lithiasis: new and requires workup  Urinary tract infection with hematuria, site unspecified: new and requires workup  Diagnosis management comments: Patient has a history of kidney stones has left flank pain and feels like it is a kidney stone. We will test her urine and CT.        Amount and/or Complexity of Data Reviewed  Clinical lab tests: reviewed and ordered  Tests in the radiology section of CPT®: ordered and reviewed  Tests in the medicine section of CPT®: ordered and reviewed           Procedures

## 2020-01-06 NOTE — DISCHARGE INSTRUCTIONS

## 2020-01-06 NOTE — LETTER
[address] [address] EMERGENCY DEPT 
[address] 
[place] [id number] [phone] Work/School Note Date: 1/6/2020 To Whom It May concern: 
 
Delia Damon was seen and treated today in the emergency room by the following provider(s): 
Attending Provider: Hector Waller MD. Delia Damon may return to work on 12/07/20.  
 
Sincerely, 
 
 
 
 
Terra Camarena RN

## 2020-01-06 NOTE — LETTER
[address] [address] EMERGENCY DEPT 
[address] [facility] [id number] [phone] Work/School Note Date: 1/6/2020 To Whom It May concern: 
 
Moris Duvall was seen and treated today in the emergency room by the following provider(s): 
Attending Provider: Kamlesh Galaviz MD. Moris Duvall may return to work on 01/07/2020 Sincerely, 
 
 
 
 
Omid Boswell RN

## 2020-01-10 ENCOUNTER — ANESTHESIA EVENT (OUTPATIENT)
Dept: SURGERY | Age: 45
End: 2020-01-10
Payer: COMMERCIAL

## 2020-01-11 ENCOUNTER — HOSPITAL ENCOUNTER (OUTPATIENT)
Age: 45
Setting detail: OUTPATIENT SURGERY
Discharge: HOME | End: 2020-01-11
Attending: UROLOGY | Admitting: UROLOGY
Payer: COMMERCIAL

## 2020-01-11 ENCOUNTER — ANESTHESIA (OUTPATIENT)
Dept: SURGERY | Age: 45
End: 2020-01-11
Payer: COMMERCIAL

## 2020-01-11 VITALS
BODY MASS INDEX: 42.76 KG/M2 | HEIGHT: 64 IN | TEMPERATURE: 97.7 F | WEIGHT: 250.5 LBS | DIASTOLIC BLOOD PRESSURE: 58 MMHG | RESPIRATION RATE: 16 BRPM | SYSTOLIC BLOOD PRESSURE: 115 MMHG | OXYGEN SATURATION: 95 % | HEART RATE: 75 BPM

## 2020-01-11 DIAGNOSIS — N20.0 RENAL STONE: Primary | ICD-10-CM

## 2020-01-11 LAB — GLUCOSE BLD STRIP.AUTO-MCNC: 124 MG/DL (ref 65–100)

## 2020-01-11 PROCEDURE — 77030039425 HC BLD LARYNG TRULITE DISP TELE -A: Performed by: ANESTHESIOLOGY

## 2020-01-11 PROCEDURE — 74011000250 HC RX REV CODE- 250: Performed by: NURSE ANESTHETIST, CERTIFIED REGISTERED

## 2020-01-11 PROCEDURE — 74011250636 HC RX REV CODE- 250/636: Performed by: NURSE ANESTHETIST, CERTIFIED REGISTERED

## 2020-01-11 PROCEDURE — 76060000033 HC ANESTHESIA 1 TO 1.5 HR: Performed by: UROLOGY

## 2020-01-11 PROCEDURE — 50590 FRAGMENTING OF KIDNEY STONE: CPT | Performed by: UROLOGY

## 2020-01-11 PROCEDURE — 77030040361 HC SLV COMPR DVT MDII -B: Performed by: UROLOGY

## 2020-01-11 PROCEDURE — 82962 GLUCOSE BLOOD TEST: CPT

## 2020-01-11 PROCEDURE — 74011250636 HC RX REV CODE- 250/636: Performed by: UROLOGY

## 2020-01-11 PROCEDURE — 77030037088 HC TUBE ENDOTRACH ORAL NSL COVD-A: Performed by: ANESTHESIOLOGY

## 2020-01-11 PROCEDURE — 76010000149 HC OR TIME 1 TO 1.5 HR: Performed by: UROLOGY

## 2020-01-11 PROCEDURE — 76210000020 HC REC RM PH II FIRST 0.5 HR: Performed by: UROLOGY

## 2020-01-11 PROCEDURE — 76210000006 HC OR PH I REC 0.5 TO 1 HR: Performed by: UROLOGY

## 2020-01-11 PROCEDURE — 74011250636 HC RX REV CODE- 250/636: Performed by: ANESTHESIOLOGY

## 2020-01-11 RX ORDER — MIDAZOLAM HYDROCHLORIDE 1 MG/ML
2 INJECTION, SOLUTION INTRAMUSCULAR; INTRAVENOUS ONCE
Status: DISCONTINUED | OUTPATIENT
Start: 2020-01-11 | End: 2020-01-11 | Stop reason: HOSPADM

## 2020-01-11 RX ORDER — LIDOCAINE HYDROCHLORIDE 10 MG/ML
0.1 INJECTION INFILTRATION; PERINEURAL AS NEEDED
Status: DISCONTINUED | OUTPATIENT
Start: 2020-01-11 | End: 2020-01-11 | Stop reason: HOSPADM

## 2020-01-11 RX ORDER — NALOXONE HYDROCHLORIDE 0.4 MG/ML
0.04 INJECTION, SOLUTION INTRAMUSCULAR; INTRAVENOUS; SUBCUTANEOUS
Status: DISCONTINUED | OUTPATIENT
Start: 2020-01-11 | End: 2020-01-11 | Stop reason: HOSPADM

## 2020-01-11 RX ORDER — LIDOCAINE HYDROCHLORIDE 20 MG/ML
INJECTION, SOLUTION EPIDURAL; INFILTRATION; INTRACAUDAL; PERINEURAL AS NEEDED
Status: DISCONTINUED | OUTPATIENT
Start: 2020-01-11 | End: 2020-01-11 | Stop reason: HOSPADM

## 2020-01-11 RX ORDER — ROCURONIUM BROMIDE 10 MG/ML
INJECTION, SOLUTION INTRAVENOUS AS NEEDED
Status: DISCONTINUED | OUTPATIENT
Start: 2020-01-11 | End: 2020-01-11 | Stop reason: HOSPADM

## 2020-01-11 RX ORDER — FENTANYL CITRATE 50 UG/ML
100 INJECTION, SOLUTION INTRAMUSCULAR; INTRAVENOUS ONCE
Status: COMPLETED | OUTPATIENT
Start: 2020-01-11 | End: 2020-01-11

## 2020-01-11 RX ORDER — FENTANYL CITRATE 50 UG/ML
INJECTION, SOLUTION INTRAMUSCULAR; INTRAVENOUS
Status: ACTIVE
Start: 2020-01-11 | End: 2020-01-11

## 2020-01-11 RX ORDER — MIDAZOLAM HYDROCHLORIDE 1 MG/ML
2 INJECTION, SOLUTION INTRAMUSCULAR; INTRAVENOUS
Status: DISCONTINUED | OUTPATIENT
Start: 2020-01-11 | End: 2020-01-11 | Stop reason: HOSPADM

## 2020-01-11 RX ORDER — TAMSULOSIN HYDROCHLORIDE 0.4 MG/1
0.4 CAPSULE ORAL DAILY
Qty: 20 CAP | Refills: 0 | Status: SHIPPED | OUTPATIENT
Start: 2020-01-11 | End: 2020-02-28 | Stop reason: CLARIF

## 2020-01-11 RX ORDER — PROPOFOL 10 MG/ML
INJECTION, EMULSION INTRAVENOUS AS NEEDED
Status: DISCONTINUED | OUTPATIENT
Start: 2020-01-11 | End: 2020-01-11 | Stop reason: HOSPADM

## 2020-01-11 RX ORDER — NEOSTIGMINE METHYLSULFATE 1 MG/ML
INJECTION, SOLUTION INTRAVENOUS AS NEEDED
Status: DISCONTINUED | OUTPATIENT
Start: 2020-01-11 | End: 2020-01-11 | Stop reason: HOSPADM

## 2020-01-11 RX ORDER — CEFAZOLIN SODIUM/WATER 2 G/20 ML
2 SYRINGE (ML) INTRAVENOUS
Status: COMPLETED | OUTPATIENT
Start: 2020-01-11 | End: 2020-01-11

## 2020-01-11 RX ORDER — ONDANSETRON 2 MG/ML
INJECTION INTRAMUSCULAR; INTRAVENOUS AS NEEDED
Status: DISCONTINUED | OUTPATIENT
Start: 2020-01-11 | End: 2020-01-11 | Stop reason: HOSPADM

## 2020-01-11 RX ORDER — FENTANYL CITRATE 50 UG/ML
50 INJECTION, SOLUTION INTRAMUSCULAR; INTRAVENOUS
Status: DISCONTINUED | OUTPATIENT
Start: 2020-01-11 | End: 2020-01-11 | Stop reason: HOSPADM

## 2020-01-11 RX ORDER — SODIUM CHLORIDE, SODIUM LACTATE, POTASSIUM CHLORIDE, CALCIUM CHLORIDE 600; 310; 30; 20 MG/100ML; MG/100ML; MG/100ML; MG/100ML
100 INJECTION, SOLUTION INTRAVENOUS CONTINUOUS
Status: DISCONTINUED | OUTPATIENT
Start: 2020-01-11 | End: 2020-01-11 | Stop reason: HOSPADM

## 2020-01-11 RX ORDER — HYDROCODONE BITARTRATE AND ACETAMINOPHEN 5; 325 MG/1; MG/1
1 TABLET ORAL
Qty: 20 TAB | Refills: 0 | Status: SHIPPED | OUTPATIENT
Start: 2020-01-11 | End: 2020-01-18

## 2020-01-11 RX ORDER — FENTANYL CITRATE 50 UG/ML
INJECTION, SOLUTION INTRAMUSCULAR; INTRAVENOUS AS NEEDED
Status: DISCONTINUED | OUTPATIENT
Start: 2020-01-11 | End: 2020-01-11 | Stop reason: HOSPADM

## 2020-01-11 RX ORDER — GLYCOPYRROLATE 0.2 MG/ML
INJECTION INTRAMUSCULAR; INTRAVENOUS AS NEEDED
Status: DISCONTINUED | OUTPATIENT
Start: 2020-01-11 | End: 2020-01-11 | Stop reason: HOSPADM

## 2020-01-11 RX ORDER — DEXAMETHASONE SODIUM PHOSPHATE 4 MG/ML
INJECTION, SOLUTION INTRA-ARTICULAR; INTRALESIONAL; INTRAMUSCULAR; INTRAVENOUS; SOFT TISSUE AS NEEDED
Status: DISCONTINUED | OUTPATIENT
Start: 2020-01-11 | End: 2020-01-11 | Stop reason: HOSPADM

## 2020-01-11 RX ADMIN — FENTANYL CITRATE 50 MCG: 50 INJECTION, SOLUTION INTRAMUSCULAR; INTRAVENOUS at 09:28

## 2020-01-11 RX ADMIN — FENTANYL CITRATE 100 MCG: 50 INJECTION INTRAMUSCULAR; INTRAVENOUS at 10:12

## 2020-01-11 RX ADMIN — LIDOCAINE HYDROCHLORIDE 60 MG: 20 INJECTION, SOLUTION EPIDURAL; INFILTRATION; INTRACAUDAL; PERINEURAL at 10:12

## 2020-01-11 RX ADMIN — Medication 3 MG: at 11:00

## 2020-01-11 RX ADMIN — DEXAMETHASONE SODIUM PHOSPHATE 4 MG: 4 INJECTION, SOLUTION INTRAMUSCULAR; INTRAVENOUS at 11:00

## 2020-01-11 RX ADMIN — ROCURONIUM BROMIDE 40 MG: 10 INJECTION, SOLUTION INTRAVENOUS at 10:12

## 2020-01-11 RX ADMIN — GLYCOPYRROLATE 0.4 MG: 0.2 INJECTION, SOLUTION INTRAMUSCULAR; INTRAVENOUS at 11:00

## 2020-01-11 RX ADMIN — SODIUM CHLORIDE, SODIUM LACTATE, POTASSIUM CHLORIDE, AND CALCIUM CHLORIDE 100 ML/HR: 600; 310; 30; 20 INJECTION, SOLUTION INTRAVENOUS at 09:28

## 2020-01-11 RX ADMIN — ONDANSETRON 4 MG: 2 INJECTION INTRAMUSCULAR; INTRAVENOUS at 11:00

## 2020-01-11 RX ADMIN — PROPOFOL 200 MG: 10 INJECTION, EMULSION INTRAVENOUS at 10:12

## 2020-01-11 RX ADMIN — Medication 2 G: at 10:11

## 2020-01-11 NOTE — PERIOP NOTES
Preoperative flank skin condition: intact  Lead shield: no  Patient ear protection: yes  Gel applied to patient's flank and Lithotripsy head:yes  Starting power level: 7  Shock start time (first  fluoroscopy): 1029  Shock stop time (last lithotripsy shock): 1100  Ending power level: 11  Total shocks: 3000  Total fluoroscopy time: 1:41  Postoperative flank skin condition: intact

## 2020-01-11 NOTE — ANESTHESIA POSTPROCEDURE EVALUATION
Procedure(s):  LITHOTRIPSY EXTRACORPOREAL SHOCKWAVE ESWL LEFT. general    <BSHSIANPOST>    Vitals Value Taken Time   /63 1/11/2020 11:35 AM   Temp 36.4 °C (97.6 °F) 1/11/2020 11:16 AM   Pulse 70 1/11/2020 11:49 AM   Resp 14 1/11/2020 11:35 AM   SpO2 94 % 1/11/2020 11:49 AM   Vitals shown include unvalidated device data.

## 2020-01-11 NOTE — OP NOTES
[facility]  OPERATIVE REPORT    Name:  Mine Ashraf  MR#:  [id number]  :  1975  ACCOUNT #:  [de-identified]  DATE OF SERVICE:  2020    PREOPERATIVE DIAGNOSIS:  Left renal stone. POSTOPERATIVE DIAGNOSIS:  Left renal stone. PROCEDURE PERFORMED:  Left extracorporeal shock wave lithotripsy. SURGEON:  Gustavo Cottrell. Sonido Simms MD    ASSISTANT:  None. ANESTHESIA:  General.    COMPLICATIONS:  None. SPECIMENS REMOVED:  None. IMPLANTS:  None. ESTIMATED BLOOD LOSS:  None. FINDINGS:  6 mm stone in the left mid kidney. DESCRIPTION OF PROCEDURE:  The patient was given a general anesthetic, placed in the supine position on the lithotripsy treatment table. Fluoroscopy was performed. The stone was visualized. This was a 6 mm stone which appears to be located in the left lateral kidney. Treatment was begun with a low treatment setting and gradually increased up to a power setting of 11 kV. A total of 3000 shocks were given. The stone showed excellent pulverization. PLAN:  She will be discharged home. Strain the urine. Return to the office in 1 month for a KUB. We will consider doing a metabolic workup in the future.       Evia Galeazzi, MD [id number]  D:  2020 11:27  T:  2020 13:24  JOB #:  [id number]

## 2020-01-11 NOTE — PERIOP NOTES
To whom it may concern: This is to certify [place] is excused from (school/work) on the following dates _1/11/20-1/14/20_______  Please feel free to contact Dr. Senait Milner ([phone]) with any questions or concerns. Thank you for your assistance in this matter.     Sincerely,

## 2020-01-11 NOTE — DISCHARGE INSTRUCTIONS
Lithotripsy: What to Expect at [address] [place] [address] is a way to treat kidney stones without surgery. It is also called extracorporeal shock wave lithotripsy, or ESWL. This treatment uses sound waves to break kidney stones into tiny pieces. These pieces can then pass out of the body in the urine. You may have a small amount of blood in your urine after this treatment. Your urine may be slightly pink or reddish. The blood in the urine often goes away after 2 days. This care sheet gives you a general idea about how long it will take for you to recover. But each person recovers at a different pace. Follow the steps below to feel better as quickly as possible. Activity  · Rest as much as you need to after you go home. · You may do your regular activities. But avoid hard exercise or sports for a week. Diet  · You can eat your normal diet. · Drink plenty of fluids, enough so that your urine is light yellow or clear like water. If you have kidney, heart, or liver disease and have to limit fluids, talk with your doctor before you increase the amount of fluids you drink. Medicines  · Your doctor will tell you if and when you can restart your medicines. He or she will also give you instructions about taking any new medicines. · If you take blood thinners, such as warfarin (Coumadin), clopidogrel (Plavix), or aspirin, be sure to talk to your doctor. He or she will tell you if and when to start taking those medicines again. Make sure that you understand exactly what your doctor wants you to do. · Be safe with medicines. Read and follow all instructions on the label. ¨ If the doctor gave you a prescription medicine for pain, take it as prescribed. ¨ If you are not taking a prescription pain medicine, ask your doctor if you can take acetaminophen (Tylenol). Do not take ibuprofen (Advil, Motrin) or naproxen (Aleve), or similar medicines unless your doctor tells you to.   ¨ Do not take two or more pain medicines at the same time unless the doctor told you to. Many pain medicines have acetaminophen, which is Tylenol. Too much acetaminophen (Tylenol) can be harmful. Other instructions  · Urinate through the strainer the doctor gives you. Save any stone pieces, including those that look like sand or gravel. Take these to your doctor. This will help your doctor find the cause of your stones. Follow-up care is a key part of your treatment and safety. Be sure to make and go to all appointments, and call your doctor if you are having problems. It's also a good idea to know your test results and keep a list of the medicines you take. When should you call for help? Call your doctor now or seek immediate medical care if:  · You have severe pain that does not get better after you take pain medicine. · You have severe pain when you urinate. · You have a fever over 100°F.  · You are not able to urinate within 6 to 8 hours after the procedure. · Your urine is still bright red 48 hours after the procedure. Watch closely for any changes in your health, and be sure to contact your doctor if:  You do not get better as expected. After general anesthesia or intravenous sedation, for 24 hours or while taking prescription Narcotics:  · Limit your activities  · A responsible adult needs to be with you for the next 24 hours  · Do not drive and operate hazardous machinery  · Do not make important personal or business decisions  · Do  not drink alcoholic beverages  · If you have not urinated within 8 hours after discharge, please contact your surgeon on call. · If you have sleep apnea and have a CPAP machine, please use it for all naps and sleeping. · Please use caution when taking narcotics and any of your home medications that may cause drowsiness. *  Please give a list of your current medications to your Primary Care Provider.   *  Please update this list whenever your medications are discontinued, doses are      changed, or new medications (including over-the-counter products) are added. *  Please carry medication information at all times in case of emergency situations. These are general instructions for a healthy lifestyle:  No smoking/ No tobacco products/ Avoid exposure to second hand smoke  Surgeon General's Warning:  Quitting smoking now greatly reduces serious risk to your health.   Obesity, smoking, and sedentary lifestyle greatly increases your risk for illness  A healthy diet, regular physical exercise & weight monitoring are important for maintaining a healthy lifestyle

## 2020-01-11 NOTE — BRIEF OP NOTE
BRIEF OPERATIVE NOTE    Date of Procedure: 1/11/2020   Preoperative Diagnosis: left Kidney stone [N20.0]  Postoperative Diagnosis: left Kidney stone [N20.0]    Procedure(s):  LITHOTRIPSY EXTRACORPOREAL SHOCKWAVE ESWL LEFT  Surgeon(s) and Role:     * Rishi Flanagan MD - Primary         Surgical Assistant: none    Surgical Staff:  Circ-1: Ashtyn Weston RN  Radiology Technician: Mariana Dillon, RT, R, CT  Event Time In Time Out   Incision Start 1029    Incision Close 1100      Anesthesia: General   Estimated Blood Loss: none  Specimens: * No specimens in log *   Findings: see op note   Complications: none  Implants: * No implants in log *

## 2020-01-11 NOTE — ANESTHESIA PREPROCEDURE EVALUATION
Anesthetic History   No history of anesthetic complications            Review of Systems / Medical History  Patient summary reviewed and pertinent labs reviewed    Pulmonary  Within defined limits                 Neuro/Psych   Within defined limits           Cardiovascular    Hypertension          Hyperlipidemia    Exercise tolerance: >4 METS     GI/Hepatic/Renal     GERD: poorly controlled    Renal disease: stones       Endo/Other    Diabetes: well controlled, type 2    Morbid obesity     Other Findings              Physical Exam    Airway  Mallampati: II  TM Distance: 4 - 6 cm  Neck ROM: normal range of motion   Mouth opening: Normal     Cardiovascular  Regular rate and rhythm,  S1 and S2 normal,  no murmur, click, rub, or gallop  Rhythm: regular  Rate: normal         Dental  No notable dental hx       Pulmonary  Breath sounds clear to auscultation               Abdominal  GI exam deferred       Other Findings            Anesthetic Plan    ASA: 2  Anesthesia type: general          Induction: Intravenous  Anesthetic plan and risks discussed with: Patient      ANKUR   Pt has allergy to dilaudid

## 2020-01-17 PROBLEM — N89.8 VAGINAL DISCHARGE: Status: ACTIVE | Noted: 2020-01-17

## 2020-02-21 ENCOUNTER — HOSPITAL ENCOUNTER (OUTPATIENT)
Dept: CT IMAGING | Age: 45
Discharge: HOME | End: 2020-02-21
Attending: NURSE PRACTITIONER
Payer: COMMERCIAL

## 2020-02-21 DIAGNOSIS — N20.0 KIDNEY STONE: ICD-10-CM

## 2020-02-21 PROCEDURE — 74176 CT ABD & PELVIS W/O CONTRAST: CPT

## 2020-02-29 ENCOUNTER — ANESTHESIA EVENT (OUTPATIENT)
Dept: SURGERY | Age: 45
End: 2020-02-29
Payer: COMMERCIAL

## 2020-03-02 ENCOUNTER — ANESTHESIA (OUTPATIENT)
Dept: SURGERY | Age: 45
End: 2020-03-02
Payer: COMMERCIAL

## 2020-03-02 ENCOUNTER — HOSPITAL ENCOUNTER (OUTPATIENT)
Age: 45
Setting detail: OUTPATIENT SURGERY
Discharge: HOME | End: 2020-03-02
Attending: UROLOGY | Admitting: UROLOGY
Payer: COMMERCIAL

## 2020-03-02 VITALS
SYSTOLIC BLOOD PRESSURE: 150 MMHG | OXYGEN SATURATION: 100 % | DIASTOLIC BLOOD PRESSURE: 73 MMHG | WEIGHT: 259 LBS | TEMPERATURE: 99.5 F | BODY MASS INDEX: 44.46 KG/M2 | RESPIRATION RATE: 17 BRPM | HEART RATE: 73 BPM

## 2020-03-02 DIAGNOSIS — N20.1 URETERAL STONE: Primary | ICD-10-CM

## 2020-03-02 LAB
GLUCOSE BLD STRIP.AUTO-MCNC: 99 MG/DL (ref 65–100)
POTASSIUM BLD-SCNC: 4.1 MMOL/L (ref 3.5–5.1)

## 2020-03-02 PROCEDURE — 74011250637 HC RX REV CODE- 250/637: Performed by: ANESTHESIOLOGY

## 2020-03-02 PROCEDURE — 74011250636 HC RX REV CODE- 250/636: Performed by: ANESTHESIOLOGY

## 2020-03-02 PROCEDURE — 77030040361 HC SLV COMPR DVT MDII -B: Performed by: UROLOGY

## 2020-03-02 PROCEDURE — 76210000021 HC REC RM PH II 0.5 TO 1 HR: Performed by: UROLOGY

## 2020-03-02 PROCEDURE — 74011250636 HC RX REV CODE- 250/636: Performed by: REGISTERED NURSE

## 2020-03-02 PROCEDURE — 76010000149 HC OR TIME 1 TO 1.5 HR: Performed by: UROLOGY

## 2020-03-02 PROCEDURE — 82962 GLUCOSE BLOOD TEST: CPT

## 2020-03-02 PROCEDURE — 50590 FRAGMENTING OF KIDNEY STONE: CPT | Performed by: UROLOGY

## 2020-03-02 PROCEDURE — 74011000250 HC RX REV CODE- 250: Performed by: REGISTERED NURSE

## 2020-03-02 PROCEDURE — 84132 ASSAY OF SERUM POTASSIUM: CPT

## 2020-03-02 PROCEDURE — 76060000033 HC ANESTHESIA 1 TO 1.5 HR: Performed by: UROLOGY

## 2020-03-02 PROCEDURE — 77030039425 HC BLD LARYNG TRULITE DISP TELE -A: Performed by: ANESTHESIOLOGY

## 2020-03-02 PROCEDURE — 76210000063 HC OR PH I REC FIRST 0.5 HR: Performed by: UROLOGY

## 2020-03-02 PROCEDURE — 74011250636 HC RX REV CODE- 250/636: Performed by: NURSE PRACTITIONER

## 2020-03-02 PROCEDURE — 77030037088 HC TUBE ENDOTRACH ORAL NSL COVD-A: Performed by: ANESTHESIOLOGY

## 2020-03-02 RX ORDER — SODIUM CHLORIDE 0.9 % (FLUSH) 0.9 %
5-40 SYRINGE (ML) INJECTION EVERY 8 HOURS
Status: DISCONTINUED | OUTPATIENT
Start: 2020-03-02 | End: 2020-03-02 | Stop reason: HOSPADM

## 2020-03-02 RX ORDER — MORPHINE SULFATE 10 MG/ML
5 INJECTION, SOLUTION INTRAMUSCULAR; INTRAVENOUS
Status: DISCONTINUED | OUTPATIENT
Start: 2020-03-02 | End: 2020-03-02 | Stop reason: HOSPADM

## 2020-03-02 RX ORDER — SODIUM CHLORIDE 9 MG/ML
50 INJECTION, SOLUTION INTRAVENOUS CONTINUOUS
Status: DISCONTINUED | OUTPATIENT
Start: 2020-03-02 | End: 2020-03-02 | Stop reason: HOSPADM

## 2020-03-02 RX ORDER — ROCURONIUM BROMIDE 10 MG/ML
INJECTION, SOLUTION INTRAVENOUS AS NEEDED
Status: DISCONTINUED | OUTPATIENT
Start: 2020-03-02 | End: 2020-03-02 | Stop reason: HOSPADM

## 2020-03-02 RX ORDER — SODIUM CHLORIDE 0.9 % (FLUSH) 0.9 %
5-40 SYRINGE (ML) INJECTION AS NEEDED
Status: DISCONTINUED | OUTPATIENT
Start: 2020-03-02 | End: 2020-03-02 | Stop reason: HOSPADM

## 2020-03-02 RX ORDER — SODIUM CHLORIDE, SODIUM LACTATE, POTASSIUM CHLORIDE, CALCIUM CHLORIDE 600; 310; 30; 20 MG/100ML; MG/100ML; MG/100ML; MG/100ML
150 INJECTION, SOLUTION INTRAVENOUS CONTINUOUS
Status: DISCONTINUED | OUTPATIENT
Start: 2020-03-02 | End: 2020-03-02 | Stop reason: HOSPADM

## 2020-03-02 RX ORDER — FENTANYL CITRATE 50 UG/ML
100 INJECTION, SOLUTION INTRAMUSCULAR; INTRAVENOUS ONCE
Status: DISCONTINUED | OUTPATIENT
Start: 2020-03-02 | End: 2020-03-02 | Stop reason: HOSPADM

## 2020-03-02 RX ORDER — LIDOCAINE HYDROCHLORIDE 10 MG/ML
0.1 INJECTION INFILTRATION; PERINEURAL AS NEEDED
Status: DISCONTINUED | OUTPATIENT
Start: 2020-03-02 | End: 2020-03-02 | Stop reason: HOSPADM

## 2020-03-02 RX ORDER — SUCCINYLCHOLINE CHLORIDE 20 MG/ML
INJECTION INTRAMUSCULAR; INTRAVENOUS AS NEEDED
Status: DISCONTINUED | OUTPATIENT
Start: 2020-03-02 | End: 2020-03-02 | Stop reason: HOSPADM

## 2020-03-02 RX ORDER — LIDOCAINE HYDROCHLORIDE 20 MG/ML
INJECTION, SOLUTION EPIDURAL; INFILTRATION; INTRACAUDAL; PERINEURAL AS NEEDED
Status: DISCONTINUED | OUTPATIENT
Start: 2020-03-02 | End: 2020-03-02 | Stop reason: HOSPADM

## 2020-03-02 RX ORDER — ACETAMINOPHEN 500 MG
1000 TABLET ORAL
Status: DISCONTINUED | OUTPATIENT
Start: 2020-03-02 | End: 2020-03-02 | Stop reason: HOSPADM

## 2020-03-02 RX ORDER — PROPOFOL 10 MG/ML
INJECTION, EMULSION INTRAVENOUS AS NEEDED
Status: DISCONTINUED | OUTPATIENT
Start: 2020-03-02 | End: 2020-03-02 | Stop reason: HOSPADM

## 2020-03-02 RX ORDER — ONDANSETRON 2 MG/ML
INJECTION INTRAMUSCULAR; INTRAVENOUS AS NEEDED
Status: DISCONTINUED | OUTPATIENT
Start: 2020-03-02 | End: 2020-03-02 | Stop reason: HOSPADM

## 2020-03-02 RX ORDER — FAMOTIDINE 20 MG/1
20 TABLET, FILM COATED ORAL ONCE
Status: COMPLETED | OUTPATIENT
Start: 2020-03-02 | End: 2020-03-02

## 2020-03-02 RX ORDER — MIDAZOLAM HYDROCHLORIDE 1 MG/ML
2 INJECTION, SOLUTION INTRAMUSCULAR; INTRAVENOUS
Status: COMPLETED | OUTPATIENT
Start: 2020-03-02 | End: 2020-03-02

## 2020-03-02 RX ORDER — CEFAZOLIN SODIUM/WATER 2 G/20 ML
2 SYRINGE (ML) INTRAVENOUS
Status: COMPLETED | OUTPATIENT
Start: 2020-03-02 | End: 2020-03-02

## 2020-03-02 RX ORDER — TRAMADOL HYDROCHLORIDE 50 MG/1
50 TABLET ORAL
Qty: 15 TAB | Refills: 0 | Status: SHIPPED | OUTPATIENT
Start: 2020-03-02 | End: 2020-03-05

## 2020-03-02 RX ORDER — HYDROCODONE BITARTRATE AND ACETAMINOPHEN 5; 325 MG/1; MG/1
1 TABLET ORAL AS NEEDED
Status: DISCONTINUED | OUTPATIENT
Start: 2020-03-02 | End: 2020-03-02 | Stop reason: HOSPADM

## 2020-03-02 RX ORDER — TAMSULOSIN HYDROCHLORIDE 0.4 MG/1
0.4 CAPSULE ORAL DAILY
Qty: 7 CAP | Refills: 0 | Status: SHIPPED | OUTPATIENT
Start: 2020-03-02 | End: 2020-03-17 | Stop reason: SDUPTHER

## 2020-03-02 RX ORDER — ACETAMINOPHEN 500 MG
1000 TABLET ORAL ONCE
Status: COMPLETED | OUTPATIENT
Start: 2020-03-02 | End: 2020-03-02

## 2020-03-02 RX ADMIN — MIDAZOLAM 2 MG: 1 INJECTION INTRAMUSCULAR; INTRAVENOUS at 11:27

## 2020-03-02 RX ADMIN — SODIUM CHLORIDE, SODIUM LACTATE, POTASSIUM CHLORIDE, AND CALCIUM CHLORIDE 150 ML/HR: 600; 310; 30; 20 INJECTION, SOLUTION INTRAVENOUS at 11:27

## 2020-03-02 RX ADMIN — PROPOFOL 200 MG: 10 INJECTION, EMULSION INTRAVENOUS at 12:53

## 2020-03-02 RX ADMIN — Medication 2 G: at 13:02

## 2020-03-02 RX ADMIN — HYDROCODONE BITARTRATE AND ACETAMINOPHEN 1 TABLET: 5; 325 TABLET ORAL at 14:29

## 2020-03-02 RX ADMIN — ONDANSETRON 4 MG: 2 INJECTION INTRAMUSCULAR; INTRAVENOUS at 13:44

## 2020-03-02 RX ADMIN — FAMOTIDINE 20 MG: 20 TABLET, FILM COATED ORAL at 11:26

## 2020-03-02 RX ADMIN — ROCURONIUM BROMIDE 5 MG: 10 INJECTION, SOLUTION INTRAVENOUS at 12:53

## 2020-03-02 RX ADMIN — ACETAMINOPHEN 1000 MG: 500 TABLET, FILM COATED ORAL at 11:26

## 2020-03-02 RX ADMIN — LIDOCAINE HYDROCHLORIDE 100 MG: 20 INJECTION, SOLUTION EPIDURAL; INFILTRATION; INTRACAUDAL; PERINEURAL at 12:53

## 2020-03-02 RX ADMIN — SUCCINYLCHOLINE CHLORIDE 180 MG: 20 INJECTION, SOLUTION INTRAMUSCULAR; INTRAVENOUS at 12:53

## 2020-03-02 NOTE — BRIEF OP NOTE
BRIEF OPERATIVE NOTE    Date of Procedure: 3/2/2020   Preoperative Diagnosis: Kidney stone [N20.0]  Postoperative Diagnosis: Right Kidney stone [N20.0]    Procedure(s):  RIGHT LITHOTRIPSY EXTRACORPOREAL SHOCKWAVE ESWL  Surgeon(s) and Role:     Larissa Hancock MD - Primary         Surgical Assistant:     Surgical Staff:  Circ-1: [facility]  Radiology Technician: Iraj Patel, RT, R, CT  Event Time In Time Out   Incision Start 1301    Incision Close 1338      Anesthesia: General   Estimated Blood Loss:   Specimens: * No specimens in log *   Findings:    Complications:   Implants: * No implants in log *

## 2020-03-02 NOTE — PERIOP NOTES
Preoperative flank skin condition: WARM/DRY/INTACT  Lead shield: Yes  Patient ear protection: Yes   Gel applied to patient's flank and Lithotripsy head: Yes   Starting power level: 3  Shock start time (first  fluoroscopy): 1301  Shock stop time (last lithotripsy shock): 4343  Ending power level: 11  Total shocks: 3000  Total fluoroscopy time: 1:43  Postoperative flank skin condition: WARM/DRY/INTACT/PINK

## 2020-03-02 NOTE — ANESTHESIA PREPROCEDURE EVALUATION
Anesthetic History   No history of anesthetic complications            Review of Systems / Medical History  Patient summary reviewed and pertinent labs reviewed    Pulmonary  Within defined limits                 Neuro/Psych   Within defined limits           Cardiovascular    Hypertension          Hyperlipidemia    Exercise tolerance: >4 METS     GI/Hepatic/Renal     GERD: well controlled    Renal disease: stones       Endo/Other    Diabetes: well controlled, type 2    Morbid obesity     Other Findings              Physical Exam    Airway  Mallampati: I  TM Distance: 4 - 6 cm  Neck ROM: normal range of motion   Mouth opening: Normal     Cardiovascular  Regular rate and rhythm,  S1 and S2 normal,  no murmur, click, rub, or gallop  Rhythm: regular  Rate: normal         Dental  No notable dental hx       Pulmonary  Breath sounds clear to auscultation               Abdominal  GI exam deferred       Other Findings            Anesthetic Plan    ASA: 3  Anesthesia type: general          Induction: Intravenous  Anesthetic plan and risks discussed with: Patient and Family      GETA.

## 2020-03-02 NOTE — H&P
History and Physical    Subjective:      Chelsy Villar is a 38 y.o. female evaluated with a stone in the rt kidney   Recent left ureteral stones treated with ESWL     Recent KUB and CT with rt renal stone.             Past Medical History:   Diagnosis Date    Abnormal Papanicolaou smear of cervix     Adverse effect of anesthesia     slow to awaken    Diabetes ([facility] [address]     type 2 - oral agents- -does not check sugar-regularly- denies hypoglycemic episodes    GERD (gastroesophageal reflux disease)     no medications- sleeps on 3 pillows-     Hypercholesterolemia     controlled well with meds    Hypertension     medication controlled    Kidney stone      Past Surgical History:   Procedure Laterality Date    FRAGMENT KIDNEY STONE/ ESWL      HX DILATION AND CURETTAGE      HX ENDOSCOPY      HX HYSTEROSCOPY WITH ENDOMETRIAL ABLATION      HX HYSTEROSCOPY WITH ENDOMETRIAL ABLATION      HX LAP CHOLECYSTECTOMY  2017    HX LITHOTRIPSY       x 4    HX ORTHOPAEDIC Left     hand- tendon repair    HX ORTHOPAEDIC Left     ankle repair-    HX OTHER SURGICAL Bilateral     sweat gland removal under both arms    HX TUBAL LIGATION  2005    LAP,TUBAL CAUTERY        Family History   Problem Relation Age of Onset    Heart Disease Maternal Grandmother     Diabetes Maternal Grandmother     Hypertension Mother     Other Mother         Karen Bucker disease    Hypertension Father     Deep Vein Thrombosis Father     Hypertension Brother     No Known Problems Brother     Breast Cancer Neg Hx     Ovarian Cancer Neg Hx     Uterine Cancer Neg Hx     Colon Cancer Neg Hx      Social History     Tobacco Use    Smoking status: Former Smoker     Packs/day: 1.00     Years: 0.50     Pack years: 0.50     Last attempt to quit: 2009     Years since quittin.1    Smokeless tobacco: Never Used   Substance Use Topics    Alcohol use: No     Allergies   Allergen Reactions    Ace Inhibitors Angioedema    Dilaudid [Hydromorphone] Anaphylaxis     Swollen tongue, lips, hypotension, low O2 SATS    Lisinopril Anaphylaxis    Bactrim [Sulfamethoxazole-Trimethoprim] Hives    Flomax [Tamsulosin] Swelling    Norvasc [Amlodipine] Other (comments)     Pt denies any allergic reaction to Norvasc    Oxycodone Other (comments)     Makes itch    Sulfa (Sulfonamide Antibiotics) Hives     Prior to Admission medications    Medication Sig Start Date End Date Taking? Authorizing Provider   ibuprofen (MOTRIN) 200 mg tablet Take  by mouth. Yes Provider, Historical   hydroCHLOROthiazide (HYDRODIURIL) 25 mg tablet Take 1 Tab by mouth daily. 19  Yes Mike Noonan NP   pravastatin (PRAVACHOL) 40 mg tablet Take 1 Tab by mouth nightly. 19  Yes Mike Noonan NP   metFORMIN (GLUCOPHAGE) 500 mg tablet Take 1 Tab by mouth two (2) times daily (with meals). Indications: type 2 diabetes mellitus 19  Yes Romulo GUILLEN NP   multivitamin (ONE A DAY) tablet Take 1 Tab by mouth daily. Yes Provider, Historical        Review of Systems:  A comprehensive review of systems was negative except for that written in the HPI. Objective:        Patient Vitals for the past 8 hrs:   BP Temp Pulse Resp SpO2 Weight   20 1057 119/57 97.7 °F (36.5 °C) 79 16 100 % 259 lb (117.5 kg)       Temp (24hrs), Av.7 °F (36.5 °C), Min:97.7 °F (36.5 °C), Max:97.7 °F (36.5 °C)      Physical Exam:  GENERAL: alert, cooperative, no distress, appears stated age, LUNG: clear to auscultation bilaterally, HEART: regular rate and rhythm, S1, S2 normal, no murmur, click, rub or gallop, ABDOMEN: soft, non-tender. Bowel sounds normal. No masses,  no organomegaly      Assessment:     Stone on left gone and with 5-6 mm rt renal stone for  ESWL    Plan:     1. The risks, benefits, complications, treatment options, and expected outcomes were discussed with the patient.  The patient understands the risks, any and all questions were answered to the patient's satisfaction. 2. Proceed with outpatient extracorporeal shock wave lithotripsy (ESWL).

## 2020-03-02 NOTE — DISCHARGE INSTRUCTIONS
Patient Education        Lithotripsy: What to Expect at Home  Your Recovery    Lithotripsy is a way to treat kidney stones without surgery. It is also called extracorporeal shock wave lithotripsy, or ESWL. This treatment uses sound waves to break kidney stones into tiny pieces. These pieces can then pass out of the body in the urine. You may have a small amount of blood in your urine after this treatment. Your urine may be slightly pink or reddish. The blood in the urine often goes away after 2 days. This care sheet gives you a general idea about how long it will take for you to recover. But each person recovers at a different pace. Follow the steps below to feel better as quickly as possible. How can you care for yourself at home? Activity    · Rest as much as you need to after you go home.     · You may do your regular activities. But avoid hard exercise or sports for a week. Wait until there is no blood in your urine and the stent is out. Diet    · You can eat your normal diet.     · Drink plenty of fluids, enough so that your urine is light yellow or clear like water. If you have kidney, heart, or liver disease and have to limit fluids, talk with your doctor before you increase the amount of fluids you drink. Medicines    · Your doctor will tell you if and when you can restart your medicines. He or she will also give you instructions about taking any new medicines.     · If you take blood thinners, such as warfarin (Coumadin), clopidogrel (Plavix), or aspirin, be sure to talk to your doctor. He or she will tell you if and when to start taking those medicines again. Make sure that you understand exactly what your doctor wants you to do.     · Be safe with medicines. Read and follow all instructions on the label. ? If the doctor gave you a prescription medicine for pain, take it as prescribed. ? If you are not taking a prescription pain medicine, ask your doctor if you can take acetaminophen (Tylenol). Do not take ibuprofen (Advil, Motrin) or naproxen (Aleve), or similar medicines unless your doctor tells you to. ? Do not take two or more pain medicines at the same time unless the doctor told you to. Many pain medicines have acetaminophen, which is Tylenol. Too much acetaminophen (Tylenol) can be harmful. Other instructions    · Urinate through the strainer the doctor gives you. Save any stone pieces, including those that look like sand or gravel. Take these to your doctor. This will help your doctor find the cause of your stones. Follow-up care is a key part of your treatment and safety. Be sure to make and go to all appointments, and call your doctor if you are having problems. It's also a good idea to know your test results and keep a list of the medicines you take. When should you call for help? Call 911 anytime you think you may need emergency care. For example, call if:    · You passed out (lost consciousness).     · You have chest pain, are short of breath, or cough up blood.    Call your doctor now or seek immediate medical care if:    · You have pain that does not get better after you take pain medicine.     · You have new or more blood clots in your urine. (It is normal for the urine to be pink for a few days.)     · You cannot urinate.     · You have symptoms of a urinary tract infection. These may include:  ? Pain or burning when you urinate. ? A frequent need to urinate without being able to pass much urine. ? Pain in the flank, which is just below the rib cage and above the waist on either side of the back. ? Blood in the urine. ? A fever.     · You are sick to your stomach or cannot drink fluids.     · You have signs of a blood clot in your leg (called a deep vein thrombosis), such as:  ? Pain in the calf, back of the knee, thigh, or groin. ?  Redness and swelling in your leg.    Watch closely for any changes in your health, and be sure to contact your doctor if you have any problems. Where can you learn more? Go to http://gwne-brennan.info/. Enter O872 in the search box to learn more about \"Lithotripsy: What to Expect at Home. \"  Current as of: October 31, 2018  Content Version: 12.2  © 5862-4615 [employer]. Care instructions adapted under license by [employer] (which disclaims liability or warranty for this information). If you have questions about a medical condition or this instruction, always ask your healthcare professional. [place] [address] any warranty or liability for your use of this information. · STRAIN URINE for 2 days post op. DIET  · Clear liquids until no nausea or vomiting; then light diet for the first day. · Advance to regular diet on second day, unless your doctor orders otherwise. · If nausea and vomiting continues, call your doctor. · Avoid greasy and spicy food today to reduce nausea. PAIN  · Take pain medication as directed by your doctor. · Call your doctor if pain is NOT relieved by medication. · DO NOT take aspirin of blood thinners unless directed by your doctor. · Take pain pills with food to reduce nausea  · Pain pills may cause constipation. May use stool softener      AFTER ANESTHESIA   · For the first 24 hours: DO NOT Drive, Drink alcoholic beverages, or Make important decisions. · Be aware of dizziness following anesthesia and while taking pain medication.      APPOINTMENT DATE/ TIME: 6 week follow up with NP and for KUB on April 13, 2020 at 9:45 a.m. with Gilda Kelley NP with a JESSIE (xray)    YOUR DOCTOR'S PHONE NUMBER [phone]      DISCHARGE SUMMARY from Nurse    PATIENT INSTRUCTIONS:    After general anesthesia or intravenous sedation, for 24 hours or while taking prescription Narcotics:  · Limit your activities  · Do not drive and operate hazardous machinery  · Do not make important personal or business decisions  · Do  not drink alcoholic beverages  · If you have not urinated within 8 hours after discharge, please contact your surgeon on call. *  Please give a list of your current medications to your Primary Care Provider. *  Please update this list whenever your medications are discontinued, doses are      changed, or new medications (including over-the-counter products) are added. *  Please carry medication information at all times in case of emergency situations. These are general instructions for a healthy lifestyle:    No smoking/ No tobacco products/ Avoid exposure to second hand smoke    Surgeon General's Warning:  Quitting smoking now greatly reduces serious risk to your health. Obesity, smoking, and sedentary lifestyle greatly increases your risk for illness    A healthy diet, regular physical exercise & weight monitoring are important for maintaining a healthy lifestyle    You may be retaining fluid if you have a history of heart failure or if you experience any of the following symptoms:  Weight gain of 3 pounds or more overnight or 5 pounds in a week, increased swelling in our hands or feet or shortness of breath while lying flat in bed. Please call your doctor as soon as you notice any of these symptoms; do not wait until your next office visit. Recognize signs and symptoms of STROKE:    F-face looks uneven    A-arms unable to move or move unevenly    S-speech slurred or non-existent    T-time-call 911 as soon as signs and symptoms begin-DO NOT go       Back to bed or wait to see if you get better-TIME IS BRAIN.                    3/2/2020      RE: Chelsy Villar      To Whom it May Concern:  [facility]    This is to certify that Chelsy Villar underwent a surgical procedure on Monday, March 2, 2020 which required her to be absent from work. She may return to work on Thursday, March 5, 2020 when she is no longer requiring prescription pain medications.       Please feel free to contact my office Dr. Penny Verma [phone]  if you have any questions or concerns. Thank you for your assistance in this matter.     Sincerely,      Gordon Marsh RN

## 2020-03-02 NOTE — PROGRESS NOTES
's Pre-op visit requested by patient. Conveyed care and concern for patient and family. Offered prayer as requested for patient, family, and staff.     Kwadwo Ramsey MDiv, BS  Board Certified

## 2020-03-03 NOTE — OP NOTES
[facility]  OPERATIVE REPORT    Name:  Mateusz Gray  MR#:  [id number]  :  1975  ACCOUNT #:  [de-identified]  DATE OF SERVICE:  2020    PREOPERATIVE DIAGNOSES:  Recent left renal extracorporeal shock wave lithotripsy with resolved stones on the left, still with a 6-mm right renal stone. POSTOPERATIVE DIAGNOSES:  Recent left renal extracorporeal shock wave lithotripsy with resolved stones on the left, still with a 6-mm right renal stone. PROCEDURE PERFORMED:  Right renal lithotripsy. SURGEON:  Stacy Casiano MD    ASSISTANT:      ANESTHESIA:  General.    COMPLICATIONS:  No complications. SPECIMENS REMOVED:  No specimen. IMPLANTS:  .    ESTIMATED BLOOD LOSS:  None. FINDINGS:  Per dictation. PROCEDURE:  The patient was taken to the operating room suite, underwent general anesthesia, placed on the lithotripsy table. The stone was identified in the right kidney. It was 6 mm, easy to see. It was blasted with a total of 3000 shocks at a maximum setting with some apparent pulverization. The stone did not disappear. The patient tolerated the procedure well, was sent to recovery area, will be discharged on Ultram and tamsulosin, and follow up in the office with the nurse practitioner in about 6 weeks for a KUB.       Gordo Major MD [email or web address]  D:  2020 13:54  T:  2020 22:40  JOB #:  [id number]

## 2020-03-03 NOTE — ANESTHESIA POSTPROCEDURE EVALUATION
Procedure(s):  RIGHT LITHOTRIPSY EXTRACORPOREAL SHOCKWAVE ESWL.     general    Anesthesia Post Evaluation      Multimodal analgesia: multimodal analgesia used between 6 hours prior to anesthesia start to PACU discharge  Patient location during evaluation: PACU  Patient participation: complete - patient participated  Level of consciousness: awake and alert  Pain management: adequate  Airway patency: patent  Anesthetic complications: no  Cardiovascular status: acceptable and hemodynamically stable  Respiratory status: acceptable  Hydration status: acceptable        Vitals Value Taken Time   /73 3/2/2020  2:00 PM   Temp 37.5 °C (99.5 °F) 3/2/2020  1:55 PM   Pulse 72 3/2/2020  2:00 PM   Resp 16 3/2/2020  2:00 PM   SpO2 100 % 3/2/2020  2:00 PM

## 2020-05-18 PROBLEM — N89.8 VAGINAL DISCHARGE: Status: RESOLVED | Noted: 2020-01-17 | Resolved: 2020-05-18

## 2020-05-18 PROBLEM — Z11.3 SCREEN FOR STD (SEXUALLY TRANSMITTED DISEASE): Status: ACTIVE | Noted: 2020-05-18

## 2020-06-18 ENCOUNTER — HOSPITAL ENCOUNTER (OUTPATIENT)
Dept: LAB | Age: 45
Discharge: HOME | End: 2020-06-18
Payer: COMMERCIAL

## 2020-06-18 LAB
ANION GAP SERPL CALC-SCNC: 4 MMOL/L (ref 7–16)
BUN SERPL-MCNC: 11 MG/DL (ref 6–23)
CALCIUM SERPL-MCNC: 8.7 MG/DL (ref 8.3–10.4)
CHLORIDE SERPL-SCNC: 105 MMOL/L (ref 98–107)
CO2 SERPL-SCNC: 31 MMOL/L (ref 21–32)
CREAT SERPL-MCNC: 0.7 MG/DL (ref 0.6–1)
ERYTHROCYTE [DISTWIDTH] IN BLOOD BY AUTOMATED COUNT: 14.4 % (ref 11.9–14.6)
GLUCOSE SERPL-MCNC: 101 MG/DL (ref 65–100)
HCT VFR BLD AUTO: 35.2 % (ref 35.8–46.3)
HGB BLD-MCNC: 11.1 G/DL (ref 11.7–15.4)
MCH RBC QN AUTO: 27.1 PG (ref 26.1–32.9)
MCHC RBC AUTO-ENTMCNC: 31.5 G/DL (ref 31.4–35)
MCV RBC AUTO: 86.1 FL (ref 79.6–97.8)
NRBC # BLD: 0 K/UL (ref 0–0.2)
PLATELET # BLD AUTO: 274 K/UL (ref 150–450)
PMV BLD AUTO: 9.2 FL (ref 9.4–12.3)
POTASSIUM SERPL-SCNC: 3.7 MMOL/L (ref 3.5–5.1)
RBC # BLD AUTO: 4.09 M/UL (ref 4.05–5.2)
SODIUM SERPL-SCNC: 140 MMOL/L (ref 136–145)
WBC # BLD AUTO: 4.3 K/UL (ref 4.3–11.1)

## 2020-06-18 PROCEDURE — 36415 COLL VENOUS BLD VENIPUNCTURE: CPT

## 2020-06-18 PROCEDURE — 85027 COMPLETE CBC AUTOMATED: CPT

## 2020-06-18 PROCEDURE — 80048 BASIC METABOLIC PNL TOTAL CA: CPT

## 2020-06-19 ENCOUNTER — HOSPITAL ENCOUNTER (OUTPATIENT)
Age: 45
Setting detail: OUTPATIENT SURGERY
Discharge: HOME | End: 2020-06-19
Attending: UROLOGY | Admitting: UROLOGY
Payer: COMMERCIAL

## 2020-06-19 ENCOUNTER — ANESTHESIA (OUTPATIENT)
Dept: SURGERY | Age: 45
End: 2020-06-19
Payer: COMMERCIAL

## 2020-06-19 ENCOUNTER — ANESTHESIA EVENT (OUTPATIENT)
Dept: SURGERY | Age: 45
End: 2020-06-19
Payer: COMMERCIAL

## 2020-06-19 VITALS
RESPIRATION RATE: 16 BRPM | TEMPERATURE: 97.7 F | HEART RATE: 68 BPM | BODY MASS INDEX: 44.11 KG/M2 | SYSTOLIC BLOOD PRESSURE: 152 MMHG | DIASTOLIC BLOOD PRESSURE: 78 MMHG | OXYGEN SATURATION: 100 % | WEIGHT: 257 LBS

## 2020-06-19 DIAGNOSIS — N20.1 URETERAL STONE: Primary | ICD-10-CM

## 2020-06-19 LAB — GLUCOSE BLD STRIP.AUTO-MCNC: 103 MG/DL (ref 65–100)

## 2020-06-19 PROCEDURE — 82962 GLUCOSE BLOOD TEST: CPT

## 2020-06-19 PROCEDURE — 74011636320 HC RX REV CODE- 636/320: Performed by: UROLOGY

## 2020-06-19 PROCEDURE — 74011250637 HC RX REV CODE- 250/637: Performed by: ANESTHESIOLOGY

## 2020-06-19 PROCEDURE — 74011000250 HC RX REV CODE- 250: Performed by: NURSE ANESTHETIST, CERTIFIED REGISTERED

## 2020-06-19 PROCEDURE — 76210000016 HC OR PH I REC 1 TO 1.5 HR: Performed by: UROLOGY

## 2020-06-19 PROCEDURE — 76010000138 HC OR TIME 0.5 TO 1 HR: Performed by: UROLOGY

## 2020-06-19 PROCEDURE — 74011250636 HC RX REV CODE- 250/636: Performed by: NURSE ANESTHETIST, CERTIFIED REGISTERED

## 2020-06-19 PROCEDURE — 77030010509 HC AIRWY LMA MSK TELE -A: Performed by: ANESTHESIOLOGY

## 2020-06-19 PROCEDURE — 76210000020 HC REC RM PH II FIRST 0.5 HR: Performed by: UROLOGY

## 2020-06-19 PROCEDURE — 74011250636 HC RX REV CODE- 250/636: Performed by: UROLOGY

## 2020-06-19 PROCEDURE — C1758 CATHETER, URETERAL: HCPCS | Performed by: UROLOGY

## 2020-06-19 PROCEDURE — 74011250636 HC RX REV CODE- 250/636: Performed by: ANESTHESIOLOGY

## 2020-06-19 PROCEDURE — 77030018832 HC SOL IRR H20 ICUM -A: Performed by: UROLOGY

## 2020-06-19 PROCEDURE — 77030019927 HC TBNG IRR CYSTO BAXT -A: Performed by: UROLOGY

## 2020-06-19 PROCEDURE — 77030040361 HC SLV COMPR DVT MDII -B: Performed by: UROLOGY

## 2020-06-19 PROCEDURE — 76060000032 HC ANESTHESIA 0.5 TO 1 HR: Performed by: UROLOGY

## 2020-06-19 RX ORDER — MIDAZOLAM HYDROCHLORIDE 1 MG/ML
2 INJECTION, SOLUTION INTRAMUSCULAR; INTRAVENOUS ONCE
Status: COMPLETED | OUTPATIENT
Start: 2020-06-19 | End: 2020-06-19

## 2020-06-19 RX ORDER — OXYCODONE HYDROCHLORIDE 5 MG/1
5 TABLET ORAL
Status: DISCONTINUED | OUTPATIENT
Start: 2020-06-19 | End: 2020-06-19 | Stop reason: HOSPADM

## 2020-06-19 RX ORDER — SODIUM CHLORIDE 9 MG/ML
50 INJECTION, SOLUTION INTRAVENOUS CONTINUOUS
Status: DISCONTINUED | OUTPATIENT
Start: 2020-06-19 | End: 2020-06-19 | Stop reason: HOSPADM

## 2020-06-19 RX ORDER — SODIUM CHLORIDE 0.9 % (FLUSH) 0.9 %
5-40 SYRINGE (ML) INJECTION AS NEEDED
Status: DISCONTINUED | OUTPATIENT
Start: 2020-06-19 | End: 2020-06-19 | Stop reason: HOSPADM

## 2020-06-19 RX ORDER — FAMOTIDINE 20 MG/1
20 TABLET, FILM COATED ORAL ONCE
Status: COMPLETED | OUTPATIENT
Start: 2020-06-19 | End: 2020-06-19

## 2020-06-19 RX ORDER — METHENAMINE, SODIUM PHOSPHATE, MONOBASIC, MONOHYDRATE, PHENYL SALICYLATE, METHYLENE BLUE, AND HYOSCYAMINE SULFATE 118; 40.8; 36; 10; .12 MG/1; MG/1; MG/1; MG/1; MG/1
1 CAPSULE ORAL
Qty: 15 CAP | Refills: 1 | Status: SHIPPED | OUTPATIENT
Start: 2020-06-19 | End: 2020-08-13

## 2020-06-19 RX ORDER — MORPHINE SULFATE 2 MG/ML
INJECTION, SOLUTION INTRAMUSCULAR; INTRAVENOUS
Status: DISCONTINUED
Start: 2020-06-19 | End: 2020-06-19 | Stop reason: HOSPADM

## 2020-06-19 RX ORDER — SODIUM CHLORIDE 0.9 % (FLUSH) 0.9 %
5-40 SYRINGE (ML) INJECTION EVERY 8 HOURS
Status: DISCONTINUED | OUTPATIENT
Start: 2020-06-19 | End: 2020-06-19 | Stop reason: HOSPADM

## 2020-06-19 RX ORDER — PROPOFOL 10 MG/ML
INJECTION, EMULSION INTRAVENOUS AS NEEDED
Status: DISCONTINUED | OUTPATIENT
Start: 2020-06-19 | End: 2020-06-19 | Stop reason: HOSPADM

## 2020-06-19 RX ORDER — FENTANYL CITRATE 50 UG/ML
25 INJECTION, SOLUTION INTRAMUSCULAR; INTRAVENOUS ONCE
Status: DISCONTINUED | OUTPATIENT
Start: 2020-06-19 | End: 2020-06-19 | Stop reason: HOSPADM

## 2020-06-19 RX ORDER — FENTANYL CITRATE 50 UG/ML
INJECTION, SOLUTION INTRAMUSCULAR; INTRAVENOUS AS NEEDED
Status: DISCONTINUED | OUTPATIENT
Start: 2020-06-19 | End: 2020-06-19 | Stop reason: HOSPADM

## 2020-06-19 RX ORDER — MORPHINE SULFATE 2 MG/ML
2 INJECTION, SOLUTION INTRAMUSCULAR; INTRAVENOUS ONCE
Status: COMPLETED | OUTPATIENT
Start: 2020-06-19 | End: 2020-06-19

## 2020-06-19 RX ORDER — HYDROMORPHONE HYDROCHLORIDE 2 MG/ML
0.5 INJECTION, SOLUTION INTRAMUSCULAR; INTRAVENOUS; SUBCUTANEOUS
Status: DISCONTINUED | OUTPATIENT
Start: 2020-06-19 | End: 2020-06-19

## 2020-06-19 RX ORDER — LIDOCAINE HYDROCHLORIDE 10 MG/ML
0.1 INJECTION INFILTRATION; PERINEURAL AS NEEDED
Status: DISCONTINUED | OUTPATIENT
Start: 2020-06-19 | End: 2020-06-19 | Stop reason: HOSPADM

## 2020-06-19 RX ORDER — SODIUM CHLORIDE, SODIUM LACTATE, POTASSIUM CHLORIDE, CALCIUM CHLORIDE 600; 310; 30; 20 MG/100ML; MG/100ML; MG/100ML; MG/100ML
75 INJECTION, SOLUTION INTRAVENOUS CONTINUOUS
Status: DISCONTINUED | OUTPATIENT
Start: 2020-06-19 | End: 2020-06-19 | Stop reason: HOSPADM

## 2020-06-19 RX ORDER — CEFAZOLIN SODIUM/WATER 2 G/20 ML
2 SYRINGE (ML) INTRAVENOUS
Status: COMPLETED | OUTPATIENT
Start: 2020-06-19 | End: 2020-06-19

## 2020-06-19 RX ORDER — LIDOCAINE HYDROCHLORIDE 20 MG/ML
INJECTION, SOLUTION EPIDURAL; INFILTRATION; INTRACAUDAL; PERINEURAL AS NEEDED
Status: DISCONTINUED | OUTPATIENT
Start: 2020-06-19 | End: 2020-06-19 | Stop reason: HOSPADM

## 2020-06-19 RX ORDER — DEXAMETHASONE SODIUM PHOSPHATE 4 MG/ML
INJECTION, SOLUTION INTRA-ARTICULAR; INTRALESIONAL; INTRAMUSCULAR; INTRAVENOUS; SOFT TISSUE AS NEEDED
Status: DISCONTINUED | OUTPATIENT
Start: 2020-06-19 | End: 2020-06-19 | Stop reason: HOSPADM

## 2020-06-19 RX ORDER — SODIUM CHLORIDE, SODIUM LACTATE, POTASSIUM CHLORIDE, CALCIUM CHLORIDE 600; 310; 30; 20 MG/100ML; MG/100ML; MG/100ML; MG/100ML
100 INJECTION, SOLUTION INTRAVENOUS CONTINUOUS
Status: DISCONTINUED | OUTPATIENT
Start: 2020-06-19 | End: 2020-06-19 | Stop reason: HOSPADM

## 2020-06-19 RX ORDER — MIDAZOLAM HYDROCHLORIDE 1 MG/ML
2 INJECTION, SOLUTION INTRAMUSCULAR; INTRAVENOUS
Status: DISCONTINUED | OUTPATIENT
Start: 2020-06-19 | End: 2020-06-19 | Stop reason: HOSPADM

## 2020-06-19 RX ORDER — ONDANSETRON 2 MG/ML
INJECTION INTRAMUSCULAR; INTRAVENOUS AS NEEDED
Status: DISCONTINUED | OUTPATIENT
Start: 2020-06-19 | End: 2020-06-19 | Stop reason: HOSPADM

## 2020-06-19 RX ORDER — DIPHENHYDRAMINE HYDROCHLORIDE 50 MG/ML
12.5 INJECTION, SOLUTION INTRAMUSCULAR; INTRAVENOUS
Status: DISCONTINUED | OUTPATIENT
Start: 2020-06-19 | End: 2020-06-19 | Stop reason: HOSPADM

## 2020-06-19 RX ORDER — OXYCODONE AND ACETAMINOPHEN 5; 325 MG/1; MG/1
1 TABLET ORAL AS NEEDED
Status: DISCONTINUED | OUTPATIENT
Start: 2020-06-19 | End: 2020-06-19 | Stop reason: HOSPADM

## 2020-06-19 RX ORDER — HYDROCODONE BITARTRATE AND ACETAMINOPHEN 5; 325 MG/1; MG/1
1 TABLET ORAL
Qty: 15 TAB | Refills: 0 | Status: SHIPPED | OUTPATIENT
Start: 2020-06-19 | End: 2020-06-22

## 2020-06-19 RX ADMIN — MIDAZOLAM 2 MG: 1 INJECTION INTRAMUSCULAR; INTRAVENOUS at 11:21

## 2020-06-19 RX ADMIN — LIDOCAINE HYDROCHLORIDE 100 MG: 20 INJECTION, SOLUTION EPIDURAL; INFILTRATION; INTRACAUDAL; PERINEURAL at 11:57

## 2020-06-19 RX ADMIN — DEXAMETHASONE SODIUM PHOSPHATE 4 MG: 4 INJECTION, SOLUTION INTRAMUSCULAR; INTRAVENOUS at 12:07

## 2020-06-19 RX ADMIN — SODIUM CHLORIDE, SODIUM LACTATE, POTASSIUM CHLORIDE, AND CALCIUM CHLORIDE: 600; 310; 30; 20 INJECTION, SOLUTION INTRAVENOUS at 11:48

## 2020-06-19 RX ADMIN — FAMOTIDINE 20 MG: 20 TABLET, FILM COATED ORAL at 11:18

## 2020-06-19 RX ADMIN — FENTANYL CITRATE 100 MCG: 50 INJECTION INTRAMUSCULAR; INTRAVENOUS at 11:57

## 2020-06-19 RX ADMIN — Medication 2 G: at 11:59

## 2020-06-19 RX ADMIN — PROPOFOL 200 MG: 10 INJECTION, EMULSION INTRAVENOUS at 11:57

## 2020-06-19 RX ADMIN — MORPHINE SULFATE 2 MG: 2 INJECTION, SOLUTION INTRAMUSCULAR; INTRAVENOUS at 13:28

## 2020-06-19 RX ADMIN — ONDANSETRON 4 MG: 2 INJECTION INTRAMUSCULAR; INTRAVENOUS at 12:07

## 2020-06-19 NOTE — PERIOP NOTES
Patient arrived to PACU with #20 gauge 1V in L hand. Dressing clean, dry and intact . Site w/o signs/symptoms of phlebitis or infiltration. IV infusing w/o difficulty.

## 2020-06-19 NOTE — ANESTHESIA POSTPROCEDURE EVALUATION
Procedure(s):  CYSTOSCOPY WITH BILATERAL RETROGRADE PYELOGRAMS. general    <BSHSIANPOST>    INITIAL Post-op Vital signs:   Vitals Value Taken Time   /71 6/19/2020  1:29 PM   Temp 36.5 °C (97.7 °F) 6/19/2020 12:36 PM   Pulse 72 6/19/2020  1:35 PM   Resp 16 6/19/2020 12:53 PM   SpO2 99 % 6/19/2020  1:35 PM   Vitals shown include unvalidated device data.

## 2020-06-19 NOTE — PERIOP NOTES
Patient &  Dewy Rose [place] verbalized understanding to D/C instructions and left with pain med prescription.

## 2020-06-19 NOTE — DISCHARGE INSTRUCTIONS
CYSTOSCOPY    ACTIVITY   · As tolerated and as directed by your doctor. · Bathe or shower as directed by your doctor. DIET  · Clear liquids until no nausea or vomiting; then light diet for the first day. · Drink plenty of liquids. At least 8 glasses of water to help flush out bladder. Limit amount of caffeine. · Advance to regular diet on second day, unless your doctor orders otherwise. · If nausea and vomiting continues, call your doctor. PAIN  · Take pain medication as directed by your doctor. · Call your doctor if pain is NOT relieved by medication. · DO NOT take aspirin or blood thinners until directed by your doctor. CALL YOUR DOCTOR IF  · Expect blood-tinged urine. Call your doctor if it lasts more than 72 hours or if you cannot see through the urine. · Temperature of 101 degrees or above. · Unable to empty bladder. AFTER ANESTHESIA  · For the next 24 hours: DO NOT Drive, drink alcoholic beverages, or Make important decisions. · Be aware of dizziness following anesthesia and while taking pain medications    DISCHARGE SUMMARY from Nurse    PATIENT INSTRUCTIONS:    After general anesthesia or intravenous sedation, for 24 hours or while taking prescription Narcotics:  · Limit your activities  · Do not drive and operate hazardous machinery  · Do not make important personal or business decisions  · Do  not drink alcoholic beverages  · If you have not urinated within 8 hours after discharge, please contact your surgeon on call. *  Please give a list of your current medications to your Primary Care Provider. *  Please update this list whenever your medications are discontinued, doses are      changed, or new medications (including over-the-counter products) are added. *  Please carry medication information at all times in case of emergency situations.       These are general instructions for a healthy lifestyle:    No smoking/ No tobacco products/ Avoid exposure to second hand smoke    Surgeon General's Warning:  Quitting smoking now greatly reduces serious risk to your health. Obesity, smoking, and sedentary lifestyle greatly increases your risk for illness    A healthy diet, regular physical exercise & weight monitoring are important for maintaining a healthy lifestyle    You may be retaining fluid if you have a history of heart failure or if you experience any of the following symptoms:  Weight gain of 3 pounds or more overnight or 5 pounds in a week, increased swelling in our hands or feet or shortness of breath while lying flat in bed. Please call your doctor as soon as you notice any of these symptoms; do not wait until your next office visit. Recognize signs and symptoms of STROKE:    F-face looks uneven    A-arms unable to move or move unevenly    S-speech slurred or non-existent    T-time-call 911 as soon as signs and symptoms begin-DO NOT go       Back to bed or wait to see if you get better-TIME IS BRAIN. Advance Care Planning  People with COVID-19 may have no symptoms, mild symptoms, such as fever, cough, and shortness of breath or they may have more severe illness, developing severe and fatal pneumonia. As a result, Advance Care Planning with attention to naming a health care decision maker (someone you trust to make healthcare decisions for you if you could not speak for yourself) and sharing other health care preferences is important BEFORE a possible health crisis. Please contact your Primary Care Provider to discuss Advance Care Planning.      Preventing the Spread of Coronavirus Disease 2019 in Homes and Residential Communities  For the most recent information go to [employer]    Prevention steps for People with confirmed or suspected COVID-19 (including persons under investigation) who do not need to be hospitalized  and   People with confirmed COVID-19 who were hospitalized and determined to be medically stable to go home    Your healthcare provider and public health staff will evaluate whether you can be cared for at home. If it is determined that you do not need to be hospitalized and can be isolated at home, you will be monitored by staff from your local or state health department. You should follow the prevention steps below until a healthcare provider or local or state health department says you can return to your normal activities. Stay home except to get medical care  People who are mildly ill with COVID-19 are able to isolate at home during their illness. You should restrict activities outside your home, except for getting medical care. Do not go to work, school, or public areas. Avoid using public transportation, ride-sharing, or taxis. Separate yourself from other people and animals in your home  People: As much as possible, you should stay in a specific room and away from other people in your home. Also, you should use a separate bathroom, if available. Animals: You should restrict contact with pets and other animals while you are sick with COVID-19, just like you would around other people. Although there have not been reports of pets or other animals becoming sick with COVID-19, it is still recommended that people sick with COVID-19 limit contact with animals until more information is known about the virus. When possible, have another member of your household care for your animals while you are sick. If you are sick with COVID-19, avoid contact with your pet, including petting, snuggling, being kissed or licked, and sharing food. If you must care for your pet or be around animals while you are sick, wash your hands before and after you interact with pets and wear a facemask. Call ahead before visiting your doctor  If you have a medical appointment, call the healthcare provider and tell them that you have or may have COVID-19.  This will help the healthcare providers office take steps to keep other people from getting infected or exposed. Wear a facemask  You should wear a facemask when you are around other people (e.g., sharing a room or vehicle) or pets and before you enter a healthcare providers office. If you are not able to wear a facemask (for example, because it causes trouble breathing), then people who live with you should not stay in the same room with you, or they should wear a facemask if they enter your room. Cover your coughs and sneezes  Cover your mouth and nose with a tissue when you cough or sneeze. Throw used tissues in a lined trash can. Immediately wash your hands with soap and water for at least 20 seconds or, if soap and water are not available, clean your hands with an alcohol-based hand  that contains at least 60% alcohol. Clean your hands often  Wash your hands often with soap and water for at least 20 seconds, especially after blowing your nose, coughing, or sneezing; going to the bathroom; and before eating or preparing food. If soap and water are not readily available, use an alcohol-based hand  with at least 60% alcohol, covering all surfaces of your hands and rubbing them together until they feel dry. Soap and water are the best option if hands are visibly dirty. Avoid touching your eyes, nose, and mouth with unwashed hands. Avoid sharing personal household items  You should not share dishes, drinking glasses, cups, eating utensils, towels, or bedding with other people or pets in your home. After using these items, they should be washed thoroughly with soap and water. Clean all high-touch surfaces everyday  High touch surfaces include counters, tabletops, doorknobs, bathroom fixtures, toilets, phones, keyboards, tablets, and bedside tables. Also, clean any surfaces that may have blood, stool, or body fluids on them. Use a household cleaning spray or wipe, according to the label instructions.  Labels contain instructions for safe and effective use of the cleaning product including precautions you should take when applying the product, such as wearing gloves and making sure you have good ventilation during use of the product. Monitor your symptoms  Seek prompt medical attention if your illness is worsening (e.g., difficulty breathing). Before seeking care, call your healthcare provider and tell them that you have, or are being evaluated for, COVID-19. Put on a facemask before you enter the facility. These steps will help the healthcare providers office to keep other people in the office or waiting room from getting infected or exposed. Ask your healthcare provider to call the local or state health department. Persons who are placed under active monitoring or facilitated self-monitoring should follow instructions provided by their local health department or occupational health professionals, as appropriate. When working with your local health department check their available hours. If you have a medical emergency and need to call 911, notify the dispatch personnel that you have, or are being evaluated for COVID-19. If possible, put on a facemask before emergency medical services arrive. Discontinuing home isolation  Patients with confirmed COVID-19 should remain under home isolation precautions until the risk of secondary transmission to others is thought to be low. The decision to discontinue home isolation precautions should be made on a case-by-case basis, in consultation with healthcare providers and state and local health departments.

## 2020-06-19 NOTE — BRIEF OP NOTE
Brief Postoperative Note    Patient: Mary Howard  YOB: 1975  MRN: [id number]    Date of Procedure: 6/19/2020     Pre-Op Diagnosis: Flank pain [R10.9] recent left renal stone passage and rt renal stone    Post-Op Diagnosis: no ureteral obstruction and unchanged rt renal stone  No left ureteral stone seen    Procedure(s):  CYSTOSCOPY WITH BILATERAL RETROGRADE PYELOGRAMS    Surgeon(s):  Elva Horton MD    Surgical Assistant: None    Anesthesia: General     Estimated Blood Loss (mL): ?     Complications: None    Specimens: * No specimens in log *     Implants: * No implants in log *    Drains: * No LDAs found *    Findings: note    Electronically Signed by Ashley Gray MD on 6/19/2020 at 12:24 PM

## 2020-06-19 NOTE — H&P
She has a known upper pole right renal stone that appears to be in a calyceal.  Continues to have left flank pain no right flank pain whatsoever. After discussing with Dr. Thang Newman the decision was made to proceed with cystoscopy and bilateral retrogrades due to patient's ongoing pain. We in the past have advised patient that the left flank pain is probably not urinary in nature. She says that her primary care physician told her that it was probably because she had some constipation.                Past Medical History:   Diagnosis Date    Abnormal Papanicolaou smear of cervix      Adverse effect of anesthesia       slow to awaken    Diabetes ([facility] [address]       type 2 - oral agents- -does not check sugar-regularly- denies hypoglycemic episodes    GERD (gastroesophageal reflux disease)       no medications- sleeps on 3 pillows-     Hypercholesterolemia       controlled well with meds    Hypertension       medication controlled    Kidney stone              Past Surgical History:   Procedure Laterality Date    FRAGMENT KIDNEY STONE/ ESWL        HX DILATION AND CURETTAGE        HX ENDOSCOPY        HX HYSTEROSCOPY WITH ENDOMETRIAL ABLATION        HX HYSTEROSCOPY WITH ENDOMETRIAL ABLATION   2017    HX LAP CHOLECYSTECTOMY   03/28/2017    HX LITHOTRIPSY          x 4    HX ORTHOPAEDIC Left       hand- tendon repair    HX ORTHOPAEDIC Left       ankle repair-    HX OTHER SURGICAL Bilateral       sweat gland removal under both arms    HX TUBAL LIGATION   07/25/2005    LAP,TUBAL CAUTERY                 Current Outpatient Medications   Medication Sig Dispense Refill    HYDROcodone-acetaminophen (NORCO) 5-325 mg per tablet Take 1 Tab by mouth every eight (8) hours as needed for Pain for up to 3 days. Max Daily Amount: 3 Tabs. 9 Tab 0    loratadine (Claritin) 10 mg tablet Take 1 Tab by mouth daily. 90 Tab 3    hydroCHLOROthiazide (HYDRODIURIL) 25 mg tablet Take 1 Tab by mouth daily.  90 Tab 1    pravastatin (Pravachol) 40 mg tablet Take 1 Tab by mouth nightly. 90 Tab 1    fluticasone propionate (FLONASE) 50 mcg/actuation nasal spray 2 Sprays by Both Nostrils route daily. 1 Bottle 0    ibuprofen (MOTRIN) 200 mg tablet Take  by mouth.        metFORMIN (GLUCOPHAGE) 500 mg tablet Take 1 Tab by mouth two (2) times daily (with meals).  Indications: type 2 diabetes mellitus 180 Tab 1    multivitamin (ONE A DAY) tablet Take 1 Tab by mouth daily.                Allergies   Allergen Reactions    Ace Inhibitors Angioedema    Dilaudid [Hydromorphone] Anaphylaxis       Swollen tongue, lips, hypotension, low O2 SATS    Lisinopril Anaphylaxis    Bactrim [Sulfamethoxazole-Trimethoprim] Hives    Norvasc [Amlodipine] Other (comments)       Pt denies any allergic reaction to Norvasc    Oxycodone Other (comments)       Makes itch    Sulfa (Sulfonamide Antibiotics) Hives      Social History            Socioeconomic History    Marital status:        Spouse name: Not on file    Number of children: Not on file    Years of education: Not on file    Highest education level: Not on file   Occupational History    Not on file   Social Needs    Financial resource strain: Not on file    Food insecurity       Worry: Not on file       Inability: Not on file    Transportation needs       Medical: Not on file       Non-medical: Not on file   Tobacco Use    Smoking status: Former Smoker       Packs/day: 1.00       Years: 0.50       Pack years: 0.50       Last attempt to quit: 2009       Years since quittin.4    Smokeless tobacco: Never Used   Substance and Sexual Activity    Alcohol use: No    Drug use: No    Sexual activity: Yes       Partners: Male       Birth control/protection: Surgical       Comment: tubal   Lifestyle    Physical activity       Days per week: Not on file       Minutes per session: Not on file    Stress: Not on file   Relationships    Social connections       Talks on phone: Not on file       Gets together: Not on file       Attends [employer] service: Not on file       Active member of club or organization: Not on file       Attends meetings of clubs or organizations: Not on file       Relationship status: Not on file    Intimate partner violence       Fear of current or ex partner: Not on file       Emotionally abused: Not on file       Physically abused: Not on file       Forced sexual activity: Not on file   Other Topics Concern     Service Not Asked    Blood Transfusions Not Asked    Caffeine Concern No    Occupational Exposure Not Asked    Hobby Hazards Not Asked    Sleep Concern Not Asked    Stress Concern Not Asked    Weight Concern Not Asked    Special Diet Not Asked    Back Care Not Asked    Exercise Yes    Bike Helmet Not Asked    Seat Belt Yes    Self-Exams No   Social History Narrative     Abuse: Feels safe at home, no history of physical abuse, no history of sexual abuse            Family History   Problem Relation Age of Onset    Heart Disease Maternal Grandmother      Diabetes Maternal Grandmother      Hypertension Mother      Other Mother           [employer] disease    Hypertension Father      Deep Vein Thrombosis Father      Hypertension Brother      No Known Problems Brother      Breast Cancer Maternal Aunt      Colon Cancer Maternal Uncle      Ovarian Cancer Neg Hx      Uterine Cancer Neg Hx           Review of Systems  Constitutional:   Negative for fever. GI: Positive for abdominal pain. Negative for nausea. Musculoskeletal:  Negative for back pain.       PHYSICAL EXAM        Patient Vitals for the past 12 hrs:   Temp Pulse Resp BP SpO2   06/19/20 0956 98.5 °F (36.9 °C) 65 18 146/79 100 %          General appearance - normal,  in no distress  Mental status - alert, oriented to person, place, and time  Chest/Lung-  Heart sounds: regular rate and rhythm. Lungs clear to auscultation and normal respiratory effort.    Abdomen - soft, nontender with out any rebound tenderness, BS present,  no masses. Musculoskeletal - normal gait and station.         Assessment and Plan      ICD-10-CM ICD-9-CM     1. Kidney stone N20.0 592.0 AMB POC URINALYSIS DIP STICK AUTO W/O MICRO (PGU)         XR ABD (KUB)         HYDROcodone-acetaminophen (NORCO) 5-325 mg per tablet         DISCONTINUED: HYDROcodone-acetaminophen (NORCO) 5-325 mg per tablet   2. Left flank pain R10.9 789.09 HYDROcodone-acetaminophen (NORCO) 5-325 mg per tablet         DISCONTINUED: HYDROcodone-acetaminophen (NORCO) 5-325 mg per tablet      PLAN:  Cystoscopy, bilateral retrogrades possible ureteroscopy's will be done  with Dr. Daisy Gonzales. If urinary system is completely clear refer back to PCP as patient may need GI work-up and or work-up.       CYSTOSCOPY bilateral retrogrades.

## 2020-06-19 NOTE — ANESTHESIA PREPROCEDURE EVALUATION
Relevant Problems   No relevant active problems       Anesthetic History   No history of anesthetic complications            Review of Systems / Medical History  Patient summary reviewed and pertinent labs reviewed    Pulmonary  Within defined limits                 Neuro/Psych   Within defined limits           Cardiovascular    Hypertension: well controlled          Hyperlipidemia    Exercise tolerance: >4 METS     GI/Hepatic/Renal     GERD: well controlled           Endo/Other    Diabetes: well controlled, type 2    Morbid obesity     Other Findings              Physical Exam    Airway  Mallampati: II  TM Distance: 4 - 6 cm  Neck ROM: normal range of motion   Mouth opening: Normal     Cardiovascular    Rhythm: regular  Rate: normal         Dental  No notable dental hx       Pulmonary  Breath sounds clear to auscultation               Abdominal  Abdominal exam normal       Other Findings            Anesthetic Plan    ASA: 3  Anesthesia type: general          Induction: Intravenous  Anesthetic plan and risks discussed with: Patient

## 2020-06-20 NOTE — OP NOTES
[facility]  OPERATIVE REPORT    Name:  Karla Shirley  MR#:  [id number]  :  1975  ACCOUNT #:  [de-identified]  DATE OF SERVICE:  2020    PREOPERATIVE DIAGNOSES:  Right renal stone with recent passage of a left renal stone and persistent left flank and lower abdominal pain. POSTOPERATIVE DIAGNOSES:  No ureteral obstruction or left ureteral stone or renal stones are seen, right renal midpole stones unchanged with no ureteral obstruction on the right. PROCEDURE PERFORMED:  Cystoscopy with bilateral retrograde pyelograms. SURGEON:  Marilou Bustamante MD    ASSISTANT:  None. ANESTHESIA:  General.    ESTIMATED BLOOD LOSS:  None. COMPLICATIONS:  None. SPECIMENS REMOVED:  None. IMPLANT:  None. DRAINS:  None. FINDINGS:  Per dictation. OPERATIVE INDICATIONS:  This is a 18-year-old female who back earlier this year had some pain, had a CT urogram showed right renal midpole stone about 6 mm, I would estimate, maybe a little less and a left lower pole stone. The patient seemed to have passed a kidney stone on the left side had a repeat CT scan showed the stone was gone from the lower pole of the left kidney but there was no ureteral obstruction or ureteral stones seen on the repeat CT scan. The patient has persisted with some left flank and lower abdominal pressure and discomfort. I am not sure if there were still stone. KUB did not show any obvious ureteral stones. It was elected to do a cystoscopy and bilateral retrograde pyelogram to make sure there was no stone in the bladder or obstructing the ureter. PROCEDURE:  The patient was taken to the operating room, underwent general anesthesia, placed in the dorsal lithotomy position, prepped with Betadine and draped in appropriate manner. A #22-Cuban cystoscope was passed per urethra in the bladder without difficulty. The bladder had minimal urine.   The patient then had the bladder filled with irrigation fluid to a certain degree until the ureteral orifices were identified. While observing, there was clear efflux of urine from both ureteral orifices while observing the floor of the bladder. There were no bladder lesions. No bladder stones and there was no evidence for inflammation at either ureteral orifice; both were in normal position and were solitary ureters on each side. A bulb tip retrograde pyelogram was performed on the right side. There was minimal bowel gas so it was a very good fluoroscopic image. There was a faint calcification in the area of the mid renal area. Retrograde pyelogram showed a very delicate right ureter going all the way up into the delicate collecting system of the kidney. There did appear to be this calcification in the mid pole calyx. The patient, however, did have excellent drainage on the followup views over the course of the procedure and there was no noted ureteral obstruction and the contrast completely flushed out of the ureter on the right side. At this point, retrograde pyelogram was performed on the left side. The ureter was delicate. There were no obvious calcifications in the kidney or along the course of the left ureter. The delicate ureter was followed all the way up into the renal pelvis and calyces. No filling defects were seen and the post injection view showed good drainage of the ureter and the collecting system. IMPRESSION:  This patient probably passed the left stone back earlier this year, but there is no residual stones or obstruction in the ureter. The patient still has a right renal stone that may or may not be causing symptoms but the stone that passed on the left side would have been about the same size of the right renal stone, maybe 5-6 mm in size and it no longer persists on the left. Recommend a discharge home and follow up in the office in about eight weeks with the nurse practitioner to check the urine and follow symptoms.   No need for lithotripsy on the right renal stone at this point unless there is some significant symptoms related to the right flank and there is no pathology on the left side that needs intervention.       Jamir Chakraborty MD      [id number]  D:  06/19/2020 12:41  T:  06/19/2020 23:39  JOB #:  [id number]

## 2020-06-26 ENCOUNTER — HOSPITAL ENCOUNTER (OUTPATIENT)
Dept: LAB | Age: 45
Discharge: HOME | End: 2020-06-26
Payer: COMMERCIAL

## 2020-06-26 LAB
ANION GAP SERPL CALC-SCNC: 3 MMOL/L (ref 7–16)
BUN SERPL-MCNC: 11 MG/DL (ref 6–23)
CALCIUM SERPL-MCNC: 9.2 MG/DL (ref 8.3–10.4)
CHLORIDE SERPL-SCNC: 103 MMOL/L (ref 98–107)
CO2 SERPL-SCNC: 34 MMOL/L (ref 21–32)
CREAT SERPL-MCNC: 0.83 MG/DL (ref 0.6–1)
ERYTHROCYTE [DISTWIDTH] IN BLOOD BY AUTOMATED COUNT: 14.5 % (ref 11.9–14.6)
GLUCOSE SERPL-MCNC: 117 MG/DL (ref 65–100)
HCT VFR BLD AUTO: 37.9 % (ref 35.8–46.3)
HGB BLD-MCNC: 11.4 G/DL (ref 11.7–15.4)
MCH RBC QN AUTO: 26.3 PG (ref 26.1–32.9)
MCHC RBC AUTO-ENTMCNC: 30.1 G/DL (ref 31.4–35)
MCV RBC AUTO: 87.3 FL (ref 79.6–97.8)
NRBC # BLD: 0 K/UL (ref 0–0.2)
PLATELET # BLD AUTO: 308 K/UL (ref 150–450)
PMV BLD AUTO: 9.3 FL (ref 9.4–12.3)
POTASSIUM SERPL-SCNC: 3.6 MMOL/L (ref 3.5–5.1)
RBC # BLD AUTO: 4.34 M/UL (ref 4.05–5.2)
SODIUM SERPL-SCNC: 140 MMOL/L (ref 136–145)
WBC # BLD AUTO: 5.6 K/UL (ref 4.3–11.1)

## 2020-06-26 PROCEDURE — 80048 BASIC METABOLIC PNL TOTAL CA: CPT

## 2020-06-26 PROCEDURE — 36415 COLL VENOUS BLD VENIPUNCTURE: CPT

## 2020-06-26 PROCEDURE — 85027 COMPLETE CBC AUTOMATED: CPT

## 2020-06-28 ENCOUNTER — ANESTHESIA EVENT (OUTPATIENT)
Dept: SURGERY | Age: 45
End: 2020-06-28

## 2020-06-28 RX ORDER — NALOXONE HYDROCHLORIDE 0.4 MG/ML
0.1 INJECTION, SOLUTION INTRAMUSCULAR; INTRAVENOUS; SUBCUTANEOUS AS NEEDED
Status: CANCELLED | OUTPATIENT
Start: 2020-06-28 | End: 2020-06-28

## 2020-06-28 RX ORDER — SODIUM CHLORIDE, SODIUM LACTATE, POTASSIUM CHLORIDE, CALCIUM CHLORIDE 600; 310; 30; 20 MG/100ML; MG/100ML; MG/100ML; MG/100ML
75 INJECTION, SOLUTION INTRAVENOUS CONTINUOUS
Status: CANCELLED | OUTPATIENT
Start: 2020-06-28

## 2020-06-28 RX ORDER — DIPHENHYDRAMINE HYDROCHLORIDE 50 MG/ML
12.5 INJECTION, SOLUTION INTRAMUSCULAR; INTRAVENOUS ONCE
Status: CANCELLED | OUTPATIENT
Start: 2020-06-28 | End: 2020-06-28

## 2020-06-28 RX ORDER — ONDANSETRON 2 MG/ML
4 INJECTION INTRAMUSCULAR; INTRAVENOUS ONCE
Status: CANCELLED | OUTPATIENT
Start: 2020-06-28 | End: 2020-06-28

## 2020-06-29 ENCOUNTER — HOSPITAL ENCOUNTER (OUTPATIENT)
Age: 45
Setting detail: OUTPATIENT SURGERY
Discharge: HOME | End: 2020-06-29
Attending: UROLOGY | Admitting: UROLOGY

## 2020-06-29 ENCOUNTER — ANESTHESIA (OUTPATIENT)
Dept: SURGERY | Age: 45
End: 2020-06-29

## 2020-06-29 VITALS
WEIGHT: 256.13 LBS | DIASTOLIC BLOOD PRESSURE: 67 MMHG | TEMPERATURE: 98.6 F | RESPIRATION RATE: 18 BRPM | BODY MASS INDEX: 43.73 KG/M2 | HEIGHT: 64 IN | OXYGEN SATURATION: 99 % | SYSTOLIC BLOOD PRESSURE: 120 MMHG | HEART RATE: 80 BPM

## 2020-06-29 RX ORDER — LIDOCAINE HYDROCHLORIDE 10 MG/ML
0.1 INJECTION INFILTRATION; PERINEURAL AS NEEDED
Status: DISCONTINUED | OUTPATIENT
Start: 2020-06-29 | End: 2020-06-29 | Stop reason: HOSPADM

## 2020-06-29 RX ORDER — MIDAZOLAM HYDROCHLORIDE 1 MG/ML
2 INJECTION, SOLUTION INTRAMUSCULAR; INTRAVENOUS
Status: DISCONTINUED | OUTPATIENT
Start: 2020-06-29 | End: 2020-06-29 | Stop reason: HOSPADM

## 2020-06-29 RX ORDER — CEFAZOLIN SODIUM/WATER 2 G/20 ML
2 SYRINGE (ML) INTRAVENOUS
Status: DISCONTINUED | OUTPATIENT
Start: 2020-06-29 | End: 2020-06-29 | Stop reason: HOSPADM

## 2020-06-29 RX ORDER — ACETAMINOPHEN 500 MG
1000 TABLET ORAL ONCE
Status: DISCONTINUED | OUTPATIENT
Start: 2020-06-29 | End: 2020-06-29 | Stop reason: HOSPADM

## 2020-06-29 RX ORDER — FENTANYL CITRATE 50 UG/ML
100 INJECTION, SOLUTION INTRAMUSCULAR; INTRAVENOUS ONCE
Status: DISCONTINUED | OUTPATIENT
Start: 2020-06-29 | End: 2020-06-29 | Stop reason: HOSPADM

## 2020-06-29 RX ORDER — SODIUM CHLORIDE, SODIUM LACTATE, POTASSIUM CHLORIDE, CALCIUM CHLORIDE 600; 310; 30; 20 MG/100ML; MG/100ML; MG/100ML; MG/100ML
100 INJECTION, SOLUTION INTRAVENOUS CONTINUOUS
Status: DISCONTINUED | OUTPATIENT
Start: 2020-06-29 | End: 2020-06-29 | Stop reason: HOSPADM

## 2020-06-29 RX ORDER — MIDAZOLAM HYDROCHLORIDE 1 MG/ML
2 INJECTION, SOLUTION INTRAMUSCULAR; INTRAVENOUS ONCE
Status: DISCONTINUED | OUTPATIENT
Start: 2020-06-29 | End: 2020-06-29 | Stop reason: HOSPADM

## 2020-06-29 NOTE — H&P
[facility]  HISTORY AND PHYSICAL    Name:  Cleo Ravi  MR#:  [id number]  :  1975  ACCOUNT #:  [de-identified]  ADMIT DATE:  2020        HISTORY OF PRESENT ILLNESS:  This female was experiencing left-sided pain and had a previous CT scan that showed bilateral 6-mm renal stones, but she developed pain on the left side and hematuria and repeat CT scan showed that she had probably passed the left-sided 6-mm stone, but because of persistent left-sided pain 10 days ago, she underwent a cystoscopy and bilateral retrograde pyelogram.  The left retrograde pyelogram showed no obvious calcifications or obstruction in the ureter or the kidney. No calcifications were noted in the kidney and there was good drainage from the left renal moiety. Bladder was normal as well. A retrograde pyelogram was performed on the right side that showed a delicate collecting system. However, there was a calcification in the left mid calyceal area about 6 mm. The stone was nonobstructing. It was in the mid calyx and was in a calyx with a narrow infundibulum and may have even been within the parenchyma of the tip of the papilla. The patient was not having any symptoms on the right side. The patient had called the office to set up a lithotripsy. However, the plan was that she would follow up in two months with a KUB and did not need anything done on the right renal stone because she was not having any symptoms and it was not obstructing and it had been seen on previous CT scan and was unchanged. The plan at that point was to follow up in two months with a KUB. We will continue to monitor the right renal stone. If it becomes symptomatic or gets bigger, we will do a lithotripsy. The patient scheduled for a lithotripsy after a phone call to the office this past week. She does not need a lithotripsy since she is having no symptoms and the plan is to continue to monitor this right renal stone.   Her lithotripsy today was canceled. She was okay with that after I explained everything to her and we will follow up with her in the office in a couple of weeks.       Sajan Gonzalez MD      [id number]  D:  06/29/2020 10:01  T:  06/29/2020 10:54  JOB #:  [id number]

## 2020-06-29 NOTE — H&P
History and Physical      Candy Palma MD   Physician   Urology   Progress Notes       Signed   Encounter Date:  6/29/2020                    []Hide copied text    []Hover for details  ESWL was canceled today because plan was to follow rt renal stone and no stone found on the left. She has no symptoms on the right and no stone on left.  She needs a 3 month appointment for a KUB and to follow symptoms.     Sebastian ANAND      Electronically signed by Candy Palma MD at 06/29/20 [id number]

## 2020-07-09 PROBLEM — R19.7 DIARRHEA, UNSPECIFIED: Status: ACTIVE | Noted: 2020-07-09

## 2020-07-09 PROBLEM — M77.32 HEEL SPUR, LEFT: Status: RESOLVED | Noted: 2018-07-04 | Resolved: 2020-07-09

## 2020-07-09 PROBLEM — R10.2 PELVIC PAIN: Status: ACTIVE | Noted: 2020-07-09

## 2020-09-12 ENCOUNTER — HOSPITAL ENCOUNTER (EMERGENCY)
Age: 45
Discharge: HOME | End: 2020-09-12
Attending: EMERGENCY MEDICINE
Payer: COMMERCIAL

## 2020-09-12 ENCOUNTER — APPOINTMENT (OUTPATIENT)
Dept: GENERAL RADIOLOGY | Age: 45
End: 2020-09-12
Attending: EMERGENCY MEDICINE
Payer: COMMERCIAL

## 2020-09-12 ENCOUNTER — APPOINTMENT (OUTPATIENT)
Dept: ULTRASOUND IMAGING | Age: 45
End: 2020-09-12
Attending: EMERGENCY MEDICINE
Payer: COMMERCIAL

## 2020-09-12 VITALS
HEIGHT: 64 IN | BODY MASS INDEX: 43.19 KG/M2 | HEART RATE: 79 BPM | WEIGHT: 253 LBS | RESPIRATION RATE: 12 BRPM | TEMPERATURE: 98.6 F | OXYGEN SATURATION: 97 % | DIASTOLIC BLOOD PRESSURE: 61 MMHG | SYSTOLIC BLOOD PRESSURE: 130 MMHG

## 2020-09-12 DIAGNOSIS — R10.9 LEFT FLANK PAIN: Primary | ICD-10-CM

## 2020-09-12 LAB
ALBUMIN SERPL-MCNC: 3.4 G/DL (ref 3.5–5)
ALBUMIN/GLOB SERPL: 0.7 {RATIO} (ref 1.2–3.5)
ALP SERPL-CCNC: 110 U/L (ref 50–136)
ALT SERPL-CCNC: 16 U/L (ref 12–65)
ANION GAP SERPL CALC-SCNC: 5 MMOL/L (ref 7–16)
AST SERPL-CCNC: 21 U/L (ref 15–37)
BACTERIA URNS QL MICRO: 0 /HPF
BASOPHILS # BLD: 0 K/UL (ref 0–0.2)
BASOPHILS NFR BLD: 0 % (ref 0–2)
BILIRUB SERPL-MCNC: 0.2 MG/DL (ref 0.2–1.1)
BUN SERPL-MCNC: 10 MG/DL (ref 6–23)
CALCIUM SERPL-MCNC: 9 MG/DL (ref 8.3–10.4)
CASTS URNS QL MICRO: 0 /LPF
CHLORIDE SERPL-SCNC: 102 MMOL/L (ref 98–107)
CO2 SERPL-SCNC: 30 MMOL/L (ref 21–32)
CREAT SERPL-MCNC: 0.89 MG/DL (ref 0.6–1)
CRYSTALS URNS QL MICRO: NORMAL /LPF
DIFFERENTIAL METHOD BLD: ABNORMAL
EOSINOPHIL # BLD: 0.2 K/UL (ref 0–0.8)
EOSINOPHIL NFR BLD: 2 % (ref 0.5–7.8)
EPI CELLS #/AREA URNS HPF: NORMAL /HPF
ERYTHROCYTE [DISTWIDTH] IN BLOOD BY AUTOMATED COUNT: 14.1 % (ref 11.9–14.6)
GLOBULIN SER CALC-MCNC: 5.2 G/DL (ref 2.3–3.5)
GLUCOSE SERPL-MCNC: 97 MG/DL (ref 65–100)
HCG UR QL: NEGATIVE
HCT VFR BLD AUTO: 35.7 % (ref 35.8–46.3)
HGB BLD-MCNC: 11.5 G/DL (ref 11.7–15.4)
IMM GRANULOCYTES # BLD AUTO: 0 K/UL (ref 0–0.5)
IMM GRANULOCYTES NFR BLD AUTO: 0 % (ref 0–5)
LYMPHOCYTES # BLD: 3.6 K/UL (ref 0.5–4.6)
LYMPHOCYTES NFR BLD: 41 % (ref 13–44)
MCH RBC QN AUTO: 27.6 PG (ref 26.1–32.9)
MCHC RBC AUTO-ENTMCNC: 32.2 G/DL (ref 31.4–35)
MCV RBC AUTO: 85.6 FL (ref 79.6–97.8)
MONOCYTES # BLD: 0.5 K/UL (ref 0.1–1.3)
MONOCYTES NFR BLD: 6 % (ref 4–12)
MUCOUS THREADS URNS QL MICRO: 0 /LPF
NEUTS SEG # BLD: 4.4 K/UL (ref 1.7–8.2)
NEUTS SEG NFR BLD: 50 % (ref 43–78)
NRBC # BLD: 0 K/UL (ref 0–0.2)
PLATELET # BLD AUTO: 312 K/UL (ref 150–450)
PMV BLD AUTO: 9.3 FL (ref 9.4–12.3)
POTASSIUM SERPL-SCNC: 3.8 MMOL/L (ref 3.5–5.1)
PROT SERPL-MCNC: 8.6 G/DL (ref 6.3–8.2)
RBC # BLD AUTO: 4.17 M/UL (ref 4.05–5.2)
RBC #/AREA URNS HPF: NORMAL /HPF
SODIUM SERPL-SCNC: 137 MMOL/L (ref 136–145)
WBC # BLD AUTO: 8.8 K/UL (ref 4.3–11.1)
WBC URNS QL MICRO: 0 /HPF

## 2020-09-12 PROCEDURE — 74018 RADEX ABDOMEN 1 VIEW: CPT

## 2020-09-12 PROCEDURE — 80053 COMPREHEN METABOLIC PANEL: CPT

## 2020-09-12 PROCEDURE — 96374 THER/PROPH/DIAG INJ IV PUSH: CPT

## 2020-09-12 PROCEDURE — 96375 TX/PRO/DX INJ NEW DRUG ADDON: CPT

## 2020-09-12 PROCEDURE — 74011250636 HC RX REV CODE- 250/636: Performed by: EMERGENCY MEDICINE

## 2020-09-12 PROCEDURE — 99284 EMERGENCY DEPT VISIT MOD MDM: CPT

## 2020-09-12 PROCEDURE — 81025 URINE PREGNANCY TEST: CPT

## 2020-09-12 PROCEDURE — 81015 MICROSCOPIC EXAM OF URINE: CPT

## 2020-09-12 PROCEDURE — 76770 US EXAM ABDO BACK WALL COMP: CPT

## 2020-09-12 PROCEDURE — 85025 COMPLETE CBC W/AUTO DIFF WBC: CPT

## 2020-09-12 RX ORDER — NAPROXEN 500 MG/1
500 TABLET, DELAYED RELEASE ORAL
Qty: 20 TAB | Refills: 0 | Status: SHIPPED | OUTPATIENT
Start: 2020-09-12 | End: 2020-10-27

## 2020-09-12 RX ORDER — ONDANSETRON 2 MG/ML
4 INJECTION INTRAMUSCULAR; INTRAVENOUS
Status: COMPLETED | OUTPATIENT
Start: 2020-09-12 | End: 2020-09-12

## 2020-09-12 RX ORDER — KETOROLAC TROMETHAMINE 30 MG/ML
30 INJECTION, SOLUTION INTRAMUSCULAR; INTRAVENOUS ONCE
Status: COMPLETED | OUTPATIENT
Start: 2020-09-12 | End: 2020-09-12

## 2020-09-12 RX ORDER — ONDANSETRON 4 MG/1
4 TABLET, ORALLY DISINTEGRATING ORAL
Qty: 16 TAB | Refills: 0 | Status: SHIPPED | OUTPATIENT
Start: 2020-09-12 | End: 2020-10-27

## 2020-09-12 RX ADMIN — ONDANSETRON 4 MG: 2 INJECTION INTRAMUSCULAR; INTRAVENOUS at 19:58

## 2020-09-12 RX ADMIN — KETOROLAC TROMETHAMINE 30 MG: 30 INJECTION, SOLUTION INTRAMUSCULAR at 19:58

## 2020-09-12 NOTE — ED TRIAGE NOTES
Pt complains of LLQ abd pain X2 weeks with N/V/D. Pt reports history of kidney stones and states this feels the same.

## 2020-09-12 NOTE — ED PROVIDER NOTES
HPI   41-year-old female history of DM 2 biases presents to the emergency department with complaint of left flank pain for the past 2 weeks. Worse today. She has had intermittent nausea and vomiting. No dysuria. No fevers. She status post cholecystectomy. She has had multiple lithotripsies in the past.  She is also status post endometrial ablation. Denies chest pain or dyspnea. Denies change in bowel movements.   Past Medical History:   Diagnosis Date    Abnormal Papanicolaou smear of cervix     Adverse effect of anesthesia     slow to awaken    Anemia     Chronic low back pain     Diabetes (HCC)     type 2 - oral agents- -does not check sugar-regularly- denies hypoglycemic episodes    VIRAL (generalized anxiety disorder)     GERD (gastroesophageal reflux disease)     no medications- sleeps on 3 pillows-     Heel spur, left     Hypercholesterolemia     controlled well with meds    Hypertension     medication controlled    Kidney stone     Vitamin D deficiency        Past Surgical History:   Procedure Laterality Date    FRAGMENT KIDNEY STONE/ ESWL      HX DILATION AND CURETTAGE      HX ENDOSCOPY      HX HYSTEROSCOPY WITH ENDOMETRIAL ABLATION      HX HYSTEROSCOPY WITH ENDOMETRIAL ABLATION  2017    HX LAP CHOLECYSTECTOMY  03/28/2017    HX LITHOTRIPSY       x 4    HX ORTHOPAEDIC Left     hand- tendon repair    HX ORTHOPAEDIC Left     ankle repair-    HX OTHER SURGICAL Bilateral     sweat gland removal under both arms    HX TUBAL LIGATION  07/25/2005    LAP,TUBAL CAUTERY           Family History:   Problem Relation Age of Onset    Heart Disease Maternal Grandmother     Diabetes Maternal Grandmother     Hypertension Mother     Other Mother         Laura Garrison disease    Hypertension Father     Deep Vein Thrombosis Father     Hypertension Brother     Breast Cancer Maternal Aunt     Colon Cancer Maternal Uncle     Ovarian Cancer Neg Hx     Uterine Cancer Neg Hx        Social History Socioeconomic History    Marital status:      Spouse name: Not on file    Number of children: Not on file    Years of education: Not on file    Highest education level: Not on file   Occupational History    Not on file   Social Needs    Financial resource strain: Not on file    Food insecurity     Worry: Not on file     Inability: Not on file    Transportation needs     Medical: Not on file     Non-medical: Not on file   Tobacco Use    Smoking status: Former Smoker     Packs/day: 1.00     Years: 0.50     Pack years: 0.50     Last attempt to quit: 2009     Years since quittin.7    Smokeless tobacco: Never Used   Substance and Sexual Activity    Alcohol use: Yes     Comment: rarely    Drug use: No    Sexual activity: Yes     Partners: Male     Birth control/protection: Surgical     Comment: tubal   Lifestyle    Physical activity     Days per week: Not on file     Minutes per session: Not on file    Stress: Not on file   Relationships    Social connections     Talks on phone: Not on file     Gets together: Not on file     Attends Yarsani service: Not on file     Active member of club or organization: Not on file     Attends meetings of clubs or organizations: Not on file     Relationship status: Not on file    Intimate partner violence     Fear of current or ex partner: Not on file     Emotionally abused: Not on file     Physically abused: Not on file     Forced sexual activity: Not on file   Other Topics Concern     Service Not Asked    Blood Transfusions Not Asked    Caffeine Concern No    Occupational Exposure Not Asked   Geoffery Fillers Hazards Not Asked    Sleep Concern Not Asked    Stress Concern Not Asked    Weight Concern Not Asked    Special Diet Not Asked    Back Care Not Asked    Exercise Yes    Bike Helmet Not Asked    Seat Belt Yes    Self-Exams No   Social History Narrative    Abuse: Feels safe at home, no history of physical abuse, no history of sexual abuse         ALLERGIES: Ace inhibitors; Dilaudid [hydromorphone]; Lisinopril; Bactrim [sulfamethoxazole-trimethoprim]; Norvasc [amlodipine]; Oxycodone; and Sulfa (sulfonamide antibiotics)    Review of Systems  Review of Systems Negative Except as in HPI    Vitals:    09/12/20 1904   BP: 124/75   Pulse: 88   Resp: 17   Temp: 98.4 °F (36.9 °C)   SpO2: 100%   Weight: 114.8 kg (253 lb)   Height: 5' 4\" (1.626 m)            Physical Exam  Vitals signs and nursing note reviewed. Constitutional:       General: She is not in acute distress. Appearance: She is not ill-appearing, toxic-appearing or diaphoretic. HENT:      Mouth/Throat:      Mouth: Mucous membranes are moist.   Eyes:      General: No scleral icterus. Cardiovascular:      Rate and Rhythm: Normal rate and regular rhythm. Pulmonary:      Effort: Pulmonary effort is normal.      Breath sounds: Normal breath sounds. Abdominal:      Palpations: Abdomen is soft. Tenderness: There is no abdominal tenderness. There is left CVA tenderness. Comments: No palpable incarcerated hernias   Musculoskeletal:      Comments: Neck and back grossly unremarkable. No acute gross extremity abnormalities noted. Skin:     General: Skin is warm and dry. Comments: No zoster/vesicular lesions noted on the trunk or flank   Neurological:      Comments: Awake, alert. GCS 15. CN II-XII grossly intact. Speech clear. No gross lateralizing neuro deficits.     Psychiatric:         Behavior: Behavior normal.          MDM       Procedures      Recent Results (from the past 12 hour(s))   HCG URINE, QL. - POC    Collection Time: 09/12/20  7:12 PM   Result Value Ref Range    Pregnancy test,urine (POC) Negative NEG     CBC WITH AUTOMATED DIFF    Collection Time: 09/12/20  7:18 PM   Result Value Ref Range    WBC 8.8 4.3 - 11.1 K/uL    RBC 4.17 4.05 - 5.2 M/uL    HGB 11.5 (L) 11.7 - 15.4 g/dL    HCT 35.7 (L) 35.8 - 46.3 %    MCV 85.6 79.6 - 97.8 FL    MCH 27.6 26.1 - 32.9 PG MCHC 32.2 31.4 - 35.0 g/dL    RDW 14.1 11.9 - 14.6 %    PLATELET 839 980 - 451 K/uL    MPV 9.3 (L) 9.4 - 12.3 FL    ABSOLUTE NRBC 0.00 0.0 - 0.2 K/uL    DF AUTOMATED      NEUTROPHILS 50 43 - 78 %    LYMPHOCYTES 41 13 - 44 %    MONOCYTES 6 4.0 - 12.0 %    EOSINOPHILS 2 0.5 - 7.8 %    BASOPHILS 0 0.0 - 2.0 %    IMMATURE GRANULOCYTES 0 0.0 - 5.0 %    ABS. NEUTROPHILS 4.4 1.7 - 8.2 K/UL    ABS. LYMPHOCYTES 3.6 0.5 - 4.6 K/UL    ABS. MONOCYTES 0.5 0.1 - 1.3 K/UL    ABS. EOSINOPHILS 0.2 0.0 - 0.8 K/UL    ABS. BASOPHILS 0.0 0.0 - 0.2 K/UL    ABS. IMM. GRANS. 0.0 0.0 - 0.5 K/UL   METABOLIC PANEL, COMPREHENSIVE    Collection Time: 09/12/20  7:18 PM   Result Value Ref Range    Sodium 137 136 - 145 mmol/L    Potassium 3.8 3.5 - 5.1 mmol/L    Chloride 102 98 - 107 mmol/L    CO2 30 21 - 32 mmol/L    Anion gap 5 (L) 7 - 16 mmol/L    Glucose 97 65 - 100 mg/dL    BUN 10 6 - 23 MG/DL    Creatinine 0.89 0.6 - 1.0 MG/DL    GFR est AA >60 >60 ml/min/1.73m2    GFR est non-AA >60 >60 ml/min/1.73m2    Calcium 9.0 8.3 - 10.4 MG/DL    Bilirubin, total 0.2 0.2 - 1.1 MG/DL    ALT (SGPT) 16 12 - 65 U/L    AST (SGOT) 21 15 - 37 U/L    Alk. phosphatase 110 50 - 136 U/L    Protein, total 8.6 (H) 6.3 - 8.2 g/dL    Albumin 3.4 (L) 3.5 - 5.0 g/dL    Globulin 5.2 (H) 2.3 - 3.5 g/dL    A-G Ratio 0.7 (L) 1.2 - 3.5     URINE MICROSCOPIC    Collection Time: 09/12/20  7:31 PM   Result Value Ref Range    WBC 0 0 /hpf    RBC 3-5 0 /hpf    Epithelial cells 3-5 0 /hpf    Bacteria 0 0 /hpf    Casts 0 0 /lpf    Crystals, urine OCCASIONAL 0 /LPF    Mucus 0 0 /lpf       Xr Abd (kub)    Result Date: 9/12/2020  EXAM: Abdomen x-rays. INDICATION: Left-sided abdominal pain. COMPARISON: Prior CT abdomen and pelvis on February 21, 2020. TECHNIQUE: Supine x-rays of the abdomen and pelvis were obtained. FINDINGS: The bowel gas pattern and soft tissues are within normal limits. Again noted is a right kidney stone and cholecystectomy clips.  No left-sided urinary tract stones are visualized. IMPRESSION: 1. Normal bowel gas pattern. 2. Unchanged right kidney stone. 3. Prior cholecystectomy. Us Retroperitoneum Comp    Result Date: 9/12/2020  EXAM: Ultrasound of the kidneys. INDICATION: Left flank pain. COMPARISON: Prior CT abdomen on February 21, 2020. TECHNIQUE: A standard protocol kidney ultrasound was performed. FINDINGS: The right kidney measures 11.2 cm in length and contains an unchanged small nonobstructing stone in its midpole. The left kidney measures 12 cm in length and has a normal appearance. No hydronephrosis is seen. The urinary bladder is within normal limits. IMPRESSION: Unremarkable study, except for a small nonobstructing right kidney stone. Medications   ketorolac (TORADOL) injection 30 mg (30 mg IntraVENous Given 9/12/20 1958)   ondansetron (ZOFRAN) injection 4 mg (4 mg IntraVENous Given 9/12/20 1958)     Disposition  Discussed results. Answered questions. No UTI. No obstructing stone noted. Symptoms have been ongoing a while she states she has a follow-up appointment for a colonoscopy in the next 2 weeks which I encouraged her to keep. We will discharge her on naproxen and Zofran. Return precautions given. Please note, this chart was dictated using Dragon dictation, voice recognition software. While efforts were made to correct any transcription errors, some may inadvertently remain. Please forgive punctuation and typographic/voice recognition errors. Please contact me with any questions concerns or for clarification of documentation.

## 2020-09-13 NOTE — ED NOTES
I have reviewed discharge instructions with the patient. The patient verbalized understanding. Patient left ED via Discharge Method: ambulatory to Home with self. Opportunity for questions and clarification provided. Patient given 2 scripts. To continue your aftercare when you leave the hospital, you may receive an automated call from our care team to check in on how you are doing. This is a free service and part of our promise to provide the best care and service to meet your aftercare needs.  If you have questions, or wish to unsubscribe from this service please call [phone]. Thank you for Choosing our [facility].

## 2020-09-13 NOTE — DISCHARGE INSTRUCTIONS
Follow-up with primary care physician within next few days. Keep your follow-up appoint with your gastroenterologist and upcoming colonoscopy.     Return to the emergency department for worsening or concerning symptoms

## 2020-09-14 NOTE — PROGRESS NOTES
Agatha Steven  : 1975  Payor: [facility] / Plan: [id number] / Product Type: PPO /  [id number]  at [facility]  [facility], [address] [place] [address], [place] [address] [address]  [phone]   [phone]         OUTPATIENT PHYSICAL THERAPY:Daily Note 2018    ICD-10: Treatment Diagnosis:   Difficulty in walking, not elsewhere classified (R26.2)   Muscle weakness (generalized) (M62.81)   Plantar fascial fibromatosis (M72.2)    PRECAUTIONS/ALLERGIES:  Lisinopril; Ace inhibitors; Bactrim [sulfamethoxazole-trimethoprim]; Flomax [tamsulosin]; Norvasc [amlodipine]; and Sulfa (sulfonamide antibiotics)     FALL RISK SCORE: 1 (? 5 = High Risk)    MD ORDERS: Eval and Treat MEDICAL/REFERRING DIAGNOSIS:  heel spur    DATE OF ONSET: 1 year ago    REFERRING PHYSICIAN: Kristopher Reina DPM    RETURN PHYSICIAN APPOINTMENT: 3 weeks     INITIAL ASSESSMENT:  Ms. Agatha Steven has attended 1 physical therapy session including initial evaluation as of 2018. Agatha Steven demonstrates decreased strength, decreased ROM, decreased tolerance of ADLs, decreased functional mobility, and decreased activity tolerance, all consistent with S/S of plantar fasciitis. Pt had decreased 1st ray mobility in L foot accompanied by positive Windlass test. Pt had palpable trigger points throughout L proximal gastroc region. Increased tone throughout R LE noted as well. Pt did not have ankle swelling nor positive special tests indicating ankle involvement. Recommending skilled PT: manual therapeutic techniques (as appropriate), therapeutic exercises and activities, balance and comprehensive home exercises program to address current impairment. Agatha Steven will benefit from skilled PT (medically necessary) to address above deficits affecting participation in basic ADLs and overall functional tolerance. PROBLEM LIST (Impacting functional limitations):  1. Decreased Strength  2.  Decreased ADL/Functional Activities  3. Decreased Transfer Abilities  4. Decreased Ambulation Ability/Technique  5. Decreased Balance  6. Increased Pain  7. Decreased Activity Tolerance  8. Decreased [place] with Home Exercise Program INTERVENTIONS PLANNED:  1. Balance Exercise  2. Bed Mobility  3. Cold  4. Cryotherapy  5. Family Education  6. Gait Training  7. Heat  8. Home Exercise Program (HEP)  9. Manual Therapy  10. Neuromuscular Re-education/Strengthening  11. Range of Motion (ROM)  12. Therapeutic Activites  13. Therapeutic Exercise/Strengthening  14. Transfer Training  15. Ultrasound (US)   TREATMENT PLAN:  Effective Dates: 7/25/2018 TO 10/24/2018 (90 days). Frequency/Duration: 2 times a week for 90 Days    SHORT-TERM FUNCTIONAL GOALS: Time Frame: 12 weeks  Roberta Ching will be compliant with home exercise program within 4 weeks in order to improve active participation with management of patient's symptoms and/or functional deficits. Roberta Ching will report <=3/10 pain with walking >15 minutes in order to participate in daily exercise and daily activities. Roberta Ching will be able to stand >=15 minutes with <2/10 pain to feet in order to participate in household duties without issues/compromise. Roberta Ching will be report improved score on the Foot and Ankle Ability Measure from 41 to 51 to indicate improvement in functional independence. Roberta Ching will improve ankle strength to >=4+/5 to improve tolerance of ADLs and improve overall functional mobility. REHABILITATION POTENTIAL FOR STATED GOALS: Good    Regarding Char Winston's therapy, I certify that the treatment plan above will be carried out by a therapist or under their direction. Thank you for this referral,  Maxx Parra PTA       Referring Physician Signature: Francetta Cowden, DPM              Date                    HISTORY:  All history obtained on 7/26/18 unless otherwise noted.    PATIENT RLL PNA w/ parapneumonic effusion s/p pigtail placement  AF w/ RVR s/p DCCV x 2  COPD on home O2  HFpEF    Neuro: Taken off precedex overnight. Less agitated today on Seroquel 50mg BID and Haldol PRN  Cardiovascular: DCCV x 2 over weekend for hypotension/ tachycardia. Stopped Norsynephrine and switched to PO Amiodarone (increased dose to 400mg tid) and decreased IV lasix as per Cards recondensation. Aim for MAP target 65. Allergic to digoxin. Repeat EKG for QTc. HR remains uncontrolled 120-150s  Resp/Chest: Maintain SpO2 > 92% Currently oxygenating well, continue NC. R sided pigtail continue to drain ~ 240cc/ 24hrs   GI/Nutrition: On DASH diet  ID: On Ceftriaxone to complete 7 day course. R pigtail continues to drain. f/u Blood cultures. Trend LA  Renal: Avoid nephrotoxic agents. Strict I&O  Endocrinology: Maintain Blood sugar < 180  Haem/Oncology:  DVT ppx w/ Lovenox bid,  decrease dose if any sign of bleeding    Critical Care time: 35 minutes assessing presenting problems of acute illness that poses high probability of life threatening deterioration or end organ damage/dysfunction.  Medical decision making including Initiating plan of care, reviewing data, reviewing radiology, discussing with multidisciplinary team, non inclusive of procedures     STATED GOAL:   -Pt states she would like to return to walking and performing physical activity with decreased levels of pain. HISTORY OF PRESENT INJURY/ILLNESS (REASON FOR REFERRAL):  Pt states the pain started about a year ago when she started going to the gym. Pt states her achilles heel was bothering her and was instructed by physician to stop walking on the treadmill and begin using the bicycle. Pt states the pain then started in her arch and after imaging in March found out she had a small heel spur. Pt notes she is having significant numbness in L foot and some in R foot which is due to neuropathy according to her physician. Pt states she occasionally gets shooting pain from her L arch up to around her knee. Pt rates pain10/10 pain at this moment and describes it as sharp and throbbing. Pt states the pain can get as high as 10/10 and as low as 5/10. Pt states she is having the most difficulty standing and walking for long periods of time, standing on it first thing in the morning, coming up on her toes, moving her foot in different directions, ascending/descending stairs. PAST MEDICAL HISTORY/COMORBIDITIES:  Ms. Dumont  has a past medical history of Abnormal Papanicolaou smear of cervix; Adverse effect of anesthesia; Anemia; Angioedema; Breast lump on left side at 2 o'clock position; Chronic cholecystitis (3/28/2017); Claustrophobia; Former cigarette smoker; GERD (gastroesophageal reflux disease); History of kidney stones; Hypercholesterolemia; Hypertension; Kidney stone; Menorrhagia with irregular cycle; Morbid obesity ([address] (03/27/2017); Prediabetes (2/13/2016); Type 2 diabetes mellitus ([address] Vertigo; and Vitamin D deficiency.   Ms. Dumont  has a past surgical history that includes hx tubal ligation (07/25/2005); hx dilation and curettage; hx lap cholecystectomy (03/28/2017); hx other surgical (Bilateral); fragment kidney stone/ eswl; hx endoscopy; hx orthopaedic (Left); and hx lithotripsy. Active Ambulatory Problems     Diagnosis Date Noted    Kidney stones 02/10/2016    Nocturia 02/10/2016    Urinary urgency 02/10/2016    ACE inhibitor-aggravated angioedema 02/13/2016    Type 2 diabetes mellitus with hyperglycemia, without long-term current use of insulin ([facility] 02/13/2016    RUQ pain 03/01/2017    Gastroesophageal reflux disease with esophagitis 03/01/2017    Chronic cholecystitis 03/28/2017    Hypercholesterolemia     Hypertension     Constipation 01/11/2016    Gastroesophageal reflux disease without esophagitis 01/11/2016    Nausea 01/11/2016    Morbid obesity with BMI of 40.0-44.9, adult ([facility] 07/29/2017    Chronic bilateral low back pain without sciatica 07/29/2017    Kidney stone 11/26/2017    Left foot pain 11/26/2017    Environmental and seasonal allergies 03/12/2018    VIRAL (generalized anxiety disorder) 03/12/2018    Rhinosinusitis 03/12/2018    Heel spur, left 07/04/2018    SANTO I (cervical intraepithelial neoplasia I) 07/09/2018     Resolved Ambulatory Problems     Diagnosis Date Noted    No Resolved Ambulatory Problems     Past Medical History:   Diagnosis Date    Abnormal Papanicolaou smear of cervix     Adverse effect of anesthesia     Anemia     Angioedema     Breast lump on left side at 2 o'clock position     Chronic cholecystitis 3/28/2017    Claustrophobia     Former cigarette smoker     GERD (gastroesophageal reflux disease)     History of kidney stones     Hypercholesterolemia     Hypertension     Kidney stone     Menorrhagia with irregular cycle     Morbid obesity ([facility] 03/27/2017    Prediabetes 2/13/2016    Type 2 diabetes mellitus (HCC)     Vertigo     Vitamin D deficiency          Social History     Social History    Marital status:      Spouse name: N/A    Number of children: N/A    Years of education: N/A     Occupational History    Not on file.      Social History Main Topics    Smoking status: Former Smoker Packs/day: 1.00     Years: 0.50     Quit date: 1/1/2009    Smokeless tobacco: Never Used    Alcohol use No    Drug use: No    Sexual activity: Yes     Partners: Male     Birth control/ protection: None      Comment: tubal     Other Topics Concern    Caffeine Concern No    Exercise Yes    Seat Belt Yes    Self-Exams No     Social History Narrative    Abuse: Feels safe at home, no history of physical abuse, no history of sexual abuse           PRIOR LEVEL OF FUNCTION/WOR/ACTIVITY:  Pt states she has been suffering for a long period of time. Pt notes it has recently gotten worse and is impacting her ability to perform daily activities. CURRENT MEDICATIONS:    Current Outpatient Prescriptions:     amoxicillin-clavulanate (AUGMENTIN) 875-125 mg per tablet, Take 1 Tab by mouth every twelve (12) hours for 10 days. , Disp: 20 Tab, Rfl: 0    metFORMIN (GLUCOPHAGE) 1,000 mg tablet, Take 1 Tab by mouth two (2) times daily (with meals). Indications: type 2 diabetes mellitus, Disp: 90 Tab, Rfl: 3    ibuprofen (MOTRIN) 800 mg tablet, Take 1 Tab by mouth every six (6) hours as needed for Pain., Disp: 90 Tab, Rfl: 1    pravastatin (PRAVACHOL) 40 mg tablet, Take 1 Tab by mouth nightly., Disp: 90 Tab, Rfl: 1    hydroCHLOROthiazide (HYDRODIURIL) 25 mg tablet, Take 1 Tab by mouth daily. , Disp: 90 Tab, Rfl: 1    fluticasone (FLONASE) 50 mcg/actuation nasal spray, 2 Sprays by Both Nostrils route daily. , Disp: 3 Bottle, Rfl: 1    loratadine (CLARITIN) 10 mg tablet, Take 1 Tab by mouth daily. , Disp: 90 Tab, Rfl: 3    metroNIDAZOLE (FLAGYL) 500 mg tablet, Take 1 Tab by mouth two (2) times a day for 10 days. , Disp: 20 Tab, Rfl: 0     Date Last Reviewed:  8/8/2018   Number of Personal Factors/Comorbidities that affect the Plan of Care: 1-2: MODERATE COMPLEXITY   EXAMINATION:   OBSERVATION/ORTHOSTATIC POSTURAL ASSESSMENT:Assessed @ Initial Visit:   Pt sits with forward head and rounded shoulders which indicate tight anterior chest musculature, upper trapezius, and levator scapula and weak posterior scapula musculature and deep cervical flexors. Pt displays decreased core motor control indicating weak core and low back musculature. PALPATION: Assessed @ Initial Visit: Tenderness to L plantar fascia, arch, medial heel. Decreased L 1st ray mobility. L proximal lateral gastroc. Increased tone throughout R gastroc and pronounced R achilles tendon. MEASUREMENTS:          Date: 7/25/18  Date:  Date:     Right Left Right Left Right Left   Ankle Circumference 25cm 26cm       Figure 8 52cm 53cm         AROM/PROM         Joint: Date: 7/25/18  Date:  Date:    Active LE ROM Right Left Right Left Right Left   Hip Flexion [facility]       Hip Extension [facility]       Hip IR [facility]       Hip ER [facility]       Knee Extension [facility]       Knee Flexion [facility]       Ankle DF -2 degrees -2 degrees       Ankle PF 65 degrees 60 degrees       Ankle IV 25 degrees 25 degrees       Ankle EV 20 degrees 20 degrees  -pain in arch         STRENGTH         Joint: Date:  7/25/18  Date:  Date:     Right Left Right Left Right Left   Hip Abduction 4/5 4/5       Hip Adduction 4/5 4/5       Hip IR 4/5 4/5       Hip ER 4/5 4/5       Hip Flexion 4+/5 4+/5       Knee Extension 4+/5 4+/5       Knee Flexion 4+/5 4+/5       Ankle DF 4+/5 4/5       Ankle PF 4+/5 4/5       Ankle IV 4+/5 4-/5       Ankle EV 4+/5 4-/5  -pain felt along big toe         SPECIAL TESTS: Assessed @ Initial Visit:    -Talar tilt: Negative   -Anterior drawer: Negative   -Windlass test for Plantar Fasciitis: Positive L    NEUROLOGICAL SCREEN: Assessed @ Initial Visit:    -Pt notes she occasionally has tingling in B feet     FUNCTIONAL MOBILITY:  Assessed @ Initial Visit:    -Affecting participation in basic ADLs and functional tasks.   -Limited tolerance of walking and standing   -Ambulation/Gait: Pt ambulates with slight R lateral trunk lean.     -Bed mobility: Pt reports no pain while in bed but extreme pain when first getting out of bed in the morning.    -Stairs: Pt reports difficulty with stairs due to increased pain in L foot. -Transfers: Independent   -Wheelchair: N/A    BALANCE:    SLS: Date: 7/25/18 Date: Date:   Right: NT     Left: NT          Body Structures Involved:  1. Bones  2. Joints  3. Muscles  4. Ligaments Body Functions Affected:  1. Sensory/Pain  2. Neuromusculoskeletal  3. Movement Related Activities and Participation Affected:  1. Mobility  2. Self Care  3. Domestic Life  4. Interpersonal Interactions and Relationships  5. Community, Social and [place]   Number of elements that affect the Plan of Care: 4+: HIGH COMPLEXITY   CLINICAL PRESENTATION:   Presentation: Stable and uncomplicated: LOW COMPLEXITY   CLINICAL DECISION MAKING:   TOOL USED:   -FOOT AND ANKLE ABILITY MEASURE (FAAM)  Score:  Initial: 41/84 Most Recent: X (Date: -- )   Interpretation of Score: For the \"Activities of Daily Living\", there are 21 questions each scored on a 5 point scale with 0 representing \"Unable to do\" and 4 representing \"No difficulty\". The lower the score, the greater the functional disability. 84/84 represents no disability. Minimal detectable change is 5.7 points. With the addition of the 8 questions in the \"Sports Subscale,\" there are 29 questions, each scored on a 5 point scale with 0 representing \"Unable to do\" and 4 representing \"No difficulty\". The lower the score, the greater the functional disability. 116/116 represents no disability. Minimal detectable change is 12.3 points. Activities of Daily Living:  Score 84 83-68 67-51 50-34 33-18 17-1 0   Modifier CH CI CJ CK CL CM CN     Activities of Daily Living + Sports Subscale:  Score 116 115-94 93-71 70-47 46-24 23-1 0   Modifier CH CI CJ CK CL CM CN       MEDICAL NECESSITY:   · Skilled intervention continues to be required due to above deficits affecting participation in basic ADLs and overall functional tolerance.     REASON FOR SERVICES/ OTHER COMMENTS:   · Patient continues to require skilled intervention due to  above deficits affecting participation in basic ADLs and overall functional tolerance. Use of outcome tool(s) and clinical judgement create a POC that gives a: Clear prediction of patient's progress: LOW COMPLEXITY   TREATMENT:   (In addition to Assessment/Re-Assessment sessions the following treatments were rendered)  PRE-TREATMENT SUBJECTIVE COMMENTS: Patient presents with L foot in boot. She reports pain is 0/10 with Ibuprofen. She reports no pain while in boot, but has pain when out of boot if there is any pressure on the foot. THERAPEUTIC EXERCISE: (15 min) Exercises per grid below to improve mobility, strength and balance. Required minimal visual, verbal and manual cues to promote proper body alignment, promote proper body posture and promote proper body breathing techniques. Progressed repetitions as indicated. Date:7/27/18 Date: 8/3/18   8-8-18   Pt education HEP, Anatomy, POC     Physiostep 12min  -level 3 12min  Level 3  Nu-Step  15 min   Level 3  Nustep    BAPs (PF/DF) 1x20 reps     BAPs (IV/EV) 1x20 reps     Resisted ankle PF 2x10 reps L only  -yellow TB     Resisted ankle DF 2x10 reps L only  -yellow TB     Seated great toe extension 1x15 reps     Towel pick ups 2x10 reps         MANUAL THERAPY: (25 minutes): Joint mobilization, Soft tissue mobilization and Manipulation was utilized and necessary because of the patient's restricted joint motion, painful spasm, loss of articular motion and restricted motion of soft tissue. Pt supine with LEs supported by wedge for STM to plantar aspect of foot, achilles tendon, peroneal musculature, and gastroc muscle. Grade II & III mobilizations to L 1st ray to improve mobility. Kinesio tape to L ankle for additional support to joint.      MODALITIES: (10 minutes):      *  Cold Pack Therapy in order to reduce inflammation and edema to L foot while supine and propped on wedge. Skin was clear and intact . TREATMENT/SESSION ASSESSMENT: Marcos Tanner tolerated STM and trigger point work to L foot. Biggest amount of tenderness today noted in arch and heel of L foot. No swelling or bruising present along lateral aspect of L foot. Pt reported 0-1/10 pain at the end of treatment session. Continued to encourage patient to not go without boot til she sees specialist.     · Pain: Initial: 0/10 Post Session: 0-1/10 ·   Compliance with Program/Exercises: Will assess as treatment progresses. · Recommendations/Intent for next treatment session: \"Next visit will focus on advancements to more challenging activities and reduction in assistance provided\".     TOTAL TREATMENT DURATION:  PT Patient Time In/Time Out  Time In: 1430  Time Out: [address] [facility], [facility]

## 2020-09-29 ENCOUNTER — HOSPITAL ENCOUNTER (OUTPATIENT)
Dept: LAB | Age: 45
Discharge: HOME | End: 2020-09-29

## 2020-09-29 PROCEDURE — 88312 SPECIAL STAINS GROUP 1: CPT

## 2020-09-29 PROCEDURE — 88305 TISSUE EXAM BY PATHOLOGIST: CPT

## 2020-11-26 PROCEDURE — 99283 EMERGENCY DEPT VISIT LOW MDM: CPT

## 2020-11-27 ENCOUNTER — APPOINTMENT (OUTPATIENT)
Dept: GENERAL RADIOLOGY | Age: 45
End: 2020-11-27
Attending: EMERGENCY MEDICINE
Payer: COMMERCIAL

## 2020-11-27 ENCOUNTER — HOSPITAL ENCOUNTER (EMERGENCY)
Age: 45
Discharge: HOME | End: 2020-11-27
Attending: EMERGENCY MEDICINE
Payer: COMMERCIAL

## 2020-11-27 VITALS
RESPIRATION RATE: 16 BRPM | TEMPERATURE: 98.6 F | OXYGEN SATURATION: 99 % | DIASTOLIC BLOOD PRESSURE: 82 MMHG | SYSTOLIC BLOOD PRESSURE: 136 MMHG | WEIGHT: 262 LBS | BODY MASS INDEX: 44.73 KG/M2 | HEIGHT: 64 IN | HEART RATE: 85 BPM

## 2020-11-27 DIAGNOSIS — M25.521 RIGHT ELBOW PAIN: Primary | ICD-10-CM

## 2020-11-27 PROCEDURE — 74011250637 HC RX REV CODE- 250/637: Performed by: EMERGENCY MEDICINE

## 2020-11-27 PROCEDURE — 73080 X-RAY EXAM OF ELBOW: CPT

## 2020-11-27 RX ORDER — IBUPROFEN 800 MG/1
800 TABLET ORAL
Status: COMPLETED | OUTPATIENT
Start: 2020-11-27 | End: 2020-11-27

## 2020-11-27 RX ADMIN — IBUPROFEN 800 MG: 800 TABLET ORAL at 00:31

## 2020-11-27 NOTE — ED PROVIDER NOTES
27-year-old female presents with complaint of right elbow pain status post mechanical trip and fall around 1 hour prior to arrival.  States that she was walking up steps without her glasses when she skipped a step and hit her elbow on the stairs. Patient denies hitting head or loss of consciousness. Denies neck pain, back pain, numbness, tingling, weakness, chest pain, shortness of breath, abdominal pain. Patient reports mild pain to right elbow. Denies being on any anticoagulation. Patient states menstrual cycle several days ago and denies possibility being pregnant at this time. The history is provided by the patient. No  was used. Fall   The accident occurred 1 to 2 hours ago. The fall occurred while walking. She landed on concrete. There was no blood loss. Point of impact: R elbow  Pain location: R elbow  The pain is at a severity of 2/10. The pain is mild. Pertinent negatives include no visual change, no fever, no numbness, no abdominal pain, no nausea, no vomiting, no headaches, no extremity weakness, no loss of consciousness, no tingling and no laceration. She has tried nothing for the symptoms. The treatment provided no relief.         Past Medical History:   Diagnosis Date    Abnormal Papanicolaou smear of cervix     Adverse effect of anesthesia     slow to awaken    Anemia     Chronic low back pain     Diabetes (HCC)     type 2 - oral agents- -does not check sugar-regularly- denies hypoglycemic episodes    VIRAL (generalized anxiety disorder)     GERD (gastroesophageal reflux disease)     no medications- sleeps on 3 pillows-     Heel spur, left     Hypercholesterolemia     controlled well with meds    Hypertension     medication controlled    Kidney stone     Vitamin D deficiency        Past Surgical History:   Procedure Laterality Date    FRAGMENT KIDNEY STONE/ ESWL      HX DILATION AND CURETTAGE      HX ENDOSCOPY      HX HYSTEROSCOPY WITH ENDOMETRIAL ABLATION  HX HYSTEROSCOPY WITH ENDOMETRIAL ABLATION  2017    HX LAP CHOLECYSTECTOMY  2017    HX LITHOTRIPSY       x 4    HX ORTHOPAEDIC Left     hand- tendon repair    HX ORTHOPAEDIC Left     ankle repair-    HX OTHER SURGICAL Bilateral     sweat gland removal under both arms    HX TUBAL LIGATION  2005    LAP,TUBAL CAUTERY           Family History:   Problem Relation Age of Onset    Heart Disease Maternal Grandmother     Diabetes Maternal Grandmother     Hypertension Mother     Other Mother         Glendy Shape disease    Hypertension Father     Deep Vein Thrombosis Father     Hypertension Brother     Breast Cancer Maternal Aunt     Colon Cancer Maternal Uncle     Ovarian Cancer Neg Hx     Uterine Cancer Neg Hx        Social History     Socioeconomic History    Marital status:      Spouse name: Not on file    Number of children: Not on file    Years of education: Not on file    Highest education level: Not on file   Occupational History    Not on file   Social Needs    Financial resource strain: Not on file    Food insecurity     Worry: Not on file     Inability: Not on file    Transportation needs     Medical: Not on file     Non-medical: Not on file   Tobacco Use    Smoking status: Former Smoker     Packs/day: 1.00     Years: 0.50     Pack years: 0.50     Last attempt to quit: 2009     Years since quittin.9    Smokeless tobacco: Never Used   Substance and Sexual Activity    Alcohol use: Yes     Comment: rarely    Drug use: No    Sexual activity: Yes     Partners: Male     Birth control/protection: Surgical     Comment: tubal   Lifestyle    Physical activity     Days per week: Not on file     Minutes per session: Not on file    Stress: Not on file   Relationships    Social connections     Talks on phone: Not on file     Gets together: Not on file     Attends [employer] service: Not on file     Active member of club or organization: Not on file     Attends meetings of clubs or organizations: Not on file     Relationship status: Not on file    Intimate partner violence     Fear of current or ex partner: Not on file     Emotionally abused: Not on file     Physically abused: Not on file     Forced sexual activity: Not on file   Other Topics Concern     Service Not Asked    Blood Transfusions Not Asked    Caffeine Concern No    Occupational Exposure Not Asked    Hobby Hazards Not Asked    Sleep Concern Not Asked    Stress Concern Not Asked    Weight Concern Not Asked    Special Diet Not Asked    Back Care Not Asked    Exercise Yes    Bike Helmet Not Asked    Seat Belt Yes    Self-Exams No   Social History Narrative    Abuse: Feels safe at home, no history of physical abuse, no history of sexual abuse         ALLERGIES: Ace inhibitors; Dilaudid [hydromorphone]; Lisinopril; Bactrim [sulfamethoxazole-trimethoprim]; Norvasc [amlodipine]; Oxycodone; and Sulfa (sulfonamide antibiotics)    Review of Systems   Constitutional: Negative for fatigue and fever. Respiratory: Negative for cough and shortness of breath. Cardiovascular: Negative for chest pain. Gastrointestinal: Negative for abdominal pain, nausea and vomiting. Genitourinary: Negative for dysuria and flank pain. Musculoskeletal: Positive for arthralgias. Negative for back pain, extremity weakness, joint swelling and neck pain. Skin: Negative for rash and wound. Neurological: Negative for dizziness, tingling, loss of consciousness, weakness, light-headedness, numbness and headaches. Vitals:    11/26/20 2356   BP: (!) 148/83   Pulse: 99   Resp: 16   Temp: 98.1 °F (36.7 °C)   SpO2: 99%   Weight: 118.8 kg (262 lb)   Height: 5' 4\" (1.626 m)            Physical Exam  Vitals signs and nursing note reviewed. Constitutional:       Appearance: Normal appearance. HENT:      Head: Normocephalic.       Mouth/Throat:      Mouth: Mucous membranes are moist.   Eyes:      Extraocular Movements: Extraocular movements intact. Pupils: Pupils are equal, round, and reactive to light. Neck:      Musculoskeletal: Normal range of motion. Cardiovascular:      Rate and Rhythm: Normal rate. Pulses: Normal pulses. Heart sounds: Normal heart sounds. Pulmonary:      Effort: Pulmonary effort is normal.      Breath sounds: Normal breath sounds. Abdominal:      Palpations: Abdomen is soft. Tenderness: There is no abdominal tenderness. Musculoskeletal: Normal range of motion. General: No deformity. Right elbow: She exhibits no swelling, no effusion, no deformity and no laceration. Comments: Mild TTP noted to R elbow. No deformity. No joint effusion or swelling. FROM R elbow. No joint laxity. Radial pulses 2+ and equal bilaterally. FROM R shoulder, R wrist. NVID. Skin:     Findings: No laceration. Comments: No significant abrasions. No lacerations noted. Neurological:      General: No focal deficit present. Mental Status: She is alert and oriented to person, place, and time. Motor: No weakness. Comments: Strength 5 out of 5 throughout. Normal sensory exam.          MDM  Number of Diagnoses or Management Options  Right elbow pain: new and requires workup  Diagnosis management comments: Ice placed on left elbow patient given ibuprofen 8 mg for pain control. XR R elbow with no fracture or any other acute concerning findings. Will discharge home with instructions to rest, ice, elevate, NSAIDs for pain control. Patient directed follow-up with primary care physician and given return precautions.        Amount and/or Complexity of Data Reviewed  Clinical lab tests: ordered and reviewed  Tests in the radiology section of CPT®: ordered and reviewed  Tests in the medicine section of CPT®: ordered and reviewed  Review and summarize past medical records: yes  Independent visualization of images, tracings, or specimens: yes    Risk of Complications, Morbidity, and/or Mortality  Presenting problems: low  Diagnostic procedures: low  Management options: low  General comments: Pt declines UPT. Patient Progress  Patient progress: stable    ED Course as of Nov 27 0114   Fri Nov 27, 2020   0114 XR R elbow IMPRESSION:  1. No acute fracture. [DF]      ED Course User Index  [DF] Samantha Zurita MD       Procedures             Jaiden Quinones MD; 11/27/2020 @12:47 AM Voice dictation software was used during the making of this note. This software is not perfect and grammatical and other typographical errors may be present.   This note has not been proofread for errors.  ===================================================================

## 2020-11-27 NOTE — ED TRIAGE NOTES
Pt to the ED after a fall. Pt states that it was dark and she was not wearing her glasses and missed a step. Pt c/o of right elbow pain. Pt states that she did not hit her head and landed on her right elbow. No deformity noted. Pt masked prior to triage.

## 2020-11-27 NOTE — DISCHARGE INSTRUCTIONS
Rest, ice, elevate, NSAIDs for pain control. Schedule close follow-up with primary care physician. Return to ED if symptoms worsen or progress in any way. Joint Pain: Care Instructions  Your Care Instructions     Many people have small aches and pains from overuse or injury to muscles and joints. Joint injuries often happen during sports or recreation, work tasks, or projects around the home. An overuse injury can happen when you put too much stress on a joint or when you do an activity that stresses the joint over and over, such as using the computer or rowing a boat. You can take action at home to help your muscles and joints get better. You should feel better in 1 to 2 weeks, but it can take 3 months or more to heal completely. Follow-up care is a key part of your treatment and safety. Be sure to make and go to all appointments, and call your doctor if you are having problems. It's also a good idea to know your test results and keep a list of the medicines you take. How can you care for yourself at home? · Do not put weight on the injured joint for at least a day or two. · For the first day or two after an injury, do not take hot showers or baths, and do not use hot packs. The heat could make swelling worse. · Put ice or a cold pack on the sore joint for 10 to 20 minutes at a time. Try to do this every 1 to 2 hours for the next 3 days (when you are awake) or until the swelling goes down. Put a thin cloth between the ice and your skin. · Wrap the injury in an elastic bandage. Do not wrap it too tightly because this can cause more swelling. · Prop up the sore joint on a pillow when you ice it or anytime you sit or lie down during the next 3 days. Try to keep it above the level of your heart. This will help reduce swelling. · Take an over-the-counter pain medicine, such as acetaminophen (Tylenol), ibuprofen (Advil, Motrin), or naproxen (Aleve). Read and follow all instructions on the label.   · After 1 or 2 days of rest, begin moving the joint gently. While the joint is still healing, you can begin to exercise using activities that do not strain or hurt the painful joint. When should you call for help? Call your doctor now or seek immediate medical care if:    · You have signs of infection, such as:  ? Increased pain, swelling, warmth, and redness. ? Red streaks leading from the joint. ? A fever. Watch closely for changes in your health, and be sure to contact your doctor if:    · Your movement or symptoms are not getting better after 1 to 2 weeks of home treatment. Where can you learn more? Go to http://www.gray.com/  Enter P205 in the search box to learn more about \"Joint Pain: Care Instructions. \"  Current as of: March 2, 2020               Content Version: 12.6  © 3298-8191 [employer]. Care instructions adapted under license by [employer] (which disclaims liability or warranty for this information). If you have questions about a medical condition or this instruction, always ask your healthcare professional. [place] [address] any warranty or liability for your use of this information. Patient Education     Musculoskeletal Pain: Care Instructions  Your Care Instructions  Different problems with the bones, muscles, nerves, ligaments, and tendons in the body can cause pain. One or more areas of your body may ache or burn. Or they may feel tired, stiff, or sore. The medical term for this type of pain is musculoskeletal pain. It can have many different causes. Sometimes the pain is caused by an injury such as a strain or sprain. Or you might have pain from using one part of your body in the same way over and over again. This is called overuse. In some cases, the cause of the pain is another health problem such as arthritis or fibromyalgia.   The doctor will examine you and ask you questions about your health to help find the cause of your pain. Blood tests or imaging tests like an X-ray may also be helpful. But sometimes doctors can't find a cause of the pain. Treatment depends on your symptoms and the cause of the pain, if known. The doctor has checked you carefully, but problems can develop later. If you notice any problems or new symptoms, get medical treatment right away. Follow-up care is a key part of your treatment and safety. Be sure to make and go to all appointments, and call your doctor if you are having problems. It's also a good idea to know your test results and keep a list of the medicines you take. How can you care for yourself at home? · Rest until you feel better. · Do not do anything that makes the pain worse. Return to exercise gradually if you feel better and your doctor says it's okay. · Be safe with medicines. Read and follow all instructions on the label. ¨ If the doctor gave you a prescription medicine for pain, take it as prescribed. ¨ If you are not taking a prescription pain medicine, ask your doctor if you can take an over-the-counter medicine. · Put ice or a cold pack on the area for 10 to 20 minutes at a time to ease pain. Put a thin cloth between the ice and your skin. When should you call for help? Call your doctor now or seek immediate medical care if:  · You have new pain, or your pain gets worse. · You have new symptoms such as a fever, a rash, or chills. Watch closely for changes in your health, and be sure to contact your doctor if:  · You do not get better as expected. Where can you learn more? Go to [employer]  Enter Q624 in the search box to learn more about \"Musculoskeletal Pain: Care Instructions. \"   © 9274-1678 [employer]. Care instructions adapted under license by [employer] (which disclaims liability or warranty for this information).  This care instruction is for use with your licensed healthcare professional. If you have questions about a medical condition or this instruction, always ask your healthcare professional. [address]  any warranty or liability for your use of this information.   Content Version: 00.8.117790; Current as of: November 20, 2015

## 2020-11-27 NOTE — ED NOTES
I have reviewed discharge instructions with the patient. The patient verbalized understanding. Patient left ED via Discharge Method: ambulatory to Home with family / friend. Opportunity for questions and clarification provided. Patient given 0 scripts. To continue your aftercare when you leave the hospital, you may receive an automated call from our care team to check in on how you are doing. This is a free service and part of our promise to provide the best care and service to meet your aftercare needs.  If you have questions, or wish to unsubscribe from this service please call [phone]. Thank you for Choosing our [facility] [employer] [facility].

## 2021-04-08 ENCOUNTER — HOSPITAL ENCOUNTER (OUTPATIENT)
Dept: GENERAL RADIOLOGY | Age: 46
Discharge: HOME | End: 2021-04-08

## 2021-04-08 DIAGNOSIS — G89.29 CHRONIC PAIN OF LEFT KNEE: ICD-10-CM

## 2021-04-08 DIAGNOSIS — M25.562 CHRONIC PAIN OF LEFT KNEE: ICD-10-CM

## 2021-04-09 NOTE — PROGRESS NOTES
Knee XR shows mild osteoarthritis. Looks like some tendinitis on the films along the medial aspect of her knee where she is hurting. Begin anti-inflammatory and see if that will help. Follow up in 3-4 weeks with virtual appointment if still having pain.   Vassie Flurry

## 2021-07-27 PROBLEM — E11.42 DIABETIC SENSORY POLYNEUROPATHY (HCC): Status: ACTIVE | Noted: 2021-07-27

## 2022-02-21 PROBLEM — Z12.31 SCREENING MAMMOGRAM, ENCOUNTER FOR: Status: ACTIVE | Noted: 2022-02-21

## 2022-03-18 PROBLEM — E11.40 TYPE 2 DIABETES MELLITUS WITH DIABETIC NEUROPATHY (HCC): Status: ACTIVE | Noted: 2019-01-28

## 2022-03-18 PROBLEM — E53.8 B12 DEFICIENCY: Status: ACTIVE | Noted: 2019-03-27

## 2022-03-18 PROBLEM — E66.01 MORBID OBESITY WITH BMI OF 40.0-44.9, ADULT (HCC): Status: ACTIVE | Noted: 2017-07-29

## 2022-03-18 PROBLEM — Z12.31 SCREENING MAMMOGRAM, ENCOUNTER FOR: Status: ACTIVE | Noted: 2022-02-21

## 2022-03-18 PROBLEM — Z11.3 SCREEN FOR STD (SEXUALLY TRANSMITTED DISEASE): Status: ACTIVE | Noted: 2020-05-18

## 2022-03-18 PROBLEM — R19.7 DIARRHEA, UNSPECIFIED: Status: ACTIVE | Noted: 2020-07-09

## 2022-03-19 PROBLEM — R10.2 PELVIC PAIN: Status: ACTIVE | Noted: 2020-07-09

## 2022-03-19 PROBLEM — G89.29 CHRONIC BILATERAL LOW BACK PAIN WITHOUT SCIATICA: Status: ACTIVE | Noted: 2017-07-29

## 2022-03-19 PROBLEM — E11.42 DIABETIC SENSORY POLYNEUROPATHY (HCC): Status: ACTIVE | Noted: 2021-07-27

## 2022-03-19 PROBLEM — Z01.419 WOMEN'S ANNUAL ROUTINE GYNECOLOGICAL EXAMINATION: Status: ACTIVE | Noted: 2019-04-09

## 2022-03-19 PROBLEM — M54.50 CHRONIC BILATERAL LOW BACK PAIN WITHOUT SCIATICA: Status: ACTIVE | Noted: 2017-07-29

## 2022-03-19 PROBLEM — N87.0 CIN I (CERVICAL INTRAEPITHELIAL NEOPLASIA I): Status: ACTIVE | Noted: 2018-07-09

## 2022-03-19 PROBLEM — E55.9 VITAMIN D INSUFFICIENCY: Status: ACTIVE | Noted: 2019-01-28

## 2022-03-20 PROBLEM — J30.89 ENVIRONMENTAL AND SEASONAL ALLERGIES: Status: ACTIVE | Noted: 2018-03-12

## 2022-03-20 PROBLEM — F41.1 GAD (GENERALIZED ANXIETY DISORDER): Status: ACTIVE | Noted: 2018-03-12

## 2022-03-23 PROBLEM — Z01.419 WOMEN'S ANNUAL ROUTINE GYNECOLOGICAL EXAMINATION: Status: RESOLVED | Noted: 2019-04-09 | Resolved: 2022-03-23

## 2022-03-23 PROBLEM — Z12.31 SCREENING MAMMOGRAM, ENCOUNTER FOR: Status: RESOLVED | Noted: 2022-02-21 | Resolved: 2022-03-23

## 2022-03-29 ENCOUNTER — HOSPITAL ENCOUNTER (OUTPATIENT)
Dept: MAMMOGRAPHY | Age: 47
Discharge: HOME | End: 2022-03-29
Attending: OBSTETRICS & GYNECOLOGY

## 2022-03-29 DIAGNOSIS — Z12.31 SCREENING MAMMOGRAM, ENCOUNTER FOR: ICD-10-CM

## 2022-05-27 ENCOUNTER — OFFICE VISIT (OUTPATIENT)
Dept: FAMILY MEDICINE CLINIC | Facility: CLINIC | Age: 47
End: 2022-05-27
Payer: COMMERCIAL

## 2022-05-27 VITALS
OXYGEN SATURATION: 98 % | DIASTOLIC BLOOD PRESSURE: 86 MMHG | SYSTOLIC BLOOD PRESSURE: 122 MMHG | RESPIRATION RATE: 16 BRPM | WEIGHT: 284 LBS | BODY MASS INDEX: 48.49 KG/M2 | TEMPERATURE: 97.4 F | HEIGHT: 64 IN | HEART RATE: 88 BPM

## 2022-05-27 DIAGNOSIS — J30.1 NON-SEASONAL ALLERGIC RHINITIS DUE TO POLLEN: ICD-10-CM

## 2022-05-27 DIAGNOSIS — R63.5 WEIGHT GAIN: ICD-10-CM

## 2022-05-27 DIAGNOSIS — I10 PRIMARY HYPERTENSION: ICD-10-CM

## 2022-05-27 DIAGNOSIS — E78.2 MIXED HYPERLIPIDEMIA: ICD-10-CM

## 2022-05-27 DIAGNOSIS — E11.65 TYPE 2 DIABETES MELLITUS WITH HYPERGLYCEMIA, WITHOUT LONG-TERM CURRENT USE OF INSULIN (HCC): Primary | ICD-10-CM

## 2022-05-27 PROCEDURE — 99214 OFFICE O/P EST MOD 30 MIN: CPT | Performed by: NURSE PRACTITIONER

## 2022-05-27 PROCEDURE — 3044F HG A1C LEVEL LT 7.0%: CPT | Performed by: NURSE PRACTITIONER

## 2022-05-27 RX ORDER — DIETHYLPROPION HYDROCHLORIDE 25 MG/1
1 TABLET ORAL 2 TIMES DAILY
Qty: 60 TABLET | Refills: 1 | Status: SHIPPED | OUTPATIENT
Start: 2022-05-27 | End: 2022-07-26

## 2022-05-27 RX ORDER — IBUPROFEN 800 MG/1
800 TABLET ORAL 2 TIMES DAILY PRN
Qty: 120 TABLET | Refills: 3 | Status: CANCELLED | OUTPATIENT
Start: 2022-05-27

## 2022-05-27 RX ORDER — HYDROXYZINE HYDROCHLORIDE 25 MG/1
25 TABLET, FILM COATED ORAL 3 TIMES DAILY PRN
Qty: 90 TABLET | Refills: 5 | Status: CANCELLED | OUTPATIENT
Start: 2022-05-27

## 2022-05-27 RX ORDER — LORATADINE 10 MG/1
10 TABLET ORAL DAILY
Qty: 90 TABLET | Refills: 1 | Status: SHIPPED | OUTPATIENT
Start: 2022-05-27

## 2022-05-27 RX ORDER — GABAPENTIN 300 MG/1
300 CAPSULE ORAL 3 TIMES DAILY
Qty: 270 CAPSULE | Refills: 1 | Status: SHIPPED | OUTPATIENT
Start: 2022-05-27 | End: 2022-11-23

## 2022-05-27 RX ORDER — PRAVASTATIN SODIUM 40 MG
40 TABLET ORAL NIGHTLY
Qty: 90 TABLET | Refills: 1 | Status: SHIPPED | OUTPATIENT
Start: 2022-05-27

## 2022-05-27 RX ORDER — FLUTICASONE PROPIONATE 50 MCG
2 SPRAY, SUSPENSION (ML) NASAL DAILY
Qty: 3 EACH | Refills: 3 | Status: SHIPPED | OUTPATIENT
Start: 2022-05-27

## 2022-05-27 ASSESSMENT — PATIENT HEALTH QUESTIONNAIRE - PHQ9
SUM OF ALL RESPONSES TO PHQ QUESTIONS 1-9: 0
SUM OF ALL RESPONSES TO PHQ QUESTIONS 1-9: 0
2. FEELING DOWN, DEPRESSED OR HOPELESS: 0
SUM OF ALL RESPONSES TO PHQ QUESTIONS 1-9: 0
1. LITTLE INTEREST OR PLEASURE IN DOING THINGS: 0
SUM OF ALL RESPONSES TO PHQ9 QUESTIONS 1 & 2: 0
SUM OF ALL RESPONSES TO PHQ QUESTIONS 1-9: 0

## 2022-05-27 ASSESSMENT — ENCOUNTER SYMPTOMS
DIARRHEA: 0
EYE DISCHARGE: 0
VOMITING: 0
CONSTIPATION: 0
EYE PAIN: 0
VISUAL CHANGE: 0
BLOOD IN STOOL: 0
CHEST TIGHTNESS: 0
ABDOMINAL PAIN: 0

## 2022-05-27 NOTE — PATIENT INSTRUCTIONS
Patient Education        Learning About Carbohydrate (Carb) Counting and Eating Out When You Have Diabetes  Why plan your meals? Meal planning can be a key part of managing diabetes. Planning meals and snacks with the right balance of carbohydrate, protein, and fat can help you keep yourblood sugar at the target level you set with your doctor. You don't have to eat special foods. You can eat what your family eats, including sweets once in a while. But you do have to pay attention to how oftenyou eat and how much you eat of certain foods. You may want to work with a dietitian or a diabetes educator. They can give you tips and meal ideas and can answer your questions about meal planning. This health professional can also help you reach a healthy weight if that is one ofyour goals. What should you know about eating carbs? Managing the amount of carbohydrate (carbs) you eat is an important part ofhealthy meals when you have diabetes. Carbohydrate is found in many foods.  Learn which foods have carbs. And learn the amounts of carbs in different foods. ? Bread, cereal, pasta, and rice have about 15 grams of carbs in a serving. A serving is 1 slice of bread (1 ounce), ½ cup of cooked cereal, or 1/3 cup of cooked pasta or rice. ? Fruits have 15 grams of carbs in a serving. A serving is 1 small fresh fruit, such as an apple or orange; ½ of a banana; ½ cup of cooked or canned fruit; ½ cup of fruit juice; 1 cup of melon or raspberries; or 2 tablespoons of dried fruit. ? Milk and no-sugar-added yogurt have 15 grams of carbs in a serving. A serving is 1 cup of milk or 3/4 cup (6 oz) of no-sugar-added yogurt. ? Starchy vegetables have 15 grams of carbs in a serving. A serving is ½ cup of mashed potatoes or sweet potato; 1 cup winter squash; ½ of a small baked potato; ½ cup of cooked beans; or ½ cup cooked corn or green peas.    Learn how much carbs to eat each day and at each meal. A dietitian or certified diabetes educator can teach you how to keep track of the amount of carbs you eat. This is called carbohydrate counting.  If you are not sure how to count carbohydrate grams, use the plate method to plan meals. It is a quick way to make sure that you have a balanced meal. It also can help you manage the amount of carbohydrate you eat at meals. ? Divide your plate by types of foods. Put non-starchy vegetables on half the plate, meat or other protein food on one-quarter of the plate, and a grain or starchy vegetable in the final quarter of the plate. To this you can add a small piece of fruit and 1 cup of milk or yogurt, depending on how many carbs you are supposed to eat at a meal.   Try to eat about the same amount of carbs at each meal. Do not \"save up\" your daily allowance of carbs to eat at one meal.   Proteins have very little or no carbs. Examples of proteins are beef, chicken, turkey, fish, eggs, tofu, cheese, cottage cheese, and peanut butter. How can you eat out and still eat healthy?  Learn to estimate the serving sizes of foods that have carbohydrate. If you measure food at home, it will be easier to estimate the amount in a serving of restaurant food.  If the meal you order has too much carbohydrate (such as potatoes, corn, or baked beans), ask to have a low-carbohydrate food instead. Ask for a salad or non-starchy vegetables like broccoli, cauliflower, green beans, or peppers.  If you eat more carbohydrate at a meal than you had planned, take a walk or do other exercise. This will help lower your blood sugar. What are some tips for eating healthy?  Limit saturated fat, such as the fat from meat and dairy products. This is a healthy choice because people who have diabetes are at higher risk of heart disease. So choose lean cuts of meat and nonfat or low-fat dairy products. Use olive or canola oil instead of butter or shortening when cooking.  Don't skip meals.  Your blood sugar may drop too low if you skip meals and take insulin or certain medicines for diabetes.  Check with your doctor before you drink alcohol. Alcohol can cause your blood sugar to drop too low. Alcohol can also cause a bad reaction if you take certain diabetes medicines. Follow-up care is a key part of your treatment and safety. Be sure to make and go to all appointments, and call your doctor if you are having problems. It's also a good idea to know your test results and keep alist of the medicines you take. Where can you learn more? Go to https://[employer].FarmBot. org and sign in to your Womai account. Enter H112 in the SocialCompare box to learn more about \"Learning About Carbohydrate (Carb) Counting and Eating Out When You Have Diabetes. \"     If you do not have an account, please click on the \"Sign Up Now\" link. Current as of: September 8, 2021               Content Version: 13.2  © 2006-2022 hike. Care instructions adapted under license by [facility] ([facility]). If you have questions about a medical condition or this instruction, always ask your healthcare professional. [place] [address] any warranty or liability for your use of this information. Patient Education        A Healthy Lifestyle: Care Instructions  Your Care Instructions     A healthy lifestyle can help you feel good, stay at a healthy weight, and have plenty of energy for both work and play. A healthy lifestyle is something youcan share with your whole family. A healthy lifestyle also can lower your risk for serious health problems, suchas high blood pressure, heart disease, and diabetes. You can follow a few steps listed below to improve your health and the healthof your family. Follow-up care is a key part of your treatment and safety. Be sure to make and go to all appointments, and call your doctor if you are having problems.  It's also a good idea to know your test results and keep alist of the medicines you take. How can you care for yourself at home?  Do not eat too much sugar, fat, or fast foods. You can still have dessert and treats now and then. The goal is moderation.  Start small to improve your eating habits. Pay attention to portion sizes, drink less juice and soda pop, and eat more fruits and vegetables. ? Eat a healthy amount of food. A 3-ounce serving of meat, for example, is about the size of a deck of cards. Fill the rest of your plate with vegetables and whole grains. ? Limit the amount of soda and sports drinks you have every day. Drink more water when you are thirsty. ? Eat plenty of fruits and vegetables every day. Have an apple or some carrot sticks as an afternoon snack instead of a candy bar. Try to have fruits and/or vegetables at every meal.   Make exercise part of your daily routine. You may want to start with simple activities, such as walking, bicycling, or slow swimming. Try to be active 30 to 60 minutes every day. You do not need to do all 30 to 60 minutes all at once. For example, you can exercise 3 times a day for 10 or 20 minutes. Moderate exercise is safe for most people, but it is always a good idea to talk to your doctor before starting an exercise program.   Keep moving. Lucía Fickle the lawn, work in the garden, or CITYBIZLIST. Take the stairs instead of the elevator at work.  If you smoke, quit. People who smoke have an increased risk for heart attack, stroke, cancer, and other lung illnesses. Quitting is hard, but there are ways to boost your chance of quitting tobacco for good. ? Use nicotine gum, patches, or lozenges. ? Ask your doctor about stop-smoking programs and medicines. ? Keep trying. In addition to reducing your risk of diseases in the future, you will notice some benefits soon after you stop using tobacco. If you have shortness of breath or asthma symptoms, they will likely get better within a few weeks after you quit.    Limit how much alcohol you drink. Moderate amounts of alcohol (up to 2 drinks a day for men, 1 drink a day for women) are okay. But drinking too much can lead to liver problems, high blood pressure, and other health problems. Family health  If you have a family, there are many things you can do together to improve yourhealth.  Eat meals together as a family as often as possible.  Eat healthy foods. This includes fruits, vegetables, lean meats and dairy, and whole grains.  Include your family in your fitness plan. Most people think of activities such as jogging or tennis as the way to fitness, but there are many ways you and your family can be more active. Anything that makes you breathe hard and gets your heart pumping is exercise. Here are some tips:  ? Walk to do errands or to take your child to school or the bus.  ? Go for a family bike ride after dinner instead of watching TV. Where can you learn more? Go to https://RupeeTimes.LDL Technology. org and sign in to your LigerTail account. Enter A837 in the G-cluster box to learn more about \"A Healthy Lifestyle: Care Instructions. \"     If you do not have an account, please click on the \"Sign Up Now\" link. Current as of: June 16, 2021               Content Version: 13.2  © 2006-2022 Healthwise, Incorporated. Care instructions adapted under license by [facility] ([facility]). If you have questions about a medical condition or this instruction, always ask your healthcare professional. [place] [address] any warranty or liability for your use of this information.

## 2022-05-27 NOTE — PROGRESS NOTES
Jennifer Kramer (:  1975) is a 49 y.o. female,Established patient, here for evaluation of the following chief complaint(s):  Follow-up         ASSESSMENT/PLAN:  1. Type 2 diabetes mellitus with hyperglycemia, without long-term current use of insulin (HCC)  -     gabapentin (NEURONTIN) 300 MG capsule; Take 1 capsule by mouth 3 times daily for 180 days. , Disp-270 capsule, R-1Normal  -     metFORMIN (GLUCOPHAGE) 500 MG tablet; Take 1 tablet by mouth 2 times daily (with meals), Disp-180 tablet, R-1Normal  -     Hemoglobin A1C; Future    Check feet daily. 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 Need to monitor blood sugars at least two to three times a week. Will check a1c today. 2. Weight gain  -     Diethylpropion HCl 25 MG TABS; Take 1 tablet by mouth in the morning and at bedtime for 60 days. , Disp-60 tablet, R-1Normal    I have reviewed the patients controlled substance prescription history, as maintained in the [place] prescription monitoring program, so that the prescription(s) for a  controlled substance can be given. No abnormalities were identified. Will do Fastin for weight loss. Cannot do phentamine due to stimulating affect. Encourage a diet and exercise program for success. 3. Primary hypertension    Continue to check BP at home/school. Monitor blood pressure at home twice weekly and keep log. To ER with signs of CVA or MI. Follow up in 6 months or sooner if needed. 4. Mixed hyperlipidemia  -     pravastatin (PRAVACHOL) 40 MG tablet; Take 1 tablet by mouth nightly, Disp-90 tablet, R-1Normal    Low fat choices with more vegetables. 5. Non-seasonal allergic rhinitis due to pollen  -     loratadine (CLARITIN) 10 MG tablet; Take 1 tablet by mouth daily, Disp-90 tablet, R-1Normal  -     fluticasone (FLONASE) 50 MCG/ACT nasal spray; 2 sprays by Nasal route daily, Disp-3 each, R-3Normal  Continue allergy meds. Watch for signs of infection. Subjective   SUBJECTIVE/OBJECTIVE:  Not checking blood sugars. No episodes of low blood sugars. Is trying to lose weight. Has not been on her Fastin for weeks. Thought it really did help her with her weight loss earlier this year. 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 Has that scheduled thru [employer] next week. No formal exercise plan. 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 Has questions about ALS as her mom  of it about 12 years ago. Her and her brother are concerned that it may be hereditary. Has been doing some online research to see if this is true. Diabetes  She presents for her follow-up diabetic visit. She has type 2 diabetes mellitus. Her disease course has been stable. There are no hypoglycemic associated symptoms. Pertinent negatives for hypoglycemia include no dizziness, nervousness/anxiousness, seizures or tremors. Pertinent negatives for diabetes include no fatigue, no foot paresthesias, no foot ulcerations, no polydipsia, no polyphagia, no polyuria, no visual change and no weakness. There are no hypoglycemic complications. Symptoms are stable. There are no diabetic complications. Risk factors for coronary artery disease include diabetes mellitus, dyslipidemia and family history. Current diabetic treatment includes oral agent (monotherapy). She is compliant with treatment all of the time. Her weight is stable. She is following a diabetic and generally healthy diet. Meal planning includes avoidance of concentrated sweets. She has not had a previous visit with a dietitian. She participates in exercise weekly. Home blood sugar record trend: unknown. An ACE inhibitor/angiotensin II receptor blocker is contraindicated. She does not see a podiatrist.Eye exam is not current. Hyperlipidemia  This is a chronic problem. The problem is controlled. Recent lipid tests were reviewed and are normal. Exacerbating diseases include diabetes. There are no known factors aggravating her hyperlipidemia.  Current antihyperlipidemic treatment includes statins. The current treatment provides significant improvement of lipids. There are no compliance problems. Risk factors for coronary artery disease include diabetes mellitus, dyslipidemia and family history. Allergies   Allergen Reactions    Ace Inhibitors Anaphylaxis and Angioedema    Hydromorphone Anaphylaxis     Swollen tongue, lips, hypotension, low O2 SATS    Amlodipine Other (See Comments)     Pt denies any allergic reaction to Norvasc    Oxycodone Other (See Comments)     Makes itch    Sulfa Antibiotics Hives    Sulfamethoxazole-Trimethoprim Hives     Current Outpatient Medications   Medication Sig    gabapentin (NEURONTIN) 300 MG capsule Take 1 capsule by mouth 3 times daily for 180 days.  metFORMIN (GLUCOPHAGE) 500 MG tablet Take 1 tablet by mouth 2 times daily (with meals)    loratadine (CLARITIN) 10 MG tablet Take 1 tablet by mouth daily    pravastatin (PRAVACHOL) 40 MG tablet Take 1 tablet by mouth nightly    fluticasone (FLONASE) 50 MCG/ACT nasal spray 2 sprays by Nasal route daily    Diethylpropion HCl 25 MG TABS Take 1 tablet by mouth in the morning and at bedtime for 60 days.  hydroCHLOROthiazide (HYDRODIURIL) 25 MG tablet Take 25 mg by mouth daily    ibuprofen (ADVIL;MOTRIN) 800 MG tablet Take 800 mg by mouth 2 times daily as needed    diclofenac (VOLTAREN) 50 MG EC tablet Take 50 mg by mouth 2 times daily (Patient not taking: Reported on 5/27/2022)    Diethylpropion HCl 25 MG TABS Take 1 tablet by mouth 2 times daily.     hydrOXYzine (ATARAX) 25 MG tablet Take by mouth 3 times daily as needed (Patient not taking: Reported on 5/27/2022)    Lidocaine, Anorectal, 5 % CREA Actual prescription: Lidocaine 5%: Neurontin/gabapentin 5% combined topical cream/gelApply up to 4 times a day as needed for pain (Patient not taking: Reported on 5/27/2022)    triamcinolone (KENALOG) 0.1 % cream Apply topically 2 times daily (Patient not taking: Reported on 5/27/2022)     No current facility-administered medications for this visit. Review of Systems   Constitutional: Negative for fatigue. No change in weight. States she is motivated to lose weight and has used Fastin in the past.    HENT: Negative for congestion and hearing loss. Eyes: Negative for pain, discharge and visual disturbance. Respiratory: Negative for chest tightness. Cardiovascular: Negative for palpitations and leg swelling. Gastrointestinal: Negative for abdominal pain, blood in stool, constipation, diarrhea and vomiting. Endocrine: Negative for polydipsia, polyphagia and polyuria. Type 2 diabetes--not checking blood sugars. Has not had any hypoglycemia. Last a1c 6.8 in January 2022. Genitourinary: Negative for difficulty urinating, frequency and urgency. Musculoskeletal: Negative for arthralgias and gait problem. Skin: Negative for rash. Neurological: Negative for dizziness, tremors, seizures and weakness. Psychiatric/Behavioral: Negative for decreased concentration and sleep disturbance. The patient is not nervous/anxious. /86   Pulse 88   Temp 97.4 °F (36.3 °C)   Resp 16   Ht 5' 4\" (1.626 m)   Wt 284 lb (128.8 kg)   SpO2 98%   BMI 48.75 kg/m²       Objective   Physical Exam  Constitutional:       Appearance: Normal appearance. HENT:      Head: Normocephalic and atraumatic. Right Ear: Tympanic membrane, ear canal and external ear normal.      Left Ear: Tympanic membrane, ear canal and external ear normal.      Nose: Nose normal.      Mouth/Throat:      Mouth: Mucous membranes are moist.   Eyes:      Extraocular Movements: Extraocular movements intact. Conjunctiva/sclera: Conjunctivae normal.      Pupils: Pupils are equal, round, and reactive to light. Cardiovascular:      Rate and Rhythm: Normal rate and regular rhythm.    Pulmonary:      Effort: Pulmonary effort is normal.      Breath sounds: Normal breath sounds. Abdominal:      General: Bowel sounds are normal.   Musculoskeletal:         General: Normal range of motion. Cervical back: Normal range of motion. Skin:     General: Skin is warm and dry. Neurological:      General: No focal deficit present. Mental Status: She is alert and oriented to person, place, and time. Psychiatric:         Mood and Affect: Mood normal.         Behavior: Behavior normal.         Thought Content: Thought content normal.         Judgment: Judgment normal.           PHQ-9 Total Score: 0 (5/27/2022  9:58 AM)    Body mass index is 48.75 kg/m². On this date 5/27/2022 I have spent 30 minutes reviewing previous notes, test results and face to face with the patient discussing the diagnosis and importance of compliance with the treatment plan as well as documenting on the day of the visit. An electronic signature was used to authenticate this note.     --Enrique Holland, MORGAN - CNP

## 2022-05-28 LAB
EST. AVERAGE GLUCOSE BLD GHB EST-MCNC: 137 MG/DL
HBA1C MFR BLD: 6.4 % (ref 4.2–6.3)

## 2022-06-15 NOTE — LETTER
[id number] [address] EMERGENCY DEPT 
[id number] [facility] [id number] [phone] Work/School Note Date: 11/10/2019 To Whom It May concern: 
 
Bryanna Proctor was seen and treated today in the emergency room by the following provider(s): 
Attending Provider: Wing Rosemarie MD. Bryanna Proctor may return to work on Wednesday 11/13/19 Sincerely, Tj Jerome RN 
 
 
 
 CBC/CMP/COVID-19

## 2022-08-08 ENCOUNTER — TELEPHONE (OUTPATIENT)
Dept: FAMILY MEDICINE CLINIC | Facility: CLINIC | Age: 47
End: 2022-08-08

## 2022-08-08 DIAGNOSIS — I10 ESSENTIAL HYPERTENSION: Primary | ICD-10-CM

## 2022-08-08 DIAGNOSIS — M25.50 ARTHRALGIA, UNSPECIFIED JOINT: ICD-10-CM

## 2022-08-08 RX ORDER — HYDROCHLOROTHIAZIDE 25 MG/1
25 TABLET ORAL DAILY
Qty: 90 TABLET | Refills: 0 | Status: SHIPPED | OUTPATIENT
Start: 2022-08-08 | End: 2022-08-31 | Stop reason: SDUPTHER

## 2022-08-08 NOTE — TELEPHONE ENCOUNTER
----- Message from Radha Aragon sent at 8/8/2022  1:23 PM EDT -----  Subject: Refill Request    QUESTIONS  Name of Medication? hydroCHLOROthiazide (HYDRODIURIL) 25 MG tablet  Patient-reported dosage and instructions? 1 daily  How many days do you have left? 7  Preferred Pharmacy? [id number]  Pharmacy phone number (if available)? [phone]  ---------------------------------------------------------------------------  --------------,  Name of Medication? diclofenac (VOLTAREN) 50 MG EC tablet  Patient-reported dosage and instructions? 2 daily  How many days do you have left? 7  Preferred Pharmacy? [id number]  Pharmacy phone number (if available)? [phone]  ---------------------------------------------------------------------------  --------------  Anita CRAIG  What is the best way for the office to contact you? OK to leave message on   voicemail  Preferred Call Back Phone Number? [phone]  ---------------------------------------------------------------------------  --------------  SCRIPT ANSWERS  Relationship to Patient?  Self

## 2022-08-31 ENCOUNTER — OFFICE VISIT (OUTPATIENT)
Dept: FAMILY MEDICINE CLINIC | Facility: CLINIC | Age: 47
End: 2022-08-31
Payer: COMMERCIAL

## 2022-08-31 VITALS
HEART RATE: 82 BPM | BODY MASS INDEX: 49 KG/M2 | DIASTOLIC BLOOD PRESSURE: 82 MMHG | TEMPERATURE: 97.7 F | WEIGHT: 287 LBS | RESPIRATION RATE: 18 BRPM | HEIGHT: 64 IN | OXYGEN SATURATION: 97 % | SYSTOLIC BLOOD PRESSURE: 126 MMHG

## 2022-08-31 DIAGNOSIS — I10 ESSENTIAL HYPERTENSION: ICD-10-CM

## 2022-08-31 DIAGNOSIS — M25.50 ARTHRALGIA, UNSPECIFIED JOINT: ICD-10-CM

## 2022-08-31 DIAGNOSIS — E11.40 TYPE 2 DIABETES MELLITUS WITH DIABETIC NEUROPATHY, WITHOUT LONG-TERM CURRENT USE OF INSULIN (HCC): ICD-10-CM

## 2022-08-31 LAB
EST. AVERAGE GLUCOSE BLD GHB EST-MCNC: 146 MG/DL
HBA1C MFR BLD: 6.7 % (ref 4.8–5.6)

## 2022-08-31 PROCEDURE — 3044F HG A1C LEVEL LT 7.0%: CPT | Performed by: NURSE PRACTITIONER

## 2022-08-31 PROCEDURE — 99214 OFFICE O/P EST MOD 30 MIN: CPT | Performed by: NURSE PRACTITIONER

## 2022-08-31 RX ORDER — HYDROCHLOROTHIAZIDE 25 MG/1
25 TABLET ORAL DAILY
Qty: 90 TABLET | Refills: 1 | Status: SHIPPED | OUTPATIENT
Start: 2022-08-31

## 2022-08-31 RX ORDER — DIETHYLPROPION HYDROCHLORIDE 25 MG/1
1 TABLET ORAL 2 TIMES DAILY
Qty: 180 TABLET | Refills: 0 | Status: SHIPPED | OUTPATIENT
Start: 2022-08-31 | End: 2022-11-29

## 2022-08-31 ASSESSMENT — PATIENT HEALTH QUESTIONNAIRE - PHQ9
SUM OF ALL RESPONSES TO PHQ QUESTIONS 1-9: 0
SUM OF ALL RESPONSES TO PHQ QUESTIONS 1-9: 0
SUM OF ALL RESPONSES TO PHQ9 QUESTIONS 1 & 2: 0
SUM OF ALL RESPONSES TO PHQ QUESTIONS 1-9: 0
2. FEELING DOWN, DEPRESSED OR HOPELESS: 0
1. LITTLE INTEREST OR PLEASURE IN DOING THINGS: 0
SUM OF ALL RESPONSES TO PHQ QUESTIONS 1-9: 0

## 2022-08-31 ASSESSMENT — ENCOUNTER SYMPTOMS
VOMITING: 0
SHORTNESS OF BREATH: 0
ABDOMINAL PAIN: 0
EYE DISCHARGE: 0
BLOOD IN STOOL: 0
COUGH: 0
RHINORRHEA: 0
CONSTIPATION: 0
CHEST TIGHTNESS: 0
WHEEZING: 0
EYE PAIN: 0
DIARRHEA: 0

## 2022-08-31 NOTE — PATIENT INSTRUCTIONS

## 2022-08-31 NOTE — PROGRESS NOTES
Desmond Johnson (:  1975) is a 49 y.o. female,Established patient, here for evaluation of the following chief complaint(s):  Follow-up (F/u on diabetes) and Medication Refill (Pt. Is here for med refills and labs)         ASSESSMENT/PLAN:  1. BMI 40.0-44.9, adult (HCC)  -     Diethylpropion HCl 25 MG TABS; Take 1 tablet by mouth 2 times daily for 90 days. , Disp-180 tablet, R-0Normal    BMI Counseling:  Due to this patient's BMI, I have provided counseling regarding nutrition and physical activity. 2. Essential hypertension  -     hydroCHLOROthiazide (HYDRODIURIL) 25 MG tablet; Take 1 tablet by mouth daily, Disp-90 tablet, R-1Normal    Monitor blood pressure at home twice weekly and keep log. To ER with signs of CVA or MI. Follow up in 6 months or sooner if needed. 3. Type 2 diabetes mellitus with diabetic neuropathy, without long-term current use of insulin ([facility])  -     Hemoglobin A1C; Future    A1c good. Does not need refills on her metformin    Follow up in 6 months. May need to decrease metofrmin if a1c is less than 5.5.    4. Arthralgia, unspecified joint  -     diclofenac (VOLTAREN) 50 MG EC tablet; Take 1 tablet by mouth 2 times daily, Disp-60 tablet, R-2Normal    Continue diclofenac. . Chronic joint and back pain. No follow-ups on file. Subjective   SUBJECTIVE/OBJECTIVE:  Back of her calves feel like they are tightening up. Cramping while in bed. BS was 72 this morning. Has changed her diet to low carbs. Working on her weight. Was on diethylproprion in the past and it helped with her weight loss. Was on Adipex in the past and that caused her to have palpitations and jitteriness. Did not like the way it made her feel. Is trying to walk for exercise. Has a group of people to walk with and has tried to go to different [place] to walk. Diabetes  She presents for her follow-up diabetic visit. She has type 2 diabetes mellitus.  Her disease course has been improving. There are no hypoglycemic associated symptoms. Pertinent negatives for hypoglycemia include no dizziness, headaches, nervousness/anxiousness, seizures or tremors. Pertinent negatives for diabetes include no chest pain, no fatigue, no foot ulcerations, no polydipsia, no polyphagia and no weakness. There are no hypoglycemic complications. Symptoms are stable. There are no diabetic complications. Risk factors for coronary artery disease include diabetes mellitus and hypertension. Current diabetic treatment includes oral agent (monotherapy). She is compliant with treatment all of the time. Her weight is stable. She is following a diabetic and generally healthy diet. Meal planning includes avoidance of concentrated sweets. She has not had a previous visit with a dietitian. She participates in exercise three times a week. There is no change in her home blood glucose trend. Her overall blood glucose range is 70-90 mg/dl. An ACE inhibitor/angiotensin II receptor blocker is not being taken. She does not see a podiatrist.Eye exam is not current. Allergies   Allergen Reactions    Ace Inhibitors Anaphylaxis and Angioedema    Hydromorphone Anaphylaxis     Swollen tongue, lips, hypotension, low O2 SATS    Amlodipine Other (See Comments)     Pt denies any allergic reaction to Norvasc    Oxycodone Other (See Comments)     Makes itch    Sulfa Antibiotics Hives    Sulfamethoxazole-Trimethoprim Hives     Current Outpatient Medications   Medication Sig    BLACK CURRANT SEED OIL PO Take by mouth    diclofenac (VOLTAREN) 50 MG EC tablet Take 1 tablet by mouth 2 times daily    hydroCHLOROthiazide (HYDRODIURIL) 25 MG tablet Take 1 tablet by mouth daily    Diethylpropion HCl 25 MG TABS Take 1 tablet by mouth 2 times daily for 90 days. gabapentin (NEURONTIN) 300 MG capsule Take 1 capsule by mouth 3 times daily for 180 days.     metFORMIN (GLUCOPHAGE) 500 MG tablet Take 1 tablet by mouth 2 times daily (with meals) loratadine (CLARITIN) 10 MG tablet Take 1 tablet by mouth daily    pravastatin (PRAVACHOL) 40 MG tablet Take 1 tablet by mouth nightly    fluticasone (FLONASE) 50 MCG/ACT nasal spray 2 sprays by Nasal route daily     No current facility-administered medications for this visit. Review of Systems   Constitutional:  Negative for fatigue and fever. HENT:  Negative for congestion, hearing loss, postnasal drip and rhinorrhea. Eyes:  Negative for pain, discharge and visual disturbance. Respiratory:  Negative for cough, chest tightness, shortness of breath and wheezing. Cardiovascular:  Negative for chest pain, palpitations and leg swelling. Gastrointestinal:  Negative for abdominal pain, blood in stool, constipation, diarrhea and vomiting. Endocrine: Negative for polydipsia and polyphagia. BS have been good. No hypoglycemia but has been as low as 70. Genitourinary:  Negative for difficulty urinating, frequency and urgency. Musculoskeletal:  Negative for arthralgias, gait problem and myalgias. Skin:  Negative for rash. Neurological:  Negative for dizziness, tremors, seizures, weakness and headaches. Psychiatric/Behavioral:  Negative for decreased concentration and sleep disturbance. The patient is not nervous/anxious. /82 (Site: Left Upper Arm, Position: Sitting, Cuff Size: Large Adult)   Pulse 82   Temp 97.7 °F (36.5 °C) (Temporal)   Resp 18   Ht 5' 4\" (1.626 m)   Wt 287 lb (130.2 kg)   LMP  (LMP Unknown)   SpO2 97%   BMI 49.26 kg/m²       Objective   Physical Exam  Constitutional:       Appearance: Normal appearance. HENT:      Head: Normocephalic and atraumatic. Right Ear: Tympanic membrane, ear canal and external ear normal.      Left Ear: Tympanic membrane, ear canal and external ear normal.      Nose: Nose normal.      Mouth/Throat:      Mouth: Mucous membranes are moist.   Eyes:      Extraocular Movements: Extraocular movements intact. Conjunctiva/sclera: Conjunctivae normal.      Pupils: Pupils are equal, round, and reactive to light. Cardiovascular:      Rate and Rhythm: Normal rate and regular rhythm. Pulses: Normal pulses. Heart sounds: Normal heart sounds. Pulmonary:      Effort: Pulmonary effort is normal.      Breath sounds: Normal breath sounds. Abdominal:      General: Bowel sounds are normal.   Musculoskeletal:         General: Normal range of motion. Cervical back: Normal range of motion. Skin:     General: Skin is warm and dry. Neurological:      General: No focal deficit present. Mental Status: She is alert and oriented to person, place, and time. Psychiatric:         Mood and Affect: Mood normal.         Behavior: Behavior normal.         Thought Content: Thought content normal.         Judgment: Judgment normal.      PHQ-9 Total Score: 0 (8/31/2022 10:24 AM)  Body mass index is 49.26 kg/m². On this date 8/31/2022 I have spent 30 minutes reviewing previous notes, test results and face to face with the patient discussing the diagnosis and importance of compliance with the treatment plan as well as documenting on the day of the visit. An electronic signature was used to authenticate this note.     --MORGAN Thompson - CNP

## 2022-09-03 ENCOUNTER — HOSPITAL ENCOUNTER (EMERGENCY)
Dept: GENERAL RADIOLOGY | Age: 47
Discharge: HOME | End: 2022-09-06
Payer: COMMERCIAL

## 2022-09-03 ENCOUNTER — HOSPITAL ENCOUNTER (EMERGENCY)
Age: 47
Discharge: HOME | End: 2022-09-03
Attending: EMERGENCY MEDICINE
Payer: COMMERCIAL

## 2022-09-03 VITALS
HEART RATE: 100 BPM | SYSTOLIC BLOOD PRESSURE: 175 MMHG | WEIGHT: 287 LBS | TEMPERATURE: 99.9 F | RESPIRATION RATE: 18 BRPM | HEIGHT: 64 IN | BODY MASS INDEX: 49 KG/M2 | DIASTOLIC BLOOD PRESSURE: 85 MMHG | OXYGEN SATURATION: 99 %

## 2022-09-03 DIAGNOSIS — J06.9 ACUTE UPPER RESPIRATORY INFECTION: ICD-10-CM

## 2022-09-03 DIAGNOSIS — R05.9 COUGH: Primary | ICD-10-CM

## 2022-09-03 PROBLEM — R06.02 SHORTNESS OF BREATH: Status: ACTIVE | Noted: 2022-09-03

## 2022-09-03 LAB
SARS-COV-2 RDRP RESP QL NAA+PROBE: NOT DETECTED
SOURCE: NORMAL

## 2022-09-03 PROCEDURE — 87635 SARS-COV-2 COVID-19 AMP PRB: CPT

## 2022-09-03 PROCEDURE — 99284 EMERGENCY DEPT VISIT MOD MDM: CPT

## 2022-09-03 PROCEDURE — 71045 X-RAY EXAM CHEST 1 VIEW: CPT

## 2022-09-03 RX ORDER — BENZONATATE 100 MG/1
100 CAPSULE ORAL 2 TIMES DAILY PRN
Qty: 20 CAPSULE | Refills: 0 | Status: SHIPPED | OUTPATIENT
Start: 2022-09-03 | End: 2022-09-10

## 2022-09-03 ASSESSMENT — PAIN - FUNCTIONAL ASSESSMENT: PAIN_FUNCTIONAL_ASSESSMENT: 0-10

## 2022-09-03 ASSESSMENT — ENCOUNTER SYMPTOMS
TROUBLE SWALLOWING: 0
SORE THROAT: 1
CHEST TIGHTNESS: 0
VOICE CHANGE: 0
COUGH: 1
ABDOMINAL PAIN: 0
BACK PAIN: 0
SHORTNESS OF BREATH: 1
FACIAL SWELLING: 0
EYE PAIN: 0
NAUSEA: 0
VOMITING: 0

## 2022-09-03 ASSESSMENT — LIFESTYLE VARIABLES
HOW MANY STANDARD DRINKS CONTAINING ALCOHOL DO YOU HAVE ON A TYPICAL DAY: 1 OR 2
HOW OFTEN DO YOU HAVE A DRINK CONTAINING ALCOHOL: MONTHLY OR LESS

## 2022-09-03 ASSESSMENT — PAIN DESCRIPTION - LOCATION: LOCATION: HEAD

## 2022-09-03 ASSESSMENT — PAIN SCALES - GENERAL: PAINLEVEL_OUTOF10: 6

## 2022-09-03 ASSESSMENT — PAIN DESCRIPTION - DESCRIPTORS: DESCRIPTORS: POUNDING

## 2022-09-03 NOTE — DISCHARGE INSTRUCTIONS
You are diagnosed here with an upper respiratory type infection. I recommend you follow-up with your primary care doctor. Please stay home from work until your symptoms have resolved. Please return the emergency department for any worsening symptoms. I have also written you for a cough medicine for home. Medication was sent to the [facility] in [place] on [address].

## 2022-09-03 NOTE — ED NOTES
Pt presents to the ED with complaints of flu like symptoms. Pt has been experiencing nausea, vomiting, shortness of breath, and chest tightness due to her cough. Pt claims that her cough is productive and she produces white frothy sputum. Pt states this all started two days ago and has progressively gotten worse. Pt resting on stretcher, no active signs of distress at this time.       Ayaz Durham RN  09/03/22 5200

## 2022-09-03 NOTE — ED PROVIDER NOTES
[facility] Emergency Department Provider Note                   PCP:                Peterson Goldsmith, APRN - NAM               Age: 49 y.o. Sex: female       ICD-10-CM    1. Cough  R05.9       2. Acute upper respiratory infection  J06.9           DISPOSITION          MDM  Number of Diagnoses or Management Options  Diagnosis management comments: 19-year-old female presents emerged department via private vehicle with chief complaint of upper respiratory-like symptoms. Vital signs here are reviewed. She is hypertensive. Patient is nontoxic in appearance she is a dry cough in the room. She has evidence of erythema on her throat examination no evidence of PTA or Dolores's. She is in no acute respiratory distress. Chest x-ray was obtained which showed no evidence of any acute pathology or pneumonia. COVID swab here was negative. Patient will be treated for symptomatic care of upper respiratory-like infections. Patient be written for cough medicine. Patient stable discharge abdominal cardiac and respiratory examination. Orders Placed This Encounter   Procedures    COVID-19, Rapid    XR CHEST PORTABLE        Medications - No data to display    New Prescriptions    BENZONATATE (TESSALON) 100 MG CAPSULE    Take 1 capsule by mouth 2 times daily as needed for Cough        Rosales Vigil is a 49 y.o. female who presents to the Emergency Department with chief complaint of    Chief Complaint   Patient presents with    Cough    Headache     Cough for the last 2 days that is getting worse. 19-year-old female presents emerged department via private vehicle with chief complaint of 2-day history of cough, sore throat, runny nose. Patient states that she has had 2 at home negative COVID test.  She is here to make sure she does not have COVID. She reports symptoms have been constant in time she denies any alleviating or aggravating factors.   She reports having history of hypertension, hyperlipidemia and type 2 diabetes. Review of Systems   Constitutional:  Negative for activity change, chills and fever. HENT:  Positive for sore throat. Negative for dental problem, drooling, facial swelling, trouble swallowing and voice change. Eyes:  Negative for pain. Respiratory:  Positive for cough and shortness of breath. Negative for chest tightness. Cardiovascular:  Negative for chest pain and palpitations. Gastrointestinal:  Negative for abdominal pain, nausea and vomiting. Endocrine: Negative for polydipsia. Genitourinary:  Negative for difficulty urinating, dysuria and hematuria. Musculoskeletal:  Negative for back pain and neck pain. Skin:  Negative for rash and wound. Neurological:  Negative for dizziness, seizures, facial asymmetry, speech difficulty, numbness and headaches. Psychiatric/Behavioral:  Negative for agitation and behavioral problems.       Past Medical History:   Diagnosis Date    Abnormal Papanicolaou smear of cervix     Adverse effect of anesthesia     slow to awaken    Anemia     Chronic low back pain     Diabetes (HCC)     type 2 - oral agents- -does not check sugar-regularly- denies hypoglycemic episodes    ROBERTO (generalized anxiety disorder)     GERD (gastroesophageal reflux disease)     no medications- sleeps on 3 pillows-     Heel spur, left     Hypercholesterolemia     controlled well with meds    Hypertension     medication controlled    Kidney stone     Vitamin D deficiency         Past Surgical History:   Procedure Laterality Date    CHOLECYSTECTOMY, LAPAROSCOPIC  03/28/2017    DILATION AND CURETTAGE OF UTERUS      ENDOMETRIAL ABLATION  2017    ENDOMETRIAL ABLATION      FRAGMENT KIDNEY STONE/ ESWL      LAP,TUBAL CAUTERY      LITHOTRIPSY       x 4    ORTHOPEDIC SURGERY Left     hand- tendon repair    ORTHOPEDIC SURGERY Left     ankle repair-    OTHER SURGICAL HISTORY Bilateral     sweat gland removal under both arms    TUBAL LIGATION  07/25/2005    UPPER GASTROINTESTINAL ENDOSCOPY          Family History   Problem Relation Age of Onset    Ovarian Cancer Neg Hx     Colon Cancer Maternal Uncle     Breast Cancer Maternal Aunt     Hypertension Brother     Deep Vein Thrombosis Father     Hypertension Mother     Other Mother         Autumn Bolster disease    Uterine Cancer Neg Hx     Heart Disease Maternal Grandmother     Diabetes Maternal Grandmother     Hypertension Father         Social History     Socioeconomic History    Marital status:      Spouse name: None    Number of children: None    Years of education: None    Highest education level: None   Tobacco Use    Smoking status: Former     Packs/day: 1.00     Types: Cigarettes     Quit date: 2009     Years since quittin.6    Smokeless tobacco: Never   Vaping Use    Vaping Use: Never used   Substance and Sexual Activity    Alcohol use: Yes     Comment: occasionally    Drug use: No   Social History Narrative    Abuse: Feels safe at home, no history of physical abuse, no history of sexual abuse           Ace inhibitors, Hydromorphone, Lisinopril, Amlodipine, Oxycodone, Sulfa antibiotics, and Sulfamethoxazole-trimethoprim     Previous Medications    BLACK CURRANT SEED OIL PO    Take by mouth    DICLOFENAC (VOLTAREN) 50 MG EC TABLET    Take 1 tablet by mouth 2 times daily    DIETHYLPROPION HCL 25 MG TABS    Take 1 tablet by mouth 2 times daily for 90 days. FLUTICASONE (FLONASE) 50 MCG/ACT NASAL SPRAY    2 sprays by Nasal route daily    GABAPENTIN (NEURONTIN) 300 MG CAPSULE    Take 1 capsule by mouth 3 times daily for 180 days. HYDROCHLOROTHIAZIDE (HYDRODIURIL) 25 MG TABLET    Take 1 tablet by mouth daily    LORATADINE (CLARITIN) 10 MG TABLET    Take 1 tablet by mouth daily    METFORMIN (GLUCOPHAGE) 500 MG TABLET    Take 1 tablet by mouth 2 times daily (with meals)    PRAVASTATIN (PRAVACHOL) 40 MG TABLET    Take 1 tablet by mouth nightly        Vitals signs and nursing note reviewed.    Patient Vitals for

## 2022-09-04 NOTE — ED NOTES
I have reviewed discharge instructions with the patient. The patient verbalized understanding. Patient left ED via Discharge Method: ambulatory to Home with self. Opportunity for questions and clarification provided. Patient given 0 scripts. patients prescriptions sent to pharmacy. To continue your aftercare when you leave the hospital, you may receive an automated call from our care team to check in on how you are doing. This is a free service and part of our promise to provide the best care and service to meet your aftercare needs.  If you have questions, or wish to unsubscribe from this service please call [phone]. Thank you for Choosing our [facility] Emergency Department.         NIDHI Clifford  09/03/22 2026

## 2022-10-03 PROBLEM — R05.9 COUGH: Status: RESOLVED | Noted: 2022-09-03 | Resolved: 2022-10-03

## 2022-11-10 ENCOUNTER — HOSPITAL ENCOUNTER (EMERGENCY)
Dept: GENERAL RADIOLOGY | Age: 47
Discharge: HOME | End: 2022-11-13
Payer: COMMERCIAL

## 2022-11-10 ENCOUNTER — HOSPITAL ENCOUNTER (EMERGENCY)
Age: 47
Discharge: HOME | End: 2022-11-10
Attending: EMERGENCY MEDICINE
Payer: COMMERCIAL

## 2022-11-10 VITALS
HEART RATE: 87 BPM | HEIGHT: 65 IN | RESPIRATION RATE: 16 BRPM | OXYGEN SATURATION: 97 % | WEIGHT: 262 LBS | SYSTOLIC BLOOD PRESSURE: 165 MMHG | BODY MASS INDEX: 43.65 KG/M2 | TEMPERATURE: 98 F | DIASTOLIC BLOOD PRESSURE: 90 MMHG

## 2022-11-10 DIAGNOSIS — R07.89 ATYPICAL CHEST PAIN: Primary | ICD-10-CM

## 2022-11-10 LAB
ALBUMIN SERPL-MCNC: 3.3 G/DL (ref 3.5–5)
ALBUMIN/GLOB SERPL: 0.7 {RATIO} (ref 0.4–1.6)
ALP SERPL-CCNC: 110 U/L (ref 50–130)
ALT SERPL-CCNC: 27 U/L (ref 12–65)
ANION GAP SERPL CALC-SCNC: 4 MMOL/L (ref 2–11)
AST SERPL-CCNC: 32 U/L (ref 15–37)
BILIRUB SERPL-MCNC: 0.3 MG/DL (ref 0.2–1.1)
BUN SERPL-MCNC: 13 MG/DL (ref 6–23)
CALCIUM SERPL-MCNC: 9.3 MG/DL (ref 8.3–10.4)
CHLORIDE SERPL-SCNC: 102 MMOL/L (ref 101–110)
CO2 SERPL-SCNC: 32 MMOL/L (ref 21–32)
CREAT SERPL-MCNC: 0.87 MG/DL (ref 0.6–1)
EKG ATRIAL RATE: 78 BPM
EKG DIAGNOSIS: NORMAL
EKG P AXIS: 49 DEGREES
EKG P-R INTERVAL: 164 MS
EKG Q-T INTERVAL: 378 MS
EKG QRS DURATION: 90 MS
EKG QTC CALCULATION (BAZETT): 430 MS
EKG R AXIS: 27 DEGREES
EKG T AXIS: 49 DEGREES
EKG VENTRICULAR RATE: 78 BPM
ERYTHROCYTE [DISTWIDTH] IN BLOOD BY AUTOMATED COUNT: 13.3 % (ref 11.9–14.6)
GLOBULIN SER CALC-MCNC: 5 G/DL (ref 2.8–4.5)
GLUCOSE SERPL-MCNC: 104 MG/DL (ref 65–100)
HCT VFR BLD AUTO: 37.4 % (ref 35.8–46.3)
HGB BLD-MCNC: 11.8 G/DL (ref 11.7–15.4)
MCH RBC QN AUTO: 27.7 PG (ref 26.1–32.9)
MCHC RBC AUTO-ENTMCNC: 31.6 G/DL (ref 31.4–35)
MCV RBC AUTO: 87.8 FL (ref 82–102)
NRBC # BLD: 0 K/UL (ref 0–0.2)
PLATELET # BLD AUTO: 265 K/UL (ref 150–450)
PMV BLD AUTO: 9.2 FL (ref 9.4–12.3)
POTASSIUM SERPL-SCNC: 3.7 MMOL/L (ref 3.5–5.1)
PROT SERPL-MCNC: 8.3 G/DL (ref 6.3–8.2)
RBC # BLD AUTO: 4.26 M/UL (ref 4.05–5.2)
SODIUM SERPL-SCNC: 138 MMOL/L (ref 133–143)
TROPONIN I SERPL HS-MCNC: 4.1 PG/ML (ref 0–14)
TROPONIN I SERPL HS-MCNC: 4.6 PG/ML (ref 0–14)
WBC # BLD AUTO: 4.6 K/UL (ref 4.3–11.1)

## 2022-11-10 PROCEDURE — 84484 ASSAY OF TROPONIN QUANT: CPT

## 2022-11-10 PROCEDURE — 93005 ELECTROCARDIOGRAM TRACING: CPT | Performed by: EMERGENCY MEDICINE

## 2022-11-10 PROCEDURE — 71045 X-RAY EXAM CHEST 1 VIEW: CPT

## 2022-11-10 PROCEDURE — 80053 COMPREHEN METABOLIC PANEL: CPT

## 2022-11-10 PROCEDURE — 85027 COMPLETE CBC AUTOMATED: CPT

## 2022-11-10 PROCEDURE — 99285 EMERGENCY DEPT VISIT HI MDM: CPT

## 2022-11-10 RX ORDER — DICLOFENAC SODIUM 75 MG/1
75 TABLET, DELAYED RELEASE ORAL 2 TIMES DAILY
Qty: 10 TABLET | Refills: 0 | Status: SHIPPED | OUTPATIENT
Start: 2022-11-10 | End: 2022-11-15

## 2022-11-10 ASSESSMENT — ENCOUNTER SYMPTOMS
COUGH: 1
SHORTNESS OF BREATH: 0
BACK PAIN: 0
ABDOMINAL PAIN: 0
VOMITING: 0

## 2022-11-10 ASSESSMENT — PAIN DESCRIPTION - LOCATION
LOCATION: CHEST
LOCATION: CHEST

## 2022-11-10 ASSESSMENT — PAIN DESCRIPTION - DESCRIPTORS: DESCRIPTORS: SHARP

## 2022-11-10 ASSESSMENT — PAIN SCALES - GENERAL
PAINLEVEL_OUTOF10: 6
PAINLEVEL_OUTOF10: 6

## 2022-11-10 ASSESSMENT — PAIN DESCRIPTION - FREQUENCY: FREQUENCY: INTERMITTENT

## 2022-11-10 ASSESSMENT — PAIN - FUNCTIONAL ASSESSMENT
PAIN_FUNCTIONAL_ASSESSMENT: 0-10
PAIN_FUNCTIONAL_ASSESSMENT: 0-10

## 2022-11-10 ASSESSMENT — PAIN DESCRIPTION - ORIENTATION: ORIENTATION: LEFT

## 2022-11-10 NOTE — ED PROVIDER NOTES
Emergency Department Provider Note                   PCP:                MORGAN Joshi CNP               Age: 49 y.o. Sex: female     No diagnosis found. DISPOSITION          MDM  Number of Diagnoses or Management Options  Diagnosis management comments: EKG shows normal sinus rhythm no diagnostic ST changes or ectopy. Lab work unremarkable. Troponin normal.  Chest x-ray normal.  Pain appears to be reproducible with movement and palpation suggesting musculoskeletal.  For now we will treat with nonsteroidal.       Amount and/or Complexity of Data Reviewed  Clinical lab tests: ordered and reviewed  Tests in the radiology section of CPT®: ordered and reviewed  Independent visualization of images, tracings, or specimens: yes    Risk of Complications, Morbidity, and/or Mortality  Presenting problems: moderate  Diagnostic procedures: moderate  Management options: moderate               Orders Placed This Encounter   Procedures    XR CHEST PORTABLE    CBC    Comprehensive Metabolic Panel    Troponin    Cardiac Monitor    Pulse Oximetry    EKG 12 Lead    Saline lock IV        Medications - No data to display    New Prescriptions    No medications on file        Valentine Ingram is a 49 y.o. female who presents to the Emergency Department with chief complaint of    Chief Complaint   Patient presents with    Chest Pain      48-year-old -American female history of hypertension diabetes developed a sharp stabbing chest pain yesterday morning at work. She has noticed pain seems to be worse with specific movements. She has had slight cough but no fever or shortness of breath. No leg pain or swelling. Pain does not radiate into her back. The history is provided by the patient. Review of Systems   Constitutional:  Negative for fever. HENT:  Negative for congestion. Respiratory:  Positive for cough. Negative for shortness of breath.     Cardiovascular:  Positive for chest pain.   Gastrointestinal:  Negative for abdominal pain and vomiting. Genitourinary:  Negative for dysuria. Musculoskeletal:  Negative for back pain. Skin:  Negative for rash. Neurological:  Negative for headaches. All other systems reviewed and are negative.     Past Medical History:   Diagnosis Date    Abnormal Papanicolaou smear of cervix     Adverse effect of anesthesia     slow to awaken    Anemia     Chronic low back pain     Diabetes (HCC)     type 2 - oral agents- -does not check sugar-regularly- denies hypoglycemic episodes    ROBERTO (generalized anxiety disorder)     GERD (gastroesophageal reflux disease)     no medications- sleeps on 3 pillows-     Heel spur, left     Hypercholesterolemia     controlled well with meds    Hypertension     medication controlled    Kidney stone     Vitamin D deficiency         Past Surgical History:   Procedure Laterality Date    CHOLECYSTECTOMY, LAPAROSCOPIC  03/28/2017    DILATION AND CURETTAGE OF UTERUS      ENDOMETRIAL ABLATION  2017    ENDOMETRIAL ABLATION      FRAGMENT KIDNEY STONE/ ESWL      LAP,TUBAL CAUTERY      LITHOTRIPSY       x 4    ORTHOPEDIC SURGERY Left     hand- tendon repair    ORTHOPEDIC SURGERY Left     ankle repair-    OTHER SURGICAL HISTORY Bilateral     sweat gland removal under both arms    TUBAL LIGATION  07/25/2005    UPPER GASTROINTESTINAL ENDOSCOPY          Family History   Problem Relation Age of Onset    Ovarian Cancer Neg Hx     Colon Cancer Maternal Uncle     Breast Cancer Maternal Aunt     Hypertension Brother     Deep Vein Thrombosis Father     Hypertension Mother     Other Mother         Satish Lazier disease    Uterine Cancer Neg Hx     Heart Disease Maternal Grandmother     Diabetes Maternal Grandmother     Hypertension Father         Social History     Socioeconomic History    Marital status:      Spouse name: None    Number of children: None    Years of education: None    Highest education level: None   Tobacco Use    Smoking status: Former     Packs/day: 1.00     Types: Cigarettes     Quit date: 2009     Years since quittin.8    Smokeless tobacco: Never   Vaping Use    Vaping Use: Never used   Substance and Sexual Activity    Alcohol use: Yes     Comment: occasionally    Drug use: No   Social History Narrative    Abuse: Feels safe at home, no history of physical abuse, no history of sexual abuse           Ace inhibitors, Hydromorphone, Lisinopril, Amlodipine, Oxycodone, Sulfa antibiotics, and Sulfamethoxazole-trimethoprim     Previous Medications    BLACK CURRANT SEED OIL PO    Take by mouth    DICLOFENAC (VOLTAREN) 50 MG EC TABLET    Take 1 tablet by mouth 2 times daily    DIETHYLPROPION HCL 25 MG TABS    Take 1 tablet by mouth 2 times daily for 90 days. FLUTICASONE (FLONASE) 50 MCG/ACT NASAL SPRAY    2 sprays by Nasal route daily    GABAPENTIN (NEURONTIN) 300 MG CAPSULE    Take 1 capsule by mouth 3 times daily for 180 days. HYDROCHLOROTHIAZIDE (HYDRODIURIL) 25 MG TABLET    Take 1 tablet by mouth daily    LORATADINE (CLARITIN) 10 MG TABLET    Take 1 tablet by mouth daily    METFORMIN (GLUCOPHAGE) 500 MG TABLET    Take 1 tablet by mouth 2 times daily (with meals)    PRAVASTATIN (PRAVACHOL) 40 MG TABLET    Take 1 tablet by mouth nightly        Vitals signs and nursing note reviewed. Patient Vitals for the past 4 hrs:   Temp Pulse Resp BP   11/10/22 0858 98 °F (36.7 °C) 92 16 (!) 166/73          Physical Exam  Vitals and nursing note reviewed. Constitutional:       General: She is not in acute distress. Appearance: Normal appearance. She is not toxic-appearing. HENT:      Head: Normocephalic and atraumatic. Nose: Nose normal.      Mouth/Throat:      Mouth: Mucous membranes are moist.      Pharynx: Oropharynx is clear. Eyes:      Pupils: Pupils are equal, round, and reactive to light. Cardiovascular:      Rate and Rhythm: Normal rate and regular rhythm. Heart sounds: No murmur heard. Pulmonary:      Effort: Pulmonary effort is normal.      Breath sounds: Normal breath sounds. No wheezing or rales. Chest:      Chest wall: Tenderness present. Abdominal:      General: There is no distension. Palpations: Abdomen is soft. Tenderness: There is no abdominal tenderness. There is no guarding. Musculoskeletal:         General: No swelling or tenderness. Cervical back: Normal range of motion and neck supple. Skin:     General: Skin is warm and dry. Neurological:      Mental Status: She is alert and oriented to person, place, and time. Psychiatric:         Mood and Affect: Mood normal.         Behavior: Behavior normal.        EKG 12 Lead    Date/Time: 11/10/2022 10:18 AM  Performed by: John Bhatti MD  Authorized by: John Bhatti MD     ECG reviewed by ED Physician in the absence of a cardiologist: yes    Interpretation:     Interpretation: normal    Rate:     ECG rate:  78    ECG rate assessment: normal    Rhythm:     Rhythm: sinus rhythm    Ectopy:     Ectopy: none    QRS:     QRS axis:  Normal  ST segments:     ST segments:  Normal  T waves:     T waves: normal      Results for orders placed or performed during the hospital encounter of 11/10/22   XR CHEST PORTABLE    Narrative    Portable chest x-ray    CLINICAL INDICATION: Left-sided chest pain    FINDINGS: Single AP view the chest compared to a similar exam dated 9/3/2022  show the lungs to be expanded and clear. No pleural effusion or pneumothorax. The cardiac and mediastinum are unremarkable. The bones are normal.      Impression    Normal portable chest x-ray. No acute abnormality.    CBC   Result Value Ref Range    WBC 4.6 4.3 - 11.1 K/uL    RBC 4.26 4.05 - 5.2 M/uL    Hemoglobin 11.8 11.7 - 15.4 g/dL    Hematocrit 37.4 35.8 - 46.3 %    MCV 87.8 82.0 - 102.0 FL    MCH 27.7 26.1 - 32.9 PG    MCHC 31.6 31.4 - 35.0 g/dL    RDW 13.3 11.9 - 14.6 %    Platelets 307 821 - 962 K/uL    MPV 9.2 (L) 9.4 - 12.3 FL    nRBC 0.00 0.0 - 0.2 K/uL   Comprehensive Metabolic Panel   Result Value Ref Range    Sodium 138 133 - 143 mmol/L    Potassium 3.7 3.5 - 5.1 mmol/L    Chloride 102 101 - 110 mmol/L    CO2 32 21 - 32 mmol/L    Anion Gap 4 2 - 11 mmol/L    Glucose 104 (H) 65 - 100 mg/dL    BUN 13 6 - 23 MG/DL    Creatinine 0.87 0.6 - 1.0 MG/DL    Est, Glom Filt Rate >60 >60 ml/min/1.73m2    Calcium 9.3 8.3 - 10.4 MG/DL    Total Bilirubin 0.3 0.2 - 1.1 MG/DL    ALT 27 12 - 65 U/L    AST 32 15 - 37 U/L    Alk Phosphatase 110 50 - 130 U/L    Total Protein 8.3 (H) 6.3 - 8.2 g/dL    Albumin 3.3 (L) 3.5 - 5.0 g/dL    Globulin 5.0 (H) 2.8 - 4.5 g/dL    Albumin/Globulin Ratio 0.7 0.4 - 1.6     Troponin   Result Value Ref Range    Troponin, High Sensitivity 4.1 0 - 14 pg/mL   EKG 12 Lead   Result Value Ref Range    Ventricular Rate 78 BPM    Atrial Rate 78 BPM    P-R Interval 164 ms    QRS Duration 90 ms    Q-T Interval 378 ms    QTc Calculation (Bazett) 430 ms    P Axis 49 degrees    R Axis 27 degrees    T Axis 49 degrees    Diagnosis !! AGE AND GENDER SPECIFIC ECG ANALYSIS !!         XR CHEST PORTABLE   Final Result   Normal portable chest x-ray. No acute abnormality. Voice dictation software was used during the making of this note. This software is not perfect and grammatical and other typographical errors may be present. This note has not been completely proofread for errors.      Eveline Cushing, MD  11/10/22 4857

## 2022-11-10 NOTE — ED NOTES
I have reviewed discharge instructions with the patient. The patient verbalized understanding. Patient left ED via Discharge Method: ambulatory to Home with (self). Opportunity for questions and clarification provided. Patient given 1 scripts. No esign     To continue your aftercare when you leave the hospital, you may receive an automated call from our care team to check in on how you are doing. This is a free service and part of our promise to provide the best care and service to meet your aftercare needs.  If you have questions, or wish to unsubscribe from this service please call [phone]. Thank you for Choosing our [facility] Emergency Department.       Bella Manning RN  11/10/22 [id number]

## 2022-11-10 NOTE — ED TRIAGE NOTES
Patient reports left side chest pain since yesterday AM. Patient reports talking and any movement makes pain worse. Patient reports nasal congestion and chills.

## 2022-12-01 ENCOUNTER — OFFICE VISIT (OUTPATIENT)
Dept: FAMILY MEDICINE CLINIC | Facility: CLINIC | Age: 47
End: 2022-12-01
Payer: COMMERCIAL

## 2022-12-01 VITALS
OXYGEN SATURATION: 97 % | BODY MASS INDEX: 47.46 KG/M2 | TEMPERATURE: 97.2 F | RESPIRATION RATE: 16 BRPM | DIASTOLIC BLOOD PRESSURE: 78 MMHG | SYSTOLIC BLOOD PRESSURE: 124 MMHG | HEIGHT: 64 IN | HEART RATE: 78 BPM | WEIGHT: 278 LBS

## 2022-12-01 DIAGNOSIS — E78.2 MIXED HYPERLIPIDEMIA: ICD-10-CM

## 2022-12-01 DIAGNOSIS — I10 ESSENTIAL HYPERTENSION: ICD-10-CM

## 2022-12-01 DIAGNOSIS — N61.1 BOIL, BREAST: Primary | ICD-10-CM

## 2022-12-01 DIAGNOSIS — E11.65 TYPE 2 DIABETES MELLITUS WITH HYPERGLYCEMIA, WITHOUT LONG-TERM CURRENT USE OF INSULIN (HCC): ICD-10-CM

## 2022-12-01 DIAGNOSIS — J30.1 NON-SEASONAL ALLERGIC RHINITIS DUE TO POLLEN: ICD-10-CM

## 2022-12-01 PROCEDURE — 3078F DIAST BP <80 MM HG: CPT | Performed by: NURSE PRACTITIONER

## 2022-12-01 PROCEDURE — 3044F HG A1C LEVEL LT 7.0%: CPT | Performed by: NURSE PRACTITIONER

## 2022-12-01 PROCEDURE — 99214 OFFICE O/P EST MOD 30 MIN: CPT | Performed by: NURSE PRACTITIONER

## 2022-12-01 PROCEDURE — 3074F SYST BP LT 130 MM HG: CPT | Performed by: NURSE PRACTITIONER

## 2022-12-01 RX ORDER — GABAPENTIN 300 MG/1
300 CAPSULE ORAL 3 TIMES DAILY
Qty: 270 CAPSULE | Refills: 1 | Status: SHIPPED | OUTPATIENT
Start: 2022-12-01 | End: 2023-05-30

## 2022-12-01 RX ORDER — HYDROCHLOROTHIAZIDE 25 MG/1
25 TABLET ORAL DAILY
Qty: 90 TABLET | Refills: 1 | Status: SHIPPED | OUTPATIENT
Start: 2022-12-01

## 2022-12-01 RX ORDER — DIETHYLPROPION HYDROCHLORIDE 25 MG/1
TABLET ORAL
Qty: 60 TABLET | Refills: 2 | Status: SHIPPED | OUTPATIENT
Start: 2022-12-01 | End: 2023-04-01

## 2022-12-01 RX ORDER — FLUTICASONE PROPIONATE 50 MCG
2 SPRAY, SUSPENSION (ML) NASAL DAILY
Qty: 3 EACH | Refills: 3 | Status: SHIPPED | OUTPATIENT
Start: 2022-12-01

## 2022-12-01 RX ORDER — LORATADINE 10 MG/1
10 TABLET ORAL DAILY
Qty: 90 TABLET | Refills: 1 | Status: SHIPPED | OUTPATIENT
Start: 2022-12-01

## 2022-12-01 RX ORDER — CLINDAMYCIN HYDROCHLORIDE 300 MG/1
300 CAPSULE ORAL 2 TIMES DAILY
Qty: 20 CAPSULE | Refills: 0 | Status: SHIPPED | OUTPATIENT
Start: 2022-12-01 | End: 2022-12-09 | Stop reason: ALTCHOICE

## 2022-12-01 RX ORDER — PRAVASTATIN SODIUM 40 MG
40 TABLET ORAL NIGHTLY
Qty: 90 TABLET | Refills: 1 | Status: SHIPPED | OUTPATIENT
Start: 2022-12-01

## 2022-12-01 ASSESSMENT — ENCOUNTER SYMPTOMS
EYE PAIN: 0
EYE DISCHARGE: 0
COUGH: 0
CONSTIPATION: 0
RHINORRHEA: 0
WHEEZING: 0
SHORTNESS OF BREATH: 0
ABDOMINAL PAIN: 0
DIARRHEA: 0
CHEST TIGHTNESS: 0
VOMITING: 0
BLOOD IN STOOL: 0

## 2022-12-01 ASSESSMENT — PATIENT HEALTH QUESTIONNAIRE - PHQ9
SUM OF ALL RESPONSES TO PHQ9 QUESTIONS 1 & 2: 0
SUM OF ALL RESPONSES TO PHQ QUESTIONS 1-9: 0
SUM OF ALL RESPONSES TO PHQ QUESTIONS 1-9: 0
1. LITTLE INTEREST OR PLEASURE IN DOING THINGS: 0
SUM OF ALL RESPONSES TO PHQ QUESTIONS 1-9: 0
2. FEELING DOWN, DEPRESSED OR HOPELESS: 0
SUM OF ALL RESPONSES TO PHQ QUESTIONS 1-9: 0

## 2022-12-01 NOTE — PROGRESS NOTES
Roly Hernandez (:  1975) is a 49 y.o. female,Established patient, here for evaluation of the following chief complaint(s):  Skin Problem (Pt. C/o having boils under each breast )         ASSESSMENT/PLAN:  1. Boil, breast  -     clindamycin (CLEOCIN) 300 MG capsule; Take 1 capsule by mouth 2 times daily for 10 days, Disp-20 capsule, R-0Normal    Warm compresses. Follow up in 5 days if boils are not better. May need to see breast surgeon if these do not clear. 2. Type 2 diabetes mellitus with hyperglycemia, without long-term current use of insulin (HCC)  -     gabapentin (NEURONTIN) 300 MG capsule; Take 1 capsule by mouth 3 times daily for 180 days. , Disp-270 capsule, R-1Normal  -     metFORMIN (GLUCOPHAGE) 500 MG tablet; Take 1 tablet by mouth 2 times daily (with meals), Disp-180 tablet, R-1Normal    Continue to check blood sugars. Check feet daily. Stay on healthy diet and increase exercise to 30-60 minutes 3-5 days a week. 3. Essential hypertension  -     hydroCHLOROthiazide (HYDRODIURIL) 25 MG tablet; Take 1 tablet by mouth daily, Disp-90 tablet, R-1Normal    Monitor blood pressure weekly. Check BP if she develops headache not relieved with Tylenol. Encourage healthy diet and exercise. 4. BMI 45.0-49.9, adult (HCC)  -     Diethylpropion HCl 25 MG TABS; Take one tab at breakfast and lunch, Disp-60 tablet, R-2Normal    Continue meds for weight loss. Remember to work on diet--decrease portion sizes, avoid sweet drinks. Follow up in 3 months. 5. Mixed hyperlipidemia  -     pravastatin (PRAVACHOL) 40 MG tablet; Take 1 tablet by mouth nightly, Disp-90 tablet, R-1Normal  Low fat choices. More veggies. Less processed foods. 6. Non-seasonal allergic rhinitis due to pollen  -     loratadine (CLARITIN) 10 MG tablet;  Take 1 tablet by mouth daily, Disp-90 tablet, R-1Normal  -     fluticasone (FLONASE) 50 MCG/ACT nasal spray; 2 sprays by Nasal route daily, Disp-3 each, R-3Normal  Continue meds for allergies. Return in about 5 days (around 12/6/2022), or if symptoms worsen or fail to improve. Subjective   SUBJECTIVE/OBJECTIVE:  Boil on each breast.  Had sweat gland removal surgery 7 years ago and the boils got better. Now with hard limps, not draining. No fever. Up to date on mammogram.  Had covid in November. Light case. Fatigue and cough. Better now. Needs her medications refilled. BP has been good when she checks it at home. Blood sugars have been good when she checks them at home. Has had no hypoglycemia. Continues to work as a . Is watching her diet and is drinking mostly water. Feels the diethylpropion is helping her to decrease carbs by decreasing her cravings for carbs. Remains active. Hypertension  This is a chronic problem. The problem has been resolved since onset. The problem is controlled. Pertinent negatives include no chest pain, headaches, malaise/fatigue, palpitations, peripheral edema or shortness of breath. There are no associated agents to hypertension. Risk factors for coronary artery disease include dyslipidemia, diabetes mellitus, obesity and sedentary lifestyle. Past treatments include diuretics. The current treatment provides moderate improvement. There are no compliance problems. Cyst  This is a recurrent problem. The current episode started more than 1 month ago. The problem has been gradually worsening. Pertinent negatives include no abdominal pain, arthralgias, chest pain, congestion, coughing, fatigue, fever, headaches, myalgias, rash, swollen glands or vomiting. Exacerbated by: shaving. She has tried nothing for the symptoms. The treatment provided mild relief.      Allergies   Allergen Reactions    Ace Inhibitors Anaphylaxis and Angioedema    Hydromorphone Anaphylaxis     Swollen tongue, lips, hypotension, low O2 SATS    Lisinopril Anaphylaxis     Lips swell, last time she was in the hospital for 4 days    Amlodipine Other (See Comments)     Pt denies any allergic reaction to Norvasc    Oxycodone Other (See Comments)     Makes itch    Sulfa Antibiotics Hives    Sulfamethoxazole-Trimethoprim Hives     Current Outpatient Medications   Medication Sig    gabapentin (NEURONTIN) 300 MG capsule Take 1 capsule by mouth 3 times daily for 180 days. metFORMIN (GLUCOPHAGE) 500 MG tablet Take 1 tablet by mouth 2 times daily (with meals)    loratadine (CLARITIN) 10 MG tablet Take 1 tablet by mouth daily    pravastatin (PRAVACHOL) 40 MG tablet Take 1 tablet by mouth nightly    fluticasone (FLONASE) 50 MCG/ACT nasal spray 2 sprays by Nasal route daily    hydroCHLOROthiazide (HYDRODIURIL) 25 MG tablet Take 1 tablet by mouth daily    Diethylpropion HCl 25 MG TABS Take one tab at breakfast and lunch    clindamycin (CLEOCIN) 300 MG capsule Take 1 capsule by mouth 2 times daily for 10 days    BLACK CURRANT SEED OIL PO Take by mouth    diclofenac (VOLTAREN) 75 MG EC tablet Take 1 tablet by mouth 2 times daily for 5 days     No current facility-administered medications for this visit. Review of Systems   Constitutional:  Negative for fatigue, fever and malaise/fatigue. HENT:  Negative for congestion, hearing loss, postnasal drip and rhinorrhea. Eyes:  Negative for pain, discharge and visual disturbance. Respiratory:  Negative for cough, chest tightness, shortness of breath and wheezing. Cardiovascular:  Negative for chest pain, palpitations and leg swelling. BP has been good when she has checked it. No chest pain. Gastrointestinal:  Negative for abdominal pain, blood in stool, constipation, diarrhea and vomiting. Endocrine:        Blood sugars have been good. No hypoglycemia. Genitourinary:  Negative for difficulty urinating, frequency and urgency. Musculoskeletal:  Negative for arthralgias, gait problem and myalgias. Skin:  Negative for rash. Boils located on both breasts.   No drainage. Boils are firm. Previous sweat gland surgery to prevent boils. Neurological:  Negative for dizziness, tremors, seizures and headaches. Psychiatric/Behavioral:  Negative for decreased concentration and sleep disturbance. The patient is not nervous/anxious. /78 (Site: Left Upper Arm, Position: Sitting, Cuff Size: Large Adult)   Pulse 78   Temp 97.2 °F (36.2 °C) (Temporal)   Resp 16   Ht 5' 4\" (1.626 m)   Wt 278 lb (126.1 kg)   SpO2 97%   BMI 47.72 kg/m²       Objective   Physical Exam  Constitutional:       Appearance: Normal appearance. HENT:      Head: Normocephalic and atraumatic. Right Ear: Tympanic membrane, ear canal and external ear normal.      Left Ear: Tympanic membrane, ear canal and external ear normal.      Nose: Nose normal.      Mouth/Throat:      Mouth: Mucous membranes are moist.   Eyes:      Extraocular Movements: Extraocular movements intact. Conjunctiva/sclera: Conjunctivae normal.      Pupils: Pupils are equal, round, and reactive to light. Cardiovascular:      Rate and Rhythm: Normal rate and regular rhythm. Pulmonary:      Effort: Pulmonary effort is normal.      Breath sounds: Normal breath sounds. Abdominal:      General: Bowel sounds are normal.   Musculoskeletal:         General: Normal range of motion. Cervical back: Normal range of motion. Skin:     General: Skin is warm and dry. Comments: Hard firm area upper left lateral breast.  Firm area present right lateral breast.  Neither are at a head. Red, warm, tender. Neurological:      General: No focal deficit present. Mental Status: She is alert and oriented to person, place, and time. Psychiatric:         Mood and Affect: Mood normal.         Behavior: Behavior normal.         Thought Content:  Thought content normal.         Judgment: Judgment normal.        PHQ Scores 12/1/2022 8/31/2022 5/27/2022 2/21/2022 1/14/2022 8/23/2021 7/2/2021   PHQ2 Score 0 0 0 - - - - PHQ2 Score - - - 0 0 0 0   PHQ9 Score 0 0 0 - - - -     Interpretation of Total Score Depression Severity: 1-4 = Minimal depression, 5-9 = Mild depression, 10-14 = Moderate depression, 15-19 = Moderately severe depression, 20-27 = Severe depression    Body mass index is 47.72 kg/m². Bob Payan On this date 12/1/2022 I have spent    minutes reviewing previous notes, test results and face to face with the patient discussing the diagnosis and importance of compliance with the treatment plan as well as documenting on the day of the visit. An electronic signature was used to authenticate this note.     --Jayden Ibanez, MORGAN - CNP

## 2022-12-06 ASSESSMENT — ENCOUNTER SYMPTOMS: SWOLLEN GLANDS: 0

## 2022-12-09 ENCOUNTER — OFFICE VISIT (OUTPATIENT)
Dept: FAMILY MEDICINE CLINIC | Facility: CLINIC | Age: 47
End: 2022-12-09
Payer: COMMERCIAL

## 2022-12-09 ENCOUNTER — HOSPITAL ENCOUNTER (OUTPATIENT)
Dept: GENERAL RADIOLOGY | Age: 47
Discharge: HOME | End: 2022-12-09
Payer: COMMERCIAL

## 2022-12-09 VITALS
HEIGHT: 65 IN | RESPIRATION RATE: 16 BRPM | TEMPERATURE: 97.7 F | WEIGHT: 278 LBS | SYSTOLIC BLOOD PRESSURE: 130 MMHG | BODY MASS INDEX: 46.32 KG/M2 | DIASTOLIC BLOOD PRESSURE: 78 MMHG

## 2022-12-09 DIAGNOSIS — M79.671 RIGHT FOOT PAIN: Primary | ICD-10-CM

## 2022-12-09 DIAGNOSIS — M79.671 RIGHT FOOT PAIN: ICD-10-CM

## 2022-12-09 PROCEDURE — 73620 X-RAY EXAM OF FOOT: CPT

## 2022-12-09 PROCEDURE — 99213 OFFICE O/P EST LOW 20 MIN: CPT | Performed by: NURSE PRACTITIONER

## 2022-12-09 PROCEDURE — 3074F SYST BP LT 130 MM HG: CPT | Performed by: NURSE PRACTITIONER

## 2022-12-09 PROCEDURE — 3078F DIAST BP <80 MM HG: CPT | Performed by: NURSE PRACTITIONER

## 2022-12-09 RX ORDER — DICLOFENAC SODIUM 75 MG/1
75 TABLET, DELAYED RELEASE ORAL 2 TIMES DAILY
Qty: 60 TABLET | Refills: 5 | Status: SHIPPED | OUTPATIENT
Start: 2022-12-09 | End: 2023-01-08

## 2022-12-09 ASSESSMENT — PATIENT HEALTH QUESTIONNAIRE - PHQ9
SUM OF ALL RESPONSES TO PHQ QUESTIONS 1-9: 0
SUM OF ALL RESPONSES TO PHQ QUESTIONS 1-9: 0
2. FEELING DOWN, DEPRESSED OR HOPELESS: 0
SUM OF ALL RESPONSES TO PHQ QUESTIONS 1-9: 0
1. LITTLE INTEREST OR PLEASURE IN DOING THINGS: 0
SUM OF ALL RESPONSES TO PHQ QUESTIONS 1-9: 0
SUM OF ALL RESPONSES TO PHQ9 QUESTIONS 1 & 2: 0

## 2022-12-09 ASSESSMENT — ENCOUNTER SYMPTOMS
VOMITING: 0
BLOOD IN STOOL: 0
EYE PAIN: 0
RHINORRHEA: 0
SHORTNESS OF BREATH: 0
WHEEZING: 0
ABDOMINAL PAIN: 0
DIARRHEA: 0
COUGH: 0
CONSTIPATION: 0
EYE DISCHARGE: 0
CHEST TIGHTNESS: 0

## 2022-12-09 NOTE — PROGRESS NOTES
Miladis Orta (:  1975) is a 49 y.o. female,Established patient, here for evaluation of the following chief complaint(s): Foot Pain (Burning and stinging right foot)         ASSESSMENT/PLAN:  1. Right foot pain  -     XR FOOT RIGHT (2 VIEWS); Future  -     diclofenac (VOLTAREN) 75 MG EC tablet; Take 1 tablet by mouth 2 times daily, Disp-60 tablet, R-5Normal    Ok to do heat as well as diclofenac gel or biofreeze to the foot. Keep shoes tied loose. If not better in 2 weeks, will send to podiatry. Return in about 2 weeks (around 2022), or if symptoms worsen or fail to improve. Subjective   SUBJECTIVE/OBJECTIVE:  Right foot pain:  Burning sensation for a year. Constant now and never goes away. Hard to walk on foot. Pain is on the top of the foot and goes around tot he back of her ankle. No swelling. No redness. No injury. No falls. Foot Pain   The pain is present in the right foot. This is a new problem. The current episode started in the past 7 days. There has been no history of extremity trauma. The problem occurs constantly. The problem has been unchanged. The quality of the pain is described as burning. The pain is severe. Associated symptoms include an inability to bear weight. Pertinent negatives include no fever, limited range of motion, numbness or tingling. The symptoms are aggravated by activity and standing. She has tried NSAIDS for the symptoms. The treatment provided no relief. Family history does not include gout or rheumatoid arthritis. Her past medical history is significant for diabetes.      Allergies   Allergen Reactions    Ace Inhibitors Anaphylaxis and Angioedema    Hydromorphone Anaphylaxis     Swollen tongue, lips, hypotension, low O2 SATS    Lisinopril Anaphylaxis     Lips swell, last time she was in the hospital for 4 days    Amlodipine Other (See Comments)     Pt denies any allergic reaction to Norvasc    Oxycodone Other (See Comments)     Makes itch    Sulfa Antibiotics Hives    Sulfamethoxazole-Trimethoprim Hives     Current Outpatient Medications   Medication Sig    diclofenac (VOLTAREN) 75 MG EC tablet Take 1 tablet by mouth 2 times daily    gabapentin (NEURONTIN) 300 MG capsule Take 1 capsule by mouth 3 times daily for 180 days. metFORMIN (GLUCOPHAGE) 500 MG tablet Take 1 tablet by mouth 2 times daily (with meals)    loratadine (CLARITIN) 10 MG tablet Take 1 tablet by mouth daily    pravastatin (PRAVACHOL) 40 MG tablet Take 1 tablet by mouth nightly    fluticasone (FLONASE) 50 MCG/ACT nasal spray 2 sprays by Nasal route daily    hydroCHLOROthiazide (HYDRODIURIL) 25 MG tablet Take 1 tablet by mouth daily    Diethylpropion HCl 25 MG TABS Take one tab at breakfast and lunch    BLACK CURRANT SEED OIL PO Take by mouth     No current facility-administered medications for this visit. Review of Systems   Constitutional:  Negative for fatigue and fever. HENT:  Negative for congestion, hearing loss, postnasal drip and rhinorrhea. Eyes:  Negative for pain, discharge and visual disturbance. Respiratory:  Negative for cough, chest tightness, shortness of breath and wheezing. Cardiovascular:  Negative for chest pain, palpitations and leg swelling. Gastrointestinal:  Negative for abdominal pain, blood in stool, constipation, diarrhea and vomiting. Genitourinary:  Negative for difficulty urinating, frequency and urgency. Musculoskeletal:  Negative for arthralgias, gait problem and myalgias. Right foot pain for about a week. No swelling or redness. No injury. Pain is on top of foot and radiates to her ankle. Follows the 3rd tarsal.    Skin:  Negative for rash. Neurological:  Negative for dizziness, tingling, tremors, seizures, numbness and headaches. Psychiatric/Behavioral:  Negative for decreased concentration and sleep disturbance. The patient is not nervous/anxious.        /78   Temp 97.7 °F (36.5 °C) (Tympanic) Resp 16   Ht 5' 5\" (1.651 m)   Wt 278 lb (126.1 kg)   BMI 46.26 kg/m²       Objective   Physical Exam  Constitutional:       Appearance: Normal appearance. HENT:      Head: Normocephalic and atraumatic. Right Ear: Tympanic membrane, ear canal and external ear normal.      Left Ear: Tympanic membrane, ear canal and external ear normal.      Nose: Nose normal.      Mouth/Throat:      Mouth: Mucous membranes are moist.   Eyes:      Extraocular Movements: Extraocular movements intact. Conjunctiva/sclera: Conjunctivae normal.      Pupils: Pupils are equal, round, and reactive to light. Cardiovascular:      Rate and Rhythm: Normal rate and regular rhythm. Pulmonary:      Effort: Pulmonary effort is normal.      Breath sounds: Normal breath sounds. Abdominal:      General: Bowel sounds are normal.   Musculoskeletal:         General: Normal range of motion. Cervical back: Normal range of motion. Comments: Right foot: normal pulses. Normal temperature. No discoloration or toes. No rashes. No swelling or redness. Pain is along 3rd tarsal but no point tenderness. Skin:     General: Skin is warm and dry. Neurological:      General: No focal deficit present. Mental Status: She is alert and oriented to person, place, and time. Psychiatric:         Mood and Affect: Mood normal.         Behavior: Behavior normal.         Thought Content: Thought content normal.         Judgment: Judgment normal.      PHQ-9 Total Score: 0 (12/9/2022  9:53 AM)  Body mass index is 46.26 kg/m². On this date 12/9/2022 I have spent 25 minutes reviewing previous notes, test results and face to face with the patient discussing the diagnosis and importance of compliance with the treatment plan as well as documenting on the day of the visit. An electronic signature was used to authenticate this note.     --Scooby Suárez, MORGAN - CNP

## 2022-12-12 ENCOUNTER — TELEPHONE (OUTPATIENT)
Dept: FAMILY MEDICINE CLINIC | Facility: CLINIC | Age: 47
End: 2022-12-12

## 2022-12-12 DIAGNOSIS — N61.1 BREAST ABSCESS: Primary | ICD-10-CM

## 2022-12-12 RX ORDER — DOXYCYCLINE HYCLATE 100 MG
100 TABLET ORAL 2 TIMES DAILY
Qty: 20 TABLET | Refills: 0 | Status: SHIPPED | OUTPATIENT
Start: 2022-12-12 | End: 2022-12-22

## 2022-12-12 NOTE — TELEPHONE ENCOUNTER
Pt. Called in stating the mediation prescribed for boil under her breast is not helping, wants to know if you can change this.  Pt. Uses [facility] [address]

## 2022-12-12 NOTE — TELEPHONE ENCOUNTER
Referral to surgeon to evaluate the breast abscess for us. Changed to Doxycycline. Please let patient know.  Matheus Moreno

## 2022-12-20 ENCOUNTER — TELEPHONE (OUTPATIENT)
Dept: FAMILY MEDICINE CLINIC | Facility: CLINIC | Age: 47
End: 2022-12-20

## 2022-12-20 DIAGNOSIS — B37.31 YEAST VAGINITIS: Primary | ICD-10-CM

## 2022-12-20 RX ORDER — FLUCONAZOLE 150 MG/1
150 TABLET ORAL
Qty: 2 TABLET | Refills: 0 | Status: SHIPPED | OUTPATIENT
Start: 2022-12-20 | End: 2022-12-28

## 2022-12-20 NOTE — TELEPHONE ENCOUNTER
Pt. Advised of Diflucan sent to pharmacy, if symptoms do not clear up a wet prep will needs to be done.

## 2022-12-20 NOTE — TELEPHONE ENCOUNTER
Diflucan sent to pharmacy. If she continues to have discharge, she will need a wet prep done. This can be done by us or her GYN.   Steve Concepcion

## 2022-12-20 NOTE — TELEPHONE ENCOUNTER
Pt. Called in stating the Doxycycline has caused her to have vaginal discharge, describes it as \"wetness\" but states there is no odor and no itching. Pt.  Wants to know if something can be called in

## 2022-12-22 ENCOUNTER — TELEPHONE (OUTPATIENT)
Dept: FAMILY MEDICINE CLINIC | Facility: CLINIC | Age: 47
End: 2022-12-22

## 2022-12-22 NOTE — TELEPHONE ENCOUNTER
If she is still having the discharge next Wednesday--she should probably be seen. Please schedule her an appointment and if she gets better, she can cancel.   Crystal Novak Admission

## 2022-12-22 NOTE — TELEPHONE ENCOUNTER
Patient advised to take 2nd pill and she scheduled an appt with Willian Griffith for 12/28 & will cancel if she gets better

## 2022-12-28 ENCOUNTER — OFFICE VISIT (OUTPATIENT)
Dept: SURGERY | Age: 47
End: 2022-12-28
Payer: COMMERCIAL

## 2022-12-28 ENCOUNTER — OFFICE VISIT (OUTPATIENT)
Dept: FAMILY MEDICINE CLINIC | Facility: CLINIC | Age: 47
End: 2022-12-28
Payer: COMMERCIAL

## 2022-12-28 VITALS
RESPIRATION RATE: 16 BRPM | DIASTOLIC BLOOD PRESSURE: 82 MMHG | TEMPERATURE: 97.3 F | WEIGHT: 276 LBS | SYSTOLIC BLOOD PRESSURE: 124 MMHG | BODY MASS INDEX: 47.12 KG/M2 | HEIGHT: 64 IN

## 2022-12-28 VITALS — BODY MASS INDEX: 47.12 KG/M2 | HEIGHT: 64 IN | WEIGHT: 276 LBS

## 2022-12-28 DIAGNOSIS — B96.89 BV (BACTERIAL VAGINOSIS): Primary | ICD-10-CM

## 2022-12-28 DIAGNOSIS — N89.8 VAGINAL DISCHARGE: ICD-10-CM

## 2022-12-28 DIAGNOSIS — L03.313 CELLULITIS OF CHEST WALL: ICD-10-CM

## 2022-12-28 DIAGNOSIS — N76.0 BV (BACTERIAL VAGINOSIS): Primary | ICD-10-CM

## 2022-12-28 LAB
BACTERIA, WET MOUNT, POC: NEGATIVE
CLUE CELLS, WET MOUNT, POC: PRESENT
RBC WET MOUNT, POC: NEGATIVE
TRICH, WET MOUNT, POC: ABNORMAL
WBC, WET MOUNT, POC: NEGATIVE
YEAST, WET MOUNT, POC: ABNORMAL

## 2022-12-28 PROCEDURE — 99244 OFF/OP CNSLTJ NEW/EST MOD 40: CPT | Performed by: SURGERY

## 2022-12-28 PROCEDURE — 3078F DIAST BP <80 MM HG: CPT | Performed by: NURSE PRACTITIONER

## 2022-12-28 PROCEDURE — 87210 SMEAR WET MOUNT SALINE/INK: CPT | Performed by: NURSE PRACTITIONER

## 2022-12-28 PROCEDURE — 99213 OFFICE O/P EST LOW 20 MIN: CPT | Performed by: NURSE PRACTITIONER

## 2022-12-28 PROCEDURE — 3074F SYST BP LT 130 MM HG: CPT | Performed by: NURSE PRACTITIONER

## 2022-12-28 RX ORDER — METRONIDAZOLE 500 MG/1
500 TABLET ORAL 2 TIMES DAILY
Qty: 14 TABLET | Refills: 0 | Status: SHIPPED | OUTPATIENT
Start: 2022-12-28 | End: 2023-01-04

## 2022-12-28 RX ORDER — DOXYCYCLINE 100 MG/1
100 TABLET ORAL 2 TIMES DAILY
Qty: 60 TABLET | Refills: 1 | Status: SHIPPED | OUTPATIENT
Start: 2022-12-28 | End: 2023-01-27

## 2022-12-28 ASSESSMENT — PATIENT HEALTH QUESTIONNAIRE - PHQ9
SUM OF ALL RESPONSES TO PHQ9 QUESTIONS 1 & 2: 0
SUM OF ALL RESPONSES TO PHQ QUESTIONS 1-9: 0
1. LITTLE INTEREST OR PLEASURE IN DOING THINGS: 0
2. FEELING DOWN, DEPRESSED OR HOPELESS: 0
SUM OF ALL RESPONSES TO PHQ QUESTIONS 1-9: 0

## 2022-12-28 ASSESSMENT — ENCOUNTER SYMPTOMS
CONSTIPATION: 0
BLOOD IN STOOL: 0
EYE PAIN: 0
WHEEZING: 0
COUGH: 0
CHEST TIGHTNESS: 0
SHORTNESS OF BREATH: 0
EYE DISCHARGE: 0
VOMITING: 0
DIARRHEA: 0
ABDOMINAL PAIN: 0
NAUSEA: 0
RHINORRHEA: 0

## 2022-12-28 NOTE — PROGRESS NOTES
H&P/Consult Note/Progress Note/Office Note:   Jaja Gross  MRN: [id number]  :1975  Age:47 y.o.    HPI: Jaja Gross is a 49 y.o. female who was referred in consultation by Dr. Elinor Childress for evaluation of recurrent left lateral breast/chest wall infections    Ms. Saintclair Deem reports difficulty with induration and discomfort of her left chest wall adjacent to the breast laterally since mid October. She took clindamycin for about a week and had more success with doxycycline. She has noticed improvement with antibiotics. She is non-insulin-dependent diabetic for 4 years  22 HgbA1c 6.7      She is non-smoker. She reports her last mammogram in 2022 was unremarkable. She has a prior history of breast infections which required antibiotics in the past and describes a right breast benign cyst removed in the operating room years ago. She had no prior infections  Her transaminases were normal on 11/10/2022.           Past Medical History:   Diagnosis Date    Abnormal Papanicolaou smear of cervix     Adverse effect of anesthesia     slow to awaken    Anemia     Chronic low back pain     Diabetes (HCC)     type 2 - oral agents- -does not check sugar-regularly- denies hypoglycemic episodes    ROBERTO (generalized anxiety disorder)     GERD (gastroesophageal reflux disease)     no medications- sleeps on 3 pillows-     Heel spur, left     Hypercholesterolemia     controlled well with meds    Hypertension     medication controlled    Kidney stone     Vitamin D deficiency      Past Surgical History:   Procedure Laterality Date    CHOLECYSTECTOMY, LAPAROSCOPIC  2017    DILATION AND CURETTAGE OF UTERUS      ENDOMETRIAL ABLATION  2017    ENDOMETRIAL ABLATION      FRAGMENT KIDNEY STONE/ ESWL      LAP,TUBAL CAUTERY      LITHOTRIPSY       x 4    ORTHOPEDIC SURGERY Left     hand- tendon repair    ORTHOPEDIC SURGERY Left     ankle repair-    OTHER SURGICAL HISTORY Bilateral     sweat gland removal under both arms    TUBAL LIGATION  2005    UPPER GASTROINTESTINAL ENDOSCOPY       Current Outpatient Medications   Medication Sig    fluconazole (DIFLUCAN) 150 MG tablet Take 1 tablet by mouth every 7 days for 2 doses    diclofenac (VOLTAREN) 75 MG EC tablet Take 1 tablet by mouth 2 times daily    gabapentin (NEURONTIN) 300 MG capsule Take 1 capsule by mouth 3 times daily for 180 days. metFORMIN (GLUCOPHAGE) 500 MG tablet Take 1 tablet by mouth 2 times daily (with meals)    loratadine (CLARITIN) 10 MG tablet Take 1 tablet by mouth daily    pravastatin (PRAVACHOL) 40 MG tablet Take 1 tablet by mouth nightly    fluticasone (FLONASE) 50 MCG/ACT nasal spray 2 sprays by Nasal route daily    hydroCHLOROthiazide (HYDRODIURIL) 25 MG tablet Take 1 tablet by mouth daily    Diethylpropion HCl 25 MG TABS Take one tab at breakfast and lunch    BLACK CURRANT SEED OIL PO Take by mouth    metroNIDAZOLE (FLAGYL) 500 MG tablet Take 1 tablet by mouth in the morning and at bedtime for 7 days (Patient not taking: Reported on 2022)     No current facility-administered medications for this visit.      ALLERGIES:  Ace inhibitors, Hydromorphone, Lisinopril, Amlodipine, Oxycodone, Sulfa antibiotics, and Sulfamethoxazole-trimethoprim    Social History     Socioeconomic History    Marital status:      Spouse name: None    Number of children: None    Years of education: None    Highest education level: None   Tobacco Use    Smoking status: Former     Packs/day: 1.00     Types: Cigarettes     Quit date: 2009     Years since quittin.9    Smokeless tobacco: Never   Vaping Use    Vaping Use: Never used   Substance and Sexual Activity    Alcohol use: Yes     Comment: occasionally    Drug use: No   Social History Narrative    Abuse: Feels safe at home, no history of physical abuse, no history of sexual abuse       Social History     Tobacco Use   Smoking Status Former    Packs/day: 1.00    Types: Cigarettes    Quit date: 2009    Years since quittin.9   Smokeless Tobacco Never     Family History   Problem Relation Age of Onset    Ovarian Cancer Neg Hx     Colon Cancer Maternal Uncle     Breast Cancer Maternal Aunt     Hypertension Brother     Deep Vein Thrombosis Father     Hypertension Mother     Other Mother         Ro Havers disease    Uterine Cancer Neg Hx     Heart Disease Maternal Grandmother     Diabetes Maternal Grandmother     Hypertension Father      ROS: The patient has no difficulty with chest pain or shortness of breath. No fever or chills. Comprehensive review of systems was otherwise unremarkable except as noted above. Physical Exam:   Ht 5' 4\" (1.626 m)   Wt 276 lb (125.2 kg)   BMI 47.38 kg/m²   Vitals:    22 1157   Weight: 276 lb (125.2 kg)   Height: 5' 4\" (1.626 m)     [unfilled]  [unfilled]    Constitutional: Alert, oriented, cooperative patient in no acute distress; appears stated age    Eyes:Sclera are clear. EOMs intact  ENMT: no external lesions gross hearing normal; no obvious neck masses, no ear or lip lesions, nares normal  CV: RRR. Normal perfusion  Resp: No JVD. Breathing is  non-labored; no audible wheezing. No breast masses bilaterally. No regional adenopathy. There is induration and mild tenderness over a 4 cm area of skin involving the inframammary fold laterally along the left chest wall. No fluctuance. GI: soft and non-distended     Musculoskeletal: unremarkable with normal function. No embolic signs or cyanosis.    Neuro:  Oriented; moves all 4; no focal deficits  Psychiatric: normal affect and mood, no memory impairment    Recent vitals (if inpt):  @IPVITALS(24:)@    Amount and/or Complexity of Data Reviewed and Analyzed:  I reviewed and analyzed all of the unique labs and radiologic studies that are shown below as well as any that are in the HPI, and any that are in the expanded problem list below  *Each unique test, order, or document contributes to the combination of 2 or combination of 3 in Category 1 below. For this visit I also reviewed old records and prior notes. No results for input(s): WBC, HGB, PLT, NA, K, CL, CO2, BUN, CREA, GLU, INR, APTT, ALT, AML, AML, LCAD, PCO2, PO2, HCO3 in the last 72 hours. Invalid input(s): PTP, TBIL, TBILI, CBIL, SGOT, GPT, AP, LPSE, NH4, TROPT, TROIQ,  PH  Review of most recent CBC  Lab Results   Component Value Date    WBC 4.6 11/10/2022    HGB 11.8 11/10/2022    HCT 37.4 11/10/2022    MCV 87.8 11/10/2022     11/10/2022       Review of most recent BMP  Lab Results   Component Value Date/Time     11/10/2022 09:15 AM    K 3.7 11/10/2022 09:15 AM     11/10/2022 09:15 AM    CO2 32 11/10/2022 09:15 AM    BUN 13 11/10/2022 09:15 AM    CREATININE 0.87 11/10/2022 09:15 AM    GLUCOSE 104 11/10/2022 09:15 AM    CALCIUM 9.3 11/10/2022 09:15 AM        Review of most recent LFTs (and lipase if done)  Lab Results   Component Value Date    ALT 27 11/10/2022    AST 32 11/10/2022    ALKPHOS 110 11/10/2022    BILITOT 0.3 11/10/2022     No results found for: LIPASE    No results found for: INR, APTT, LCAD    Review of most recent HgbA1c  Hemoglobin A1C   Date Value Ref Range Status   08/31/2022 6.7 (H) 4.8 - 5.6 % Final       Nutritional assessment screen for wound healing issues:  Lab Results   Component Value Date    LABALBU 3.3 (L) 11/10/2022       @lastcovr@    Xray Result (most recent):  XR FOOT RIGHT (2 VIEWS) 12/09/2022    Narrative  EXAMINATION: XR FOOT RIGHT (2 VIEWS) 12/9/2022 10:39 AM    COMPARISON: None available. INDICATION: Pain in right foot    TECHNIQUE: 3 views of the right foot were obtained    FINDINGS:  No evidence of acute fracture or malalignment. Chronic ossicles along the fifth  metatarsal tuberosity compatible with remote injury or fragmented enthesopathy. Small Achilles insertion enthesophyte. Joint spaces are preserved. Normal bone  mineralization and soft tissues.     Impression  No acute osseous abnormality. CT Result (most recent):  CT ABDOMEN PELVIS WO IV CONTRAST 02/21/2020    Narrative  Clinical History: The patient is a 38years year old Female presenting with  symptoms of left-sided flank pain    Comparison:  None. Technique: Thin slice axial images were obtained through the abdomen and pelvis without  intravenous and without oral contrast.  Coronal reformatted images were also  provided for review. All CT scans at this facility are performed using dose reduction/dose modulation  techniques, as appropriate the performed exam, including the following:  Automated Exposure Control; Adjustment of the mA and/or kV according to patient  size (this includes techniques or standardized protocols for targeted exams  where dose is matched to indication/reason for exam); and Use of Iterative  Reconstruction Technique. Radiation Exposure Indices:  Reference Air Kerma (Ka,r) = abdomen pelvis CT 1/6/2020 mGy-cm    Findings:    Abdomen:  Lung bases: Clear without evidence of focal infiltrate, consolidation, or  effusion. Liver: Normal size, contour, and density without focal mass. Biliary: Cholecystectomy. No evidence of intra or extrahepatic biliary  dilatation. Pancreas: Normal in size and contour without focal lesion. Spleen: Normal in size and contour without focal lesion. Adrenal glands: Normal in size without focal lesion. Kidneys: A 6 mm stone or cluster of stones is again demonstrated in the  interpolar region of the right kidney. The stone in the interpolar region of the  left kidney is no longer visualized. There is no evidence of hydronephrosis or  hydroureter    Bowel: No evidence of obstruction or focal lesion. Normal appendix    Retroperitoneum/vasculature: No evidence of significant adenopathy. Unremarkable  aorta and IVC. Abdominal soft tissues: Unremarkable. Osseous structures: No acute osseous abnormality. Pelvis:  Unremarkable bladder.  No significant adenopathy or free fluid. .  The uterus is unremarkable    Impression  IMPRESSION:  1. Persistent small stone or cluster of stones in right mid kidney. 2. Interval passage or removal of left renal stone. CPT code(s): Q5913208, [id number]    US Result (most recent):  US RETROPERITONEAL COMPLETE 09/12/2020    Narrative  EXAM: Ultrasound of the kidneys. INDICATION: Left flank pain. COMPARISON: Prior CT abdomen on February 21, 2020. TECHNIQUE: A standard protocol kidney ultrasound was performed. FINDINGS: The right kidney measures 11.2 cm in length and contains an unchanged  small nonobstructing stone in its midpole. The left kidney measures 12 cm in  length and has a normal appearance. No hydronephrosis is seen. The urinary  bladder is within normal limits. Impression  IMPRESSION: Unremarkable study, except for a small nonobstructing right kidney  stone. Admission date (for inpatients): (Not on file)   * No surgery found *  * No surgery found *        ASSESSMENT/PLAN:  [unfilled]  Active Problems:    * No active hospital problems. *  Resolved Problems:    * No resolved hospital problems.  *     Patient Active Problem List    Diagnosis Date Noted    Shortness of breath 09/03/2022     Priority: Medium    Acute upper respiratory infection 09/03/2022     Priority: Medium    Essential hypertension      Mgmt per PCP        Diabetic sensory polyneuropathy ([facility] 07/27/2021    Diarrhea, unspecified 07/09/2020     noted        Pelvic pain 07/09/2020     noted        Screen for STD (sexually transmitted disease) 05/18/2020     noted        B12 deficiency 03/27/2019    Type 2 diabetes mellitus with diabetic neuropathy ([facility] 01/28/2019    Vitamin D insufficiency 01/28/2019    JESSICA I (cervical intraepithelial neoplasia I) 07/09/2018     On colpo done by [facility] on 4/19/18        ROBERTO (generalized anxiety disorder) 03/12/2018    Environmental and seasonal allergies 03/12/2018    Morbid obesity with BMI of 40.0-44.9, adult ([facility] 07/29/2017    Chronic bilateral low back pain without sciatica 07/29/2017    Hypercholesterolemia     Type 2 diabetes mellitus with hyperglycemia, without long-term current use of insulin ([facility] 02/13/2016    ACE inhibitor-aggravated angioedema 02/13/2016    Gastroesophageal reflux disease without esophagitis 01/11/2016     Last Assessment & Plan:   Patient's reflux symptoms are refractory to multiple medications(Zantac,   omeprazole twice a day, Nexium twice daily, Prevacid). -Since Carafate is partially relieving her symptoms I advised patient to   continue it for now. -She has had decrease in the burning sensation omeprazole, so I will   restart omeprazole 40 mg twice a day. -I will also schedule patient for EGD for further evaluation of refractory   GERD  -Based on the patient's history is possible she may have a nonacid reflux. If EGD is negative and she continues to have symptoms in spite of about   medications I will consider starting patient on baclofen 10 mg by mouth   2-3 times a day  -Patient was also counseled about antireflux measures              Number and Complexity of Problems addressed and   Risks of complications and/or morbidity of management        She has subacute/chronic cellulitis involving the left lateral chest wall just lateral to the breast which looks to be evolving into chronic hidradenitis. I recommended longer-term doxycycline which I have prescribed electronically. I will regroup with her in 3 to 4 weeks  Encouraged her not to smoke and to continue with her aggressive diabetic control which is resulted in a very good hemoglobin A1c in October 2022. We also discussed the possibility of surgical excision of the area if this proves refractory to less aggressive measures.     NIDDM since approximately 2018  Encourage strict diabetic diet and close adherence to diabetic medical regimen to lower susceptibility to infection  Encouraged close follow-up with primary care      BMI >47  Encourage weight loss        Level of MDM (2/3 elements below)  Number and Complexity of Problems Addressed Amount and/or Complexity of Data to be Reviewed and Analyzed  *Each unique test, order, or document contributes to the combination of 2 or combination of 3 in Category 1 below. Risk of Complications and/or Morbidity or Mortality of pt Management     40009  [id number] SF Minimal  ?1self-limited or minor problem Minimal or none Minimal risk of morbidity from additional diagnostic testing or Rx   10684  76182 Low Low  ? 2or more self-limited or minor problems;    or  ? 1stable chronic illness;    or  ?1acute, uncomplicated illness or injury   Limited  (Must meet the requirements of at least 1 of the 2 categories)  Category 1: Tests and documents   ? Any combination of 2 from the following:  ?Review of prior external note(s) from each unique source*;  ?review of the result(s) of each unique test*;   ?ordering of each unique test*    or   Category 2: Assessment requiring an independent historian(s)  (For the categories of independent interpretation of tests and discussion of management or test interpretation, see moderate or high) Low risk of morbidity from additional diagnostic testing or treatment     65362  34430 Mod Moderate  ? 1or more chronic illnesses with exacerbation, progression, or side effects of treatment;    or  ?2or more stable chronic illnesses;    or  ?1undiagnosed new problem with uncertain prognosis;    or  ?1acute illness with systemic symptoms;    or  ?1acute complicated injury   Moderate  (Must meet the requirements of at least 1 out of 3 categories)  Category 1: Tests, documents, or independent historian(s)  ? Any combination of 3 from the following:   ?Review of prior external note(s) from each unique source*;  ?Review of the result(s) of each unique test*;  ?Ordering of each unique test*;  ?Assessment requiring an independent historian(s)    or  Category 2: Independent interpretation of tests   ? Independent interpretation of a test performed by another physician/other qualified health care professional (not separately reported);     or  Category 3: Discussion of management or test interpretation  ? Discussion of management or test interpretation with external physician/other qualified health care professional/appropriate source (not separately reported)   Moderate risk of morbidity from additional diagnostic testing or treatment  Examples only:  ?Prescription drug management   ? Decision regarding minor surgery with identified patient or procedure risk factors  ? Decision regarding elective major surgery without identified patient or procedure risk factors   ? Diagnosis or treatment significantly limited by social determinants of health       [id number] High High  ? 1or more chronic illnesses with severe exacerbation, progression, or side effects of treatment;    or  ?1 acute or chronic illness or injury that poses a threat to life or bodily function   Extensive  (Must meet the requirements of at least 2 out of 3 categories)  Category 1: Tests, documents, or independent historian(s)  ? Any combination of 3 from the following:   ?Review of prior external note(s) from each unique source*;  ?Review of the result(s) of each unique test*;   ?Ordering of each unique test*;   ?Assessment requiring an independent historian(s)    or   Category 2: Independent interpretation of tests   ? Independent interpretation of a test performed by another physician/other qualified health care professional (not separately reported);     or  Category 3: Discussion of management or test interpretation  ? Discussion of management or test interpretation with external physician/other qualified health care professional/appropriate source (not separately reported)   High risk of morbidity from additional diagnostic testing or treatment  Examples only:  ?Drug therapy requiring intensive monitoring for toxicity  ? Decision regarding elective major surgery with identified patient or procedure risk factors  ? Decision regarding emergency major surgery  ? Decision regarding hospitalization  ? Decision not to resuscitate or to de-escalate care because of poor prognosis             I have personally performed a face-to-face diagnostic evaluation and management  service on this patient. I have independently seen the patient. I have independently obtained the above history from the patient/family. I have independently examined the patient with above findings. I have independently reviewed data/labs for this patient and developed the above plan of care (MDM).       Signed: Chetan Ventura MD  12/28/2022    12:09 PM

## 2022-12-28 NOTE — PATIENT INSTRUCTIONS
Patient Education        A Healthy Lifestyle: Care Instructions  A healthy lifestyle can help you feel good, have more energy, and stay at a weight that's healthy for you. You can share a healthy lifestyle with your friends and family. And you can do it on your own. Eat meals with your friends or family. You could try cooking together. Plan activities with other people. Go for a walk with a friend, try a free online fitness class, or join a sports league. Eat a variety of healthy foods. These include fruits, vegetables, whole grains, low-fat dairy, and lean protein. Choose healthy portions of food. You can use the Nutrition Facts label on food packages as a guide. Eat more fruits and vegetables. You could add vegetables to sandwiches or add fruit to cereal.   Drink water when you are thirsty. Limit soda, juice, and sports drinks. Try to exercise most days. Aim for at least 2½ hours of exercise each week. Keep moving. Work in the garden or take your dog on a walk. Use the stairs instead of the elevator. If you use tobacco or nicotine, try to quit. Ask your doctor about programs and medicines to help you quit. Limit alcohol. Men should have no more than 2 drinks a day. Women should have no more than 1. For some people, no alcohol is the best choice. Follow-up care is a key part of your treatment and safety. Be sure to make and go to all appointments, and call your doctor if you are having problems. It's also a good idea to know your test results and keep a list of the medicines you take. Where can you learn more? Go to http://www.desai.com/ and enter U807 to learn more about \"A Healthy Lifestyle: Care Instructions. \"  Current as of: March 9, 2022               Content Version: 13.5  © 6008-7632 [employer], [employer]. Care instructions adapted under license by [facility] ([facility]).  If you have questions about a medical condition or this instruction, always ask your healthcare professional. [address] any warranty or liability for your use of this information.

## 2022-12-28 NOTE — PROGRESS NOTES
Carolina Navarrete (:  1975) is a 49 y.o. female,Established patient, here for evaluation of the following chief complaint(s):  Vaginal Discharge    Results for orders placed or performed in visit on 22   AMB POC SMEAR, STAIN & INTERPRET, WET [place] SC   Result Value Ref Range    RBC [place], POC Negative     WBC, [place] POC Negative     Clue Cells, [place] POC Present Negative    Bacteria, [place] POC Negative     Yeast, [place] POC No Yeast Cells Seen Negative    Trich, [place] POC Absent Negative            ASSESSMENT/PLAN:  1. BV (bacterial vaginosis)  -     metroNIDAZOLE (FLAGYL) 500 MG tablet; Take 1 tablet by mouth in the morning and at bedtime for 7 days, Disp-14 tablet, R-0Normal (Patient not taking: Reported on 2022)  2. Vaginal discharge  -     Nuswab Vaginitis Plus (VG+)  -     AMB POC SMEAR, STAIN & INTERPRET, WET MOUNT SC    Begin Flagyl. If this bothers her stomach, we can change to gel. Follow up prn 3-5 days if symptoms are not better. Will send swab out for an over read to make sure this is not trich. Return in about 5 days (around 2023), or if symptoms worsen or fail to improve. Subjective   SUBJECTIVE/OBJECTIVE:  Clear discharge, no smell. Feels like  jelly. Some itching. Discharge is copious. Took second diflucan Monday. Very wet. Sexually active, monogamous. Vaginal Discharge  The patient's primary symptoms include vaginal discharge. This is a new problem. The current episode started in the past 7 days. The problem occurs constantly. The problem has been gradually worsening. The patient is experiencing no pain. The problem affects both sides. Pertinent negatives include no abdominal pain, constipation, diarrhea, fever, frequency, headaches, hematuria, nausea, painful intercourse, rash, urgency or vomiting. The vaginal discharge was mucoid, copious, clear, thin and watery. There has been no bleeding. She has not been passing clots. She has not been passing tissue. Nothing aggravates the symptoms. She has tried antifungals for the symptoms. The treatment provided no relief. She is sexually active. No, her partner does not have an STD. She is postmenopausal.     Allergies   Allergen Reactions    Ace Inhibitors Anaphylaxis and Angioedema    Hydromorphone Anaphylaxis     Swollen tongue, lips, hypotension, low O2 SATS    Lisinopril Anaphylaxis     Lips swell, last time she was in the hospital for 4 days    Amlodipine Other (See Comments)     Pt denies any allergic reaction to Norvasc    Oxycodone Other (See Comments)     Makes itch    Sulfa Antibiotics Hives    Sulfamethoxazole-Trimethoprim Hives     Current Outpatient Medications   Medication Sig    metroNIDAZOLE (FLAGYL) 500 MG tablet Take 1 tablet by mouth in the morning and at bedtime for 7 days (Patient not taking: Reported on 12/28/2022)    diclofenac (VOLTAREN) 75 MG EC tablet Take 1 tablet by mouth 2 times daily    gabapentin (NEURONTIN) 300 MG capsule Take 1 capsule by mouth 3 times daily for 180 days. metFORMIN (GLUCOPHAGE) 500 MG tablet Take 1 tablet by mouth 2 times daily (with meals)    loratadine (CLARITIN) 10 MG tablet Take 1 tablet by mouth daily    pravastatin (PRAVACHOL) 40 MG tablet Take 1 tablet by mouth nightly    fluticasone (FLONASE) 50 MCG/ACT nasal spray 2 sprays by Nasal route daily    hydroCHLOROthiazide (HYDRODIURIL) 25 MG tablet Take 1 tablet by mouth daily    Diethylpropion HCl 25 MG TABS Take one tab at breakfast and lunch    BLACK CURRANT SEED OIL PO Take by mouth    doxycycline monohydrate (ADOXA) 100 MG tablet Take 1 tablet by mouth 2 times daily    fluconazole (DIFLUCAN) 150 MG tablet Take 1 tablet by mouth every 7 days for 2 doses     No current facility-administered medications for this visit. Review of Systems   Constitutional:  Negative for fatigue and fever. HENT:  Negative for congestion, hearing loss, postnasal drip and rhinorrhea.     Eyes: Negative for pain, discharge and visual disturbance. Respiratory:  Negative for cough, chest tightness, shortness of breath and wheezing. Cardiovascular:  Negative for chest pain, palpitations and leg swelling. Gastrointestinal:  Negative for abdominal pain, blood in stool, constipation, diarrhea, nausea and vomiting. Genitourinary:  Positive for vaginal discharge. Negative for difficulty urinating, frequency, hematuria and urgency. Vaginal discharge-no odor. Clear. Copious. Musculoskeletal:  Negative for arthralgias, gait problem and myalgias. Skin:  Negative for rash. Neurological:  Negative for dizziness, tremors, seizures and headaches. Psychiatric/Behavioral:  Negative for decreased concentration and sleep disturbance. The patient is not nervous/anxious. /82   Temp 97.3 °F (36.3 °C) (Temporal)   Resp 16   Ht 5' 4\" (1.626 m)   Wt 276 lb (125.2 kg)   BMI 47.38 kg/m²       Objective   Physical Exam  Exam conducted with a chaperone present ([place] Karolina). Constitutional:       Appearance: Normal appearance. HENT:      Head: Normocephalic and atraumatic. Right Ear: Tympanic membrane, ear canal and external ear normal.      Left Ear: Tympanic membrane, ear canal and external ear normal.      Nose: Nose normal.      Mouth/Throat:      Mouth: Mucous membranes are moist.   Eyes:      Extraocular Movements: Extraocular movements intact. Conjunctiva/sclera: Conjunctivae normal.      Pupils: Pupils are equal, round, and reactive to light. Cardiovascular:      Rate and Rhythm: Normal rate and regular rhythm. Pulses: Normal pulses. Heart sounds: Normal heart sounds. Pulmonary:      Effort: Pulmonary effort is normal.      Breath sounds: Normal breath sounds. Abdominal:      General: Bowel sounds are normal.   Genitourinary:     General: Normal vulva. Comments: Normal female genitalia. Normal vaginal tissue.   Discharge is white watery, no odor.    Musculoskeletal:         General: Normal range of motion. Cervical back: Normal range of motion. Skin:     General: Skin is warm and dry. Neurological:      General: No focal deficit present. Mental Status: She is alert and oriented to person, place, and time. Psychiatric:         Mood and Affect: Mood normal.         Behavior: Behavior normal.         Thought Content: Thought content normal.         Judgment: Judgment normal.      PHQ-9 Total Score: 0 (12/28/2022 10:52 AM)  Body mass index is 47.38 kg/m². On this date 12/28/2022 I have spent 30 minutes reviewing previous notes, test results and face to face with the patient discussing the diagnosis and importance of compliance with the treatment plan as well as documenting on the day of the visit. An electronic signature was used to authenticate this note.     --MORGAN Vega - CNP

## 2022-12-30 LAB
A VAGINAE DNA VAG QL NAA+PROBE: ABNORMAL SCORE
BVAB2 DNA VAG QL NAA+PROBE: ABNORMAL SCORE
C ALBICANS DNA VAG QL NAA+PROBE: NEGATIVE
C GLABRATA DNA VAG QL NAA+PROBE: NEGATIVE
C TRACH RRNA SPEC QL NAA+PROBE: NEGATIVE
MEGA1 DNA VAG QL NAA+PROBE: ABNORMAL SCORE
N GONORRHOEA RRNA SPEC QL NAA+PROBE: NEGATIVE
SPECIMEN SOURCE: ABNORMAL
T VAGINALIS RRNA SPEC QL NAA+PROBE: POSITIVE

## 2023-01-13 ENCOUNTER — TELEMEDICINE (OUTPATIENT)
Dept: FAMILY MEDICINE CLINIC | Facility: CLINIC | Age: 48
End: 2023-01-13
Payer: COMMERCIAL

## 2023-01-13 DIAGNOSIS — M70.71 BURSITIS OF RIGHT HIP, UNSPECIFIED BURSA: Primary | ICD-10-CM

## 2023-01-13 PROCEDURE — 99213 OFFICE O/P EST LOW 20 MIN: CPT | Performed by: NURSE PRACTITIONER

## 2023-01-13 RX ORDER — HYDROCODONE BITARTRATE AND ACETAMINOPHEN 5; 325 MG/1; MG/1
1 TABLET ORAL EVERY 6 HOURS PRN
Qty: 12 TABLET | Refills: 0 | Status: SHIPPED | OUTPATIENT
Start: 2023-01-13 | End: 2023-01-16

## 2023-01-13 RX ORDER — PREDNISONE 5 MG/1
TABLET ORAL
Qty: 21 EACH | Refills: 0 | Status: SHIPPED | OUTPATIENT
Start: 2023-01-13

## 2023-01-13 NOTE — PROGRESS NOTES
Sanaz Nava (:  1975) is a Established patient, here for evaluation of the following:    Assessment & Plan   Below is the assessment and plan developed based on review of pertinent history, physical exam, labs, studies, and medications. Diagnosis Orders   1. Bursitis of right hip, unspecified bursa  predniSONE 5 MG (21) TBPK    HYDROcodone-acetaminophen (NORCO) 5-325 MG per tablet        Already on diclofenac- do not take NSAIDs with this. On gabapentin. Adding norco for severe pain. Start icing TID. Start prednisone- no exercising until pain is gone. On doxy for boil already. No other  symptoms - think pain from hip and radiating to other areas- told her when to go to ER if symptoms worsen and/or do not improve. Subjective   HPI  presents today through virtual visit for hip swelling. Started exercising. Doing about 30 minutes on bike and treadmill. 1 week ago started to feel discomfort. Hip is swollen and warm to touch. Fees like a lump- is painful. Having some pain on right side of back near kidney. Pain shoots all the way to her knees- feels like a stabbing pain. Pain with ambulation. When she tries to lay on her back- feels like she is stretching and will have to switch to left side. If she puts hand on right side where swelling is she will be uncomfortable. Pain is to right hip- goes to right lower back- right buttocks- wraps to right pelvic then shoot almost to right knee. Pain worse with using bicycle. She denies fever. Did try ibuprofen- helped with pain but wears off after 4-6 hours. Last night took her husbands left over norco and this helped her sleep. Denies any past hx of hip injury. Has had injury to left ankle. No trouble breathing, SOB. Does not have a way to check vital signs at home. Already taking diclofenac for arthritis of right foot. Checks blood sugar at home.      Review of Systems   Review of Systems - History obtained from the patient  General ROS: negative for - chills, fatigue, or fever  Ophthalmic ROS: negative for - blurry vision or double vision  ENT ROS: negative for - nasal congestion or sore throat  Respiratory ROS: no cough, shortness of breath, or wheezing  Cardiovascular ROS: no chest pain or dyspnea on exertion  Gastrointestinal ROS: negative for - abdominal pain or nausea/vomiting  Genito-Urinary ROS: positive for - pelvic pain  negative for - dysuria or hematuria  Musculoskeletal ROS: positive for - joint pain and joint swelling  Neurological ROS: negative for - confusion, dizziness, or numbness/tingling        Objective   Patient-Reported Vitals  No data recorded     Physical Exam  [INSTRUCTIONS:  \"[x]\" Indicates a positive item  \"[]\" Indicates a negative item  -- DELETE ALL ITEMS NOT EXAMINED]    Constitutional: [x] Appears well-developed and well-nourished [x] No apparent distress      [] Abnormal -     Mental status: [x] Alert and awake  [x] Oriented to person/place/time [x] Able to follow commands    [] Abnormal -     Eyes:   EOM    [x]  Normal    [] Abnormal -   Sclera  [x]  Normal    [] Abnormal -          Discharge [x]  None visible   [] Abnormal -     HENT: [x] Normocephalic, atraumatic  [] Abnormal -   [x] Mouth/Throat: Mucous membranes are moist    External Ears [x] Normal  [] Abnormal -    Neck: [x] No visualized mass [] Abnormal -     Pulmonary/Chest: [x] Respiratory effort normal   [x] No visualized signs of difficulty breathing or respiratory distress        [] Abnormal -      Musculoskeletal:   [x] Normal gait with no signs of ataxia         [x] Normal range of motion of neck        [x] Abnormal - right hip- states pain with bearing weight- hard to see edema or redness on image- can bear weight    Neurological:        [x] No Facial Asymmetry (Cranial nerve 7 motor function) (limited exam due to video visit)          [x] No gaze palsy        [] Abnormal -          Skin:        [x] No significant exanthematous lesions or discoloration noted on facial skin         [] Abnormal -            Psychiatric:       [x] Normal Affect [] Abnormal -        [x] No Hallucinations        Dorcasd[place] Pooja, was evaluated through a synchronous (real-time) audio-video encounter. The patient (or guardian if applicable) is aware that this is a billable service, which includes applicable co-pays. This Virtual Visit was conducted with patient's (and/or legal guardian's) consent. The visit was conducted pursuant to the emergency declaration under the [address] [place] [address] and the [employer] Act. Patient identification was verified, and a caregiver was present when appropriate. The patient was located at Home: [place] [address] [address] [facility] [address] [place] [id number]. Provider was located at [facility] ([address] [place] [address]): [address] [place] [address],  [address] [facility].         --MORGAN Norris - CNP

## 2023-02-04 ENCOUNTER — HOSPITAL ENCOUNTER (EMERGENCY)
Age: 48
Discharge: HOME | End: 2023-02-04
Attending: EMERGENCY MEDICINE
Payer: COMMERCIAL

## 2023-02-04 ENCOUNTER — APPOINTMENT (OUTPATIENT)
Dept: GENERAL RADIOLOGY | Age: 48
End: 2023-02-04
Payer: COMMERCIAL

## 2023-02-04 VITALS
WEIGHT: 268 LBS | OXYGEN SATURATION: 96 % | RESPIRATION RATE: 22 BRPM | HEART RATE: 83 BPM | HEIGHT: 64 IN | BODY MASS INDEX: 45.75 KG/M2 | SYSTOLIC BLOOD PRESSURE: 158 MMHG | DIASTOLIC BLOOD PRESSURE: 104 MMHG | TEMPERATURE: 98.3 F

## 2023-02-04 DIAGNOSIS — J06.9 VIRAL URI WITH COUGH: Primary | ICD-10-CM

## 2023-02-04 LAB
FLUAV RNA SPEC QL NAA+PROBE: NOT DETECTED
FLUBV RNA SPEC QL NAA+PROBE: NOT DETECTED
SARS-COV-2 RDRP RESP QL NAA+PROBE: NOT DETECTED
SOURCE: NORMAL

## 2023-02-04 PROCEDURE — 87502 INFLUENZA DNA AMP PROBE: CPT

## 2023-02-04 PROCEDURE — 71046 X-RAY EXAM CHEST 2 VIEWS: CPT

## 2023-02-04 PROCEDURE — 99284 EMERGENCY DEPT VISIT MOD MDM: CPT

## 2023-02-04 PROCEDURE — 6370000000 HC RX 637 (ALT 250 FOR IP): Performed by: EMERGENCY MEDICINE

## 2023-02-04 PROCEDURE — 87635 SARS-COV-2 COVID-19 AMP PRB: CPT

## 2023-02-04 RX ORDER — BENZONATATE 100 MG/1
100 CAPSULE ORAL 3 TIMES DAILY PRN
Qty: 30 CAPSULE | Refills: 0 | Status: SHIPPED | OUTPATIENT
Start: 2023-02-04 | End: 2023-02-14

## 2023-02-04 RX ORDER — BENZONATATE 100 MG/1
200 CAPSULE ORAL ONCE
Status: COMPLETED | OUTPATIENT
Start: 2023-02-04 | End: 2023-02-04

## 2023-02-04 RX ADMIN — BENZONATATE 200 MG: 100 CAPSULE ORAL at 06:32

## 2023-02-04 ASSESSMENT — ENCOUNTER SYMPTOMS
ABDOMINAL PAIN: 0
COUGH: 1
VOMITING: 0
DIARRHEA: 0
FACIAL SWELLING: 0
SHORTNESS OF BREATH: 1

## 2023-02-04 ASSESSMENT — PAIN - FUNCTIONAL ASSESSMENT: PAIN_FUNCTIONAL_ASSESSMENT: NONE - DENIES PAIN

## 2023-02-04 ASSESSMENT — LIFESTYLE VARIABLES: HOW OFTEN DO YOU HAVE A DRINK CONTAINING ALCOHOL: MONTHLY OR LESS

## 2023-02-04 NOTE — ED NOTES
I have reviewed discharge instructions with the patient. The patient verbalized understanding. Patient left ED via Discharge Method: ambulatory to Home. Opportunity for questions and clarification provided. Patient given 1 scripts. To continue your aftercare when you leave the hospital, you may receive an automated call from our care team to check in on how you are doing. This is a free service and part of our promise to provide the best care and service to meet your aftercare needs.  If you have questions, or wish to unsubscribe from this service please call [phone]. Thank you for Choosing our [facility] Emergency Department.        Sebastian Cintron RN  02/04/23 8764

## 2023-02-04 NOTE — ED PROVIDER NOTES
Emergency Department Provider Note                   PCP:                Partha Verma, APRN - CNP               Age: 49 y.o. Sex: female       ICD-10-CM    1. Viral URI with cough  J06.9           DISPOSITION Decision To Discharge 02/04/2023 06:51:12 AM        Medical Decision Making  Well-appearing. Chest x-ray clear. COVID and flu negative. Likely viral.    Amount and/or Complexity of Data Reviewed  Radiology: ordered and independent interpretation performed. Risk  Prescription drug management. Orders Placed This Encounter   Procedures    COVID-19, Rapid    Influenza A/B, Molecular    XR CHEST (2 VW)        Medications   benzonatate (TESSALON) capsule 200 mg (200 mg Oral Given 2/4/23 9659)       New Prescriptions    BENZONATATE (TESSALON) 100 MG CAPSULE    Take 1 capsule by mouth 3 times daily as needed for Cough        Josephine Kahn is a 49 y.o. female who presents to the Emergency Department with chief complaint of    Chief Complaint   Patient presents with    Cough      59-year-old female  presents with 1 week of cough, congestion, scratchy throat, body aches, and shortness of breath. No documented fevers. Her  has developed similar symptoms. Denies any underlying lung disease. Does not smoke. Review of Systems   Constitutional:  Positive for fatigue. Negative for fever. HENT:  Positive for congestion. Negative for facial swelling. Eyes:  Negative for visual disturbance. Respiratory:  Positive for cough and shortness of breath. Cardiovascular:  Negative for chest pain. Gastrointestinal:  Negative for abdominal pain, diarrhea and vomiting. Musculoskeletal:  Positive for myalgias. Negative for joint swelling. Skin:  Negative for rash. Neurological:  Negative for speech difficulty. Psychiatric/Behavioral:  Negative for confusion. All other systems reviewed and are negative.     Past Medical History:   Diagnosis Date    Abnormal Papanicolaou smear of cervix     Adverse effect of anesthesia     slow to awaken    Anemia     Chronic low back pain     Diabetes (HCC)     type 2 - oral agents- -does not check sugar-regularly- denies hypoglycemic episodes    ROBERTO (generalized anxiety disorder)     GERD (gastroesophageal reflux disease)     no medications- sleeps on 3 pillows-     Heel spur, left     Hypercholesterolemia     controlled well with meds    Hypertension     medication controlled    Kidney stone     Vitamin D deficiency         Past Surgical History:   Procedure Laterality Date    CHOLECYSTECTOMY, LAPAROSCOPIC  2017    DILATION AND CURETTAGE OF UTERUS      ENDOMETRIAL ABLATION      ENDOMETRIAL ABLATION      FRAGMENT KIDNEY STONE/ ESWL      LAP,TUBAL CAUTERY      LITHOTRIPSY       x 4    ORTHOPEDIC SURGERY Left     hand- tendon repair    ORTHOPEDIC SURGERY Left     ankle repair-    OTHER SURGICAL HISTORY Bilateral     sweat gland removal under both arms    TUBAL LIGATION  2005    UPPER GASTROINTESTINAL ENDOSCOPY          Family History   Problem Relation Age of Onset    Ovarian Cancer Neg Hx     Colon Cancer Maternal Uncle     Breast Cancer Maternal Aunt     Hypertension Brother     Deep Vein Thrombosis Father     Hypertension Mother     Other Mother         Shelocta Camper disease    Uterine Cancer Neg Hx     Heart Disease Maternal Grandmother     Diabetes Maternal Grandmother     Hypertension Father         Social History     Socioeconomic History    Marital status:    Tobacco Use    Smoking status: Former     Packs/day: 1.00     Types: Cigarettes     Quit date: 2009     Years since quittin.1    Smokeless tobacco: Never   Vaping Use    Vaping Use: Never used   Substance and Sexual Activity    Alcohol use: Yes     Comment: occasionally    Drug use: No   Social History Narrative    Abuse: Feels safe at home, no history of physical abuse, no history of sexual abuse           Ace inhibitors, Hydromorphone, Lisinopril, Amlodipine, Oxycodone, Sulfa antibiotics, and Sulfamethoxazole-trimethoprim     Previous Medications    BLACK CURRANT SEED OIL PO    Take by mouth    DICLOFENAC (VOLTAREN) 75 MG EC TABLET    Take 1 tablet by mouth 2 times daily    DIETHYLPROPION HCL 25 MG TABS    Take one tab at breakfast and lunch    FLUTICASONE (FLONASE) 50 MCG/ACT NASAL SPRAY    2 sprays by Nasal route daily    GABAPENTIN (NEURONTIN) 300 MG CAPSULE    Take 1 capsule by mouth 3 times daily for 180 days. HYDROCHLOROTHIAZIDE (HYDRODIURIL) 25 MG TABLET    Take 1 tablet by mouth daily    LORATADINE (CLARITIN) 10 MG TABLET    Take 1 tablet by mouth daily    METFORMIN (GLUCOPHAGE) 500 MG TABLET    Take 1 tablet by mouth 2 times daily (with meals)    PRAVASTATIN (PRAVACHOL) 40 MG TABLET    Take 1 tablet by mouth nightly    PREDNISONE 5 MG (21) TBPK    Day 1:  Take 1 Tablet by mouth 6 times, Day 2:  Take 1 Tablet by mouth 5 times, Day 3:  Take 1 Tablet by mouth 4 times, Day 4:  Take 1 Tablet by mouth 3 times, Day 5:  Take 1 Tablet by mouth 2 times, Day 6:  Take 1 Tablet by mouth 1 time then stop        Vitals signs and nursing note reviewed. Patient Vitals for the past 4 hrs:   Temp Pulse Resp BP SpO2   02/04/23 0559 98.3 °F (36.8 °C) 83 22 (!) 158/104 96 %          Physical Exam  Vitals and nursing note reviewed. Constitutional:       Appearance: Normal appearance. She is well-developed. Comments: Frequent coughing   HENT:      Head: Normocephalic and atraumatic. Nose: Nose normal.      Mouth/Throat:      Mouth: Mucous membranes are moist.   Eyes:      Extraocular Movements: Extraocular movements intact. Pupils: Pupils are equal, round, and reactive to light. Cardiovascular:      Rate and Rhythm: Normal rate and regular rhythm. Heart sounds: Normal heart sounds. Pulmonary:      Effort: Pulmonary effort is normal. No respiratory distress. Breath sounds: Normal breath sounds.    Abdominal: General: Abdomen is flat. There is no distension. Musculoskeletal:         General: Normal range of motion. Skin:     General: Skin is warm and dry. Neurological:      General: No focal deficit present. Mental Status: She is alert. Mental status is at baseline. Psychiatric:         Mood and Affect: Mood normal.        Procedures    Results for orders placed or performed during the hospital encounter of 02/04/23   COVID-19, Rapid    Specimen: Nasopharyngeal   Result Value Ref Range    Source Nasopharyngeal      SARS-CoV-2, Rapid Not detected NOTD     Influenza A/B, Molecular    Specimen: Nasal   Result Value Ref Range    Influenza A, GINO Not detected      Influenza B, GINO Not detected          XR CHEST (2 VW)    (Results Pending)                       Voice dictation software was used during the making of this note. This software is not perfect and grammatical and other typographical errors may be present. This note has not been completely proofread for errors.      Allison Gusman MD  02/04/23 4000

## 2023-02-07 RX ORDER — DOXYCYCLINE 100 MG/1
TABLET ORAL
Qty: 60 TABLET | Refills: 1 | Status: SHIPPED | OUTPATIENT
Start: 2023-02-07

## 2023-02-09 ENCOUNTER — TELEMEDICINE (OUTPATIENT)
Dept: FAMILY MEDICINE CLINIC | Facility: CLINIC | Age: 48
End: 2023-02-09
Payer: COMMERCIAL

## 2023-02-09 DIAGNOSIS — E11.65 TYPE 2 DIABETES MELLITUS WITH HYPERGLYCEMIA, WITHOUT LONG-TERM CURRENT USE OF INSULIN (HCC): Primary | ICD-10-CM

## 2023-02-09 PROCEDURE — 99213 OFFICE O/P EST LOW 20 MIN: CPT | Performed by: NURSE PRACTITIONER

## 2023-02-09 RX ORDER — TIRZEPATIDE 2.5 MG/.5ML
2.5 INJECTION, SOLUTION SUBCUTANEOUS WEEKLY
Qty: 4 ADJUSTABLE DOSE PRE-FILLED PEN SYRINGE | Refills: 2 | Status: SHIPPED | OUTPATIENT
Start: 2023-02-09

## 2023-02-09 ASSESSMENT — ENCOUNTER SYMPTOMS
ABDOMINAL PAIN: 0
SHORTNESS OF BREATH: 0
EYE PAIN: 0
CONSTIPATION: 0
CHEST TIGHTNESS: 0
BLOOD IN STOOL: 0
RHINORRHEA: 0
WHEEZING: 0
EYE DISCHARGE: 0
VOMITING: 0
COUGH: 0
DIARRHEA: 0

## 2023-02-09 ASSESSMENT — PATIENT HEALTH QUESTIONNAIRE - PHQ9
SUM OF ALL RESPONSES TO PHQ9 QUESTIONS 1 & 2: 0
SUM OF ALL RESPONSES TO PHQ QUESTIONS 1-9: 0
1. LITTLE INTEREST OR PLEASURE IN DOING THINGS: 0
SUM OF ALL RESPONSES TO PHQ QUESTIONS 1-9: 0
2. FEELING DOWN, DEPRESSED OR HOPELESS: 0

## 2023-02-09 NOTE — PROGRESS NOTES
Geri Jamison (:  1975) is a Established patient, here for evaluation of the following: diabetes: wants to try new med    Assessment & Plan   Below is the assessment and plan developed based on review of pertinent history, physical exam, labs, studies, and medications. 1. Type 2 diabetes mellitus with hyperglycemia, without long-term current use of insulin (HCC)  -     Tirzepatide (MOUNJARO) 2.5 MG/0.5ML SOPN SC injection; Inject 0.5 mLs into the skin once a week, Disp-4 Adjustable Dose Pre-filled Pen Syringe, R-2Normal    Instructed patient on side effects of meds. Instructed her on use. Patient no history of pancreatitis or thyroid cancer. Patient will make appointment in 3 weeks so we can adjust her dosage. Return in about 4 weeks (around 3/9/2023) for titrate meds. Subjective   Wants to try [facility]. Had a reaction to Trulicity in the past.  Caused her to completely lose her appetite so she did not eat. Had to stop the med. Did not have nausea or vomiting. Has researched the [facility] and feels that it will work for her. Has diabetes and her blood sugars have been around 120 when she checks them. Has had no hypoglycemia. Weight Management  This is a chronic problem. The problem has been unchanged. Pertinent negatives include no abdominal pain, arthralgias, chest pain, congestion, coughing, fatigue, fever, headaches, myalgias, rash or vomiting. The symptoms are aggravated by drinking and eating. Treatments tried: lifestyle changes--portion sizes and exercise. The treatment provided no relief. Diabetes  She presents for her follow-up diabetic visit. She has type 2 diabetes mellitus. No MedicAlert identification noted. Her disease course has been stable. There are no hypoglycemic associated symptoms. Pertinent negatives for hypoglycemia include no dizziness, headaches, nervousness/anxiousness, seizures or tremors.  Pertinent negatives for diabetes include no chest pain, no fatigue, no foot ulcerations, no polydipsia, no polyphagia and no polyuria. There are no hypoglycemic complications. Symptoms are stable. There are no diabetic complications. Risk factors for coronary artery disease include diabetes mellitus. Current diabetic treatment includes diet and oral agent (monotherapy). She is compliant with treatment most of the time. Her weight is stable. She is following a diabetic and generally healthy diet. Meal planning includes avoidance of concentrated sweets. There is no change in her home blood glucose trend. Her overall blood glucose range is 110-130 mg/dl. Allergies   Allergen Reactions    Ace Inhibitors Anaphylaxis and Angioedema    Hydromorphone Anaphylaxis     Swollen tongue, lips, hypotension, low O2 SATS    Lisinopril Anaphylaxis     Lips swell, last time she was in the hospital for 4 days    Trulicity [Dulaglutide] Other (See Comments)     Anorexia    Amlodipine Other (See Comments)     Pt denies any allergic reaction to Norvasc    Oxycodone Other (See Comments)     Makes itch    Sulfa Antibiotics Hives    Sulfamethoxazole-Trimethoprim Hives     Current Outpatient Medications   Medication Sig    Tirzepatide (MOUNJARO) 2.5 MG/0.5ML SOPN SC injection Inject 0.5 mLs into the skin once a week    doxycycline monohydrate (ADOXA) 100 MG tablet TAKE 1 TABLET BY MOUTH TWICE A DAY    benzonatate (TESSALON) 100 MG capsule Take 1 capsule by mouth 3 times daily as needed for Cough    predniSONE 5 MG (21) TBPK Day 1:  Take 1 Tablet by mouth 6 times, Day 2:  Take 1 Tablet by mouth 5 times, Day 3:  Take 1 Tablet by mouth 4 times, Day 4:  Take 1 Tablet by mouth 3 times, Day 5:  Take 1 Tablet by mouth 2 times, Day 6:  Take 1 Tablet by mouth 1 time then stop    diclofenac (VOLTAREN) 75 MG EC tablet Take 1 tablet by mouth 2 times daily    gabapentin (NEURONTIN) 300 MG capsule Take 1 capsule by mouth 3 times daily for 180 days.     metFORMIN (GLUCOPHAGE) 500 MG tablet Take 1 tablet by mouth 2 times daily (with meals)    loratadine (CLARITIN) 10 MG tablet Take 1 tablet by mouth daily    pravastatin (PRAVACHOL) 40 MG tablet Take 1 tablet by mouth nightly    fluticasone (FLONASE) 50 MCG/ACT nasal spray 2 sprays by Nasal route daily    hydroCHLOROthiazide (HYDRODIURIL) 25 MG tablet Take 1 tablet by mouth daily    Diethylpropion HCl 25 MG TABS Take one tab at breakfast and lunch    BLACK CURRANT SEED OIL PO Take by mouth     No current facility-administered medications for this visit. Review of Systems   Constitutional:  Negative for fatigue and fever. HENT:  Negative for congestion, hearing loss, postnasal drip and rhinorrhea. Eyes:  Negative for pain, discharge and visual disturbance. Respiratory:  Negative for cough, chest tightness, shortness of breath and wheezing. Cardiovascular:  Negative for chest pain, palpitations and leg swelling. Gastrointestinal:  Negative for abdominal pain, blood in stool, constipation, diarrhea and vomiting. Endocrine: Negative for polydipsia, polyphagia and polyuria. Type 2 diabetes. Blood sugars have been good but patient believes they can be better controlled. She has researched Mounjaro and likes the fact that it treats diabetes and her obesity. Genitourinary:  Negative for difficulty urinating, frequency and urgency. Musculoskeletal:  Negative for arthralgias, gait problem and myalgias. Skin:  Negative for rash. Neurological:  Negative for dizziness, tremors, seizures and headaches. Psychiatric/Behavioral:  Negative for decreased concentration and sleep disturbance. The patient is not nervous/anxious.          Objective   Patient-Reported Vitals  No data recorded     Physical Exam  [INSTRUCTIONS:  \"[x]\" Indicates a positive item  \"[]\" Indicates a negative item  -- DELETE ALL ITEMS NOT EXAMINED]    Constitutional: [x] Appears well-developed and well-nourished [x] No apparent distress      [] Abnormal -     Mental status: [x] Alert and awake  [x] Oriented to person/place/time [x] Able to follow commands    [] Abnormal -     Eyes:   EOM    [x]  Normal    [] Abnormal -   Sclera  [x]  Normal    [] Abnormal -          Discharge [x]  None visible   [] Abnormal -     HENT: [x] Normocephalic, atraumatic  [] Abnormal -   [x] Mouth/Throat: Mucous membranes are moist    External Ears [x] Normal  [] Abnormal -    Neck: [x] No visualized mass [] Abnormal -     Pulmonary/Chest: [x] Respiratory effort normal   [x] No visualized signs of difficulty breathing or respiratory distress        [] Abnormal -      Musculoskeletal:   [x] Normal gait with no signs of ataxia         [x] Normal range of motion of neck        [] Abnormal -     Neurological:        [x] No Facial Asymmetry (Cranial nerve 7 motor function) (limited exam due to video visit)          [x] No gaze palsy        [] Abnormal -          Skin:        [x] No significant exanthematous lesions or discoloration noted on facial skin         [] Abnormal -            Psychiatric:       [x] Normal Affect, Maintains eye contact, smiling, answers questions appropriately. [] Abnormal -        [x] No Hallucinations    Other pertinent observable physical exam findings:-     PHQ-9 Total Score: 0 (2/9/2023 12:24 PM)      On this date 2/9/2023 I have spent 25 minutes reviewing previous notes, test results and face to face (virtual) with the patient discussing the diagnosis and importance of compliance with the treatment plan as well as documenting on the day of the visit. Curtis Rahmaning, was evaluated through a synchronous (real-time) audio-video encounter. The patient (or guardian if applicable) is aware that this is a billable service, which includes applicable co-pays. This Virtual Visit was conducted with patient's (and/or legal guardian's) consent.  The visit was conducted pursuant to the emergency declaration under the [facility]5 waiver authority and the Coronavirus Preparedness and Response Supplemental Appropriations Act. Patient identification was verified, and a caregiver was present when appropriate.    The patient was located at Home: [place] [address]. [place] [id number]  Provider was located at [place] [id number] (Appt Dept): [address] [facility],  [address] [facility]         --MORGAN Erickson - CNP

## 2023-03-23 ENCOUNTER — TELEMEDICINE (OUTPATIENT)
Dept: FAMILY MEDICINE CLINIC | Facility: CLINIC | Age: 48
End: 2023-03-23
Payer: COMMERCIAL

## 2023-03-23 DIAGNOSIS — E11.65 TYPE 2 DIABETES MELLITUS WITH HYPERGLYCEMIA, WITHOUT LONG-TERM CURRENT USE OF INSULIN (HCC): ICD-10-CM

## 2023-03-23 PROCEDURE — 99213 OFFICE O/P EST LOW 20 MIN: CPT | Performed by: NURSE PRACTITIONER

## 2023-03-23 RX ORDER — MELOXICAM 15 MG/1
15 TABLET ORAL DAILY
COMMUNITY
Start: 2023-03-14

## 2023-03-23 RX ORDER — DIETHYLPROPION HYDROCHLORIDE 25 MG/1
TABLET ORAL
Qty: 60 TABLET | Refills: 2 | Status: CANCELLED | OUTPATIENT
Start: 2023-03-23 | End: 2023-07-22

## 2023-03-23 SDOH — ECONOMIC STABILITY: INCOME INSECURITY: HOW HARD IS IT FOR YOU TO PAY FOR THE VERY BASICS LIKE FOOD, HOUSING, MEDICAL CARE, AND HEATING?: NOT HARD AT ALL

## 2023-03-23 SDOH — ECONOMIC STABILITY: FOOD INSECURITY: WITHIN THE PAST 12 MONTHS, YOU WORRIED THAT YOUR FOOD WOULD RUN OUT BEFORE YOU GOT MONEY TO BUY MORE.: NEVER TRUE

## 2023-03-23 SDOH — ECONOMIC STABILITY: HOUSING INSECURITY
IN THE LAST 12 MONTHS, WAS THERE A TIME WHEN YOU DID NOT HAVE A STEADY PLACE TO SLEEP OR SLEPT IN A SHELTER (INCLUDING NOW)?: NO

## 2023-03-23 SDOH — ECONOMIC STABILITY: FOOD INSECURITY: WITHIN THE PAST 12 MONTHS, THE FOOD YOU BOUGHT JUST DIDN'T LAST AND YOU DIDN'T HAVE MONEY TO GET MORE.: NEVER TRUE

## 2023-03-23 ASSESSMENT — PATIENT HEALTH QUESTIONNAIRE - PHQ9
SUM OF ALL RESPONSES TO PHQ QUESTIONS 1-9: 0
2. FEELING DOWN, DEPRESSED OR HOPELESS: 0
1. LITTLE INTEREST OR PLEASURE IN DOING THINGS: 0
SUM OF ALL RESPONSES TO PHQ9 QUESTIONS 1 & 2: 0

## 2023-03-23 ASSESSMENT — ENCOUNTER SYMPTOMS
COUGH: 0
EYE DISCHARGE: 0
RHINORRHEA: 0
VOMITING: 0
WHEEZING: 0
CONSTIPATION: 0
DIARRHEA: 0
EYE PAIN: 0
SHORTNESS OF BREATH: 0
BLOOD IN STOOL: 0
ABDOMINAL PAIN: 0
CHEST TIGHTNESS: 0

## 2023-03-23 NOTE — PROGRESS NOTES
Negative for cough, chest tightness, shortness of breath and wheezing. Cardiovascular:  Negative for chest pain, palpitations and leg swelling. Gastrointestinal:  Negative for abdominal pain, blood in stool, constipation, diarrhea and vomiting. Endocrine:        Diabetes has been controlled with diet in the past.  Has been unable to lose weight. Has lost 3 pounds since starting [employer]. Genitourinary:  Negative for difficulty urinating, frequency and urgency. Musculoskeletal:  Negative for arthralgias, gait problem and myalgias. Skin:  Negative for rash. Chest wall infection--seeing general surgery. Neurological:  Negative for dizziness, tremors, seizures and headaches. Psychiatric/Behavioral:  Negative for decreased concentration and sleep disturbance. The patient is not nervous/anxious.          Objective     INSTRUCTIONS:  \"[x]\" Indicates a positive item  \"[]\" Indicates a negative item  -- DELETE ALL ITEMS NOT EXAMINED]    Constitutional: [x] Appears well-developed and well-nourished [x] No apparent distress      [] Abnormal -     Mental status: [x] Alert and awake  [x] Oriented to person/place/time [x] Able to follow commands    [] Abnormal -     Eyes:   EOM    [x]  Normal    [] Abnormal -   Sclera  [x]  Normal    [] Abnormal -          Discharge [x]  None visible   [] Abnormal -     HENT: [x] Normocephalic, atraumatic  [] Abnormal -   [x] Mouth/Throat: Mucous membranes are moist    External Ears [x] Normal  [] Abnormal -    Neck: [x] No visualized mass [] Abnormal -     Pulmonary/Chest: [x] Respiratory effort normal   [x] No visualized signs of difficulty breathing or respiratory distress        [] Abnormal -      Musculoskeletal:   [x] Normal gait with no signs of ataxia         [x] Normal range of motion of neck        [] Abnormal -     Neurological:        [x] No Facial Asymmetry (Cranial nerve 7 motor function) (limited exam due to video visit)          [x] No gaze palsy

## 2023-03-30 ENCOUNTER — HOSPITAL ENCOUNTER (OUTPATIENT)
Dept: MAMMOGRAPHY | Age: 48
Discharge: HOME | End: 2023-04-02

## 2023-03-30 DIAGNOSIS — Z12.31 ENCOUNTER FOR SCREENING MAMMOGRAM FOR MALIGNANT NEOPLASM OF BREAST: ICD-10-CM

## 2023-04-19 ENCOUNTER — TELEMEDICINE (OUTPATIENT)
Dept: FAMILY MEDICINE CLINIC | Facility: CLINIC | Age: 48
End: 2023-04-19

## 2023-04-19 DIAGNOSIS — E11.65 TYPE 2 DIABETES MELLITUS WITH HYPERGLYCEMIA, WITHOUT LONG-TERM CURRENT USE OF INSULIN (HCC): ICD-10-CM

## 2023-04-19 RX ORDER — TIRZEPATIDE 7.5 MG/.5ML
7.5 INJECTION, SOLUTION SUBCUTANEOUS WEEKLY
Qty: 4 ADJUSTABLE DOSE PRE-FILLED PEN SYRINGE | Refills: 1 | Status: SHIPPED | OUTPATIENT
Start: 2023-04-19

## 2023-04-19 SDOH — ECONOMIC STABILITY: FOOD INSECURITY: WITHIN THE PAST 12 MONTHS, YOU WORRIED THAT YOUR FOOD WOULD RUN OUT BEFORE YOU GOT MONEY TO BUY MORE.: NEVER TRUE

## 2023-04-19 SDOH — ECONOMIC STABILITY: FOOD INSECURITY: WITHIN THE PAST 12 MONTHS, THE FOOD YOU BOUGHT JUST DIDN'T LAST AND YOU DIDN'T HAVE MONEY TO GET MORE.: NEVER TRUE

## 2023-04-19 SDOH — ECONOMIC STABILITY: INCOME INSECURITY: HOW HARD IS IT FOR YOU TO PAY FOR THE VERY BASICS LIKE FOOD, HOUSING, MEDICAL CARE, AND HEATING?: NOT HARD AT ALL

## 2023-04-19 ASSESSMENT — ENCOUNTER SYMPTOMS
COUGH: 0
EYE DISCHARGE: 0
ABDOMINAL PAIN: 0
RHINORRHEA: 0
EYE PAIN: 0
CONSTIPATION: 0
VOMITING: 0
BLOOD IN STOOL: 0
SHORTNESS OF BREATH: 0
WHEEZING: 0
CHEST TIGHTNESS: 0
DIARRHEA: 0

## 2023-04-19 ASSESSMENT — PATIENT HEALTH QUESTIONNAIRE - PHQ9
1. LITTLE INTEREST OR PLEASURE IN DOING THINGS: 0
SUM OF ALL RESPONSES TO PHQ9 QUESTIONS 1 & 2: 0
SUM OF ALL RESPONSES TO PHQ QUESTIONS 1-9: 0
2. FEELING DOWN, DEPRESSED OR HOPELESS: 0

## 2023-04-19 NOTE — PROGRESS NOTES
was located at [facility] (Appt Dept): [address] [place]  [facility],  [address]         --Bassam Hancock, MORGAN - CNP

## 2023-04-21 ASSESSMENT — ENCOUNTER SYMPTOMS: VISUAL CHANGE: 0

## 2023-04-24 DIAGNOSIS — E11.65 TYPE 2 DIABETES MELLITUS WITH HYPERGLYCEMIA, WITHOUT LONG-TERM CURRENT USE OF INSULIN (HCC): ICD-10-CM

## 2023-04-24 RX ORDER — TIRZEPATIDE 7.5 MG/.5ML
7.5 INJECTION, SOLUTION SUBCUTANEOUS WEEKLY
Qty: 4 ADJUSTABLE DOSE PRE-FILLED PEN SYRINGE | Refills: 1 | OUTPATIENT
Start: 2023-04-24

## 2023-04-26 DIAGNOSIS — E11.65 TYPE 2 DIABETES MELLITUS WITH HYPERGLYCEMIA, WITHOUT LONG-TERM CURRENT USE OF INSULIN (HCC): ICD-10-CM

## 2023-04-27 DIAGNOSIS — E11.65 TYPE 2 DIABETES MELLITUS WITH HYPERGLYCEMIA, WITHOUT LONG-TERM CURRENT USE OF INSULIN (HCC): ICD-10-CM

## 2023-04-27 RX ORDER — TIRZEPATIDE 7.5 MG/.5ML
7.5 INJECTION, SOLUTION SUBCUTANEOUS WEEKLY
Qty: 4 ADJUSTABLE DOSE PRE-FILLED PEN SYRINGE | Refills: 1 | OUTPATIENT
Start: 2023-04-27

## 2023-05-04 ENCOUNTER — OFFICE VISIT (OUTPATIENT)
Dept: FAMILY MEDICINE CLINIC | Facility: CLINIC | Age: 48
End: 2023-05-04
Payer: COMMERCIAL

## 2023-05-04 VITALS
OXYGEN SATURATION: 95 % | HEART RATE: 82 BPM | SYSTOLIC BLOOD PRESSURE: 126 MMHG | DIASTOLIC BLOOD PRESSURE: 82 MMHG | RESPIRATION RATE: 18 BRPM | WEIGHT: 274 LBS | TEMPERATURE: 97.6 F | BODY MASS INDEX: 46.78 KG/M2 | HEIGHT: 64 IN

## 2023-05-04 DIAGNOSIS — E11.65 TYPE 2 DIABETES MELLITUS WITH HYPERGLYCEMIA, WITHOUT LONG-TERM CURRENT USE OF INSULIN (HCC): Primary | ICD-10-CM

## 2023-05-04 DIAGNOSIS — I10 ESSENTIAL HYPERTENSION: ICD-10-CM

## 2023-05-04 DIAGNOSIS — M79.671 RIGHT FOOT PAIN: ICD-10-CM

## 2023-05-04 DIAGNOSIS — J30.1 NON-SEASONAL ALLERGIC RHINITIS DUE TO POLLEN: ICD-10-CM

## 2023-05-04 DIAGNOSIS — E78.2 MIXED HYPERLIPIDEMIA: ICD-10-CM

## 2023-05-04 LAB
BASOPHILS # BLD: 0 K/UL (ref 0–0.2)
BASOPHILS NFR BLD: 0 % (ref 0–2)
CREAT UR-MCNC: 207 MG/DL
DIFFERENTIAL METHOD BLD: NORMAL
EOSINOPHIL # BLD: 0.1 K/UL (ref 0–0.8)
EOSINOPHIL NFR BLD: 2 % (ref 0.5–7.8)
ERYTHROCYTE [DISTWIDTH] IN BLOOD BY AUTOMATED COUNT: 13.6 % (ref 11.9–14.6)
HCT VFR BLD AUTO: 38.4 % (ref 35.8–46.3)
HGB BLD-MCNC: 12.1 G/DL (ref 11.7–15.4)
IMM GRANULOCYTES # BLD AUTO: 0 K/UL (ref 0–0.5)
IMM GRANULOCYTES NFR BLD AUTO: 0 % (ref 0–5)
LYMPHOCYTES # BLD: 2.6 K/UL (ref 0.5–4.6)
LYMPHOCYTES NFR BLD: 37 % (ref 13–44)
MCH RBC QN AUTO: 28.9 PG (ref 26.1–32.9)
MCHC RBC AUTO-ENTMCNC: 31.5 G/DL (ref 31.4–35)
MCV RBC AUTO: 91.9 FL (ref 82–102)
MICROALBUMIN UR-MCNC: 2.12 MG/DL
MICROALBUMIN/CREAT UR-RTO: 10 MG/G (ref 0–30)
MONOCYTES # BLD: 0.4 K/UL (ref 0.1–1.3)
MONOCYTES NFR BLD: 6 % (ref 4–12)
NEUTS SEG # BLD: 3.7 K/UL (ref 1.7–8.2)
NEUTS SEG NFR BLD: 55 % (ref 43–78)
NRBC # BLD: 0 K/UL (ref 0–0.2)
PLATELET # BLD AUTO: 301 K/UL (ref 150–450)
PMV BLD AUTO: 9.7 FL (ref 9.4–12.3)
RBC # BLD AUTO: 4.18 M/UL (ref 4.05–5.2)
WBC # BLD AUTO: 6.9 K/UL (ref 4.3–11.1)

## 2023-05-04 PROCEDURE — 3079F DIAST BP 80-89 MM HG: CPT | Performed by: NURSE PRACTITIONER

## 2023-05-04 PROCEDURE — 3074F SYST BP LT 130 MM HG: CPT | Performed by: NURSE PRACTITIONER

## 2023-05-04 PROCEDURE — 99214 OFFICE O/P EST MOD 30 MIN: CPT | Performed by: NURSE PRACTITIONER

## 2023-05-04 RX ORDER — GABAPENTIN 300 MG/1
300 CAPSULE ORAL 3 TIMES DAILY
Qty: 270 CAPSULE | Refills: 1 | Status: SHIPPED | OUTPATIENT
Start: 2023-05-04 | End: 2023-10-31

## 2023-05-04 RX ORDER — DICLOFENAC SODIUM 75 MG/1
75 TABLET, DELAYED RELEASE ORAL 2 TIMES DAILY
Qty: 180 TABLET | Refills: 1 | Status: SHIPPED | OUTPATIENT
Start: 2023-05-04 | End: 2023-10-31

## 2023-05-04 RX ORDER — PRAVASTATIN SODIUM 40 MG
40 TABLET ORAL NIGHTLY
Qty: 90 TABLET | Refills: 1 | Status: SHIPPED | OUTPATIENT
Start: 2023-05-04

## 2023-05-04 RX ORDER — HYDROCHLOROTHIAZIDE 25 MG/1
25 TABLET ORAL DAILY
Qty: 90 TABLET | Refills: 1 | Status: SHIPPED | OUTPATIENT
Start: 2023-05-04

## 2023-05-04 RX ORDER — FLUTICASONE PROPIONATE 50 MCG
2 SPRAY, SUSPENSION (ML) NASAL DAILY
Qty: 3 EACH | Refills: 3 | Status: SHIPPED | OUTPATIENT
Start: 2023-05-04

## 2023-05-04 RX ORDER — LORATADINE 10 MG/1
10 TABLET ORAL DAILY
Qty: 90 TABLET | Refills: 1 | Status: SHIPPED | OUTPATIENT
Start: 2023-05-04

## 2023-05-04 RX ORDER — TIRZEPATIDE 7.5 MG/.5ML
7.5 INJECTION, SOLUTION SUBCUTANEOUS WEEKLY
Qty: 4 ADJUSTABLE DOSE PRE-FILLED PEN SYRINGE | Refills: 1 | Status: CANCELLED | OUTPATIENT
Start: 2023-05-04

## 2023-05-04 SDOH — ECONOMIC STABILITY: FOOD INSECURITY: WITHIN THE PAST 12 MONTHS, YOU WORRIED THAT YOUR FOOD WOULD RUN OUT BEFORE YOU GOT MONEY TO BUY MORE.: NEVER TRUE

## 2023-05-04 SDOH — ECONOMIC STABILITY: INCOME INSECURITY: HOW HARD IS IT FOR YOU TO PAY FOR THE VERY BASICS LIKE FOOD, HOUSING, MEDICAL CARE, AND HEATING?: NOT HARD AT ALL

## 2023-05-04 SDOH — ECONOMIC STABILITY: FOOD INSECURITY: WITHIN THE PAST 12 MONTHS, THE FOOD YOU BOUGHT JUST DIDN'T LAST AND YOU DIDN'T HAVE MONEY TO GET MORE.: NEVER TRUE

## 2023-05-04 ASSESSMENT — PATIENT HEALTH QUESTIONNAIRE - PHQ9
SUM OF ALL RESPONSES TO PHQ9 QUESTIONS 1 & 2: 0
SUM OF ALL RESPONSES TO PHQ QUESTIONS 1-9: 0
SUM OF ALL RESPONSES TO PHQ QUESTIONS 1-9: 0
1. LITTLE INTEREST OR PLEASURE IN DOING THINGS: 0
SUM OF ALL RESPONSES TO PHQ QUESTIONS 1-9: 0
SUM OF ALL RESPONSES TO PHQ QUESTIONS 1-9: 0
2. FEELING DOWN, DEPRESSED OR HOPELESS: 0

## 2023-05-04 ASSESSMENT — ENCOUNTER SYMPTOMS
BLOOD IN STOOL: 0
VOMITING: 0
CONSTIPATION: 0
SHORTNESS OF BREATH: 0
DIARRHEA: 0
COUGH: 0
VISUAL CHANGE: 0
RHINORRHEA: 0
CHEST TIGHTNESS: 0
WHEEZING: 0
ABDOMINAL PAIN: 0
EYE DISCHARGE: 0
EYE PAIN: 0

## 2023-05-04 NOTE — PROGRESS NOTES
by mouth 2 times daily, Disp-180 tablet, R-1Normal  Patient wishes lucy mahan on NSAIDs for foot pain. Return in about 2 months (around 2023) for weight managment and diabetes follow up. Subjective   SUBJECTIVE/OBJECTIVE:  Patient here today for refills. Has been on mounjaro but missed two weeks while waiting on prior auth to go thru. Found herself to be famished while off the medication. Was losing weight while on it. Blood sugars have also increased. PA went thru yesterday so she is going to pick it up from the pharmacy. Had a call last night that her cousin  from a gun shot wound. She says this is the first death since her mother passed 15 years ago. Her brother and her are meeting after her visit today. Patient states all the family members are really close and she cannot believe this happened. BP has been good when she checks it at home. Remains active. Continues to drive school bus. Hypertension  This is a chronic problem. The problem has been resolved since onset. The problem is controlled. Pertinent negatives include no chest pain, headaches, malaise/fatigue, palpitations, peripheral edema or shortness of breath. There are no associated agents to hypertension. Risk factors for coronary artery disease include diabetes mellitus and dyslipidemia. Past treatments include diuretics. The current treatment provides significant improvement. There are no compliance problems. Diabetes  She presents for her follow-up diabetic visit. She has type 2 diabetes mellitus. No MedicAlert identification noted. Her disease course has been improving. Pertinent negatives for hypoglycemia include no dizziness, headaches, nervousness/anxiousness, seizures or tremors. Pertinent negatives for diabetes include no chest pain, no fatigue, no foot ulcerations, no polydipsia, no polyphagia, no polyuria and no visual change. There are no hypoglycemic complications. Symptoms are stable.  There are no

## 2023-05-04 NOTE — PATIENT INSTRUCTIONS
Patient Education        A Healthy Lifestyle: Care Instructions  A healthy lifestyle can help you feel good, have more energy, and stay at a weight that's healthy for you. You can share a healthy lifestyle with your friends and family. And you can do it on your own. Eat meals with your friends or family. You could try cooking together. Plan activities with other people. Go for a walk with a friend, try a free online fitness class, or join a sports league. Eat a variety of healthy foods. These include fruits, vegetables, whole grains, low-fat dairy, and lean protein. Choose healthy portions of food. You can use the Nutrition Facts label on food packages as a guide. Eat more fruits and vegetables. You could add vegetables to sandwiches or add fruit to cereal.   Drink water when you are thirsty. Limit soda, juice, and sports drinks. Try to exercise most days. Aim for at least 2½ hours of exercise each week. Keep moving. Work in the garden or take your dog on a walk. Use the stairs instead of the elevator. If you use tobacco or nicotine, try to quit. Ask your doctor about programs and medicines to help you quit. Limit alcohol. Men should have no more than 2 drinks a day. Women should have no more than 1. For some people, no alcohol is the best choice. Follow-up care is a key part of your treatment and safety. Be sure to make and go to all appointments, and call your doctor if you are having problems. It's also a good idea to know your test results and keep a list of the medicines you take. Where can you learn more? Go to http://www.desai.com/ and enter U807 to learn more about \"A Healthy Lifestyle: Care Instructions. \"  Current as of: November 14, 2022               Content Version: 13.6  © 3638-0502 [employer], [employer]. Care instructions adapted under license by [facility] ([facility]).  If you have questions about a medical condition or this instruction, always ask your

## 2023-05-05 LAB
ALBUMIN SERPL-MCNC: 3.5 G/DL (ref 3.5–5)
ALBUMIN/GLOB SERPL: 0.7 (ref 0.4–1.6)
ALP SERPL-CCNC: 104 U/L (ref 50–136)
ALT SERPL-CCNC: 25 U/L (ref 12–65)
ANION GAP SERPL CALC-SCNC: 6 MMOL/L (ref 2–11)
AST SERPL-CCNC: 27 U/L (ref 15–37)
BILIRUB SERPL-MCNC: 0.5 MG/DL (ref 0.2–1.1)
BUN SERPL-MCNC: 13 MG/DL (ref 6–23)
CALCIUM SERPL-MCNC: 9.6 MG/DL (ref 8.3–10.4)
CHLORIDE SERPL-SCNC: 102 MMOL/L (ref 101–110)
CHOLEST SERPL-MCNC: 132 MG/DL
CO2 SERPL-SCNC: 28 MMOL/L (ref 21–32)
CREAT SERPL-MCNC: 0.9 MG/DL (ref 0.6–1)
EST. AVERAGE GLUCOSE BLD GHB EST-MCNC: 126 MG/DL
GLOBULIN SER CALC-MCNC: 5.2 G/DL (ref 2.8–4.5)
GLUCOSE SERPL-MCNC: 93 MG/DL (ref 65–100)
HBA1C MFR BLD: 6 % (ref 4.8–5.6)
HDLC SERPL-MCNC: 41 MG/DL (ref 40–60)
HDLC SERPL: 3.2
LDLC SERPL CALC-MCNC: 73.8 MG/DL
POTASSIUM SERPL-SCNC: 3.7 MMOL/L (ref 3.5–5.1)
PROT SERPL-MCNC: 8.7 G/DL (ref 6.3–8.2)
SODIUM SERPL-SCNC: 136 MMOL/L (ref 133–143)
TRIGL SERPL-MCNC: 86 MG/DL (ref 35–150)
TSH, 3RD GENERATION: 0.83 UIU/ML (ref 0.36–3.74)
VLDLC SERPL CALC-MCNC: 17.2 MG/DL (ref 6–23)

## 2023-05-09 RX ORDER — DOXYCYCLINE 100 MG/1
TABLET ORAL
Qty: 60 TABLET | Refills: 1 | OUTPATIENT
Start: 2023-05-09

## 2023-06-12 PROBLEM — Z01.419 WOMEN'S ANNUAL ROUTINE GYNECOLOGICAL EXAMINATION: Status: ACTIVE | Noted: 2019-04-09

## 2023-06-12 PROBLEM — L03.313 CELLULITIS OF CHEST WALL: Status: RESOLVED | Noted: 2022-12-28 | Resolved: 2023-06-12

## 2023-06-12 PROBLEM — J06.9 ACUTE UPPER RESPIRATORY INFECTION: Status: RESOLVED | Noted: 2022-09-03 | Resolved: 2023-06-12

## 2023-06-12 PROBLEM — R06.02 SHORTNESS OF BREATH: Status: RESOLVED | Noted: 2022-09-03 | Resolved: 2023-06-12

## 2023-07-03 ENCOUNTER — TELEPHONE (OUTPATIENT)
Dept: FAMILY MEDICINE CLINIC | Facility: CLINIC | Age: 48
End: 2023-07-03

## 2023-07-03 NOTE — TELEPHONE ENCOUNTER
Pt. Called in stating [facility] , she wants to know alternative plan as she does not want to gain the weight back she has worked hard to lose. Pt.  Also needs med refill on Gabapentin

## 2023-07-05 ENCOUNTER — OFFICE VISIT (OUTPATIENT)
Dept: FAMILY MEDICINE CLINIC | Facility: CLINIC | Age: 48
End: 2023-07-05
Payer: COMMERCIAL

## 2023-07-05 VITALS
RESPIRATION RATE: 18 BRPM | DIASTOLIC BLOOD PRESSURE: 80 MMHG | BODY MASS INDEX: 45.07 KG/M2 | HEART RATE: 72 BPM | HEIGHT: 64 IN | SYSTOLIC BLOOD PRESSURE: 122 MMHG | WEIGHT: 264 LBS | TEMPERATURE: 97.6 F

## 2023-07-05 DIAGNOSIS — H60.391 SKIN OF RIGHT EARLOBE WITH INFECTION: Primary | ICD-10-CM

## 2023-07-05 DIAGNOSIS — E11.65 TYPE 2 DIABETES MELLITUS WITH HYPERGLYCEMIA, WITHOUT LONG-TERM CURRENT USE OF INSULIN (HCC): ICD-10-CM

## 2023-07-05 PROCEDURE — 99213 OFFICE O/P EST LOW 20 MIN: CPT | Performed by: NURSE PRACTITIONER

## 2023-07-05 PROCEDURE — 3079F DIAST BP 80-89 MM HG: CPT | Performed by: NURSE PRACTITIONER

## 2023-07-05 PROCEDURE — 3044F HG A1C LEVEL LT 7.0%: CPT | Performed by: NURSE PRACTITIONER

## 2023-07-05 PROCEDURE — 3074F SYST BP LT 130 MM HG: CPT | Performed by: NURSE PRACTITIONER

## 2023-07-05 RX ORDER — CEPHALEXIN 500 MG/1
500 CAPSULE ORAL 3 TIMES DAILY
Qty: 30 CAPSULE | Refills: 0 | Status: SHIPPED | OUTPATIENT
Start: 2023-07-05

## 2023-07-05 RX ORDER — TIRZEPATIDE 10 MG/.5ML
10 INJECTION, SOLUTION SUBCUTANEOUS WEEKLY
Qty: 4 ADJUSTABLE DOSE PRE-FILLED PEN SYRINGE | Refills: 1 | Status: SHIPPED | OUTPATIENT
Start: 2023-07-05

## 2023-07-05 RX ORDER — TIRZEPATIDE 10 MG/.5ML
10 INJECTION, SOLUTION SUBCUTANEOUS WEEKLY
Qty: 4 ADJUSTABLE DOSE PRE-FILLED PEN SYRINGE | Refills: 1 | Status: SHIPPED | OUTPATIENT
Start: 2023-07-05 | End: 2023-07-05 | Stop reason: SDUPTHER

## 2023-07-05 RX ORDER — FLUCONAZOLE 150 MG/1
150 TABLET ORAL WEEKLY
Qty: 2 TABLET | Refills: 0 | Status: SHIPPED | OUTPATIENT
Start: 2023-07-05

## 2023-07-05 RX ORDER — GABAPENTIN 300 MG/1
300 CAPSULE ORAL 3 TIMES DAILY
Qty: 270 CAPSULE | Refills: 1 | Status: SHIPPED | OUTPATIENT
Start: 2023-07-05 | End: 2024-01-01

## 2023-07-05 SDOH — ECONOMIC STABILITY: FOOD INSECURITY: WITHIN THE PAST 12 MONTHS, YOU WORRIED THAT YOUR FOOD WOULD RUN OUT BEFORE YOU GOT MONEY TO BUY MORE.: NEVER TRUE

## 2023-07-05 SDOH — ECONOMIC STABILITY: FOOD INSECURITY: WITHIN THE PAST 12 MONTHS, THE FOOD YOU BOUGHT JUST DIDN'T LAST AND YOU DIDN'T HAVE MONEY TO GET MORE.: NEVER TRUE

## 2023-07-05 SDOH — ECONOMIC STABILITY: INCOME INSECURITY: HOW HARD IS IT FOR YOU TO PAY FOR THE VERY BASICS LIKE FOOD, HOUSING, MEDICAL CARE, AND HEATING?: NOT HARD AT ALL

## 2023-07-05 ASSESSMENT — ENCOUNTER SYMPTOMS
COUGH: 0
VOMITING: 0
CONSTIPATION: 0
EYE DISCHARGE: 0
BLURRED VISION: 1
RHINORRHEA: 0
CHEST TIGHTNESS: 0
WHEEZING: 0
DIARRHEA: 0
SHORTNESS OF BREATH: 0
BLOOD IN STOOL: 0
EYE PAIN: 0
ABDOMINAL PAIN: 0

## 2023-07-05 ASSESSMENT — PATIENT HEALTH QUESTIONNAIRE - PHQ9
SUM OF ALL RESPONSES TO PHQ9 QUESTIONS 1 & 2: 0
2. FEELING DOWN, DEPRESSED OR HOPELESS: 0
SUM OF ALL RESPONSES TO PHQ9 QUESTIONS 1 & 2: 0
SUM OF ALL RESPONSES TO PHQ QUESTIONS 1-9: 0
1. LITTLE INTEREST OR PLEASURE IN DOING THINGS: 0
SUM OF ALL RESPONSES TO PHQ QUESTIONS 1-9: 0
1. LITTLE INTEREST OR PLEASURE IN DOING THINGS: NOT AT ALL
SUM OF ALL RESPONSES TO PHQ QUESTIONS 1-9: 0
SUM OF ALL RESPONSES TO PHQ QUESTIONS 1-9: 0
2. FEELING DOWN, DEPRESSED OR HOPELESS: NOT AT ALL

## 2023-07-05 NOTE — PROGRESS NOTES
Saleem Zuniga (:  1975) is a 51 y.o. female,Established patient, here for evaluation of the following chief complaint(s):  Weight Management (Follow up ), Diabetes (Follow up), Medication Refill (Med refills ), and Other (Right ear pain from ear buds)           ASSESSMENT/PLAN:  1. Skin of right earlobe with infection  -     cephALEXin (KEFLEX) 500 MG capsule; Take 1 capsule by mouth 3 times daily, Disp-30 capsule, R-0Normal  -     fluconazole (DIFLUCAN) 150 MG tablet; Take 1 tablet by mouth once a week, Disp-2 tablet, R-0Normal  Begin Keflex for ear. Warm compresses. May need to remove ear ring if infection does not clear. 2. Type 2 diabetes mellitus with hyperglycemia, without long-term current use of insulin (HCC)  -     gabapentin (NEURONTIN) 300 MG capsule; Take 1 capsule by mouth 3 times daily for 180 days. , Disp-270 capsule, R-1Normal  Continue Mounjaro. Follow up in 1 month to adjust dosage. Next dosage up is 12.5. Return in about 3 months (around 10/5/2023) for medication refills. Subjective   SUBJECTIVE/OBJECTIVE:  Right ear sore. Went to urgent care. Got antibiotic and got better . Used her ear bud in left ear and now it is infected. Left ear is draining. No fever. Pain goes down into neck. Has missed a week of her Mounjaro as the pharmacies have been out. [facility] told her that she would not be able to find it anywhere. Is willing to see if another pharmacy has it. Blood sugars have been good. Had some hypoglycemic episodes so she decreased her Metformin to one half daily. Is watching her diet. Continues to lose weight. Is walking for exercise. Diabetes  She presents for her follow-up diabetic visit. She has type 2 diabetes mellitus. No MedicAlert identification noted. Her disease course has been stable. Hypoglycemia symptoms include headaches, nervousness/anxiousness and sweats.  Pertinent negatives for hypoglycemia include no dizziness, seizures or

## 2023-07-05 NOTE — TELEPHONE ENCOUNTER
I was out of the office when this message came across. Patient was seen in the office today.   Merritt Abarca

## 2023-07-12 PROBLEM — Z01.419 WOMEN'S ANNUAL ROUTINE GYNECOLOGICAL EXAMINATION: Status: RESOLVED | Noted: 2019-04-09 | Resolved: 2023-07-12

## 2023-07-20 ENCOUNTER — TELEPHONE (OUTPATIENT)
Dept: FAMILY MEDICINE CLINIC | Facility: CLINIC | Age: 48
End: 2023-07-20

## 2023-07-20 DIAGNOSIS — H60.391 SKIN OF RIGHT EARLOBE WITH INFECTION: ICD-10-CM

## 2023-07-20 RX ORDER — CEPHALEXIN 500 MG/1
500 CAPSULE ORAL 3 TIMES DAILY
Qty: 30 CAPSULE | Refills: 0 | Status: SHIPPED | OUTPATIENT
Start: 2023-07-20

## 2023-07-20 NOTE — TELEPHONE ENCOUNTER
Keflex sent to [facility]. Patient will need appointment if she requires more antibiotics for her ear.   Yobany Tim

## 2023-07-26 ENCOUNTER — TELEPHONE (OUTPATIENT)
Dept: FAMILY MEDICINE CLINIC | Facility: CLINIC | Age: 48
End: 2023-07-26

## 2023-07-26 DIAGNOSIS — E11.65 TYPE 2 DIABETES MELLITUS WITH HYPERGLYCEMIA, WITHOUT LONG-TERM CURRENT USE OF INSULIN (HCC): ICD-10-CM

## 2023-07-26 RX ORDER — TIRZEPATIDE 10 MG/.5ML
10 INJECTION, SOLUTION SUBCUTANEOUS WEEKLY
Qty: 4 ADJUSTABLE DOSE PRE-FILLED PEN SYRINGE | Refills: 0 | Status: SHIPPED | OUTPATIENT
Start: 2023-07-26 | End: 2023-09-14 | Stop reason: DRUGHIGH

## 2023-07-26 NOTE — TELEPHONE ENCOUNTER
Pt called in and would like a refill on her Edda Pacer she has an appt. That she says she will keep but took her last injection today.

## 2023-08-03 ENCOUNTER — OFFICE VISIT (OUTPATIENT)
Dept: FAMILY MEDICINE CLINIC | Facility: CLINIC | Age: 48
End: 2023-08-03
Payer: COMMERCIAL

## 2023-08-03 VITALS
HEIGHT: 64 IN | SYSTOLIC BLOOD PRESSURE: 126 MMHG | WEIGHT: 266 LBS | TEMPERATURE: 97 F | DIASTOLIC BLOOD PRESSURE: 76 MMHG | HEART RATE: 70 BPM | RESPIRATION RATE: 18 BRPM | BODY MASS INDEX: 45.41 KG/M2

## 2023-08-03 DIAGNOSIS — E11.65 TYPE 2 DIABETES MELLITUS WITH HYPERGLYCEMIA, WITHOUT LONG-TERM CURRENT USE OF INSULIN (HCC): ICD-10-CM

## 2023-08-03 DIAGNOSIS — E11.40 TYPE 2 DIABETES MELLITUS WITH DIABETIC NEUROPATHY, WITHOUT LONG-TERM CURRENT USE OF INSULIN (HCC): Primary | ICD-10-CM

## 2023-08-03 PROCEDURE — 99213 OFFICE O/P EST LOW 20 MIN: CPT | Performed by: NURSE PRACTITIONER

## 2023-08-03 PROCEDURE — 3078F DIAST BP <80 MM HG: CPT | Performed by: NURSE PRACTITIONER

## 2023-08-03 PROCEDURE — 3044F HG A1C LEVEL LT 7.0%: CPT | Performed by: NURSE PRACTITIONER

## 2023-08-03 PROCEDURE — 3074F SYST BP LT 130 MM HG: CPT | Performed by: NURSE PRACTITIONER

## 2023-08-03 ASSESSMENT — ENCOUNTER SYMPTOMS
CHEST TIGHTNESS: 0
WHEEZING: 0
EYE DISCHARGE: 0
COUGH: 0
BLOOD IN STOOL: 0
DIARRHEA: 0
ABDOMINAL PAIN: 0
RHINORRHEA: 0
EYE PAIN: 0
SHORTNESS OF BREATH: 0
CONSTIPATION: 0
VOMITING: 0

## 2023-08-03 NOTE — PROGRESS NOTES
Leah Salmeron (:  1975) is a 51 y.o. female,Established patient, here for evaluation of the following chief complaint(s):  Diabetes (Follow up ) and Medication Refill         ASSESSMENT/PLAN:  1. Type 2 diabetes mellitus with diabetic neuropathy, without long-term current use of insulin (HCC)  -     Tirzepatide (MOUNJARO) 12.5 MG/0.5ML SOPN SC injection; Inject 0.5 mLs into the skin once a week, Disp-4 Adjustable Dose Pre-filled Pen Syringe, R-1Normal  2. Type 2 diabetes mellitus with hyperglycemia, without long-term current use of insulin (HCC)  -     Tirzepatide (MOUNJARO) 12.5 MG/0.5ML SOPN SC injection; Inject 0.5 mLs into the skin once a week, Disp-4 Adjustable Dose Pre-filled Pen Syringe, R-1Normal  Increase Mounjaro to 12.5 mg.  Report any abdominal pain, constipation, vomiting. With increase in dosage, there should be more weight loss but also more risk of side effects. BMI Counseling:  Due to this patient's BMI, I have provided counseling regarding nutrition and physical activity. Return in about 4 weeks (around 2023) for medication refills. Subjective   SUBJECTIVE/OBJECTIVE:  Patient remains on Mounjaro for her diabetes and to promote weight loss. Has not lost any weight this past month. Has been lax about exercising. Has decreased her portion sizes. States she is mostly drinking only water. 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 Has had no hypoglycemia on it. Denies constipation. Stomach has not been upset. Has had no nausea. Weight Management  This is a chronic problem. The problem has been unchanged. Pertinent negatives include no abdominal pain, arthralgias, chest pain, congestion, coughing, fatigue, fever, headaches, myalgias, rash, visual change or vomiting. Nothing aggravates the symptoms. Treatments tried: mounjaro. The treatment provided significant relief.    Diabetes  She presents for her follow-up

## 2023-08-04 ASSESSMENT — ENCOUNTER SYMPTOMS: VISUAL CHANGE: 0

## 2023-08-28 ENCOUNTER — TELEPHONE (OUTPATIENT)
Dept: FAMILY MEDICINE CLINIC | Facility: CLINIC | Age: 48
End: 2023-08-28

## 2023-08-28 DIAGNOSIS — E11.40 TYPE 2 DIABETES MELLITUS WITH DIABETIC NEUROPATHY, WITHOUT LONG-TERM CURRENT USE OF INSULIN (HCC): ICD-10-CM

## 2023-08-28 DIAGNOSIS — E11.65 TYPE 2 DIABETES MELLITUS WITH HYPERGLYCEMIA, WITHOUT LONG-TERM CURRENT USE OF INSULIN (HCC): ICD-10-CM

## 2023-08-28 NOTE — TELEPHONE ENCOUNTER
----- Message from April Nagle sent at 8/28/2023 10:38 AM EDT -----  Subject: Refill Request    QUESTIONS  Name of Medication? Tirzepatide (MOUNJARO) 12.5 MG/0.5ML SOPN SC injection  Patient-reported dosage and instructions? Inject 0.5 mLs into the skin   once a week  How many days do you have left? 0  [id number]  Pharmacy phone number (if available)? [phone]  ---------------------------------------------------------------------------  --------------  Jenn Arthur INFO  What is the best way for the office to contact you? OK to leave message on   voicemail  Preferred Call Back Phone Number? [phone]  ---------------------------------------------------------------------------  --------------  SCRIPT ANSWERS  Relationship to Patient?  Self

## 2023-08-28 NOTE — TELEPHONE ENCOUNTER
Mounjaro sent to [facility]. Has appointment coming up.   Nirmal Kessler

## 2023-09-14 ENCOUNTER — OFFICE VISIT (OUTPATIENT)
Dept: FAMILY MEDICINE CLINIC | Facility: CLINIC | Age: 48
End: 2023-09-14
Payer: COMMERCIAL

## 2023-09-14 ENCOUNTER — TELEPHONE (OUTPATIENT)
Dept: FAMILY MEDICINE CLINIC | Facility: CLINIC | Age: 48
End: 2023-09-14

## 2023-09-14 VITALS
BODY MASS INDEX: 43.36 KG/M2 | TEMPERATURE: 97.2 F | SYSTOLIC BLOOD PRESSURE: 118 MMHG | WEIGHT: 254 LBS | HEIGHT: 64 IN | HEART RATE: 83 BPM | OXYGEN SATURATION: 98 % | DIASTOLIC BLOOD PRESSURE: 74 MMHG | RESPIRATION RATE: 18 BRPM

## 2023-09-14 DIAGNOSIS — E11.65 TYPE 2 DIABETES MELLITUS WITH HYPERGLYCEMIA, WITHOUT LONG-TERM CURRENT USE OF INSULIN (HCC): ICD-10-CM

## 2023-09-14 DIAGNOSIS — J30.1 NON-SEASONAL ALLERGIC RHINITIS DUE TO POLLEN: ICD-10-CM

## 2023-09-14 DIAGNOSIS — E78.2 MIXED HYPERLIPIDEMIA: ICD-10-CM

## 2023-09-14 DIAGNOSIS — Z23 ENCOUNTER FOR IMMUNIZATION: ICD-10-CM

## 2023-09-14 DIAGNOSIS — I10 ESSENTIAL HYPERTENSION: ICD-10-CM

## 2023-09-14 DIAGNOSIS — E11.65 TYPE 2 DIABETES MELLITUS WITH HYPERGLYCEMIA, WITHOUT LONG-TERM CURRENT USE OF INSULIN (HCC): Primary | ICD-10-CM

## 2023-09-14 PROCEDURE — 3078F DIAST BP <80 MM HG: CPT | Performed by: NURSE PRACTITIONER

## 2023-09-14 PROCEDURE — 3074F SYST BP LT 130 MM HG: CPT | Performed by: NURSE PRACTITIONER

## 2023-09-14 PROCEDURE — 90471 IMMUNIZATION ADMIN: CPT | Performed by: NURSE PRACTITIONER

## 2023-09-14 PROCEDURE — 90674 CCIIV4 VAC NO PRSV 0.5 ML IM: CPT | Performed by: NURSE PRACTITIONER

## 2023-09-14 PROCEDURE — 3044F HG A1C LEVEL LT 7.0%: CPT | Performed by: NURSE PRACTITIONER

## 2023-09-14 PROCEDURE — 99214 OFFICE O/P EST MOD 30 MIN: CPT | Performed by: NURSE PRACTITIONER

## 2023-09-14 RX ORDER — PRAVASTATIN SODIUM 40 MG
40 TABLET ORAL NIGHTLY
Qty: 90 TABLET | Refills: 1 | Status: SHIPPED | OUTPATIENT
Start: 2023-09-14

## 2023-09-14 RX ORDER — HYDROCHLOROTHIAZIDE 25 MG/1
25 TABLET ORAL DAILY
Qty: 90 TABLET | Refills: 1 | Status: SHIPPED | OUTPATIENT
Start: 2023-09-14

## 2023-09-14 RX ORDER — LORATADINE 10 MG/1
10 TABLET ORAL DAILY
Qty: 90 TABLET | Refills: 1 | Status: SHIPPED | OUTPATIENT
Start: 2023-09-14

## 2023-09-14 SDOH — ECONOMIC STABILITY: INCOME INSECURITY: HOW HARD IS IT FOR YOU TO PAY FOR THE VERY BASICS LIKE FOOD, HOUSING, MEDICAL CARE, AND HEATING?: NOT HARD AT ALL

## 2023-09-14 SDOH — ECONOMIC STABILITY: FOOD INSECURITY: WITHIN THE PAST 12 MONTHS, THE FOOD YOU BOUGHT JUST DIDN'T LAST AND YOU DIDN'T HAVE MONEY TO GET MORE.: NEVER TRUE

## 2023-09-14 SDOH — ECONOMIC STABILITY: FOOD INSECURITY: WITHIN THE PAST 12 MONTHS, YOU WORRIED THAT YOUR FOOD WOULD RUN OUT BEFORE YOU GOT MONEY TO BUY MORE.: NEVER TRUE

## 2023-09-14 ASSESSMENT — ENCOUNTER SYMPTOMS
RHINORRHEA: 0
BLOOD IN STOOL: 0
SHORTNESS OF BREATH: 0
EYE PAIN: 0
DIARRHEA: 0
WHEEZING: 0
CHEST TIGHTNESS: 0
CONSTIPATION: 0
BLURRED VISION: 1
EYE DISCHARGE: 0

## 2023-09-14 ASSESSMENT — PATIENT HEALTH QUESTIONNAIRE - PHQ9
SUM OF ALL RESPONSES TO PHQ QUESTIONS 1-9: 0
SUM OF ALL RESPONSES TO PHQ QUESTIONS 1-9: 0
1. LITTLE INTEREST OR PLEASURE IN DOING THINGS: 0
SUM OF ALL RESPONSES TO PHQ9 QUESTIONS 1 & 2: 0
2. FEELING DOWN, DEPRESSED OR HOPELESS: 0
SUM OF ALL RESPONSES TO PHQ QUESTIONS 1-9: 0
SUM OF ALL RESPONSES TO PHQ QUESTIONS 1-9: 0

## 2023-09-14 NOTE — TELEPHONE ENCOUNTER
Pt. Called in stating [facility] stating [facility] wants to know if you can resend it to [facility]

## 2023-09-14 NOTE — PROGRESS NOTES
Preservative Free, 0.5 mL      Return in about 3 months (around 12/14/2023) for medication refills, check weight. Subjective   SUBJECTIVE/OBJECTIVE:  BS . Dizzy when going from sitting to standing. Has lost another 12 pounds. Is drinking a lot of water, some tea, no sodas. Is eating smaller portions now. 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 Complains that her arms are flabby. BP has been good when she checks it. Diabetes  She presents for her follow-up diabetic visit. She has type 2 diabetes mellitus. Her disease course has been improving. Hypoglycemia symptoms include dizziness. Pertinent negatives for hypoglycemia include no nervousness/anxiousness, seizures or tremors. Associated symptoms include blurred vision and weakness. There are no hypoglycemic complications. Symptoms are stable. There are no diabetic complications. Risk factors for coronary artery disease include diabetes mellitus and sedentary lifestyle. When asked about current treatments, none were reported. Her weight is decreasing rapidly. She is following a generally healthy diet. Meal planning includes ADA exchanges, avoidance of concentrated sweets and carbohydrate counting. She has not had a previous visit with a dietitian. She participates in exercise intermittently. Her home blood glucose trend is decreasing steadily. Her breakfast blood glucose range is generally 70-90 mg/dl. Her overall blood glucose range is  mg/dl.      Allergies   Allergen Reactions    Ace Inhibitors Anaphylaxis and Angioedema    Hydromorphone Anaphylaxis     Swollen tongue, lips, hypotension, low O2 SATS    Lisinopril Anaphylaxis     Lips swell, last time she was in the hospital for 4 days    Trulicity [Dulaglutide] Other (See Comments)     Anorexia    Amlodipine Other (See Comments)     Pt denies any allergic reaction to Norvasc    Oxycodone Other (See

## 2023-11-28 ENCOUNTER — OFFICE VISIT (OUTPATIENT)
Dept: FAMILY MEDICINE CLINIC | Facility: CLINIC | Age: 48
End: 2023-11-28
Payer: COMMERCIAL

## 2023-11-28 VITALS
TEMPERATURE: 97.3 F | RESPIRATION RATE: 16 BRPM | DIASTOLIC BLOOD PRESSURE: 78 MMHG | WEIGHT: 245 LBS | BODY MASS INDEX: 41.83 KG/M2 | OXYGEN SATURATION: 99 % | HEART RATE: 80 BPM | SYSTOLIC BLOOD PRESSURE: 118 MMHG | HEIGHT: 64 IN

## 2023-11-28 DIAGNOSIS — L02.92 BOIL: Primary | ICD-10-CM

## 2023-11-28 PROCEDURE — 3078F DIAST BP <80 MM HG: CPT | Performed by: NURSE PRACTITIONER

## 2023-11-28 PROCEDURE — 99213 OFFICE O/P EST LOW 20 MIN: CPT | Performed by: NURSE PRACTITIONER

## 2023-11-28 PROCEDURE — 3074F SYST BP LT 130 MM HG: CPT | Performed by: NURSE PRACTITIONER

## 2023-11-28 RX ORDER — NYSTATIN 100000 [USP'U]/G
POWDER TOPICAL
Qty: 30 G | Refills: 0 | Status: SHIPPED | OUTPATIENT
Start: 2023-11-28

## 2023-11-28 RX ORDER — CHLORHEXIDINE GLUCONATE 4 G/100ML
SOLUTION TOPICAL
Qty: 236 ML | Refills: 0 | Status: SHIPPED | OUTPATIENT
Start: 2023-11-28 | End: 2023-12-12

## 2023-11-28 RX ORDER — CLINDAMYCIN HYDROCHLORIDE 300 MG/1
300 CAPSULE ORAL 2 TIMES DAILY
Qty: 20 CAPSULE | Refills: 0 | Status: SHIPPED | OUTPATIENT
Start: 2023-11-28 | End: 2023-12-08

## 2023-11-28 SDOH — ECONOMIC STABILITY: FOOD INSECURITY: WITHIN THE PAST 12 MONTHS, YOU WORRIED THAT YOUR FOOD WOULD RUN OUT BEFORE YOU GOT MONEY TO BUY MORE.: NEVER TRUE

## 2023-11-28 SDOH — ECONOMIC STABILITY: FOOD INSECURITY: WITHIN THE PAST 12 MONTHS, THE FOOD YOU BOUGHT JUST DIDN'T LAST AND YOU DIDN'T HAVE MONEY TO GET MORE.: NEVER TRUE

## 2023-11-28 SDOH — ECONOMIC STABILITY: INCOME INSECURITY: HOW HARD IS IT FOR YOU TO PAY FOR THE VERY BASICS LIKE FOOD, HOUSING, MEDICAL CARE, AND HEATING?: NOT HARD AT ALL

## 2023-11-28 ASSESSMENT — PATIENT HEALTH QUESTIONNAIRE - PHQ9
SUM OF ALL RESPONSES TO PHQ QUESTIONS 1-9: 0
2. FEELING DOWN, DEPRESSED OR HOPELESS: 0
1. LITTLE INTEREST OR PLEASURE IN DOING THINGS: 0
SUM OF ALL RESPONSES TO PHQ QUESTIONS 1-9: 0
SUM OF ALL RESPONSES TO PHQ9 QUESTIONS 1 & 2: 0

## 2023-11-28 NOTE — PROGRESS NOTES
entry  Heart - normal rate, regular rhythm, normal S1, S2, no murmurs, rubs, clicks or gallops  Neurological - alert, oriented, normal speech, no focal findings or movement disorder noted  Extremities - peripheral pulses normal, no pedal edema, no clubbing or cyanosis  Skin - under left breast- painful area noted- skin is peeling off area. No discharge or head on area at this time. Assessment/Plan:   Diagnosis Orders   1. Boil  clindamycin (CLEOCIN) 300 MG capsule    nystatin (MYCOSTATIN) 374543 UNIT/GM powder    chlorhexidine (HIBICLENS) 4 % external liquid        Start treatment. Use nystatin once area heals. Hibiclenz on area when flaring up.        Nato Bright, APRN - CNP

## 2023-12-14 ENCOUNTER — OFFICE VISIT (OUTPATIENT)
Dept: FAMILY MEDICINE CLINIC | Facility: CLINIC | Age: 48
End: 2023-12-14
Payer: COMMERCIAL

## 2023-12-14 VITALS
DIASTOLIC BLOOD PRESSURE: 70 MMHG | WEIGHT: 247 LBS | SYSTOLIC BLOOD PRESSURE: 126 MMHG | HEIGHT: 64 IN | RESPIRATION RATE: 16 BRPM | BODY MASS INDEX: 42.17 KG/M2

## 2023-12-14 DIAGNOSIS — E66.01 MORBID OBESITY WITH BMI OF 40.0-44.9, ADULT (HCC): ICD-10-CM

## 2023-12-14 DIAGNOSIS — J30.1 NON-SEASONAL ALLERGIC RHINITIS DUE TO POLLEN: ICD-10-CM

## 2023-12-14 DIAGNOSIS — E11.42 DIABETIC SENSORY POLYNEUROPATHY (HCC): Primary | ICD-10-CM

## 2023-12-14 DIAGNOSIS — E11.65 TYPE 2 DIABETES MELLITUS WITH HYPERGLYCEMIA, WITHOUT LONG-TERM CURRENT USE OF INSULIN (HCC): ICD-10-CM

## 2023-12-14 DIAGNOSIS — E11.40 TYPE 2 DIABETES MELLITUS WITH DIABETIC NEUROPATHY, WITHOUT LONG-TERM CURRENT USE OF INSULIN (HCC): ICD-10-CM

## 2023-12-14 PROCEDURE — 3078F DIAST BP <80 MM HG: CPT | Performed by: NURSE PRACTITIONER

## 2023-12-14 PROCEDURE — 3044F HG A1C LEVEL LT 7.0%: CPT | Performed by: NURSE PRACTITIONER

## 2023-12-14 PROCEDURE — 3074F SYST BP LT 130 MM HG: CPT | Performed by: NURSE PRACTITIONER

## 2023-12-14 PROCEDURE — 99214 OFFICE O/P EST MOD 30 MIN: CPT | Performed by: NURSE PRACTITIONER

## 2023-12-14 RX ORDER — LORATADINE 10 MG/1
10 TABLET ORAL DAILY
Qty: 90 TABLET | Refills: 1 | Status: SHIPPED | OUTPATIENT
Start: 2023-12-14

## 2023-12-14 RX ORDER — FLUTICASONE PROPIONATE 50 MCG
2 SPRAY, SUSPENSION (ML) NASAL DAILY
Qty: 3 EACH | Refills: 3 | Status: SHIPPED | OUTPATIENT
Start: 2023-12-14

## 2023-12-14 RX ORDER — GABAPENTIN 300 MG/1
300 CAPSULE ORAL 3 TIMES DAILY
Qty: 270 CAPSULE | Refills: 1 | Status: SHIPPED | OUTPATIENT
Start: 2023-12-14 | End: 2024-06-11

## 2023-12-14 NOTE — ASSESSMENT & PLAN NOTE
Well-controlled, continue current medications and continue current treatment plan last A1c 6.0 May 2023.

## 2023-12-14 NOTE — ASSESSMENT & PLAN NOTE
Well-controlled, continue current medications--continue gabapentin. Monitor symptoms. Keep A1c below 7.

## 2023-12-15 LAB
EST. AVERAGE GLUCOSE BLD GHB EST-MCNC: 117 MG/DL
HBA1C MFR BLD: 5.7 % (ref 4.8–5.6)

## 2023-12-22 ENCOUNTER — TELEPHONE (OUTPATIENT)
Dept: FAMILY MEDICINE CLINIC | Facility: CLINIC | Age: 48
End: 2023-12-22

## 2023-12-22 NOTE — TELEPHONE ENCOUNTER
Patient is already taking metformin at night and was prescribed 500mg tablets. Patient stated that about 7 months ago she was told to cut the pill in half and only take 250 mg at night. Patient wants to know what she should do.

## 2023-12-27 ENCOUNTER — TELEPHONE (OUTPATIENT)
Dept: FAMILY MEDICINE CLINIC | Facility: CLINIC | Age: 48
End: 2023-12-27

## 2023-12-27 PROBLEM — Z87.891 FORMER SMOKER: Status: ACTIVE | Noted: 2023-12-27

## 2023-12-27 PROBLEM — Z72.0 TOBACCO USE: Status: ACTIVE | Noted: 2023-12-27

## 2023-12-27 NOTE — TELEPHONE ENCOUNTER
Spoke to patient. Let her know our policies on antibiotics. Patient scheduled an appointment with Mirta Sosa on 12/28 to recheck the boil and to discuss refill on medication.

## 2023-12-27 NOTE — TELEPHONE ENCOUNTER
We do not call in antibiotics without a virtual or face to face appointment. Original script written by Dr. Rafal Walden.   [facility]

## 2024-01-16 ENCOUNTER — OFFICE VISIT (OUTPATIENT)
Dept: FAMILY MEDICINE CLINIC | Facility: CLINIC | Age: 49
End: 2024-01-16
Payer: COMMERCIAL

## 2024-01-16 VITALS
SYSTOLIC BLOOD PRESSURE: 110 MMHG | HEIGHT: 64 IN | HEART RATE: 89 BPM | RESPIRATION RATE: 16 BRPM | DIASTOLIC BLOOD PRESSURE: 78 MMHG | WEIGHT: 249 LBS | OXYGEN SATURATION: 92 % | TEMPERATURE: 97.2 F | BODY MASS INDEX: 42.51 KG/M2

## 2024-01-16 DIAGNOSIS — R20.0 NUMBNESS AND TINGLING OF BOTH LEGS: ICD-10-CM

## 2024-01-16 DIAGNOSIS — R20.0 NUMBNESS AND TINGLING OF BOTH LEGS: Primary | ICD-10-CM

## 2024-01-16 DIAGNOSIS — M54.31 BILATERAL SCIATICA: ICD-10-CM

## 2024-01-16 DIAGNOSIS — R20.2 NUMBNESS AND TINGLING OF BOTH LEGS: ICD-10-CM

## 2024-01-16 DIAGNOSIS — M54.32 BILATERAL SCIATICA: ICD-10-CM

## 2024-01-16 DIAGNOSIS — R20.2 NUMBNESS AND TINGLING OF BOTH LEGS: Primary | ICD-10-CM

## 2024-01-16 LAB
BASOPHILS # BLD: 0 K/UL (ref 0–0.2)
BASOPHILS NFR BLD: 1 % (ref 0–2)
DIFFERENTIAL METHOD BLD: NORMAL
EOSINOPHIL # BLD: 0.2 K/UL (ref 0–0.8)
EOSINOPHIL NFR BLD: 2 % (ref 0.5–7.8)
ERYTHROCYTE [DISTWIDTH] IN BLOOD BY AUTOMATED COUNT: 13 % (ref 11.9–14.6)
HCT VFR BLD AUTO: 38.4 % (ref 35.8–46.3)
HGB BLD-MCNC: 12.1 G/DL (ref 11.7–15.4)
IMM GRANULOCYTES # BLD AUTO: 0 K/UL (ref 0–0.5)
IMM GRANULOCYTES NFR BLD AUTO: 0 % (ref 0–5)
LYMPHOCYTES # BLD: 2.7 K/UL (ref 0.5–4.6)
LYMPHOCYTES NFR BLD: 37 % (ref 13–44)
MAGNESIUM SERPL-MCNC: 2.5 MG/DL (ref 1.8–2.4)
MCH RBC QN AUTO: 28.9 PG (ref 26.1–32.9)
MCHC RBC AUTO-ENTMCNC: 31.5 G/DL (ref 31.4–35)
MCV RBC AUTO: 91.9 FL (ref 82–102)
MONOCYTES # BLD: 0.5 K/UL (ref 0.1–1.3)
MONOCYTES NFR BLD: 6 % (ref 4–12)
NEUTS SEG # BLD: 3.8 K/UL (ref 1.7–8.2)
NEUTS SEG NFR BLD: 54 % (ref 43–78)
NRBC # BLD: 0 K/UL (ref 0–0.2)
PLATELET # BLD AUTO: 311 K/UL (ref 150–450)
PMV BLD AUTO: 9.4 FL (ref 9.4–12.3)
RBC # BLD AUTO: 4.18 M/UL (ref 4.05–5.2)
VIT B12 SERPL-MCNC: 1747 PG/ML (ref 193–986)
WBC # BLD AUTO: 7.2 K/UL (ref 4.3–11.1)

## 2024-01-16 PROCEDURE — 3074F SYST BP LT 130 MM HG: CPT | Performed by: NURSE PRACTITIONER

## 2024-01-16 PROCEDURE — 3078F DIAST BP <80 MM HG: CPT | Performed by: NURSE PRACTITIONER

## 2024-01-16 PROCEDURE — 99214 OFFICE O/P EST MOD 30 MIN: CPT | Performed by: NURSE PRACTITIONER

## 2024-01-16 RX ORDER — IBUPROFEN 800 MG/1
800 TABLET ORAL 2 TIMES DAILY
Qty: 20 TABLET | Refills: 0 | Status: SHIPPED | OUTPATIENT
Start: 2024-01-16

## 2024-01-16 RX ORDER — METHYLPREDNISOLONE 4 MG/1
TABLET ORAL
Qty: 1 KIT | Refills: 0 | Status: SHIPPED | OUTPATIENT
Start: 2024-01-16 | End: 2024-01-22

## 2024-01-16 SDOH — ECONOMIC STABILITY: FOOD INSECURITY: WITHIN THE PAST 12 MONTHS, YOU WORRIED THAT YOUR FOOD WOULD RUN OUT BEFORE YOU GOT MONEY TO BUY MORE.: NEVER TRUE

## 2024-01-16 SDOH — ECONOMIC STABILITY: INCOME INSECURITY: HOW HARD IS IT FOR YOU TO PAY FOR THE VERY BASICS LIKE FOOD, HOUSING, MEDICAL CARE, AND HEATING?: NOT HARD AT ALL

## 2024-01-16 SDOH — ECONOMIC STABILITY: FOOD INSECURITY: WITHIN THE PAST 12 MONTHS, THE FOOD YOU BOUGHT JUST DIDN'T LAST AND YOU DIDN'T HAVE MONEY TO GET MORE.: NEVER TRUE

## 2024-01-16 ASSESSMENT — PATIENT HEALTH QUESTIONNAIRE - PHQ9
SUM OF ALL RESPONSES TO PHQ QUESTIONS 1-9: 0
SUM OF ALL RESPONSES TO PHQ9 QUESTIONS 1 & 2: 0
SUM OF ALL RESPONSES TO PHQ QUESTIONS 1-9: 0
1. LITTLE INTEREST OR PLEASURE IN DOING THINGS: 0
SUM OF ALL RESPONSES TO PHQ QUESTIONS 1-9: 0
2. FEELING DOWN, DEPRESSED OR HOPELESS: 0
SUM OF ALL RESPONSES TO PHQ QUESTIONS 1-9: 0

## 2024-01-16 NOTE — PROGRESS NOTES
hypertension    ROBERTO (generalized anxiety disorder)    Environmental and seasonal allergies    Former smoker     Past Medical History:   Diagnosis Date    Abnormal Papanicolaou smear of cervix     Adverse effect of anesthesia     slow to awaken    Anemia     Chronic low back pain     Diabetes (HCC)     type 2 - oral agents- -does not check sugar-regularly- denies hypoglycemic episodes    ROBERTO (generalized anxiety disorder)     GERD (gastroesophageal reflux disease)     no medications- sleeps on 3 pillows-     Heel spur, left     Hypercholesterolemia     controlled well with meds    Hypertension     medication controlled    Kidney stone     Vitamin D deficiency      Past Surgical History:   Procedure Laterality Date    CHOLECYSTECTOMY, LAPAROSCOPIC  03/28/2017    DILATION AND CURETTAGE OF UTERUS      ENDOMETRIAL ABLATION  2017    ENDOMETRIAL ABLATION      FRAGMENT KIDNEY STONE/ [facility]      LAP,TUBAL CAUTERY      LITHOTRIPSY       x 4    ORTHOPEDIC SURGERY Left     hand- tendon repair    ORTHOPEDIC SURGERY Left     ankle repair-    OTHER SURGICAL HISTORY Bilateral     sweat gland removal under both arms    TUBAL LIGATION  07/25/2005    UPPER GASTROINTESTINAL ENDOSCOPY       Family History   Problem Relation Age of Onset    Ovarian Cancer Neg Hx     Colon Cancer Maternal Uncle     Breast Cancer Maternal Aunt     Hypertension Brother     Deep Vein Thrombosis Father     Hypertension Mother     Other Mother         Nasreen Gehrigs disease    Uterine Cancer Neg Hx     Heart Disease Maternal Grandmother     Diabetes Maternal Grandmother     Hypertension Father      Social History     Socioeconomic History    Marital status:      Spouse name: None    Number of children: None    Years of education: None    Highest education level: None   Tobacco Use    Smoking status: Former     Current packs/day: 0.00     Types: Cigarettes     Quit date: 1/1/2009     Years since quitting: 15.0    Smokeless tobacco: Never   Vaping Use

## 2024-02-29 ENCOUNTER — OFFICE VISIT (OUTPATIENT)
Dept: FAMILY MEDICINE CLINIC | Facility: CLINIC | Age: 49
End: 2024-02-29
Payer: COMMERCIAL

## 2024-02-29 VITALS
WEIGHT: 245 LBS | HEART RATE: 80 BPM | SYSTOLIC BLOOD PRESSURE: 118 MMHG | BODY MASS INDEX: 41.83 KG/M2 | DIASTOLIC BLOOD PRESSURE: 78 MMHG | TEMPERATURE: 97.2 F | HEIGHT: 64 IN | RESPIRATION RATE: 18 BRPM

## 2024-02-29 DIAGNOSIS — R10.10 PAIN OF UPPER ABDOMEN: ICD-10-CM

## 2024-02-29 DIAGNOSIS — K21.00 GASTROESOPHAGEAL REFLUX DISEASE WITH ESOPHAGITIS WITHOUT HEMORRHAGE: ICD-10-CM

## 2024-02-29 DIAGNOSIS — R10.10 PAIN OF UPPER ABDOMEN: Primary | ICD-10-CM

## 2024-02-29 LAB
ALBUMIN SERPL-MCNC: 3.6 G/DL (ref 3.5–5)
ALBUMIN/GLOB SERPL: 0.8 (ref 0.4–1.6)
ALP SERPL-CCNC: 98 U/L (ref 50–136)
ALT SERPL-CCNC: 14 U/L (ref 12–65)
AMYLASE SERPL-CCNC: 69 U/L (ref 25–115)
ANION GAP SERPL CALC-SCNC: 6 MMOL/L (ref 2–11)
AST SERPL-CCNC: 19 U/L (ref 15–37)
BASOPHILS # BLD: 0 K/UL (ref 0–0.2)
BASOPHILS NFR BLD: 1 % (ref 0–2)
BILIRUB SERPL-MCNC: 0.3 MG/DL (ref 0.2–1.1)
BUN SERPL-MCNC: 11 MG/DL (ref 6–23)
CALCIUM SERPL-MCNC: 9.6 MG/DL (ref 8.3–10.4)
CHLORIDE SERPL-SCNC: 103 MMOL/L (ref 103–113)
CO2 SERPL-SCNC: 33 MMOL/L (ref 21–32)
CREAT SERPL-MCNC: 0.7 MG/DL (ref 0.6–1)
DIFFERENTIAL METHOD BLD: NORMAL
EOSINOPHIL # BLD: 0.2 K/UL (ref 0–0.8)
EOSINOPHIL NFR BLD: 3 % (ref 0.5–7.8)
ERYTHROCYTE [DISTWIDTH] IN BLOOD BY AUTOMATED COUNT: 13.1 % (ref 11.9–14.6)
GLOBULIN SER CALC-MCNC: 4.8 G/DL (ref 2.8–4.5)
GLUCOSE SERPL-MCNC: 87 MG/DL (ref 65–100)
HCT VFR BLD AUTO: 38.9 % (ref 35.8–46.3)
HGB BLD-MCNC: 12.3 G/DL (ref 11.7–15.4)
IMM GRANULOCYTES # BLD AUTO: 0 K/UL (ref 0–0.5)
IMM GRANULOCYTES NFR BLD AUTO: 0 % (ref 0–5)
LIPASE SERPL-CCNC: 104 U/L (ref 73–393)
LYMPHOCYTES # BLD: 2.3 K/UL (ref 0.5–4.6)
LYMPHOCYTES NFR BLD: 38 % (ref 13–44)
MCH RBC QN AUTO: 28.3 PG (ref 26.1–32.9)
MCHC RBC AUTO-ENTMCNC: 31.6 G/DL (ref 31.4–35)
MCV RBC AUTO: 89.4 FL (ref 82–102)
MONOCYTES # BLD: 0.4 K/UL (ref 0.1–1.3)
MONOCYTES NFR BLD: 7 % (ref 4–12)
NEUTS SEG # BLD: 3.2 K/UL (ref 1.7–8.2)
NEUTS SEG NFR BLD: 51 % (ref 43–78)
NRBC # BLD: 0 K/UL (ref 0–0.2)
PLATELET # BLD AUTO: 313 K/UL (ref 150–450)
PMV BLD AUTO: 9.5 FL (ref 9.4–12.3)
POTASSIUM SERPL-SCNC: 3.6 MMOL/L (ref 3.5–5.1)
PROT SERPL-MCNC: 8.4 G/DL (ref 6.3–8.2)
RBC # BLD AUTO: 4.35 M/UL (ref 4.05–5.2)
SODIUM SERPL-SCNC: 142 MMOL/L (ref 136–146)
WBC # BLD AUTO: 6.2 K/UL (ref 4.3–11.1)

## 2024-02-29 PROCEDURE — 3074F SYST BP LT 130 MM HG: CPT | Performed by: NURSE PRACTITIONER

## 2024-02-29 PROCEDURE — 3078F DIAST BP <80 MM HG: CPT | Performed by: NURSE PRACTITIONER

## 2024-02-29 PROCEDURE — 99214 OFFICE O/P EST MOD 30 MIN: CPT | Performed by: NURSE PRACTITIONER

## 2024-02-29 RX ORDER — GABAPENTIN 300 MG/1
300 CAPSULE ORAL 3 TIMES DAILY
Qty: 270 CAPSULE | Refills: 1 | Status: CANCELLED | OUTPATIENT
Start: 2024-02-29 | End: 2024-08-27

## 2024-02-29 RX ORDER — PRAVASTATIN SODIUM 40 MG
40 TABLET ORAL NIGHTLY
Qty: 90 TABLET | Refills: 1 | Status: CANCELLED | OUTPATIENT
Start: 2024-02-29

## 2024-02-29 RX ORDER — HYDROCHLOROTHIAZIDE 25 MG/1
25 TABLET ORAL DAILY
Qty: 90 TABLET | Refills: 1 | Status: CANCELLED | OUTPATIENT
Start: 2024-02-29

## 2024-02-29 RX ORDER — ESOMEPRAZOLE MAGNESIUM 40 MG/1
40 CAPSULE, DELAYED RELEASE ORAL
Qty: 90 CAPSULE | Refills: 1 | Status: SHIPPED | OUTPATIENT
Start: 2024-02-29

## 2024-02-29 SDOH — ECONOMIC STABILITY: FOOD INSECURITY: WITHIN THE PAST 12 MONTHS, THE FOOD YOU BOUGHT JUST DIDN'T LAST AND YOU DIDN'T HAVE MONEY TO GET MORE.: NEVER TRUE

## 2024-02-29 SDOH — ECONOMIC STABILITY: INCOME INSECURITY: HOW HARD IS IT FOR YOU TO PAY FOR THE VERY BASICS LIKE FOOD, HOUSING, MEDICAL CARE, AND HEATING?: NOT HARD AT ALL

## 2024-02-29 SDOH — ECONOMIC STABILITY: FOOD INSECURITY: WITHIN THE PAST 12 MONTHS, YOU WORRIED THAT YOUR FOOD WOULD RUN OUT BEFORE YOU GOT MONEY TO BUY MORE.: NEVER TRUE

## 2024-02-29 ASSESSMENT — ENCOUNTER SYMPTOMS
COUGH: 0
ABDOMINAL PAIN: 1
SHORTNESS OF BREATH: 0
DIARRHEA: 1
EYE DISCHARGE: 0
CONSTIPATION: 0
EYE PAIN: 0
VOMITING: 0
CHEST TIGHTNESS: 0
ABDOMINAL DISTENTION: 1
RHINORRHEA: 0
BLOOD IN STOOL: 0
NAUSEA: 1
WHEEZING: 0

## 2024-02-29 ASSESSMENT — PATIENT HEALTH QUESTIONNAIRE - PHQ9
SUM OF ALL RESPONSES TO PHQ QUESTIONS 1-9: 0
SUM OF ALL RESPONSES TO PHQ QUESTIONS 1-9: 0
SUM OF ALL RESPONSES TO PHQ9 QUESTIONS 1 & 2: 0
SUM OF ALL RESPONSES TO PHQ QUESTIONS 1-9: 0
1. LITTLE INTEREST OR PLEASURE IN DOING THINGS: 0
SUM OF ALL RESPONSES TO PHQ QUESTIONS 1-9: 0
2. FEELING DOWN, DEPRESSED OR HOPELESS: 0

## 2024-02-29 NOTE — PROGRESS NOTES
Will Garcia (:  1975) is a 48 y.o. female,Established patient, here for evaluation of the following chief complaint(s):  Bloated (Pt. C/o stomach rumbling, belching with burps smelling like \"rotten egg\"), Gas, and Diarrhea (Pt c/o having loose stools )         ASSESSMENT/PLAN:  1. Pain of upper abdomen  -     Lipase; Future  -     CBC with Auto Differential; Future  -     Amylase; Future  -     H. Pylori Breath Test; Future  -     Comprehensive Metabolic Panel; Future  Monitor symptoms.  If her symptoms worsen--needs to go to ER for further work up.    Will call results.    If lipase, amylase high, will need to stop Mounjaro.  Patient aware.   2. Gastroesophageal reflux disease with esophagitis without hemorrhage  -     H. Pylori Breath Test; Future  -     Comprehensive Metabolic Panel; Future  Begin Nexium.  Take on empty stomach.          Return in about 5 days (around 3/5/2024), or if symptoms worsen or fail to improve.         Subjective   SUBJECTIVE/OBJECTIVE:  Was on OTC Prilosec for three months.  Is using a lot of it.  Got some delayed release Prilosec and started having bloating, watery stools, pains in her stomach and chest.  Could not get any sleep.  Got some relief with burping and passing gas.  Burping smells like old soured foods.  Is on Mounjaro for weight loos.  Has reached a plateau.     Abdominal Pain  This is a chronic problem. The onset quality is sudden. The problem occurs constantly. The problem has been gradually worsening. The pain is located in the epigastric region. The pain is moderate. The quality of the pain is burning and aching. The abdominal pain radiates to the right flank. Associated symptoms include diarrhea, a fever, nausea and weight loss. Pertinent negatives include no arthralgias, constipation, frequency, headaches, myalgias or vomiting. Nothing aggravates the pain. The pain is relieved by Nothing. She has tried proton pump inhibitors for the symptoms. The

## 2024-03-01 LAB — UREA BREATH TEST QL: NEGATIVE

## 2024-03-20 ENCOUNTER — OFFICE VISIT (OUTPATIENT)
Dept: FAMILY MEDICINE CLINIC | Facility: CLINIC | Age: 49
End: 2024-03-20
Payer: COMMERCIAL

## 2024-03-20 VITALS
WEIGHT: 245 LBS | TEMPERATURE: 97 F | HEART RATE: 75 BPM | OXYGEN SATURATION: 97 % | SYSTOLIC BLOOD PRESSURE: 116 MMHG | HEIGHT: 64 IN | BODY MASS INDEX: 41.83 KG/M2 | RESPIRATION RATE: 18 BRPM | DIASTOLIC BLOOD PRESSURE: 72 MMHG

## 2024-03-20 DIAGNOSIS — M25.562 CHRONIC PAIN OF BOTH KNEES: ICD-10-CM

## 2024-03-20 DIAGNOSIS — E11.65 TYPE 2 DIABETES MELLITUS WITH HYPERGLYCEMIA, WITHOUT LONG-TERM CURRENT USE OF INSULIN (HCC): Primary | ICD-10-CM

## 2024-03-20 DIAGNOSIS — G89.29 CHRONIC PAIN OF BOTH KNEES: ICD-10-CM

## 2024-03-20 DIAGNOSIS — E78.2 MIXED HYPERLIPIDEMIA: ICD-10-CM

## 2024-03-20 DIAGNOSIS — M25.561 CHRONIC PAIN OF BOTH KNEES: ICD-10-CM

## 2024-03-20 DIAGNOSIS — I10 ESSENTIAL HYPERTENSION: ICD-10-CM

## 2024-03-20 PROCEDURE — 3074F SYST BP LT 130 MM HG: CPT | Performed by: NURSE PRACTITIONER

## 2024-03-20 PROCEDURE — 99214 OFFICE O/P EST MOD 30 MIN: CPT | Performed by: NURSE PRACTITIONER

## 2024-03-20 PROCEDURE — 3078F DIAST BP <80 MM HG: CPT | Performed by: NURSE PRACTITIONER

## 2024-03-20 RX ORDER — IBUPROFEN 800 MG/1
800 TABLET ORAL EVERY 6 HOURS PRN
COMMUNITY
End: 2024-03-20 | Stop reason: SDUPTHER

## 2024-03-20 RX ORDER — HYDROCHLOROTHIAZIDE 25 MG/1
25 TABLET ORAL DAILY
Qty: 90 TABLET | Refills: 1 | Status: SHIPPED | OUTPATIENT
Start: 2024-03-20

## 2024-03-20 RX ORDER — IBUPROFEN 800 MG/1
800 TABLET ORAL 2 TIMES DAILY PRN
Qty: 180 TABLET | Refills: 1 | Status: SHIPPED | OUTPATIENT
Start: 2024-03-20

## 2024-03-20 RX ORDER — PRAVASTATIN SODIUM 40 MG
40 TABLET ORAL NIGHTLY
Qty: 90 TABLET | Refills: 1 | Status: SHIPPED | OUTPATIENT
Start: 2024-03-20

## 2024-03-20 SDOH — ECONOMIC STABILITY: FOOD INSECURITY: WITHIN THE PAST 12 MONTHS, YOU WORRIED THAT YOUR FOOD WOULD RUN OUT BEFORE YOU GOT MONEY TO BUY MORE.: NEVER TRUE

## 2024-03-20 SDOH — ECONOMIC STABILITY: FOOD INSECURITY: WITHIN THE PAST 12 MONTHS, THE FOOD YOU BOUGHT JUST DIDN'T LAST AND YOU DIDN'T HAVE MONEY TO GET MORE.: NEVER TRUE

## 2024-03-20 SDOH — ECONOMIC STABILITY: INCOME INSECURITY: HOW HARD IS IT FOR YOU TO PAY FOR THE VERY BASICS LIKE FOOD, HOUSING, MEDICAL CARE, AND HEATING?: NOT HARD AT ALL

## 2024-03-20 ASSESSMENT — PATIENT HEALTH QUESTIONNAIRE - PHQ9
1. LITTLE INTEREST OR PLEASURE IN DOING THINGS: NOT AT ALL
SUM OF ALL RESPONSES TO PHQ QUESTIONS 1-9: 0
SUM OF ALL RESPONSES TO PHQ9 QUESTIONS 1 & 2: 0
SUM OF ALL RESPONSES TO PHQ QUESTIONS 1-9: 0
2. FEELING DOWN, DEPRESSED OR HOPELESS: NOT AT ALL

## 2024-03-20 ASSESSMENT — ENCOUNTER SYMPTOMS
RHINORRHEA: 0
CONSTIPATION: 0
CHEST TIGHTNESS: 0
SHORTNESS OF BREATH: 0
VISUAL CHANGE: 0
COUGH: 0
BLOOD IN STOOL: 0
DIARRHEA: 0
EYE DISCHARGE: 0
VOMITING: 0
ABDOMINAL PAIN: 0
WHEEZING: 0
EYE PAIN: 0

## 2024-03-20 NOTE — PATIENT INSTRUCTIONS
professional. [employer] disclaims any warranty or liability for your use of this information.

## 2024-03-20 NOTE — PROGRESS NOTES
Will Garcia (:  1975) is a 49 y.o. female,Established patient, here for evaluation of the following chief complaint(s):  Diabetes (3 month follow up ), Weight Management (3 month follow up), and Medication Refill         ASSESSMENT/PLAN:  1. Type 2 diabetes mellitus with hyperglycemia, without long-term current use of insulin (HCC)  -     Tirzepatide (MOUNJARO) 15 MG/0.5ML SOPN SC injection; Inject 0.5 mLs into the skin once a week, Disp-12 Adjustable Dose Pre-filled Pen Syringe, R-1Normal  Check BGL two or three times a week.   Follow up in 3 months to recheck weight loss and BGL  2. Mixed hyperlipidemia  -     pravastatin (PRAVACHOL) 40 MG tablet; Take 1 tablet by mouth nightly, Disp-90 tablet, R-1Normal  Low fat choices with more fruits and vegetables.  Less processed foods.   3. Essential hypertension  -     hydroCHLOROthiazide (HYDRODIURIL) 25 MG tablet; Take 1 tablet by mouth daily, Disp-90 tablet, R-1Normal  Monitor blood pressure at home twice weekly and keep log.  To ER with signs of CVA or MI.    Follow up in 6 months or sooner if needed.    4. Chronic pain of both knees  -     ibuprofen (ADVIL;MOTRIN) 800 MG tablet; Take 1 tablet by mouth 2 times daily as needed for Pain, Disp-180 tablet, R-1Normal  Use as needed.  Can take 30 minutes before going to gym.  Elevate knees when sitting at home.        Return in about 3 months (around 2024) for medication refills.         Subjective   SUBJECTIVE/OBJECTIVE:  Is on spring break.  Came home from the [place] yesterday and will be going to [place] later on the week.  Has not lost weight but is losing inches.  Is down two dress sizes and two bra sizes.  Has decided to hold off buying more clothes.  Has noticed a deecrease in appetite.  Is eating more whole foods and less junk.  Is exercising 60 minutes 4 times a week with  at gym.  BGL have been in the low 100s.  No abdominal pain.  No constipation.  Is concerned about breast cancer.

## 2024-03-25 ENCOUNTER — PATIENT MESSAGE (OUTPATIENT)
Dept: FAMILY MEDICINE CLINIC | Facility: CLINIC | Age: 49
End: 2024-03-25

## 2024-03-25 DIAGNOSIS — E11.65 TYPE 2 DIABETES MELLITUS WITH HYPERGLYCEMIA, WITHOUT LONG-TERM CURRENT USE OF INSULIN (HCC): ICD-10-CM

## 2024-04-05 NOTE — TELEPHONE ENCOUNTER
From: Will Garcia  To: Chelsie Adames  Sent: 3/25/2024 2:14 PM EDT  Subject: Mounjaro    Hi. I’m having a problem getting my Mounjaro 15 mg filled. No one has it in stock. I need help please

## 2024-05-03 ENCOUNTER — TELEPHONE (OUTPATIENT)
Dept: FAMILY MEDICINE CLINIC | Facility: CLINIC | Age: 49
End: 2024-05-03

## 2024-05-03 DIAGNOSIS — E11.622 CONTROLLED TYPE 2 DIABETES MELLITUS WITH OTHER SKIN ULCER, WITHOUT LONG-TERM CURRENT USE OF INSULIN (HCC): Primary | ICD-10-CM

## 2024-05-03 NOTE — TELEPHONE ENCOUNTER
Ordered A1c test.  Can schedule lab appointment.  Has appointment with me in June.  Chelsie Adames

## 2024-05-03 NOTE — TELEPHONE ENCOUNTER
----- Message from Meenakshi Hatch sent at 5/3/2024 12:40 PM EDT -----  Subject: Referral Request    Reason for referral request? requesting A1C test   Provider patient wants to be referred to(if known):     Provider Phone Number(if known):    Additional Information for Provider?   ---------------------------------------------------------------------------  --------------  CALL BACK INFO    [phone]; OK to leave message on voicemail  ---------------------------------------------------------------------------  --------------

## 2024-05-20 ENCOUNTER — HOSPITAL ENCOUNTER (OUTPATIENT)
Age: 49
Discharge: HOME | End: 2024-05-23

## 2024-05-20 DIAGNOSIS — E11.622 CONTROLLED TYPE 2 DIABETES MELLITUS WITH OTHER SKIN ULCER, WITHOUT LONG-TERM CURRENT USE OF INSULIN (HCC): ICD-10-CM

## 2024-05-20 LAB
EST. AVERAGE GLUCOSE BLD GHB EST-MCNC: 134 MG/DL
HBA1C MFR BLD: 6.3 % (ref 0–5.6)

## 2024-05-23 ENCOUNTER — OFFICE VISIT (OUTPATIENT)
Dept: FAMILY MEDICINE CLINIC | Facility: CLINIC | Age: 49
End: 2024-05-23
Payer: COMMERCIAL

## 2024-05-23 VITALS
HEIGHT: 64 IN | SYSTOLIC BLOOD PRESSURE: 122 MMHG | BODY MASS INDEX: 41.48 KG/M2 | DIASTOLIC BLOOD PRESSURE: 78 MMHG | RESPIRATION RATE: 18 BRPM | TEMPERATURE: 97 F | OXYGEN SATURATION: 99 % | WEIGHT: 243 LBS | HEART RATE: 84 BPM

## 2024-05-23 DIAGNOSIS — E11.40 TYPE 2 DIABETES MELLITUS WITH DIABETIC NEUROPATHY, WITHOUT LONG-TERM CURRENT USE OF INSULIN (HCC): ICD-10-CM

## 2024-05-23 DIAGNOSIS — E11.65 TYPE 2 DIABETES MELLITUS WITH HYPERGLYCEMIA, WITHOUT LONG-TERM CURRENT USE OF INSULIN (HCC): ICD-10-CM

## 2024-05-23 DIAGNOSIS — K21.00 GASTROESOPHAGEAL REFLUX DISEASE WITH ESOPHAGITIS WITHOUT HEMORRHAGE: ICD-10-CM

## 2024-05-23 DIAGNOSIS — B96.89 ACUTE BACTERIAL BRONCHITIS: Primary | ICD-10-CM

## 2024-05-23 DIAGNOSIS — E11.42 DIABETIC SENSORY POLYNEUROPATHY (HCC): ICD-10-CM

## 2024-05-23 DIAGNOSIS — E11.622 CONTROLLED TYPE 2 DIABETES MELLITUS WITH OTHER SKIN ULCER, WITHOUT LONG-TERM CURRENT USE OF INSULIN (HCC): ICD-10-CM

## 2024-05-23 DIAGNOSIS — E66.01 MORBID OBESITY WITH BMI OF 40.0-44.9, ADULT (HCC): ICD-10-CM

## 2024-05-23 DIAGNOSIS — J20.8 ACUTE BACTERIAL BRONCHITIS: Primary | ICD-10-CM

## 2024-05-23 PROCEDURE — 3078F DIAST BP <80 MM HG: CPT | Performed by: NURSE PRACTITIONER

## 2024-05-23 PROCEDURE — 3074F SYST BP LT 130 MM HG: CPT | Performed by: NURSE PRACTITIONER

## 2024-05-23 PROCEDURE — 3044F HG A1C LEVEL LT 7.0%: CPT | Performed by: NURSE PRACTITIONER

## 2024-05-23 PROCEDURE — 99214 OFFICE O/P EST MOD 30 MIN: CPT | Performed by: NURSE PRACTITIONER

## 2024-05-23 RX ORDER — ESOMEPRAZOLE MAGNESIUM 40 MG/1
40 CAPSULE, DELAYED RELEASE ORAL
Qty: 90 CAPSULE | Refills: 1 | Status: SHIPPED | OUTPATIENT
Start: 2024-05-23

## 2024-05-23 RX ORDER — BUDESONIDE AND FORMOTEROL FUMARATE DIHYDRATE 160; 4.5 UG/1; UG/1
2 AEROSOL RESPIRATORY (INHALATION) 2 TIMES DAILY
Qty: 30.6 G | Refills: 3 | Status: SHIPPED | OUTPATIENT
Start: 2024-05-23

## 2024-05-23 RX ORDER — GABAPENTIN 300 MG/1
300 CAPSULE ORAL 3 TIMES DAILY
Qty: 270 CAPSULE | Refills: 1 | Status: SHIPPED | OUTPATIENT
Start: 2024-05-23 | End: 2024-11-19

## 2024-05-23 RX ORDER — AZITHROMYCIN 250 MG/1
TABLET, FILM COATED ORAL
Qty: 6 TABLET | Refills: 0 | Status: SHIPPED | OUTPATIENT
Start: 2024-05-23 | End: 2024-05-29 | Stop reason: ALTCHOICE

## 2024-05-23 RX ORDER — PREDNISONE 10 MG/1
TABLET ORAL
Qty: 48 EACH | Refills: 0 | Status: SHIPPED | OUTPATIENT
Start: 2024-05-23

## 2024-05-23 SDOH — ECONOMIC STABILITY: FOOD INSECURITY: WITHIN THE PAST 12 MONTHS, YOU WORRIED THAT YOUR FOOD WOULD RUN OUT BEFORE YOU GOT MONEY TO BUY MORE.: NEVER TRUE

## 2024-05-23 SDOH — ECONOMIC STABILITY: FOOD INSECURITY: WITHIN THE PAST 12 MONTHS, THE FOOD YOU BOUGHT JUST DIDN'T LAST AND YOU DIDN'T HAVE MONEY TO GET MORE.: NEVER TRUE

## 2024-05-23 SDOH — ECONOMIC STABILITY: INCOME INSECURITY: HOW HARD IS IT FOR YOU TO PAY FOR THE VERY BASICS LIKE FOOD, HOUSING, MEDICAL CARE, AND HEATING?: NOT HARD AT ALL

## 2024-05-23 NOTE — PROGRESS NOTES
Will Garcia (:  1975) is a 49 y.o. female,Established patient, here for evaluation of the following chief complaint(s):  ER follow up (Pt. Was seen in hospital for cough and breathing. Pt. C/o still having cough )      Assessment & Plan     1. Acute bacterial bronchitis  -     predniSONE 10 MG (48) TBPK; 12 day double strength dose pack: take as directed., Disp-48 each, R-0Normal  -     azithromycin (ZITHROMAX) 250 MG tablet; Zpac 250:  take 2 po today then one a day thereafter until gone., Disp-6 tablet, R-0Normal  -     budesonide-formoterol (SYMBICORT) 160-4.5 MCG/ACT AERO; Inhale 2 puffs into the lungs 2 times daily, Disp-30.6 g, R-3Normal  Follow up 3-4 days if no better  Monitor temperature.  Increase fluids  OTC Coricidin for symptom control.  To ER with worsening symptoms, high fever, severe neck pain, severe headache, sore throat, difficulty breathing.  Good hand hygiene.     2. Type 2 diabetes mellitus with hyperglycemia, without long-term current use of insulin (HCC)  -     gabapentin (NEURONTIN) 300 MG capsule; Take 1 capsule by mouth 3 times daily for 180 days., Disp-270 capsule, R-1Normal  -     metFORMIN (GLUCOPHAGE) 500 MG tablet; Take 1 tablet by mouth 2 times daily (with meals), Disp-180 tablet, R-1Normal  -     Tirzepatide (MOUNJARO) 15 MG/0.5ML SOPN SC injection; Inject 0.5 mLs into the skin once a week, Disp-12 Adjustable Dose Pre-filled Pen Syringe, R-1Normal  Continue meds for her diabetes.  Encourage healthy life style changes to help with management and weight loss.    3. Diabetic sensory polyneuropathy (HCC)  Assessment & Plan:   Well-controlled, continue current medications, continue current treatment plan, and lifestyle modifications recommended--well controlled on metformin and Mounjaro.  Is trying to lose weight.  Is active and watching diet.  No hypoglycemia.    4. Gastroesophageal reflux disease with esophagitis without hemorrhage  -     esomeprazole (NEXIUM) 40 MG

## 2024-05-27 ASSESSMENT — ENCOUNTER SYMPTOMS
CHEST TIGHTNESS: 0
RHINORRHEA: 1
SORE THROAT: 0
SPUTUM PRODUCTION: 1
SINUS PRESSURE: 0
WHEEZING: 1
COUGH: 1
SHORTNESS OF BREATH: 1

## 2024-05-27 NOTE — ASSESSMENT & PLAN NOTE
Well-controlled, continue current medications, continue current treatment plan, and lifestyle modifications recommended--well controlled on metformin and Mounjaro.  Is trying to lose weight.  Is active and watching diet.  No hypoglycemia.

## 2024-05-27 NOTE — ASSESSMENT & PLAN NOTE
Well-controlled, continue current medications, continue current treatment plan, and lifestyle modifications recommended--continue Mounjaro.  Encourage exercise 60 minutes three to four days a week.  Limit low glycemic carbs and sweet drinks.  She is out of school for the summer so she has promised to increase her daily activity.     Discharge Summary    Reason for Hospitalization:  See below    Admission and Discharge Diagnoses:  See below    Course in Hospital:  This patient was admitted to the hospital and underwent repeat low segment transverse  section 2017.  She had tubal sterilization.  She delivered a healthy male child.  He is doing well and she is doing well.  She is dismissed in stable condition.  Her discharge diagnosis is term pregnancy, repeat  section, tubal sterilization.  Her postpartum hemoglobin is 11.1 and her vital signs are normal.  Will come to the office and see us in 2-3 weeks in follow-up.  She was given Norco, Colace, and ibuprofen upon dismissal.    Discharge Condition:  Stable.    Discharge Instructions and Plans for Follow-Up:  As above    Discharge Prescriptions:  As above

## 2024-05-29 ENCOUNTER — TELEMEDICINE (OUTPATIENT)
Dept: FAMILY MEDICINE CLINIC | Facility: CLINIC | Age: 49
End: 2024-05-29
Payer: COMMERCIAL

## 2024-05-29 DIAGNOSIS — N30.90 CYSTITIS: ICD-10-CM

## 2024-05-29 DIAGNOSIS — N02.9 IDIOPATHIC HEMATURIA, UNSPECIFIED WHETHER GLOMERULAR MORPHOLOGIC CHANGES PRESENT: Primary | ICD-10-CM

## 2024-05-29 DIAGNOSIS — N02.9 IDIOPATHIC HEMATURIA, UNSPECIFIED WHETHER GLOMERULAR MORPHOLOGIC CHANGES PRESENT: ICD-10-CM

## 2024-05-29 PROBLEM — J44.9 CHRONIC OBSTRUCTIVE PULMONARY DISEASE, UNSPECIFIED (HCC): Status: ACTIVE | Noted: 2024-05-29

## 2024-05-29 PROCEDURE — 81000 URINALYSIS NONAUTO W/SCOPE: CPT | Performed by: NURSE PRACTITIONER

## 2024-05-29 PROCEDURE — 99213 OFFICE O/P EST LOW 20 MIN: CPT | Performed by: NURSE PRACTITIONER

## 2024-05-29 RX ORDER — CEPHALEXIN 500 MG/1
500 CAPSULE ORAL 2 TIMES DAILY
Qty: 10 CAPSULE | Refills: 0 | Status: SHIPPED | OUTPATIENT
Start: 2024-05-29

## 2024-05-29 RX ORDER — TAMSULOSIN HYDROCHLORIDE 0.4 MG/1
0.4 CAPSULE ORAL DAILY
Qty: 90 CAPSULE | Refills: 0 | Status: SHIPPED | OUTPATIENT
Start: 2024-05-29

## 2024-05-29 SDOH — ECONOMIC STABILITY: INCOME INSECURITY: HOW HARD IS IT FOR YOU TO PAY FOR THE VERY BASICS LIKE FOOD, HOUSING, MEDICAL CARE, AND HEATING?: NOT HARD AT ALL

## 2024-05-29 SDOH — ECONOMIC STABILITY: FOOD INSECURITY: WITHIN THE PAST 12 MONTHS, YOU WORRIED THAT YOUR FOOD WOULD RUN OUT BEFORE YOU GOT MONEY TO BUY MORE.: NEVER TRUE

## 2024-05-29 SDOH — ECONOMIC STABILITY: FOOD INSECURITY: WITHIN THE PAST 12 MONTHS, THE FOOD YOU BOUGHT JUST DIDN'T LAST AND YOU DIDN'T HAVE MONEY TO GET MORE.: NEVER TRUE

## 2024-05-29 ASSESSMENT — ENCOUNTER SYMPTOMS
EYE DISCHARGE: 0
VOMITING: 0
CHEST TIGHTNESS: 0
COUGH: 0
BLOOD IN STOOL: 0
ABDOMINAL PAIN: 0
CONSTIPATION: 0
WHEEZING: 0
EYE PAIN: 0
SHORTNESS OF BREATH: 0
RHINORRHEA: 0
DIARRHEA: 0

## 2024-05-29 ASSESSMENT — PATIENT HEALTH QUESTIONNAIRE - PHQ9
2. FEELING DOWN, DEPRESSED OR HOPELESS: NOT AT ALL
SUM OF ALL RESPONSES TO PHQ QUESTIONS 1-9: 0
SUM OF ALL RESPONSES TO PHQ QUESTIONS 1-9: 0
1. LITTLE INTEREST OR PLEASURE IN DOING THINGS: NOT AT ALL
SUM OF ALL RESPONSES TO PHQ QUESTIONS 1-9: 0
SUM OF ALL RESPONSES TO PHQ QUESTIONS 1-9: 0
SUM OF ALL RESPONSES TO PHQ9 QUESTIONS 1 & 2: 0

## 2024-05-29 NOTE — PROGRESS NOTES
Will Garcia, was evaluated through a synchronous (real-time) audio-video encounter. The patient (or guardian if applicable) is aware that this is a billable service, which includes applicable co-pays. This Virtual Visit was conducted with patient's (and/or legal guardian's) consent. Patient identification was verified, and a caregiver was present when appropriate.   The patient was located at Other: Driving bus,   Provider was located at Facility (Appt Dept): [address] [place] [id number]  Confirm you are appropriately licensed, registered, or certified to deliver care in the state where the patient is located as indicated above. If you are not or unsure, please re-schedule the visit: Yes, I confirm.     Will Garcia (:  1975) is a Established patient, presenting virtually for evaluation of the following: hematuria noted on DOT exam, now with flank pan and dysuria.      Results for orders placed or performed in visit on 24   AMB POC URINALYSIS DIP STICK MANUAL W/ MICRO BSSC   Result Value Ref Range    Color (UA POC) Yellow     Clarity (UA POC) Cloudy     Glucose, Urine, POC Negative     Bilirubin, Urine, POC Negative     Ketones, Urine, POC Negative     Specific Gravity, Urine, POC 1.025 1.001 - 1.035    Blood (UA POC) Trace     pH, Urine, POC 7.0 4.6 - 8.0    Protein, Urine, POC Negative     Urobilinogen, POC Normal     Nitrite, Urine, POC Negative     Leukocyte Esterase, Urine, POC Trace     RBC, Urine, POC 1+     WBC, Urine, POC negative     Epi Cells Urine, POC 1+     Bacteria Urine, POC Negative     Casts Urine, POC few     Yeast, Urine, POC Negative     Trichomonas Urine, POC Negative          Assessment & Plan   Below is the assessment and plan developed based on review of pertinent history, physical exam, labs, studies, and medications.  1. Idiopathic hematuria, unspecified whether glomerular morphologic changes present  -     tamsulosin (FLOMAX) 0.4 MG

## 2024-05-30 LAB
BACTERIA URINE, POC: NEGATIVE
BILIRUBIN, URINE, POC: NEGATIVE
BLOOD URINE, POC: ABNORMAL
CASTS URINE, POC: ABNORMAL
EPI CELLS URINE, POC: ABNORMAL
GLUCOSE URINE, POC: NEGATIVE
KETONES, URINE, POC: NEGATIVE
LEUKOCYTE ESTERASE, URINE, POC: ABNORMAL
NITRITE, URINE, POC: NEGATIVE
PH, URINE, POC: 7 (ref 4.6–8)
PROTEIN,URINE, POC: NEGATIVE
RBC, URINE, POC: ABNORMAL
SPECIFIC GRAVITY, URINE, POC: 1.02 (ref 1–1.03)
TRICHOMONAS URINE, POC: NEGATIVE
URINALYSIS CLARITY, POC: ABNORMAL
URINALYSIS COLOR, POC: YELLOW
UROBILINOGEN, POC: NORMAL
WBC, URINE, POC: NEGATIVE
YEAST, URINE, POC: NEGATIVE

## 2024-06-01 LAB
BACTERIA SPEC CULT: NORMAL
BACTERIA SPEC CULT: NORMAL
SERVICE CMNT-IMP: NORMAL

## 2024-06-18 ENCOUNTER — OFFICE VISIT (OUTPATIENT)
Dept: OBGYN CLINIC | Age: 49
End: 2024-06-18
Payer: COMMERCIAL

## 2024-06-18 VITALS
DIASTOLIC BLOOD PRESSURE: 70 MMHG | HEIGHT: 64 IN | BODY MASS INDEX: 43.02 KG/M2 | SYSTOLIC BLOOD PRESSURE: 122 MMHG | WEIGHT: 252 LBS

## 2024-06-18 DIAGNOSIS — Z01.419 WOMEN'S ANNUAL ROUTINE GYNECOLOGICAL EXAMINATION: Primary | ICD-10-CM

## 2024-06-18 DIAGNOSIS — Z12.31 SCREENING MAMMOGRAM, ENCOUNTER FOR: ICD-10-CM

## 2024-06-18 PROCEDURE — 3078F DIAST BP <80 MM HG: CPT | Performed by: OBSTETRICS & GYNECOLOGY

## 2024-06-18 PROCEDURE — 3074F SYST BP LT 130 MM HG: CPT | Performed by: OBSTETRICS & GYNECOLOGY

## 2024-06-18 PROCEDURE — 99459 PELVIC EXAMINATION: CPT | Performed by: OBSTETRICS & GYNECOLOGY

## 2024-06-18 PROCEDURE — 99396 PREV VISIT EST AGE 40-64: CPT | Performed by: OBSTETRICS & GYNECOLOGY

## 2024-06-18 RX ORDER — ALBUTEROL SULFATE 90 UG/1
2 AEROSOL, METERED RESPIRATORY (INHALATION) EVERY 4 HOURS PRN
COMMUNITY
Start: 2024-05-19

## 2024-06-18 RX ORDER — INHALER, ASSIST DEVICES
SPACER (EA) MISCELLANEOUS
COMMUNITY
Start: 2024-05-20

## 2024-06-18 RX ORDER — TIRZEPATIDE 12.5 MG/.5ML
INJECTION, SOLUTION SUBCUTANEOUS
COMMUNITY
Start: 2024-05-14

## 2024-06-18 NOTE — PROGRESS NOTES
Patient here for AE.   Denies concerns today.    LAST PAP:  2/21/2022, neg., HPV neg.     LAST MAMMO:  3/30/2023     LMP:  No LMP recorded. Patient has had an ablation.    BIRTH CONTROL:  tubal ligation    TOBACCO USE:  No    FAMILY HISTORY OF:   Breast Cancer:  Yes   Ovarian Cancer:  No   Uterine Cancer:  No   Colon Cancer:  Yes    Vitals:    06/18/24 1018   BP: 122/70   Site: Right Upper Arm   Position: Sitting   Weight: 114.3 kg (252 lb)   Height: 1.626 m (5' 4\")        SHOSHANA SLOAN RN  06/18/24  10:27 AM

## 2024-06-18 NOTE — PROGRESS NOTES
Chaperone for Intimate Exam     Chaperone was offer accepted as part of the rooming process    Chaperone: Vonnie Benites

## 2024-06-18 NOTE — PROGRESS NOTES
[facility]  [address], [place] [address]  [phone]    Augusto Echevarria MD, FACOG  Keli Plunkett [facility]  Isabella Pendleton MD, FACOG      Assessment/Plan     Patient Active Problem List    Diagnosis Date Noted    Chronic obstructive pulmonary disease, unspecified 05/29/2024    Former smoker 12/27/2023    Screening mammogram, encounter for 02/21/2022     Overview Note:     Ordered 6/18/24         Assessment & Plan Note:     D/W pt rec is for annual mammograms at age 49      Essential hypertension      Overview Note:     Mgmt per PCP        Diabetic sensory polyneuropathy (HCC) 07/27/2021    Diarrhea, unspecified 07/09/2020     Overview Note:     noted        Pelvic pain 07/09/2020     Overview Note:     noted        Screen for STD (sexually transmitted disease) 05/18/2020     Overview Note:     noted        Women's annual routine gynecological examination 04/09/2019     Overview Note:     Pap+ HPV done 2/21/22 -- neg, neg HPV         Assessment & Plan Note:     Exam as below  Encouraged annual exams with paps as indicated  Pt to F/U with PCP for all non-gyn health related issues       B12 deficiency 03/27/2019    Type 2 diabetes mellitus with diabetic neuropathy (HCC) 01/28/2019    Vitamin D insufficiency 01/28/2019    JESSICA I (cervical intraepithelial neoplasia I) 07/09/2018     Overview Note:     On colpo done by [facility] on 4/19/18        ROBERTO (generalized anxiety disorder) 03/12/2018    Environmental and seasonal allergies 03/12/2018    Morbid obesity with BMI of 40.0-44.9, adult ([facility]) 07/29/2017    Chronic bilateral low back pain without sciatica 07/29/2017    Hypercholesterolemia     Controlled diabetes mellitus with skin complication ([facility]) 02/13/2016    ACE inhibitor-aggravated angioedema 02/13/2016    Gastroesophageal reflux disease without esophagitis 01/11/2016     Overview Note:     Last Assessment & Plan:   Patient's reflux symptoms are refractory to multiple medications(Zantac,

## 2024-07-15 ENCOUNTER — OFFICE VISIT (OUTPATIENT)
Dept: FAMILY MEDICINE CLINIC | Facility: CLINIC | Age: 49
End: 2024-07-15
Payer: COMMERCIAL

## 2024-07-15 VITALS
HEIGHT: 64 IN | TEMPERATURE: 97.6 F | RESPIRATION RATE: 18 BRPM | BODY MASS INDEX: 43.36 KG/M2 | HEART RATE: 72 BPM | WEIGHT: 254 LBS | DIASTOLIC BLOOD PRESSURE: 82 MMHG | SYSTOLIC BLOOD PRESSURE: 118 MMHG

## 2024-07-15 DIAGNOSIS — E78.2 MIXED HYPERLIPIDEMIA: ICD-10-CM

## 2024-07-15 DIAGNOSIS — J30.1 NON-SEASONAL ALLERGIC RHINITIS DUE TO POLLEN: ICD-10-CM

## 2024-07-15 DIAGNOSIS — E11.65 TYPE 2 DIABETES MELLITUS WITH HYPERGLYCEMIA, WITHOUT LONG-TERM CURRENT USE OF INSULIN (HCC): Primary | ICD-10-CM

## 2024-07-15 DIAGNOSIS — M25.562 CHRONIC PAIN OF BOTH KNEES: ICD-10-CM

## 2024-07-15 DIAGNOSIS — G89.29 CHRONIC PAIN OF BOTH KNEES: ICD-10-CM

## 2024-07-15 DIAGNOSIS — I10 ESSENTIAL HYPERTENSION: ICD-10-CM

## 2024-07-15 DIAGNOSIS — M25.561 CHRONIC PAIN OF BOTH KNEES: ICD-10-CM

## 2024-07-15 PROCEDURE — 3044F HG A1C LEVEL LT 7.0%: CPT | Performed by: NURSE PRACTITIONER

## 2024-07-15 PROCEDURE — 99214 OFFICE O/P EST MOD 30 MIN: CPT | Performed by: NURSE PRACTITIONER

## 2024-07-15 PROCEDURE — 3079F DIAST BP 80-89 MM HG: CPT | Performed by: NURSE PRACTITIONER

## 2024-07-15 PROCEDURE — 3074F SYST BP LT 130 MM HG: CPT | Performed by: NURSE PRACTITIONER

## 2024-07-15 RX ORDER — FLUTICASONE PROPIONATE 50 MCG
2 SPRAY, SUSPENSION (ML) NASAL DAILY
Qty: 3 EACH | Refills: 3 | Status: SHIPPED | OUTPATIENT
Start: 2024-07-15

## 2024-07-15 RX ORDER — PRAVASTATIN SODIUM 40 MG
40 TABLET ORAL NIGHTLY
Qty: 90 TABLET | Refills: 1 | Status: SHIPPED | OUTPATIENT
Start: 2024-07-15

## 2024-07-15 RX ORDER — HYDROCHLOROTHIAZIDE 25 MG/1
25 TABLET ORAL DAILY
Qty: 90 TABLET | Refills: 1 | Status: SHIPPED | OUTPATIENT
Start: 2024-07-15

## 2024-07-15 RX ORDER — IBUPROFEN 800 MG/1
800 TABLET ORAL 2 TIMES DAILY PRN
Qty: 180 TABLET | Refills: 1 | Status: SHIPPED | OUTPATIENT
Start: 2024-07-15

## 2024-07-15 RX ORDER — LORATADINE 10 MG/1
10 TABLET ORAL DAILY
Qty: 90 TABLET | Refills: 1 | Status: SHIPPED | OUTPATIENT
Start: 2024-07-15

## 2024-07-15 SDOH — ECONOMIC STABILITY: FOOD INSECURITY: WITHIN THE PAST 12 MONTHS, YOU WORRIED THAT YOUR FOOD WOULD RUN OUT BEFORE YOU GOT MONEY TO BUY MORE.: NEVER TRUE

## 2024-07-15 SDOH — ECONOMIC STABILITY: FOOD INSECURITY: WITHIN THE PAST 12 MONTHS, THE FOOD YOU BOUGHT JUST DIDN'T LAST AND YOU DIDN'T HAVE MONEY TO GET MORE.: NEVER TRUE

## 2024-07-15 SDOH — ECONOMIC STABILITY: INCOME INSECURITY: HOW HARD IS IT FOR YOU TO PAY FOR THE VERY BASICS LIKE FOOD, HOUSING, MEDICAL CARE, AND HEATING?: NOT HARD AT ALL

## 2024-07-15 ASSESSMENT — PATIENT HEALTH QUESTIONNAIRE - PHQ9
1. LITTLE INTEREST OR PLEASURE IN DOING THINGS: NOT AT ALL
SUM OF ALL RESPONSES TO PHQ QUESTIONS 1-9: 0
SUM OF ALL RESPONSES TO PHQ9 QUESTIONS 1 & 2: 0
2. FEELING DOWN, DEPRESSED OR HOPELESS: NOT AT ALL

## 2024-07-15 ASSESSMENT — ENCOUNTER SYMPTOMS
RHINORRHEA: 0
BLOOD IN STOOL: 0
DIARRHEA: 0
EYE PAIN: 0
WHEEZING: 0
EYE DISCHARGE: 0
CHEST TIGHTNESS: 0
SHORTNESS OF BREATH: 0
CONSTIPATION: 0

## 2024-07-15 NOTE — PATIENT INSTRUCTIONS

## 2024-07-15 NOTE — PROGRESS NOTES
Will Garcia (:  1975) is a 49 y.o. female,Established patient, here for evaluation of the following chief complaint(s):  Diabetes (Follow up ) and Medication Refill      Assessment & Plan   1. Type 2 diabetes mellitus with hyperglycemia, without long-term current use of insulin (HCC)  -     Tirzepatide (MOUNJARO) 12.5 MG/0.5ML SOPN SC injection; Inject 0.5 mLs into the skin once a week, Disp-2 mL, R-3Print  Changed back to Mounjaro.  Will begin at 12.5--since she has been off of it for the past few weeks.  Increase to 15 mg in 4 weeks.    Lifestyle changes for both weight loss and diabetes management.   2. Mixed hyperlipidemia  -     pravastatin (PRAVACHOL) 40 MG tablet; Take 1 tablet by mouth nightly, Disp-90 tablet, R-1Normal  Low fat choices with more fruits and vegetables, less process foods.    3. Essential hypertension  -     hydroCHLOROthiazide (HYDRODIURIL) 25 MG tablet; Take 1 tablet by mouth daily, Disp-90 tablet, R-1Normal  Monitor blood pressure at home twice weekly and keep log.  To ER with signs of CVA or MI.    Follow up in 6 months or sooner if needed.    4. Chronic pain of both knees  -     ibuprofen (ADVIL;MOTRIN) 800 MG tablet; Take 1 tablet by mouth 2 times daily as needed for Pain, Disp-180 tablet, R-1Normal  Continue advil.    5. Non-seasonal allergic rhinitis due to pollen  -     loratadine (CLARITIN) 10 MG tablet; Take 1 tablet by mouth daily, Disp-90 tablet, R-1Normal  -     fluticasone (FLONASE) 50 MCG/ACT nasal spray; 2 sprays by Nasal route daily, Disp-3 each, R-3Normal  Continue allergy meds.        Return in about 3 months (around 10/15/2024) for medication refills.       Subjective   Is on Ozempic now as she could not find the Mounjaro.  Would like to go back onto the Mounjaro as she felt that was losing more weight on it.  Has not had any side effects with either of them.  Is exercising.  Planning to go to the beach with her extended family next week.  Is planning to

## 2024-07-18 PROBLEM — Z01.419 WOMEN'S ANNUAL ROUTINE GYNECOLOGICAL EXAMINATION: Status: RESOLVED | Noted: 2019-04-09 | Resolved: 2024-07-18

## 2024-07-18 PROBLEM — Z12.31 SCREENING MAMMOGRAM, ENCOUNTER FOR: Status: RESOLVED | Noted: 2022-02-21 | Resolved: 2024-07-18

## 2024-07-29 ENCOUNTER — TELEPHONE (OUTPATIENT)
Dept: ORTHOPEDIC SURGERY | Age: 49
End: 2024-07-29

## 2024-07-29 ENCOUNTER — OFFICE VISIT (OUTPATIENT)
Dept: FAMILY MEDICINE CLINIC | Facility: CLINIC | Age: 49
End: 2024-07-29
Payer: COMMERCIAL

## 2024-07-29 VITALS
BODY MASS INDEX: 43.71 KG/M2 | SYSTOLIC BLOOD PRESSURE: 128 MMHG | HEIGHT: 64 IN | WEIGHT: 256 LBS | HEART RATE: 75 BPM | DIASTOLIC BLOOD PRESSURE: 80 MMHG | TEMPERATURE: 97.2 F | RESPIRATION RATE: 17 BRPM

## 2024-07-29 DIAGNOSIS — M25.562 LEFT KNEE PAIN, UNSPECIFIED CHRONICITY: Primary | ICD-10-CM

## 2024-07-29 PROCEDURE — 3079F DIAST BP 80-89 MM HG: CPT | Performed by: NURSE PRACTITIONER

## 2024-07-29 PROCEDURE — 99213 OFFICE O/P EST LOW 20 MIN: CPT | Performed by: NURSE PRACTITIONER

## 2024-07-29 PROCEDURE — 3074F SYST BP LT 130 MM HG: CPT | Performed by: NURSE PRACTITIONER

## 2024-07-29 RX ORDER — IBUPROFEN 800 MG/1
800 TABLET ORAL 2 TIMES DAILY PRN
Qty: 60 TABLET | Refills: 0 | Status: SHIPPED | OUTPATIENT
Start: 2024-07-29

## 2024-07-29 RX ORDER — METHYLPREDNISOLONE 4 MG/1
TABLET ORAL
Qty: 1 KIT | Refills: 0 | Status: SHIPPED | OUTPATIENT
Start: 2024-07-29 | End: 2024-08-04

## 2024-07-29 ASSESSMENT — ENCOUNTER SYMPTOMS
SHORTNESS OF BREATH: 0
NAUSEA: 0
VOMITING: 0
BLOOD IN STOOL: 0
DIARRHEA: 0
SINUS PAIN: 0
BACK PAIN: 0
COUGH: 0
EYE PAIN: 0
SORE THROAT: 0
EYE REDNESS: 0
CONSTIPATION: 0

## 2024-07-29 NOTE — PROGRESS NOTES
Will Garcia (:  1975) is a 49 y.o. female,Established patient, here for evaluation of the following chief complaint(s):  Knee Pain (Left knee pain for 2 weeks.)      Assessment & Plan   1. Left knee pain, unspecified chronicity  -     [facility]  -     ibuprofen (ADVIL;MOTRIN) 800 MG tablet; Take 1 tablet by mouth 2 times daily as needed for Pain, Disp-60 tablet, R-0Normal  -     methylPREDNISolone (MEDROL DOSEPACK) 4 MG tablet; Take by mouth., Disp-1 kit, R-0Normal    Start medrol pack and motrin 800 mg as directed.  Discussed medications, side effects and risks versus benefits in detail.  Referral to ortho placed.  Return precautions given.    Return if symptoms worsen or fail to improve.       Subjective   Knee Pain     Left knee pain.  One month duration.  No inciting event or injury but thinks possibly getting in and out of her son's truck.  Worsening.  Sharp, stabbing and constant in quality.  Associated swelling.  Radiates down LLE.  Aggravated by walking, bending, movement.  Alleviated by 800 mg Ibuprofen and Voltaren gel.  Left knee xray 2021 with mild osteoarthritis.      Review of Systems   Constitutional:  Negative for chills and fever.   HENT:  Negative for congestion, ear pain, sinus pain and sore throat.    Eyes:  Negative for pain and redness.   Respiratory:  Negative for cough and shortness of breath.    Cardiovascular:  Negative for chest pain and palpitations.   Gastrointestinal:  Negative for blood in stool, constipation, diarrhea, nausea and vomiting.   Endocrine: Negative for polydipsia.   Genitourinary:  Negative for dysuria, frequency and urgency.   Musculoskeletal:  Positive for arthralgias. Negative for back pain and myalgias.   Skin:  Negative for rash.   Allergic/Immunologic: Negative for environmental allergies.   Neurological:  Negative for dizziness and headaches.   Psychiatric/Behavioral:  Negative for hallucinations and suicidal ideas.

## 2024-08-25 DIAGNOSIS — K21.00 GASTROESOPHAGEAL REFLUX DISEASE WITH ESOPHAGITIS WITHOUT HEMORRHAGE: ICD-10-CM

## 2024-08-26 RX ORDER — ESOMEPRAZOLE MAGNESIUM 40 MG/1
40 CAPSULE, DELAYED RELEASE ORAL
Qty: 90 CAPSULE | Refills: 1 | OUTPATIENT
Start: 2024-08-26

## 2024-08-28 ENCOUNTER — APPOINTMENT (OUTPATIENT)
Dept: ULTRASOUND IMAGING | Age: 49
End: 2024-08-28
Payer: COMMERCIAL

## 2024-08-28 ENCOUNTER — APPOINTMENT (OUTPATIENT)
Dept: GENERAL RADIOLOGY | Age: 49
End: 2024-08-28
Payer: COMMERCIAL

## 2024-08-28 ENCOUNTER — HOSPITAL ENCOUNTER (EMERGENCY)
Age: 49
Discharge: HOME | End: 2024-08-28
Payer: COMMERCIAL

## 2024-08-28 VITALS
TEMPERATURE: 98.4 F | HEIGHT: 64 IN | RESPIRATION RATE: 18 BRPM | SYSTOLIC BLOOD PRESSURE: 141 MMHG | OXYGEN SATURATION: 96 % | WEIGHT: 242 LBS | DIASTOLIC BLOOD PRESSURE: 82 MMHG | HEART RATE: 84 BPM | BODY MASS INDEX: 41.32 KG/M2

## 2024-08-28 DIAGNOSIS — M25.562 ACUTE PAIN OF LEFT KNEE: Primary | ICD-10-CM

## 2024-08-28 PROCEDURE — 73562 X-RAY EXAM OF KNEE 3: CPT

## 2024-08-28 PROCEDURE — 99284 EMERGENCY DEPT VISIT MOD MDM: CPT

## 2024-08-28 PROCEDURE — 93971 EXTREMITY STUDY: CPT

## 2024-08-28 PROCEDURE — 6370000000 HC RX 637 (ALT 250 FOR IP)

## 2024-08-28 RX ORDER — TRAMADOL HYDROCHLORIDE 50 MG/1
50 TABLET ORAL
Status: COMPLETED | OUTPATIENT
Start: 2024-08-28 | End: 2024-08-28

## 2024-08-28 RX ORDER — TRAMADOL HYDROCHLORIDE 50 MG/1
50 TABLET ORAL EVERY 6 HOURS PRN
Qty: 12 TABLET | Refills: 0 | Status: SHIPPED | OUTPATIENT
Start: 2024-08-28 | End: 2024-08-31

## 2024-08-28 RX ADMIN — TRAMADOL HYDROCHLORIDE 50 MG: 50 TABLET ORAL at 20:45

## 2024-08-28 ASSESSMENT — LIFESTYLE VARIABLES
HOW MANY STANDARD DRINKS CONTAINING ALCOHOL DO YOU HAVE ON A TYPICAL DAY: PATIENT DOES NOT DRINK
HOW OFTEN DO YOU HAVE A DRINK CONTAINING ALCOHOL: NEVER

## 2024-08-28 ASSESSMENT — PAIN DESCRIPTION - LOCATION
LOCATION: KNEE
LOCATION: KNEE

## 2024-08-28 ASSESSMENT — PAIN SCALES - GENERAL
PAINLEVEL_OUTOF10: 10
PAINLEVEL_OUTOF10: 10

## 2024-08-28 ASSESSMENT — ENCOUNTER SYMPTOMS
EYES NEGATIVE: 1
GASTROINTESTINAL NEGATIVE: 1
RESPIRATORY NEGATIVE: 1
ALLERGIC/IMMUNOLOGIC NEGATIVE: 1

## 2024-08-28 ASSESSMENT — PAIN DESCRIPTION - ORIENTATION
ORIENTATION: LEFT
ORIENTATION: LEFT

## 2024-08-28 ASSESSMENT — PAIN - FUNCTIONAL ASSESSMENT: PAIN_FUNCTIONAL_ASSESSMENT: 0-10

## 2024-08-28 NOTE — ED TRIAGE NOTES
Patient is having left knee pain that has been going on for about a month. The last 2 weeks the pain has gotten worse. Patient is having pain around the knee cap. She also is having some swelling that runs down to her ankle.     She feels unstable when she walks on it.     No injury that she knows of.     OTC medication is not helping.

## 2024-08-29 NOTE — ED PROVIDER NOTES
Emergency Department Provider Note       PCP: Chelsie Adames APRN - CNP   Age: 49 y.o.   Sex: female     DISPOSITION Decision To Discharge 08/28/2024 09:02:42 PM  Condition at Disposition: Data Unavailable       ICD-10-CM    1. Acute pain of left knee  M25.562 traMADol (ULTRAM) 50 MG tablet          Medical Decision Making     In summary this is a well-appearing nontoxic 49-year-old female arriving for complaints of left knee pain that has been present for the past month although has felt that it has worsened over the past couple weeks.  Patient denies any obvious trauma or overexertion of the knee.  She endorses swelling moving distally in which a fast duplex was obtained which was negative for DVT.  X-ray was negative for acute fracture although joint effusion was noted.  I will wrap the extremity for pain management and generalized compression.  She has if she can get some crutches in which I will oblige.  Weightbearing as tolerated.  Will send with as needed tramadol as she has attempted over-the-counter Tylenol and ibuprofen for this time without relief.  I have recommended continued use of Tylenol and ibuprofen and only to use tramadol for breakthrough pain.  She verbalized understanding of this.  Pulses intact distally.  Good range of motion.  Neurovascularly intact distally.  She has a orthopedic appointment on Wednesday which have instructed her to keep and follow-up with him for further evaluation recommendations.  Very sure return precautions were discussed with patient which she verbalized understanding.  Her  will be driving her home here today.  ED Course as of 08/28/24 2110   Wed Aug 28, 2024   2038 Negative for DVT on vascular duplex. [TC]   2038 Left knee joint effusion noted on plain film per radiology read.  No osseous abnormalities. [TC]      ED Course User Index  [TC] Alonzo Curry APRN - NP     1 acute, uncomplicated illness or injury.  Prescription drug management  kg (242 lb)    Height: 1.626 m (5' 4\")       Physical Exam  Vitals and nursing note reviewed.   Constitutional:       General: She is not in acute distress.     Appearance: Normal appearance. She is obese. She is not ill-appearing.   HENT:      Right Ear: External ear normal.      Left Ear: External ear normal.      Nose: Nose normal.      Mouth/Throat:      Mouth: Mucous membranes are moist.   Eyes:      Extraocular Movements: Extraocular movements intact.      Pupils: Pupils are equal, round, and reactive to light.   Cardiovascular:      Rate and Rhythm: Normal rate.      Pulses: Normal pulses.      Heart sounds: Normal heart sounds.   Pulmonary:      Effort: Pulmonary effort is normal.      Breath sounds: Normal breath sounds.   Abdominal:      Palpations: Abdomen is soft.   Musculoskeletal:      Cervical back: Normal range of motion.      Comments: Swelling to the left knee circumferentially.  Some pain on palpation.  Range of motion limited although present secondary to pain.  Pulses are present distally.  Neurovascularly intact distally.  No surrounding erythema.   Skin:     General: Skin is warm.      Capillary Refill: Capillary refill takes less than 2 seconds.   Neurological:      General: No focal deficit present.      Mental Status: She is alert and oriented to person, place, and time. Mental status is at baseline.   Psychiatric:         Mood and Affect: Mood normal.         Behavior: Behavior normal.         Thought Content: Thought content normal.         Judgment: Judgment normal.        Procedures     Procedures    Orders Placed This Encounter   Procedures    XR KNEE LEFT (3 VIEWS)    ACE Wrap, apply 3\"    [employer] Crutches; Pair, Left Side Injury; Med (5'2\"-5'10\")    Vascular duplex lower extremity venous left        Medications given during this emergency department visit:  Medications   traMADol (ULTRAM) tablet 50 mg (50 mg Oral Given 8/28/24 2045)       Discharge Medication

## 2024-08-29 NOTE — DISCHARGE INSTRUCTIONS
As we discussed, your ultrasound did not show any blood clots in your leg.  Your x-ray did not show any fracture but did note a joint effusion.  You may use the crutches as weightbearing as tolerated.  Please keep your appointment with orthopedist on the fourth.  Please take the tramadol as needed.  Please continue Tylenol and ibuprofen for pain.  As we discussed, please return to the emergency department for any new, worsening, concerning symptoms.

## 2024-08-29 NOTE — ED NOTES
Patient mobility status  with no difficulty. Provider aware     I have reviewed discharge instructions with the patient.  The patient verbalized understanding.    Patient left ED via Discharge Method: ambulatory to Home with Friend.    Opportunity for questions and clarification provided.     Patient given 1 scripts.

## 2024-09-04 ENCOUNTER — OFFICE VISIT (OUTPATIENT)
Dept: ORTHOPEDIC SURGERY | Age: 49
End: 2024-09-04
Payer: COMMERCIAL

## 2024-09-04 DIAGNOSIS — M25.562 ACUTE PAIN OF LEFT KNEE: ICD-10-CM

## 2024-09-04 DIAGNOSIS — M25.562 LEFT KNEE PAIN, UNSPECIFIED CHRONICITY: Primary | ICD-10-CM

## 2024-09-04 DIAGNOSIS — M17.12 PRIMARY OSTEOARTHRITIS OF LEFT KNEE: ICD-10-CM

## 2024-09-04 PROCEDURE — 20611 DRAIN/INJ JOINT/BURSA W/US: CPT | Performed by: STUDENT IN AN ORGANIZED HEALTH CARE EDUCATION/TRAINING PROGRAM

## 2024-09-04 PROCEDURE — 99204 OFFICE O/P NEW MOD 45 MIN: CPT | Performed by: STUDENT IN AN ORGANIZED HEALTH CARE EDUCATION/TRAINING PROGRAM

## 2024-09-04 RX ORDER — HYDROCODONE BITARTRATE AND ACETAMINOPHEN 5; 325 MG/1; MG/1
1 TABLET ORAL EVERY 4 HOURS PRN
Qty: 18 TABLET | Refills: 0 | Status: SHIPPED | OUTPATIENT
Start: 2024-09-04 | End: 2024-09-06

## 2024-09-04 RX ORDER — METHYLPREDNISOLONE ACETATE 40 MG/ML
40 INJECTION, SUSPENSION INTRA-ARTICULAR; INTRALESIONAL; INTRAMUSCULAR; SOFT TISSUE ONCE
Status: COMPLETED | OUTPATIENT
Start: 2024-09-04 | End: 2024-09-04

## 2024-09-04 RX ADMIN — METHYLPREDNISOLONE ACETATE 40 MG: 40 INJECTION, SUSPENSION INTRA-ARTICULAR; INTRALESIONAL; INTRAMUSCULAR; SOFT TISSUE at 10:28

## 2024-09-04 NOTE — PROGRESS NOTES
Name: Will Garcia  YOB: 1975  Gender: female  MRN: [id number]  Date of Encounter:  9/4/2024       CHIEF COMPLAINT:     Chief Complaint   Patient presents with    Knee Pain     Left        SUBJECTIVE/OBJECTIVE:      HPI:    Patient is a 49 y.o. pleasant female who presents today for a new evaluation of her left knee pain.    Pauline presents for progressing left medial and anterior knee pain over the past 4 to 5 months.  There is no injury.  She has pain with various movements now including sitting and standing.  She also feels weakness in her leg.  She gets some radiation of pain up her thigh.  She has noted swelling as well.  She has tried various NSAIDs, Voltaren gel topically, and tramadol without significant relief.  She denies any fevers, chills, redness, recent illness.  She has had a cortisone injection in the knee in the past which she reports gave her over a year of relief.     PAST HISTORY:   Past medical, surgical, family, social history and allergies reviewed by me.   Pertinent history:   Tobacco use:  reports that she quit smoking about 15 years ago. Her smoking use included cigarettes. She has never used smokeless tobacco.  Diabetes: diabetic-non insulin  Hemoglobin A1C   Date Value Ref Range Status   05/20/2024 6.3 (H) 0 - 5.6 % Final     Comment:     Reference Range  Normal       <5.7%  Prediabetes  5.7-6.4%  Diabetes     >6.4%       Anticoagulation: no      REVIEW OF SYSTEMS:   As noted in HPI.     PHYSICAL EXAMINATION:     Gen: Well-developed, no acute distress   HEENT: NC/AT, EOMI   Neck: Trachea midline, normal ROM   CV: Regular rhythm by palpation of distal pulse, normal capillary refill   Pulm: No respiratory distress, no stridor   Psychiatric: Well oriented, normal mood and affect.   Skin: No rashes, lesions or ulcers, normal temperature, turgor, and texture on uninvolved extremity.      ORTHO EXAM:    Left Knee:     There is no erythema, warmth, or appreciated effusion.

## 2024-09-05 ENCOUNTER — PATIENT MESSAGE (OUTPATIENT)
Dept: ORTHOPEDIC SURGERY | Age: 49
End: 2024-09-05

## 2024-09-05 DIAGNOSIS — M25.562 ACUTE PAIN OF LEFT KNEE: Primary | ICD-10-CM

## 2024-09-06 RX ORDER — HYDROCODONE BITARTRATE AND ACETAMINOPHEN 5; 325 MG/1; MG/1
1 TABLET ORAL EVERY 4 HOURS PRN
Qty: 18 TABLET | Refills: 0 | Status: SHIPPED | OUTPATIENT
Start: 2024-09-06 | End: 2024-09-09

## 2024-09-12 NOTE — TELEPHONE ENCOUNTER
Mounjaro sent to [facility].   Mynor Gramajo [phone]   DISCHARGE SUPPORT TEAM (NETWORK) [phone]   PARENT CHILD HEALTH (Maternity/NICU/Pediatrics) [phone]   [facility] [phone]   [facility] [phone]   [facility] [phone]   [facility] [phone]   formerly [facility] [phone]   [facility] [phone]   [facility] [phone]   [facility] [phone]   [facility] [phone]   [facility] [phone]   [facility] [phone]   [facility] [phone]

## 2024-09-16 ENCOUNTER — PATIENT MESSAGE (OUTPATIENT)
Dept: FAMILY MEDICINE CLINIC | Facility: CLINIC | Age: 49
End: 2024-09-16

## 2024-09-16 ENCOUNTER — EVALUATION (OUTPATIENT)
Age: 49
End: 2024-09-16
Payer: COMMERCIAL

## 2024-09-16 DIAGNOSIS — M25.662 DECREASED RANGE OF MOTION (ROM) OF LEFT KNEE: ICD-10-CM

## 2024-09-16 DIAGNOSIS — Z78.9 IMPAIRED MOTOR CONTROL: ICD-10-CM

## 2024-09-16 DIAGNOSIS — M25.562 CHRONIC PAIN OF LEFT KNEE: Primary | ICD-10-CM

## 2024-09-16 DIAGNOSIS — Z74.09 DECREASED FUNCTIONAL MOBILITY AND ENDURANCE: ICD-10-CM

## 2024-09-16 DIAGNOSIS — G89.29 CHRONIC PAIN OF LEFT KNEE: Primary | ICD-10-CM

## 2024-09-16 DIAGNOSIS — Z78.9 IMPAIRED MOBILITY AND ADLS: ICD-10-CM

## 2024-09-16 DIAGNOSIS — R29.898 WEAKNESS OF LEFT LOWER EXTREMITY: ICD-10-CM

## 2024-09-16 DIAGNOSIS — Z74.09 IMPAIRED MOBILITY AND ADLS: ICD-10-CM

## 2024-09-16 PROCEDURE — 97110 THERAPEUTIC EXERCISES: CPT

## 2024-09-16 PROCEDURE — 97161 PT EVAL LOW COMPLEX 20 MIN: CPT

## 2024-09-16 PROCEDURE — 97140 MANUAL THERAPY 1/> REGIONS: CPT

## 2024-09-19 ENCOUNTER — TREATMENT (OUTPATIENT)
Age: 49
End: 2024-09-19
Payer: COMMERCIAL

## 2024-09-19 ENCOUNTER — OFFICE VISIT (OUTPATIENT)
Dept: FAMILY MEDICINE CLINIC | Facility: CLINIC | Age: 49
End: 2024-09-19
Payer: COMMERCIAL

## 2024-09-19 VITALS
HEIGHT: 64 IN | DIASTOLIC BLOOD PRESSURE: 78 MMHG | TEMPERATURE: 97 F | WEIGHT: 253 LBS | RESPIRATION RATE: 18 BRPM | BODY MASS INDEX: 43.19 KG/M2 | SYSTOLIC BLOOD PRESSURE: 124 MMHG

## 2024-09-19 DIAGNOSIS — L03.317 CELLULITIS OF BUTTOCK: Primary | ICD-10-CM

## 2024-09-19 DIAGNOSIS — Z78.9 IMPAIRED MOTOR CONTROL: ICD-10-CM

## 2024-09-19 DIAGNOSIS — M25.562 CHRONIC PAIN OF LEFT KNEE: Primary | ICD-10-CM

## 2024-09-19 DIAGNOSIS — M25.662 DECREASED RANGE OF MOTION (ROM) OF LEFT KNEE: ICD-10-CM

## 2024-09-19 DIAGNOSIS — Z74.09 IMPAIRED MOBILITY AND ADLS: ICD-10-CM

## 2024-09-19 DIAGNOSIS — G89.29 CHRONIC PAIN OF LEFT KNEE: Primary | ICD-10-CM

## 2024-09-19 DIAGNOSIS — R29.898 WEAKNESS OF LEFT LOWER EXTREMITY: ICD-10-CM

## 2024-09-19 DIAGNOSIS — Z74.09 DECREASED FUNCTIONAL MOBILITY AND ENDURANCE: ICD-10-CM

## 2024-09-19 DIAGNOSIS — Z78.9 IMPAIRED MOBILITY AND ADLS: ICD-10-CM

## 2024-09-19 DIAGNOSIS — L03.317 CELLULITIS OF BUTTOCK: ICD-10-CM

## 2024-09-19 PROCEDURE — 97140 MANUAL THERAPY 1/> REGIONS: CPT | Performed by: PHYSICAL THERAPIST

## 2024-09-19 PROCEDURE — 99214 OFFICE O/P EST MOD 30 MIN: CPT | Performed by: NURSE PRACTITIONER

## 2024-09-19 PROCEDURE — 3074F SYST BP LT 130 MM HG: CPT | Performed by: NURSE PRACTITIONER

## 2024-09-19 PROCEDURE — 3078F DIAST BP <80 MM HG: CPT | Performed by: NURSE PRACTITIONER

## 2024-09-19 PROCEDURE — 97110 THERAPEUTIC EXERCISES: CPT | Performed by: PHYSICAL THERAPIST

## 2024-09-19 RX ORDER — CLINDAMYCIN HCL 300 MG
300 CAPSULE ORAL 3 TIMES DAILY
Qty: 30 CAPSULE | Refills: 0 | Status: SHIPPED | OUTPATIENT
Start: 2024-09-19 | End: 2024-09-29

## 2024-09-19 RX ORDER — FLUCONAZOLE 150 MG/1
150 TABLET ORAL WEEKLY
Qty: 2 TABLET | Refills: 0 | Status: SHIPPED | OUTPATIENT
Start: 2024-09-19

## 2024-09-19 SDOH — ECONOMIC STABILITY: FOOD INSECURITY: WITHIN THE PAST 12 MONTHS, YOU WORRIED THAT YOUR FOOD WOULD RUN OUT BEFORE YOU GOT MONEY TO BUY MORE.: NEVER TRUE

## 2024-09-19 SDOH — ECONOMIC STABILITY: FOOD INSECURITY: WITHIN THE PAST 12 MONTHS, THE FOOD YOU BOUGHT JUST DIDN'T LAST AND YOU DIDN'T HAVE MONEY TO GET MORE.: NEVER TRUE

## 2024-09-19 SDOH — ECONOMIC STABILITY: INCOME INSECURITY: HOW HARD IS IT FOR YOU TO PAY FOR THE VERY BASICS LIKE FOOD, HOUSING, MEDICAL CARE, AND HEATING?: NOT HARD AT ALL

## 2024-09-19 ASSESSMENT — ENCOUNTER SYMPTOMS
ABDOMINAL PAIN: 0
CONSTIPATION: 0
WHEEZING: 0
SHORTNESS OF BREATH: 0
DIARRHEA: 0
COUGH: 0
VOMITING: 0
EYE DISCHARGE: 0
CHEST TIGHTNESS: 0
EYE PAIN: 0
RHINORRHEA: 0
BLOOD IN STOOL: 0

## 2024-09-19 ASSESSMENT — PATIENT HEALTH QUESTIONNAIRE - PHQ9
SUM OF ALL RESPONSES TO PHQ QUESTIONS 1-9: 0
1. LITTLE INTEREST OR PLEASURE IN DOING THINGS: NOT AT ALL
SUM OF ALL RESPONSES TO PHQ QUESTIONS 1-9: 0
2. FEELING DOWN, DEPRESSED OR HOPELESS: NOT AT ALL
SUM OF ALL RESPONSES TO PHQ9 QUESTIONS 1 & 2: 0
SUM OF ALL RESPONSES TO PHQ QUESTIONS 1-9: 0
SUM OF ALL RESPONSES TO PHQ QUESTIONS 1-9: 0

## 2024-09-20 ENCOUNTER — TELEPHONE (OUTPATIENT)
Age: 49
End: 2024-09-20

## 2024-09-20 ENCOUNTER — OFFICE VISIT (OUTPATIENT)
Dept: ORTHOPEDIC SURGERY | Age: 49
End: 2024-09-20

## 2024-09-20 DIAGNOSIS — M17.12 PRIMARY OSTEOARTHRITIS OF LEFT KNEE: ICD-10-CM

## 2024-09-20 DIAGNOSIS — M25.562 ACUTE PAIN OF LEFT KNEE: Primary | ICD-10-CM

## 2024-09-20 DIAGNOSIS — M25.562 LEFT KNEE PAIN, UNSPECIFIED CHRONICITY: ICD-10-CM

## 2024-09-22 LAB
BACTERIA SPEC CULT: ABNORMAL
BACTERIA SPEC CULT: ABNORMAL
GRAM STN SPEC: ABNORMAL
GRAM STN SPEC: ABNORMAL
SERVICE CMNT-IMP: ABNORMAL

## 2024-09-24 ENCOUNTER — TELEPHONE (OUTPATIENT)
Dept: ORTHOPEDIC SURGERY | Age: 49
End: 2024-09-24

## 2024-09-26 ENCOUNTER — TELEPHONE (OUTPATIENT)
Dept: FAMILY MEDICINE CLINIC | Facility: CLINIC | Age: 49
End: 2024-09-26

## 2024-09-26 ENCOUNTER — TREATMENT (OUTPATIENT)
Age: 49
End: 2024-09-26

## 2024-09-26 DIAGNOSIS — M25.662 DECREASED RANGE OF MOTION (ROM) OF LEFT KNEE: ICD-10-CM

## 2024-09-26 DIAGNOSIS — Z74.09 IMPAIRED MOBILITY AND ADLS: ICD-10-CM

## 2024-09-26 DIAGNOSIS — Z74.09 DECREASED FUNCTIONAL MOBILITY AND ENDURANCE: ICD-10-CM

## 2024-09-26 DIAGNOSIS — M25.562 CHRONIC PAIN OF LEFT KNEE: Primary | ICD-10-CM

## 2024-09-26 DIAGNOSIS — E11.65 TYPE 2 DIABETES MELLITUS WITH HYPERGLYCEMIA, WITHOUT LONG-TERM CURRENT USE OF INSULIN (HCC): ICD-10-CM

## 2024-09-26 DIAGNOSIS — N02.9 IDIOPATHIC HEMATURIA, UNSPECIFIED WHETHER GLOMERULAR MORPHOLOGIC CHANGES PRESENT: ICD-10-CM

## 2024-09-26 DIAGNOSIS — G89.29 CHRONIC PAIN OF LEFT KNEE: Primary | ICD-10-CM

## 2024-09-26 DIAGNOSIS — E78.2 MIXED HYPERLIPIDEMIA: ICD-10-CM

## 2024-09-26 DIAGNOSIS — R29.898 WEAKNESS OF LEFT LOWER EXTREMITY: ICD-10-CM

## 2024-09-26 DIAGNOSIS — J30.1 NON-SEASONAL ALLERGIC RHINITIS DUE TO POLLEN: ICD-10-CM

## 2024-09-26 DIAGNOSIS — Z78.9 IMPAIRED MOTOR CONTROL: ICD-10-CM

## 2024-09-26 DIAGNOSIS — I10 ESSENTIAL HYPERTENSION: ICD-10-CM

## 2024-09-26 DIAGNOSIS — Z78.9 IMPAIRED MOBILITY AND ADLS: ICD-10-CM

## 2024-09-26 RX ORDER — FLUTICASONE PROPIONATE 50 MCG
2 SPRAY, SUSPENSION (ML) NASAL DAILY
Qty: 3 EACH | Refills: 0 | Status: SHIPPED | OUTPATIENT
Start: 2024-09-26

## 2024-09-26 RX ORDER — GABAPENTIN 300 MG/1
300 CAPSULE ORAL 3 TIMES DAILY
Qty: 270 CAPSULE | Refills: 0 | Status: SHIPPED | OUTPATIENT
Start: 2024-09-26 | End: 2025-03-25

## 2024-09-26 RX ORDER — LORATADINE 10 MG/1
10 TABLET ORAL DAILY
Qty: 90 TABLET | Refills: 0 | Status: SHIPPED | OUTPATIENT
Start: 2024-09-26

## 2024-09-26 RX ORDER — HYDROCHLOROTHIAZIDE 25 MG/1
25 TABLET ORAL DAILY
Qty: 90 TABLET | Refills: 0 | Status: SHIPPED | OUTPATIENT
Start: 2024-09-26 | End: 2024-11-25 | Stop reason: SDUPTHER

## 2024-09-26 RX ORDER — PRAVASTATIN SODIUM 40 MG
40 TABLET ORAL NIGHTLY
Qty: 90 TABLET | Refills: 0 | Status: SHIPPED | OUTPATIENT
Start: 2024-09-26

## 2024-09-26 RX ORDER — TAMSULOSIN HYDROCHLORIDE 0.4 MG/1
0.4 CAPSULE ORAL DAILY
Qty: 90 CAPSULE | Refills: 0 | Status: SHIPPED | OUTPATIENT
Start: 2024-09-26

## 2024-09-26 NOTE — TELEPHONE ENCOUNTER
A 90 day script for her meds sent to [facility] [address]  Patient needs an in office appointment in December.  Chelsie Adames

## 2024-10-01 ENCOUNTER — TREATMENT (OUTPATIENT)
Age: 49
End: 2024-10-01
Payer: COMMERCIAL

## 2024-10-01 DIAGNOSIS — Z78.9 IMPAIRED MOTOR CONTROL: ICD-10-CM

## 2024-10-01 DIAGNOSIS — Z74.09 DECREASED FUNCTIONAL MOBILITY AND ENDURANCE: ICD-10-CM

## 2024-10-01 DIAGNOSIS — Z74.09 IMPAIRED MOBILITY AND ADLS: ICD-10-CM

## 2024-10-01 DIAGNOSIS — M25.662 DECREASED RANGE OF MOTION (ROM) OF LEFT KNEE: ICD-10-CM

## 2024-10-01 DIAGNOSIS — M25.562 CHRONIC PAIN OF LEFT KNEE: Primary | ICD-10-CM

## 2024-10-01 DIAGNOSIS — R29.898 WEAKNESS OF LEFT LOWER EXTREMITY: ICD-10-CM

## 2024-10-01 DIAGNOSIS — G89.29 CHRONIC PAIN OF LEFT KNEE: Primary | ICD-10-CM

## 2024-10-01 DIAGNOSIS — Z78.9 IMPAIRED MOBILITY AND ADLS: ICD-10-CM

## 2024-10-01 PROCEDURE — 97140 MANUAL THERAPY 1/> REGIONS: CPT

## 2024-10-01 PROCEDURE — 97110 THERAPEUTIC EXERCISES: CPT

## 2024-10-01 NOTE — PROGRESS NOTES
[facility]  [facility] [address]  Dept: [phone]      Physical Therapy Daily Note     Referring MD: Pinky Rivera MD  Diagnosis:     ICD-10-CM    1. Chronic pain of left knee  M25.562     G89.29       2. Decreased range of motion (ROM) of left knee  M25.662       3. Weakness of left lower extremity  R29.898       4. Impaired motor control  Z78.9       5. Impaired mobility and ADLs  Z74.09     Z78.9       6. Decreased functional mobility and endurance  Z74.09          Therapy precautions:Diabetes/neuropathy- sensory precautions  Co-morbidities affecting plan of care: HTN (controlled), TII DM, GERD    Chief complaints/history of injury: Patient reports a 4 month history of L medial knee pain, indicating along the medial patellar border and wrapping around the inferior patella. There was no specific injury, only a progressive onset of pain with a sense of weakness and occasional feeling of instability or giving out.giving out.    Date symptoms began: ~ 4 months  Nature of condition: Chronic (continuous duration > 3 months)  Primary cause of current episode: Unspecified  How did symptoms start: see above  Describe current symptoms: pain of L medial knee (along medial patellar border)    Received previous outpatient therapy? No    Pain Assessment:  Pain location: L medial knee    Average Pain/symptom intensity (0-10 scale)  Last 24 hours: 6/10  Last week (1-7 days): 6/10  How often do you feel symptoms? Constant (% of time)  Description: aching and stabbing  Aggravating factors:   Alleviating factors: rest and sitting    Patient Stated Goals: walk without pain    Initial Evaluation: 9/16/24  Last Progress Note: n/a  Total Visits: 4   Payor: Payor: [employer] /  /  /   Billing pattern: Commercial- substantial/midpoint time each CPT    Total Timed Codes: 35 min, Total Treatment Time: 35 min  Modifier needed: No    SUBJECTIVE     Pt medial knee pain with the brace, along her

## 2024-10-04 ENCOUNTER — TELEPHONE (OUTPATIENT)
Age: 49
End: 2024-10-04

## 2024-10-04 NOTE — TELEPHONE ENCOUNTER
Pt did not show for their scheduled therapy appointment today.    Reason: unknown  Communication: ANTONIETTA

## 2024-10-14 DIAGNOSIS — M25.562 LEFT KNEE PAIN, UNSPECIFIED CHRONICITY: ICD-10-CM

## 2024-10-14 RX ORDER — IBUPROFEN 800 MG/1
800 TABLET, FILM COATED ORAL 2 TIMES DAILY PRN
Qty: 60 TABLET | Refills: 0 | Status: SHIPPED | OUTPATIENT
Start: 2024-10-14

## 2024-10-14 NOTE — PROGRESS NOTES
Name: Will Garcia  YOB: 1975  Gender: female  MRN: [id number]  Date of Encounter:  10/16/2024       CHIEF COMPLAINT:     Chief Complaint   Patient presents with    Follow-up     Left Knee        SUBJECTIVE/OBJECTIVE:      HPI:    Patient is a 49 y.o. pleasant female who presents today for a return evaluation of her left knee.    Working diagnosis:   1. Primary osteoarthritis of left knee    2. Knee meniscus pain, left       LOV: 9/20/2024     Recall Hx:  Pauline has a History of HTN, DM2 with neuropathy, GERD, obesity, ROBERTO. She presented initially for atraumatic anterior knee pain with various movements now including sitting and standing.  She also feels weakness in her leg.  She gets some radiation of pain up her thigh.  She has noted swelling as well.  She has tried various NSAIDs, Voltaren gel topically, and tramadol without significant relief.  She denies any fevers, chills, redness, recent illness.  She has had a cortisone injection in the knee in the past which she reports gave her over a year of relief.   X-ray of knee showed moderate medial and patellofemoral arthritis    9/4/24: Initial eval, suspect arthritis and possible medial meniscus tear. Recommended and performed CSI. Recommended course of formal therapy. Measurements taken for medial  brace.    10/16/24: She did not get much relief from injection, bracing. She reports being compliant with PT,  though I only see 1 office visit documented in chart.      PAST HISTORY:   Past medical, surgical, family, social history and allergies reviewed by me. Unchanged from prior visit.     REVIEW OF SYSTEMS:   As noted in HPI.     PHYSICAL EXAMINATION:     Gen: Well-developed, no acute distress   HEENT: NC/AT, EOMI   Neck: Trachea midline, normal ROM   CV: Regular rhythm by palpation of distal pulse, normal capillary refill   Pulm: No respiratory distress, no stridor   Psychiatric: Well oriented, normal mood and affect.   Skin: No

## 2024-10-16 ENCOUNTER — TELEPHONE (OUTPATIENT)
Dept: ORTHOPEDIC SURGERY | Age: 49
End: 2024-10-16

## 2024-10-16 ENCOUNTER — OFFICE VISIT (OUTPATIENT)
Dept: ORTHOPEDIC SURGERY | Age: 49
End: 2024-10-16
Payer: COMMERCIAL

## 2024-10-16 DIAGNOSIS — M25.562 KNEE MENISCUS PAIN, LEFT: ICD-10-CM

## 2024-10-16 DIAGNOSIS — M17.12 PRIMARY OSTEOARTHRITIS OF LEFT KNEE: Primary | ICD-10-CM

## 2024-10-16 PROCEDURE — 99213 OFFICE O/P EST LOW 20 MIN: CPT | Performed by: STUDENT IN AN ORGANIZED HEALTH CARE EDUCATION/TRAINING PROGRAM

## 2024-10-16 NOTE — TELEPHONE ENCOUNTER
MRI LEFT KNEE APPROVAL     Status   Current Status: Approved   Validity Period: 10/16/2024 - 11/15/2024   Authorization: [id number] (Knee MRI (left))   Patient   Name: PRESTON SARMIENTO   Subscriber ID: [id number]   YOB: 1975   Gender: Female   Physician   Name: REYES COTTON   Provider ID: [id number]

## 2024-10-20 ENCOUNTER — APPOINTMENT (OUTPATIENT)
Dept: GENERAL RADIOLOGY | Age: 49
End: 2024-10-20
Payer: COMMERCIAL

## 2024-10-20 ENCOUNTER — HOSPITAL ENCOUNTER (EMERGENCY)
Age: 49
Discharge: HOME | End: 2024-10-20
Payer: COMMERCIAL

## 2024-10-20 VITALS
HEIGHT: 64 IN | HEART RATE: 74 BPM | WEIGHT: 242 LBS | SYSTOLIC BLOOD PRESSURE: 146 MMHG | TEMPERATURE: 98 F | OXYGEN SATURATION: 95 % | BODY MASS INDEX: 41.32 KG/M2 | DIASTOLIC BLOOD PRESSURE: 75 MMHG | RESPIRATION RATE: 16 BRPM

## 2024-10-20 DIAGNOSIS — M79.641 HAND PAIN, RIGHT: ICD-10-CM

## 2024-10-20 DIAGNOSIS — M25.531 RIGHT WRIST PAIN: ICD-10-CM

## 2024-10-20 DIAGNOSIS — M79.601 RIGHT ARM PAIN: ICD-10-CM

## 2024-10-20 DIAGNOSIS — M19.021 ARTHRITIS OF ELBOW, RIGHT: ICD-10-CM

## 2024-10-20 DIAGNOSIS — M19.011 ARTHRITIS OF SHOULDER REGION, RIGHT: ICD-10-CM
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
## 2024-10-20 PROCEDURE — 73110 X-RAY EXAM OF WRIST: CPT

## 2024-10-20 PROCEDURE — 73090 X-RAY EXAM OF FOREARM: CPT

## 2024-10-20 PROCEDURE — 73060 X-RAY EXAM OF HUMERUS: CPT

## 2024-10-20 PROCEDURE — 99283 EMERGENCY DEPT VISIT LOW MDM: CPT

## 2024-10-20 PROCEDURE — 73120 X-RAY EXAM OF HAND: CPT

## 2024-10-20 ASSESSMENT — PAIN - FUNCTIONAL ASSESSMENT: PAIN_FUNCTIONAL_ASSESSMENT: 0-10

## 2024-10-20 ASSESSMENT — PAIN SCALES - GENERAL
PAINLEVEL_OUTOF10: 5
PAINLEVEL_OUTOF10: 5

## 2024-10-20 ASSESSMENT — PAIN DESCRIPTION - ORIENTATION: ORIENTATION: RIGHT

## 2024-10-20 ASSESSMENT — PAIN DESCRIPTION - LOCATION: LOCATION: HAND

## 2024-10-20 NOTE — DISCHARGE INSTRUCTIONS
Rest, ice, elevate, avoid painful activities. ED if worse. Follow up with Ortho for recheck. Use brace for comfort.

## 2024-10-20 NOTE — ED PROVIDER NOTES
HYDROCHLOROTHIAZIDE (HYDRODIURIL) 25 MG TABLET    Take 1 tablet by mouth daily    IBUPROFEN (ADVIL;MOTRIN) 800 MG TABLET    Take 1 tablet by mouth 2 times daily as needed for Pain    LORATADINE (CLARITIN) 10 MG TABLET    Take 1 tablet by mouth daily    METFORMIN (GLUCOPHAGE) 500 MG TABLET    Take 1 tablet by mouth 2 times daily (with meals)    PRAVASTATIN (PRAVACHOL) 40 MG TABLET    Take 1 tablet by mouth nightly    TAMSULOSIN (FLOMAX) 0.4 MG CAPSULE    Take 1 capsule by mouth daily    TIRZEPATIDE (MOUNJARO) 12.5 MG/0.5ML SOPN SC INJECTION    Inject 0.5 mLs into the skin once a week    TIRZEPATIDE (MOUNJARO) 15 MG/0.5ML SOPN SC INJECTION    Inject 0.5 mLs into the skin once a week        Results for orders placed or performed during the hospital encounter of 10/20/24   XR HAND RIGHT (2 VIEWS)    Narrative    EXAMINATION: XR HAND RIGHT (2 VIEWS)    HISTORY: pain/trauma.      TECHNIQUE: Frontal and lateral views of the right hand.    COMPARISON: None available.    FINDINGS:   There is no evidence of acute fracture or dislocation.  Joint spaces are maintained.   Soft tissues are within normal limits.  No radiopaque foreign body.      Impression    No evidence of acute fracture or dislocation within the right hand.    Electronically signed by GOPI OSEI   XR RADIUS ULNA RIGHT (2 VIEWS)    Narrative    Right humerus, 2 views of the radius/ulna    INDICATION: Trauma    COMPARISON:  None    TECHNIQUE: AP and lateral views of the right humerus were obtained. 2 views of  the radius and ulna    FINDINGS: Humerus is intact. No fractures or other bony abnormality is seen.  Humeral head is normally located.    Mild degenerative changes noted at the right shoulder. Early degenerative  changes noted at the right elbow joint.    Subtle linear lucency noted at the distal radius which could represent nutrient  channel, recommend dedicated wrist radiographs for further evaluation.      Impression    1. No acute displaced fracture

## 2024-10-21 ENCOUNTER — TELEPHONE (OUTPATIENT)
Dept: ORTHOPEDIC SURGERY | Age: 49
End: 2024-10-21

## 2024-10-25 NOTE — PROGRESS NOTES
Name: Will Garcia  YOB: 1975  Gender: female  MRN: [id number]      CC: Results (Left Knee MRI)       HPI: Will Garcia is a 49 y.o. female who returns for follow up and MRI results of the left knee.     Recall Hx:   Pauline has a History of HTN, DM2 with neuropathy, GERD, obesity, ROBERTO. She presented initially for atraumatic anterior knee pain with various movements now including sitting and standing.  She also feels weakness in her leg.  She gets some radiation of pain up her thigh.  She has noted swelling as well.  She has tried various NSAIDs, Voltaren gel topically, and tramadol without significant relief.  She denies any fevers, chills, redness, recent illness.  She has had a cortisone injection in the knee in the past which she reports gave her over a year of relief.   X-ray of knee showed moderate medial and patellofemoral arthritis     9/4/24: Initial eval, suspect arthritis and possible medial meniscus tear. Recommended and performed CSI. Recommended course of formal therapy. Measurements taken for medial  brace.     10/16/24: She did not get much relief from injection, bracing. She reports being compliant with PT,  though I only see 1 office visit documented in chart.     10/28/24: She has grown quite frustrated with the process as she is in fairly consistent pain on a daily basis particularly at night. She is done six weeks of physical therapy, child knee injection as well as  brace without improvement. 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     Physical Examination:  General: no acute distress, well appearing  Lungs: no respiratory distress or stridor   CV: regular rhythm by pulse, normal capillary refill    Right knee  There is a minimal effusion. Flexion 120, extension 0. There is notable tenderness to the medial joint line and pes bursa, very tender. There is medial jointline pain with Natividad exam and Thessaly testing.

## 2024-10-28 ENCOUNTER — OFFICE VISIT (OUTPATIENT)
Dept: ORTHOPEDIC SURGERY | Age: 49
End: 2024-10-28

## 2024-10-28 DIAGNOSIS — M25.562 KNEE MENISCUS PAIN, LEFT: ICD-10-CM

## 2024-10-28 DIAGNOSIS — M70.52 PES ANSERINUS BURSITIS OF LEFT KNEE: Primary | ICD-10-CM

## 2024-10-28 RX ORDER — METHYLPREDNISOLONE ACETATE 40 MG/ML
40 INJECTION, SUSPENSION INTRA-ARTICULAR; INTRALESIONAL; INTRAMUSCULAR; SOFT TISSUE ONCE
Status: COMPLETED | OUTPATIENT
Start: 2024-10-28 | End: 2024-10-28

## 2024-10-28 RX ADMIN — METHYLPREDNISOLONE ACETATE 40 MG: 40 INJECTION, SUSPENSION INTRA-ARTICULAR; INTRALESIONAL; INTRAMUSCULAR; SOFT TISSUE at 16:04

## 2024-10-31 ENCOUNTER — PATIENT MESSAGE (OUTPATIENT)
Dept: FAMILY MEDICINE CLINIC | Facility: CLINIC | Age: 49
End: 2024-10-31

## 2024-10-31 DIAGNOSIS — K21.9 GASTROESOPHAGEAL REFLUX DISEASE WITHOUT ESOPHAGITIS: Primary | ICD-10-CM

## 2024-11-01 RX ORDER — ESOMEPRAZOLE MAGNESIUM 40 MG/1
40 CAPSULE, DELAYED RELEASE ORAL
Qty: 90 CAPSULE | Refills: 1 | Status: SHIPPED | OUTPATIENT
Start: 2024-11-01

## 2024-11-07 ENCOUNTER — OFFICE VISIT (OUTPATIENT)
Dept: ORTHOPEDIC SURGERY | Age: 49
End: 2024-11-07
Payer: COMMERCIAL

## 2024-11-07 VITALS — BODY MASS INDEX: 41.32 KG/M2 | WEIGHT: 242 LBS | HEIGHT: 64 IN

## 2024-11-07 DIAGNOSIS — S83.242A ACUTE MEDIAL MENISCUS TEAR, LEFT, INITIAL ENCOUNTER: Primary | ICD-10-CM

## 2024-11-07 PROCEDURE — 99204 OFFICE O/P NEW MOD 45 MIN: CPT | Performed by: ORTHOPAEDIC SURGERY

## 2024-11-07 NOTE — PROGRESS NOTES
Name: Will Garcia  YOB: 1975  Gender: female  MRN: [id number]      CC: Knee Pain (L)       HPI: Will Garcia is a 49 y.o. female who presents with Knee Pain (L)  .  She is a new patient to me today referred by Dr. Pinky Rivera.  Patient reports insidious onset of knee pain several months ago.  She has tried physical therapy, an  brace, and injections without any significant improvements other than transient benefit from intra-articular injection.  She is referred here for surgical consultation today          Current Outpatient Medications:     esomeprazole (NEXIUM) 40 MG delayed release capsule, Take 1 capsule by mouth every morning (before breakfast), Disp: 90 capsule, Rfl: 1    ibuprofen (ADVIL;MOTRIN) 800 MG tablet, Take 1 tablet by mouth 2 times daily as needed for Pain, Disp: 60 tablet, Rfl: 0    fluticasone (FLONASE) 50 MCG/ACT nasal spray, 2 sprays by Nasal route daily, Disp: 3 each, Rfl: 0    gabapentin (NEURONTIN) 300 MG capsule, Take 1 capsule by mouth 3 times daily for 180 days., Disp: 270 capsule, Rfl: 0    hydroCHLOROthiazide (HYDRODIURIL) 25 MG tablet, Take 1 tablet by mouth daily, Disp: 90 tablet, Rfl: 0    loratadine (CLARITIN) 10 MG tablet, Take 1 tablet by mouth daily, Disp: 90 tablet, Rfl: 0    metFORMIN (GLUCOPHAGE) 500 MG tablet, Take 1 tablet by mouth 2 times daily (with meals), Disp: 180 tablet, Rfl: 0    pravastatin (PRAVACHOL) 40 MG tablet, Take 1 tablet by mouth nightly, Disp: 90 tablet, Rfl: 0    tamsulosin (FLOMAX) 0.4 MG capsule, Take 1 capsule by mouth daily, Disp: 90 capsule, Rfl: 0    Tirzepatide (MOUNJARO) 15 MG/0.5ML SOPN SC injection, Inject 0.5 mLs into the skin once a week, Disp: 12 Adjustable Dose Pre-filled Pen Syringe, Rfl: 1    Tirzepatide (MOUNJARO) 12.5 MG/0.5ML SOPN SC injection, Inject 0.5 mLs into the skin once a week, Disp: 2 mL, Rfl: 3  Allergies   Allergen Reactions    Ace Inhibitors Anaphylaxis and Angioedema    Hydromorphone

## 2024-11-08 DIAGNOSIS — M25.562 KNEE MENISCUS PAIN, LEFT: Primary | ICD-10-CM

## 2024-11-12 ENCOUNTER — TREATMENT (OUTPATIENT)
Age: 49
End: 2024-11-12
Payer: COMMERCIAL

## 2024-11-12 DIAGNOSIS — M25.562 CHRONIC PAIN OF LEFT KNEE: Primary | ICD-10-CM

## 2024-11-12 DIAGNOSIS — M25.662 DECREASED RANGE OF MOTION (ROM) OF LEFT KNEE: ICD-10-CM

## 2024-11-12 DIAGNOSIS — G89.29 CHRONIC PAIN OF LEFT KNEE: Primary | ICD-10-CM

## 2024-11-12 DIAGNOSIS — R29.898 WEAKNESS OF LEFT LOWER EXTREMITY: ICD-10-CM

## 2024-11-12 DIAGNOSIS — Z74.09 DECREASED FUNCTIONAL MOBILITY AND ENDURANCE: ICD-10-CM

## 2024-11-12 PROCEDURE — 97110 THERAPEUTIC EXERCISES: CPT | Performed by: PHYSICAL THERAPIST

## 2024-11-12 PROCEDURE — 97140 MANUAL THERAPY 1/> REGIONS: CPT | Performed by: PHYSICAL THERAPIST

## 2024-11-12 NOTE — PROGRESS NOTES
[facility] PT [facility] ORTHOPAEDICS  [address] [facility] [address]  Dept: [phone]      Physical Therapy Updated Plan of Care     Referring MD: Aram Abdi MD  Diagnosis:    Diagnosis Orders   1. Chronic pain of left knee        2. Decreased range of motion (ROM) of left knee        3. Weakness of left lower extremity        4. Decreased functional mobility and endurance            Therapy precautions:Diabetes/neuropathy- sensory precautions  Co-morbidities affecting plan of care: HTN (controlled), TII DM, GERD    Chief complaints/history of injury: Patient reports a 4 month history of L medial knee pain, indicating along the medial patellar border and wrapping around the inferior patella. There was no specific injury, only a progressive onset of pain with a sense of weakness and occasional feeling of instability or giving out.giving out.    Diagnostic exams (per chart review):   X-ray LEFT knee 4 vw AP / lateral / Skier / sunrise for knee pain     Findings: Medial joint line joint space narrowing with very mild marginal osteophytosis.  There is also mild patellofemoral arthritis and superior patellar enthesopathy.  There is no effusion.  Impression: Moderate medial and patellofemoral arthritis    MRI (10/22/24) Impression: Inner surface fraying of the posterior horn of the medial meniscus with moderate extrusion of the medial meniscus without a  surfacing meniscal tear. Moderate thinning of the articular cartilage of the medial femorotibial compartment. MCL sprain. Pes anserine bursitis. Mild thinning of the articular cartilage of the lateral femorotibial compartment. 5 mm in diameter near full-thickness chondral defect in the inner aspect of the medial patellar facet. Deep infrapatellar bursitis. Prepatellar subcutaneous soft tissue edema. Small joint effusion with small popliteal cyst.     Assessment:   1. Pes anserinus bursitis of left knee    2. Knee meniscus pain, left        Patient Stated

## 2024-11-14 ENCOUNTER — CLINICAL DOCUMENTATION (OUTPATIENT)
Dept: ORTHOPEDIC SURGERY | Age: 49
End: 2024-11-14

## 2024-11-14 ENCOUNTER — OFFICE VISIT (OUTPATIENT)
Dept: ORTHOPEDIC SURGERY | Age: 49
End: 2024-11-14
Payer: COMMERCIAL

## 2024-11-14 DIAGNOSIS — M25.511 ACUTE PAIN OF RIGHT SHOULDER: Primary | ICD-10-CM

## 2024-11-14 PROCEDURE — 99214 OFFICE O/P EST MOD 30 MIN: CPT | Performed by: STUDENT IN AN ORGANIZED HEALTH CARE EDUCATION/TRAINING PROGRAM

## 2024-11-14 NOTE — PROGRESS NOTES
[facility] PT [facility] ORTHOPAEDICS  [address] [place] [address]  Dept: [phone]      Physical Therapy Updated Plan of Care     Referring MD: Aram Abdi MD  Diagnosis:    Diagnosis Orders   1. Chronic pain of left knee        2. Decreased range of motion (ROM) of left knee        3. Weakness of left lower extremity        4. Decreased functional mobility and endurance        5. Impaired motor control            Therapy precautions:Diabetes/neuropathy- sensory precautions  Co-morbidities affecting plan of care: HTN (controlled), TII DM, GERD    Chief complaints/history of injury: Patient reports a 4 month history of L medial knee pain, indicating along the medial patellar border and wrapping around the inferior patella. There was no specific injury, only a progressive onset of pain with a sense of weakness and occasional feeling of instability or giving out.giving out.    Diagnostic exams (per chart review):   X-ray LEFT knee 4 vw AP / lateral / Skier / sunrise for knee pain     Findings: Medial joint line joint space narrowing with very mild marginal osteophytosis.  There is also mild patellofemoral arthritis and superior patellar enthesopathy.  There is no effusion.  Impression: Moderate medial and patellofemoral arthritis    MRI (10/22/24) Impression: Inner surface fraying of the posterior horn of the medial meniscus with moderate extrusion of the medial meniscus without a  surfacing meniscal tear. Moderate thinning of the articular cartilage of the medial femorotibial compartment. MCL sprain. Pes anserine bursitis. Mild thinning of the articular cartilage of the lateral femorotibial compartment. 5 mm in diameter near full-thickness chondral defect in the inner aspect of the medial patellar facet. Deep infrapatellar bursitis. Prepatellar subcutaneous soft tissue edema. Small joint effusion with small popliteal cyst.     Assessment:   1. Pes anserinus bursitis of left knee    2. Knee
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
## 2024-11-15 ENCOUNTER — TREATMENT (OUTPATIENT)
Age: 49
End: 2024-11-15

## 2024-11-15 DIAGNOSIS — Z74.09 DECREASED FUNCTIONAL MOBILITY AND ENDURANCE: ICD-10-CM

## 2024-11-15 DIAGNOSIS — M25.662 DECREASED RANGE OF MOTION (ROM) OF LEFT KNEE: ICD-10-CM

## 2024-11-15 DIAGNOSIS — G89.29 CHRONIC PAIN OF LEFT KNEE: Primary | ICD-10-CM

## 2024-11-15 DIAGNOSIS — R29.898 WEAKNESS OF LEFT LOWER EXTREMITY: ICD-10-CM

## 2024-11-15 DIAGNOSIS — M25.562 CHRONIC PAIN OF LEFT KNEE: Primary | ICD-10-CM

## 2024-11-15 DIAGNOSIS — Z78.9 IMPAIRED MOTOR CONTROL: ICD-10-CM

## 2024-11-18 NOTE — PROGRESS NOTES
[facility] PT [facility] ORTHOPAEDICS  [address] [facility] [address]  Dept: [phone]      Physical Therapy Daily Note     Referring MD: Aram Abdi MD  Diagnosis:    Diagnosis Orders   1. Chronic pain of left knee        2. Decreased range of motion (ROM) of left knee        3. Weakness of left lower extremity        4. Decreased functional mobility and endurance        5. Impaired motor control        6. Impaired mobility and ADLs            Therapy precautions:Diabetes/neuropathy- sensory precautions  Co-morbidities affecting plan of care: HTN (controlled), TII DM, GERD    Chief complaints/history of injury: Patient reports a 4 month history of L medial knee pain, indicating along the medial patellar border and wrapping around the inferior patella. There was no specific injury, only a progressive onset of pain with a sense of weakness and occasional feeling of instability or giving out.giving out.    Diagnostic exams (per chart review):   X-ray LEFT knee 4 vw AP / lateral / Skier / sunrise for knee pain     Findings: Medial joint line joint space narrowing with very mild marginal osteophytosis.  There is also mild patellofemoral arthritis and superior patellar enthesopathy.  There is no effusion.  Impression: Moderate medial and patellofemoral arthritis    MRI (10/22/24) Impression: Inner surface fraying of the posterior horn of the medial meniscus with moderate extrusion of the medial meniscus without a  surfacing meniscal tear. Moderate thinning of the articular cartilage of the medial femorotibial compartment. MCL sprain. Pes anserine bursitis. Mild thinning of the articular cartilage of the lateral femorotibial compartment. 5 mm in diameter near full-thickness chondral defect in the inner aspect of the medial patellar facet. Deep infrapatellar bursitis. Prepatellar subcutaneous soft tissue edema. Small joint effusion with small popliteal cyst.     Assessment:   1. Pes anserinus bursitis of left

## 2024-11-19 ENCOUNTER — TREATMENT (OUTPATIENT)
Age: 49
End: 2024-11-19

## 2024-11-19 DIAGNOSIS — Z78.9 IMPAIRED MOTOR CONTROL: ICD-10-CM

## 2024-11-19 DIAGNOSIS — R29.898 WEAKNESS OF LEFT LOWER EXTREMITY: ICD-10-CM

## 2024-11-19 DIAGNOSIS — M25.562 CHRONIC PAIN OF LEFT KNEE: Primary | ICD-10-CM

## 2024-11-19 DIAGNOSIS — Z78.9 IMPAIRED MOBILITY AND ADLS: ICD-10-CM

## 2024-11-19 DIAGNOSIS — G89.29 CHRONIC PAIN OF LEFT KNEE: Primary | ICD-10-CM

## 2024-11-19 DIAGNOSIS — Z74.09 DECREASED FUNCTIONAL MOBILITY AND ENDURANCE: ICD-10-CM

## 2024-11-19 DIAGNOSIS — Z74.09 IMPAIRED MOBILITY AND ADLS: ICD-10-CM

## 2024-11-19 DIAGNOSIS — M25.662 DECREASED RANGE OF MOTION (ROM) OF LEFT KNEE: ICD-10-CM

## 2024-11-22 ENCOUNTER — TREATMENT (OUTPATIENT)
Age: 49
End: 2024-11-22

## 2024-11-22 DIAGNOSIS — M25.662 DECREASED RANGE OF MOTION (ROM) OF LEFT KNEE: ICD-10-CM

## 2024-11-22 DIAGNOSIS — R29.898 WEAKNESS OF LEFT LOWER EXTREMITY: ICD-10-CM

## 2024-11-22 DIAGNOSIS — Z74.09 IMPAIRED MOBILITY AND ADLS: ICD-10-CM

## 2024-11-22 DIAGNOSIS — G89.29 CHRONIC PAIN OF LEFT KNEE: Primary | ICD-10-CM

## 2024-11-22 DIAGNOSIS — M25.562 CHRONIC PAIN OF LEFT KNEE: Primary | ICD-10-CM

## 2024-11-22 DIAGNOSIS — Z78.9 IMPAIRED MOTOR CONTROL: ICD-10-CM

## 2024-11-22 DIAGNOSIS — Z74.09 DECREASED FUNCTIONAL MOBILITY AND ENDURANCE: ICD-10-CM

## 2024-11-22 DIAGNOSIS — Z78.9 IMPAIRED MOBILITY AND ADLS: ICD-10-CM

## 2024-11-22 NOTE — PROGRESS NOTES
[facility] PT [facility] ORTHOPAEDICS  [address] [facility] [address]  Dept: [phone]      Physical Therapy Daily Note     Referring MD: Aram Abdi MD  Diagnosis:    Diagnosis Orders   1. Chronic pain of left knee        2. Decreased range of motion (ROM) of left knee        3. Weakness of left lower extremity        4. Decreased functional mobility and endurance        5. Impaired motor control        6. Impaired mobility and ADLs            Therapy precautions:Diabetes/neuropathy- sensory precautions  Co-morbidities affecting plan of care: HTN (controlled), TII DM, GERD    Patient Stated Goals: walk and work without pain    Total Visits: 8   Payor: Payor[place] /  /  /   Billing pattern: Commercial- substantial/midpoint time each CPT    Total Timed Codes: 45 min, Total Treatment Time: 60 min  Modifier needed: No    Chief complaints/history of injury: Patient reports a 4 month history of L medial knee pain, indicating along the medial patellar border and wrapping around the inferior patella. There was no specific injury, only a progressive onset of pain with a sense of weakness and occasional feeling of instability or giving out.giving out.    Pt last seen on 10/1/24. Had follow up w/Dr. Rivera for MRI results. MRI + for meniscal tear. Pt given second injection with no change in pain. Referred to Dr. Aram Abdi who recommended PT and then arthroscopic meniscectomy if PT not successful. Also recommended 3rd injection but will wait due to DM. Did have episode of giving way last week, when not wearing brace. Did not fall.   Diagnostic exams (per chart review):   X-ray LEFT knee 4 vw AP / lateral / Skier / sunrise for knee pain     Findings: Medial joint line joint space narrowing with very mild marginal osteophytosis.  There is also mild patellofemoral arthritis and superior patellar enthesopathy.  There is no effusion.  Impression: Moderate medial and patellofemoral arthritis    MRI (10/22/24)

## 2024-11-25 ENCOUNTER — CLINICAL DOCUMENTATION (OUTPATIENT)
Dept: ORTHOPEDIC SURGERY | Age: 49
End: 2024-11-25

## 2024-11-25 DIAGNOSIS — M25.562 LEFT KNEE PAIN, UNSPECIFIED CHRONICITY: ICD-10-CM

## 2024-11-25 DIAGNOSIS — I10 ESSENTIAL HYPERTENSION: ICD-10-CM

## 2024-11-25 RX ORDER — HYDROCHLOROTHIAZIDE 25 MG/1
25 TABLET ORAL DAILY
Qty: 90 TABLET | Refills: 0 | Status: SHIPPED | OUTPATIENT
Start: 2024-11-25 | End: 2024-12-12 | Stop reason: SDUPTHER

## 2024-11-25 RX ORDER — IBUPROFEN 800 MG/1
800 TABLET, FILM COATED ORAL 2 TIMES DAILY PRN
Qty: 60 TABLET | Refills: 0 | Status: SHIPPED | OUTPATIENT
Start: 2024-11-25 | End: 2024-12-12 | Stop reason: SDUPTHER

## 2024-11-26 ENCOUNTER — TREATMENT (OUTPATIENT)
Age: 49
End: 2024-11-26
Payer: COMMERCIAL

## 2024-11-26 DIAGNOSIS — Z74.09 IMPAIRED MOBILITY AND ADLS: ICD-10-CM

## 2024-11-26 DIAGNOSIS — G89.29 CHRONIC PAIN OF LEFT KNEE: Primary | ICD-10-CM

## 2024-11-26 DIAGNOSIS — Z74.09 DECREASED FUNCTIONAL MOBILITY AND ENDURANCE: ICD-10-CM

## 2024-11-26 DIAGNOSIS — Z78.9 IMPAIRED MOTOR CONTROL: ICD-10-CM

## 2024-11-26 DIAGNOSIS — Z78.9 IMPAIRED MOBILITY AND ADLS: ICD-10-CM

## 2024-11-26 DIAGNOSIS — M25.662 DECREASED RANGE OF MOTION (ROM) OF LEFT KNEE: ICD-10-CM

## 2024-11-26 DIAGNOSIS — M25.562 CHRONIC PAIN OF LEFT KNEE: Primary | ICD-10-CM

## 2024-11-26 DIAGNOSIS — R29.898 WEAKNESS OF LEFT LOWER EXTREMITY: ICD-10-CM

## 2024-11-26 PROCEDURE — 97014 ELECTRIC STIMULATION THERAPY: CPT | Performed by: PHYSICAL THERAPIST

## 2024-11-26 PROCEDURE — 97110 THERAPEUTIC EXERCISES: CPT | Performed by: PHYSICAL THERAPIST

## 2024-11-26 PROCEDURE — 97016 VASOPNEUMATIC DEVICE THERAPY: CPT | Performed by: PHYSICAL THERAPIST

## 2024-11-26 PROCEDURE — 97140 MANUAL THERAPY 1/> REGIONS: CPT | Performed by: PHYSICAL THERAPIST

## 2024-11-26 NOTE — PROGRESS NOTES
[facility] PT [facility] ORTHOPAEDICS  [address] [facility] [address]  Dept: [phone]      Physical Therapy Daily Note     Referring MD: Aram Abdi MD  Diagnosis:    Diagnosis Orders   1. Chronic pain of left knee        2. Decreased range of motion (ROM) of left knee        3. Weakness of left lower extremity        4. Decreased functional mobility and endurance        5. Impaired motor control        6. Impaired mobility and ADLs            Therapy precautions:Diabetes/neuropathy- sensory precautions  Co-morbidities affecting plan of care: HTN (controlled), TII DM, GERD    Patient Stated Goals: walk and work without pain    Total Visits: 9   [place]: [place] /  /  /   Billing pattern: Commercial- substantial/midpoint time each CPT    Total Timed Codes: 45 min, Total Treatment Time: 60 min  Modifier needed: No    Chief complaints/history of injury: Patient reports a 4 month history of L medial knee pain, indicating along the medial patellar border and wrapping around the inferior patella. There was no specific injury, only a progressive onset of pain with a sense of weakness and occasional feeling of instability or giving out.giving out.    Pt last seen on 10/1/24. Had follow up w/Dr. Rivera for MRI results. MRI + for meniscal tear. Pt given second injection with no change in pain. Referred to Dr. Aram Abdi who recommended PT and then arthroscopic meniscectomy if PT not successful. Also recommended 3rd injection but will wait due to DM. Did have episode of giving way last week, when not wearing brace. Did not fall.   Diagnostic exams (per chart review):   X-ray LEFT knee 4 vw AP / lateral / Skier / sunrise for knee pain     Findings: Medial joint line joint space narrowing with very mild marginal osteophytosis.  There is also mild patellofemoral arthritis and superior patellar enthesopathy.  There is no effusion.  Impression: Moderate medial and patellofemoral arthritis    MRI (10/22/24)

## 2024-12-03 DIAGNOSIS — M17.12 PRIMARY OSTEOARTHRITIS OF LEFT KNEE: ICD-10-CM

## 2024-12-03 DIAGNOSIS — M25.562 ACUTE PAIN OF LEFT KNEE: ICD-10-CM

## 2024-12-03 DIAGNOSIS — S83.242A ACUTE MEDIAL MENISCUS TEAR, LEFT, INITIAL ENCOUNTER: Primary | ICD-10-CM

## 2024-12-03 DIAGNOSIS — M70.52 PES ANSERINUS BURSITIS OF LEFT KNEE: ICD-10-CM

## 2024-12-05 DIAGNOSIS — M25.562 KNEE MENISCUS PAIN, LEFT: Primary | ICD-10-CM

## 2024-12-05 NOTE — PERIOP NOTE
Patient verified name and .  Order for consent NOT found in EHR at time of PAT visit. Unable to verify case posting against order; surgery verified by patient.    Type 1B surgery, phone assessment complete.  Orders not received.  Labs per surgeon: none ordered  Labs per anesthesia protocol: SQBS, POC K+, Hgb s/h for DOS- patient requested.    Patient answered medical/surgical history questions at their best of ability. All prior to admission medications documented in EPIC.    Patient instructed to continue taking all prescription medications up to the day of surgery but to take only the following medications the day of surgery according to anesthesia guidelines with a small sip of water: gabapentin, claritin, flonase, tamsulosin, esomeprazole, metformin.  Also, patient is requested to take 2 Tylenol in the morning and then again before bed on the day before surgery. Regular or extra strength may be used.       Patient informed that all vitamins and supplements should be held 7 days prior to surgery and NSAIDS 5 days prior to surgery. Prescription meds to hold:  ibuprofen 800 mg hold 5 days prior to surgery.     Patient instructed on the following:    > Arrive at [facility] Entr[facility], time of arrival to be called the day before by 1700  >Patient is instructed to have clear liquids only on the day prior to procedure and to be NPO after midnight, unless otherwise indicated, including gum, mints, and ice chips.   > Please drink 32 ounces of water 3-4 hours prior to your arrival to avoid dehydration.  (Anesthesia guideline)  > Responsible adult must drive patient to the hospital, stay during surgery, and patient will need supervision 24 hours after anesthesia  > Use non moisturizing soap in shower the night before surgery and on the morning of surgery  > All piercings must be removed prior to arrival.    > Leave all valuables (money and jewelry) at home but bring insurance card and ID on DOS.   > You may be required to pay

## 2024-12-11 ENCOUNTER — CLINICAL DOCUMENTATION (OUTPATIENT)
Dept: ORTHOPEDIC SURGERY | Age: 49
End: 2024-12-11

## 2024-12-12 ENCOUNTER — ANESTHESIA EVENT (OUTPATIENT)
Dept: SURGERY | Age: 49
End: 2024-12-12
Payer: COMMERCIAL

## 2024-12-12 ENCOUNTER — OFFICE VISIT (OUTPATIENT)
Dept: FAMILY MEDICINE CLINIC | Facility: CLINIC | Age: 49
End: 2024-12-12
Payer: COMMERCIAL

## 2024-12-12 VITALS
TEMPERATURE: 97.2 F | WEIGHT: 248 LBS | DIASTOLIC BLOOD PRESSURE: 82 MMHG | HEIGHT: 64 IN | RESPIRATION RATE: 18 BRPM | BODY MASS INDEX: 42.34 KG/M2 | OXYGEN SATURATION: 96 % | HEART RATE: 113 BPM | SYSTOLIC BLOOD PRESSURE: 122 MMHG

## 2024-12-12 DIAGNOSIS — I10 ESSENTIAL HYPERTENSION: ICD-10-CM

## 2024-12-12 DIAGNOSIS — J30.1 NON-SEASONAL ALLERGIC RHINITIS DUE TO POLLEN: ICD-10-CM

## 2024-12-12 DIAGNOSIS — Z23 ENCOUNTER FOR IMMUNIZATION: ICD-10-CM

## 2024-12-12 DIAGNOSIS — M25.511 ACUTE PAIN OF RIGHT SHOULDER: Primary | ICD-10-CM

## 2024-12-12 DIAGNOSIS — M25.562 LEFT KNEE PAIN, UNSPECIFIED CHRONICITY: Primary | ICD-10-CM

## 2024-12-12 DIAGNOSIS — E11.65 TYPE 2 DIABETES MELLITUS WITH HYPERGLYCEMIA, WITHOUT LONG-TERM CURRENT USE OF INSULIN (HCC): ICD-10-CM

## 2024-12-12 DIAGNOSIS — K21.9 GASTROESOPHAGEAL REFLUX DISEASE WITHOUT ESOPHAGITIS: ICD-10-CM

## 2024-12-12 DIAGNOSIS — E78.2 MIXED HYPERLIPIDEMIA: ICD-10-CM

## 2024-12-12 PROCEDURE — 99214 OFFICE O/P EST MOD 30 MIN: CPT | Performed by: NURSE PRACTITIONER

## 2024-12-12 PROCEDURE — 3079F DIAST BP 80-89 MM HG: CPT | Performed by: NURSE PRACTITIONER

## 2024-12-12 PROCEDURE — 3074F SYST BP LT 130 MM HG: CPT | Performed by: NURSE PRACTITIONER

## 2024-12-12 PROCEDURE — 3044F HG A1C LEVEL LT 7.0%: CPT | Performed by: NURSE PRACTITIONER

## 2024-12-12 RX ORDER — IBUPROFEN 800 MG/1
800 TABLET, FILM COATED ORAL 2 TIMES DAILY PRN
Qty: 180 TABLET | Refills: 1 | Status: SHIPPED | OUTPATIENT
Start: 2024-12-12

## 2024-12-12 RX ORDER — GABAPENTIN 300 MG/1
300 CAPSULE ORAL 3 TIMES DAILY
Qty: 270 CAPSULE | Refills: 1 | Status: SHIPPED | OUTPATIENT
Start: 2024-12-12 | End: 2025-06-10

## 2024-12-12 RX ORDER — ESOMEPRAZOLE MAGNESIUM 40 MG/1
40 CAPSULE, DELAYED RELEASE ORAL
Qty: 90 CAPSULE | Refills: 1 | Status: SHIPPED | OUTPATIENT
Start: 2024-12-12

## 2024-12-12 RX ORDER — ONDANSETRON 4 MG/1
4 TABLET, ORALLY DISINTEGRATING ORAL EVERY 6 HOURS
Qty: 20 TABLET | Refills: 0 | Status: SHIPPED | OUTPATIENT
Start: 2024-12-12

## 2024-12-12 RX ORDER — LORATADINE 10 MG/1
10 TABLET ORAL DAILY
Qty: 90 TABLET | Refills: 3 | Status: SHIPPED | OUTPATIENT
Start: 2024-12-12

## 2024-12-12 RX ORDER — TAMSULOSIN HYDROCHLORIDE 0.4 MG/1
0.4 CAPSULE ORAL DAILY
Qty: 90 CAPSULE | Refills: 0 | Status: CANCELLED | OUTPATIENT
Start: 2024-12-12

## 2024-12-12 RX ORDER — MONTELUKAST SODIUM 4 MG/1
2 TABLET, CHEWABLE ORAL 2 TIMES DAILY
COMMUNITY
Start: 2024-12-06 | End: 2025-03-06

## 2024-12-12 RX ORDER — OXYCODONE HYDROCHLORIDE 5 MG/1
5 TABLET ORAL EVERY 6 HOURS PRN
Qty: 30 TABLET | Refills: 0 | Status: SHIPPED | OUTPATIENT
Start: 2024-12-12 | End: 2024-12-20

## 2024-12-12 RX ORDER — PRAVASTATIN SODIUM 40 MG
40 TABLET ORAL NIGHTLY
Qty: 90 TABLET | Refills: 1 | Status: SHIPPED | OUTPATIENT
Start: 2024-12-12

## 2024-12-12 RX ORDER — HYDROCHLOROTHIAZIDE 25 MG/1
25 TABLET ORAL DAILY
Qty: 90 TABLET | Refills: 1 | Status: SHIPPED | OUTPATIENT
Start: 2024-12-12

## 2024-12-12 RX ORDER — FLUTICASONE PROPIONATE 50 MCG
2 SPRAY, SUSPENSION (ML) NASAL DAILY
Qty: 3 EACH | Refills: 3 | Status: SHIPPED | OUTPATIENT
Start: 2024-12-12

## 2024-12-12 SDOH — ECONOMIC STABILITY: FOOD INSECURITY: WITHIN THE PAST 12 MONTHS, YOU WORRIED THAT YOUR FOOD WOULD RUN OUT BEFORE YOU GOT MONEY TO BUY MORE.: NEVER TRUE

## 2024-12-12 SDOH — ECONOMIC STABILITY: FOOD INSECURITY: WITHIN THE PAST 12 MONTHS, THE FOOD YOU BOUGHT JUST DIDN'T LAST AND YOU DIDN'T HAVE MONEY TO GET MORE.: NEVER TRUE

## 2024-12-12 SDOH — ECONOMIC STABILITY: INCOME INSECURITY: HOW HARD IS IT FOR YOU TO PAY FOR THE VERY BASICS LIKE FOOD, HOUSING, MEDICAL CARE, AND HEATING?: NOT HARD AT ALL

## 2024-12-12 ASSESSMENT — ENCOUNTER SYMPTOMS
EYE DISCHARGE: 0
WHEEZING: 0
DIARRHEA: 0
SHORTNESS OF BREATH: 0
CONSTIPATION: 0
BLOOD IN STOOL: 0
EYE PAIN: 0
CHEST TIGHTNESS: 0
RHINORRHEA: 0

## 2024-12-12 ASSESSMENT — PATIENT HEALTH QUESTIONNAIRE - PHQ9
SUM OF ALL RESPONSES TO PHQ QUESTIONS 1-9: 0
1. LITTLE INTEREST OR PLEASURE IN DOING THINGS: NOT AT ALL
2. FEELING DOWN, DEPRESSED OR HOPELESS: NOT AT ALL
SUM OF ALL RESPONSES TO PHQ QUESTIONS 1-9: 0
SUM OF ALL RESPONSES TO PHQ9 QUESTIONS 1 & 2: 0

## 2024-12-12 NOTE — ASSESSMENT & PLAN NOTE
Orders:    esomeprazole (NEXIUM) 40 MG delayed release capsule; Take 1 capsule by mouth every morning (before breakfast)    Monitor for worsening symptoms.  Avoid food triggers.

## 2024-12-12 NOTE — PROGRESS NOTES
Discussed postop pain control options with Ms. Garcia today.  She states that oxycodone makes her itchy but she has taken it before and just take some Benadryl with this.  We decided this was the best option for postop pain control for her today.  Oxycodone 5 mg every 4 hours as needed sent to pharmacy.  I also sent in Zofran to pharmacy today.

## 2024-12-12 NOTE — PERIOP NOTE
Preop department called to notify patient of arrival time for scheduled procedure. Instructions given to   - Arrive at [facility] Entrance [address].  - No solid food after midnight & Please drink 32 ounces of water 2 hours prior to your arrival to avoid dehydration unless otherwise indicated. No gum, mints, or ice chips.   - Have a responsible adult to drive patient to the hospital, stay during surgery, and patient will need supervision 24 hours after anesthesia.   - Use antibacterial soap in shower the night before surgery and on the morning of surgery.       Was patient contacted: Y  Voicemail left:   Numbers contacted: [phone]   Arrival time: 0600  Time to complete 32 ounces of water: 0400

## 2024-12-12 NOTE — ASSESSMENT & PLAN NOTE
Orders:    hydroCHLOROthiazide (HYDRODIURIL) 25 MG tablet; Take 1 tablet by mouth daily    Monitor blood pressure at home twice weekly and keep log.  To ER with signs of CVA or MI.    Follow up in 6 months or sooner if needed.

## 2024-12-12 NOTE — PROGRESS NOTES
normal.        PHQ-9 Total Score: 0 (12/12/2024 10:47 AM)    Body mass index is 42.57 kg/m².            An electronic signature was used to authenticate this note.    --MORGAN Farmer - CNP

## 2024-12-13 ENCOUNTER — CLINICAL DOCUMENTATION (OUTPATIENT)
Dept: ORTHOPEDIC SURGERY | Age: 49
End: 2024-12-13

## 2024-12-13 ENCOUNTER — HOSPITAL ENCOUNTER (OUTPATIENT)
Age: 49
Setting detail: OUTPATIENT SURGERY
Discharge: HOME | End: 2024-12-13
Attending: ORTHOPAEDIC SURGERY | Admitting: ORTHOPAEDIC SURGERY
Payer: COMMERCIAL

## 2024-12-13 ENCOUNTER — ANESTHESIA (OUTPATIENT)
Dept: SURGERY | Age: 49
End: 2024-12-13
Payer: COMMERCIAL

## 2024-12-13 ENCOUNTER — TELEPHONE (OUTPATIENT)
Dept: ORTHOPEDIC SURGERY | Age: 49
End: 2024-12-13

## 2024-12-13 VITALS
WEIGHT: 242 LBS | RESPIRATION RATE: 12 BRPM | TEMPERATURE: 97.4 F | HEART RATE: 87 BPM | HEIGHT: 64 IN | DIASTOLIC BLOOD PRESSURE: 55 MMHG | OXYGEN SATURATION: 88 % | BODY MASS INDEX: 41.32 KG/M2 | SYSTOLIC BLOOD PRESSURE: 113 MMHG

## 2024-12-13 LAB
GLUCOSE BLD STRIP.AUTO-MCNC: 74 MG/DL (ref 65–100)
HGB BLD-MCNC: 12.7 G/DL (ref 11.7–15.4)
POTASSIUM BLD-SCNC: 3.9 MMOL/L (ref 3.5–5.1)
SERVICE CMNT-IMP: NORMAL

## 2024-12-13 PROCEDURE — 2580000003 HC RX 258: Performed by: SURGERY

## 2024-12-13 PROCEDURE — 2500000003 HC RX 250 WO HCPCS

## 2024-12-13 PROCEDURE — 6360000002 HC RX W HCPCS

## 2024-12-13 PROCEDURE — 7100000000 HC PACU RECOVERY - FIRST 15 MIN: Performed by: ORTHOPAEDIC SURGERY

## 2024-12-13 PROCEDURE — 3700000001 HC ADD 15 MINUTES (ANESTHESIA): Performed by: ORTHOPAEDIC SURGERY

## 2024-12-13 PROCEDURE — 7100000010 HC PHASE II RECOVERY - FIRST 15 MIN: Performed by: ORTHOPAEDIC SURGERY

## 2024-12-13 PROCEDURE — 3700000000 HC ANESTHESIA ATTENDED CARE: Performed by: ORTHOPAEDIC SURGERY

## 2024-12-13 PROCEDURE — 84132 ASSAY OF SERUM POTASSIUM: CPT

## 2024-12-13 PROCEDURE — 3600000014 HC SURGERY LEVEL 4 ADDTL 15MIN: Performed by: ORTHOPAEDIC SURGERY

## 2024-12-13 PROCEDURE — 6360000002 HC RX W HCPCS: Performed by: ORTHOPAEDIC SURGERY

## 2024-12-13 PROCEDURE — 6360000002 HC RX W HCPCS: Performed by: PHYSICIAN ASSISTANT

## 2024-12-13 PROCEDURE — 2709999900 HC NON-CHARGEABLE SUPPLY: Performed by: ORTHOPAEDIC SURGERY

## 2024-12-13 PROCEDURE — 82962 GLUCOSE BLOOD TEST: CPT

## 2024-12-13 PROCEDURE — 3600000004 HC SURGERY LEVEL 4 BASE: Performed by: ORTHOPAEDIC SURGERY

## 2024-12-13 PROCEDURE — 85018 HEMOGLOBIN: CPT

## 2024-12-13 PROCEDURE — 6370000000 HC RX 637 (ALT 250 FOR IP): Performed by: SURGERY

## 2024-12-13 PROCEDURE — 29881 ARTHRS KNE SRG MNISECTMY M/L: CPT | Performed by: ORTHOPAEDIC SURGERY

## 2024-12-13 PROCEDURE — 2500000003 HC RX 250 WO HCPCS: Performed by: SURGERY

## 2024-12-13 PROCEDURE — 7100000011 HC PHASE II RECOVERY - ADDTL 15 MIN: Performed by: ORTHOPAEDIC SURGERY

## 2024-12-13 PROCEDURE — 6360000002 HC RX W HCPCS: Performed by: SURGERY

## 2024-12-13 PROCEDURE — 2580000003 HC RX 258: Performed by: PHYSICIAN ASSISTANT

## 2024-12-13 PROCEDURE — 7100000001 HC PACU RECOVERY - ADDTL 15 MIN: Performed by: ORTHOPAEDIC SURGERY

## 2024-12-13 RX ORDER — SODIUM CHLORIDE 9 MG/ML
INJECTION, SOLUTION INTRAVENOUS PRN
Status: DISCONTINUED | OUTPATIENT
Start: 2024-12-13 | End: 2024-12-13 | Stop reason: HOSPADM

## 2024-12-13 RX ORDER — KETAMINE HCL IN NACL, ISO-OSM 20 MG/2 ML
20 SYRINGE (ML) INJECTION
Status: COMPLETED | OUTPATIENT
Start: 2024-12-13 | End: 2024-12-13

## 2024-12-13 RX ORDER — KETOROLAC TROMETHAMINE 30 MG/ML
INJECTION, SOLUTION INTRAMUSCULAR; INTRAVENOUS
Status: DISCONTINUED | OUTPATIENT
Start: 2024-12-13 | End: 2024-12-13 | Stop reason: SDUPTHER

## 2024-12-13 RX ORDER — MIDAZOLAM HYDROCHLORIDE 1 MG/ML
INJECTION, SOLUTION INTRAMUSCULAR; INTRAVENOUS
Status: DISCONTINUED | OUTPATIENT
Start: 2024-12-13 | End: 2024-12-13 | Stop reason: SDUPTHER

## 2024-12-13 RX ORDER — SODIUM CHLORIDE 9 MG/ML
INJECTION, SOLUTION INTRAVENOUS CONTINUOUS
Status: DISCONTINUED | OUTPATIENT
Start: 2024-12-13 | End: 2024-12-13 | Stop reason: HOSPADM

## 2024-12-13 RX ORDER — TRAMADOL HYDROCHLORIDE 50 MG/1
50 TABLET ORAL
Status: DISCONTINUED | OUTPATIENT
Start: 2024-12-13 | End: 2024-12-13

## 2024-12-13 RX ORDER — LABETALOL HYDROCHLORIDE 5 MG/ML
10 INJECTION, SOLUTION INTRAVENOUS
Status: DISCONTINUED | OUTPATIENT
Start: 2024-12-13 | End: 2024-12-13 | Stop reason: HOSPADM

## 2024-12-13 RX ORDER — PROPOFOL 10 MG/ML
INJECTION, EMULSION INTRAVENOUS
Status: DISCONTINUED | OUTPATIENT
Start: 2024-12-13 | End: 2024-12-13 | Stop reason: SDUPTHER

## 2024-12-13 RX ORDER — SODIUM CHLORIDE 0.9 % (FLUSH) 0.9 %
5-40 SYRINGE (ML) INJECTION EVERY 12 HOURS SCHEDULED
Status: DISCONTINUED | OUTPATIENT
Start: 2024-12-13 | End: 2024-12-13 | Stop reason: HOSPADM

## 2024-12-13 RX ORDER — IPRATROPIUM BROMIDE AND ALBUTEROL SULFATE 2.5; .5 MG/3ML; MG/3ML
1 SOLUTION RESPIRATORY (INHALATION)
Status: DISCONTINUED | OUTPATIENT
Start: 2024-12-13 | End: 2024-12-13 | Stop reason: HOSPADM

## 2024-12-13 RX ORDER — ROCURONIUM BROMIDE 10 MG/ML
INJECTION, SOLUTION INTRAVENOUS
Status: DISCONTINUED | OUTPATIENT
Start: 2024-12-13 | End: 2024-12-13 | Stop reason: SDUPTHER

## 2024-12-13 RX ORDER — LIDOCAINE HYDROCHLORIDE 10 MG/ML
1 INJECTION, SOLUTION INFILTRATION; PERINEURAL
Status: DISCONTINUED | OUTPATIENT
Start: 2024-12-13 | End: 2024-12-13 | Stop reason: HOSPADM

## 2024-12-13 RX ORDER — DIPHENHYDRAMINE HCL 25 MG
25 CAPSULE ORAL ONCE
Status: COMPLETED | OUTPATIENT
Start: 2024-12-13 | End: 2024-12-13

## 2024-12-13 RX ORDER — DEXAMETHASONE SODIUM PHOSPHATE 4 MG/ML
INJECTION, SOLUTION INTRA-ARTICULAR; INTRALESIONAL; INTRAMUSCULAR; INTRAVENOUS; SOFT TISSUE
Status: DISCONTINUED | OUTPATIENT
Start: 2024-12-13 | End: 2024-12-13 | Stop reason: SDUPTHER

## 2024-12-13 RX ORDER — NALOXONE HYDROCHLORIDE 0.4 MG/ML
INJECTION, SOLUTION INTRAMUSCULAR; INTRAVENOUS; SUBCUTANEOUS PRN
Status: DISCONTINUED | OUTPATIENT
Start: 2024-12-13 | End: 2024-12-13 | Stop reason: HOSPADM

## 2024-12-13 RX ORDER — LIDOCAINE HYDROCHLORIDE 20 MG/ML
INJECTION, SOLUTION EPIDURAL; INFILTRATION; INTRACAUDAL; PERINEURAL
Status: DISCONTINUED | OUTPATIENT
Start: 2024-12-13 | End: 2024-12-13 | Stop reason: SDUPTHER

## 2024-12-13 RX ORDER — SCOLOPAMINE TRANSDERMAL SYSTEM 1 MG/1
1 PATCH, EXTENDED RELEASE TRANSDERMAL
Status: DISCONTINUED | OUTPATIENT
Start: 2024-12-13 | End: 2024-12-13 | Stop reason: HOSPADM

## 2024-12-13 RX ORDER — TRANEXAMIC ACID 100 MG/ML
INJECTION, SOLUTION INTRAVENOUS
Status: DISCONTINUED | OUTPATIENT
Start: 2024-12-13 | End: 2024-12-13 | Stop reason: SDUPTHER

## 2024-12-13 RX ORDER — FENTANYL CITRATE 50 UG/ML
INJECTION, SOLUTION INTRAMUSCULAR; INTRAVENOUS
Status: DISCONTINUED | OUTPATIENT
Start: 2024-12-13 | End: 2024-12-13 | Stop reason: SDUPTHER

## 2024-12-13 RX ORDER — GLYCOPYRROLATE 0.2 MG/ML
INJECTION INTRAMUSCULAR; INTRAVENOUS
Status: DISCONTINUED | OUTPATIENT
Start: 2024-12-13 | End: 2024-12-13 | Stop reason: SDUPTHER

## 2024-12-13 RX ORDER — MORPHINE SULFATE 4 MG/ML
4 INJECTION, SOLUTION INTRAMUSCULAR; INTRAVENOUS EVERY 5 MIN PRN
Status: COMPLETED | OUTPATIENT
Start: 2024-12-13 | End: 2024-12-13

## 2024-12-13 RX ORDER — SODIUM CHLORIDE, SODIUM LACTATE, POTASSIUM CHLORIDE, CALCIUM CHLORIDE 600; 310; 30; 20 MG/100ML; MG/100ML; MG/100ML; MG/100ML
INJECTION, SOLUTION INTRAVENOUS CONTINUOUS
Status: DISCONTINUED | OUTPATIENT
Start: 2024-12-13 | End: 2024-12-13 | Stop reason: HOSPADM

## 2024-12-13 RX ORDER — ONDANSETRON 2 MG/ML
INJECTION INTRAMUSCULAR; INTRAVENOUS
Status: DISCONTINUED | OUTPATIENT
Start: 2024-12-13 | End: 2024-12-13 | Stop reason: SDUPTHER

## 2024-12-13 RX ORDER — ROPIVACAINE HYDROCHLORIDE 5 MG/ML
INJECTION, SOLUTION EPIDURAL; INFILTRATION; PERINEURAL PRN
Status: DISCONTINUED | OUTPATIENT
Start: 2024-12-13 | End: 2024-12-13 | Stop reason: HOSPADM

## 2024-12-13 RX ORDER — PROCHLORPERAZINE EDISYLATE 5 MG/ML
5 INJECTION INTRAMUSCULAR; INTRAVENOUS
Status: DISCONTINUED | OUTPATIENT
Start: 2024-12-13 | End: 2024-12-13 | Stop reason: HOSPADM

## 2024-12-13 RX ORDER — ACETAMINOPHEN 500 MG
1000 TABLET ORAL ONCE
Status: COMPLETED | OUTPATIENT
Start: 2024-12-13 | End: 2024-12-13

## 2024-12-13 RX ORDER — SODIUM CHLORIDE 0.9 % (FLUSH) 0.9 %
5-40 SYRINGE (ML) INJECTION PRN
Status: DISCONTINUED | OUTPATIENT
Start: 2024-12-13 | End: 2024-12-13 | Stop reason: HOSPADM

## 2024-12-13 RX ORDER — NEOSTIGMINE METHYLSULFATE 1 MG/ML
INJECTION, SOLUTION INTRAVENOUS
Status: DISCONTINUED | OUTPATIENT
Start: 2024-12-13 | End: 2024-12-13 | Stop reason: SDUPTHER

## 2024-12-13 RX ORDER — OXYCODONE HYDROCHLORIDE 5 MG/1
5 TABLET ORAL ONCE AS NEEDED
Status: DISCONTINUED | OUTPATIENT
Start: 2024-12-13 | End: 2024-12-13 | Stop reason: HOSPADM

## 2024-12-13 RX ORDER — SUCCINYLCHOLINE CHLORIDE 20 MG/ML
INJECTION INTRAMUSCULAR; INTRAVENOUS
Status: DISCONTINUED | OUTPATIENT
Start: 2024-12-13 | End: 2024-12-13 | Stop reason: SDUPTHER

## 2024-12-13 RX ORDER — HALOPERIDOL 5 MG/ML
1 INJECTION INTRAMUSCULAR
Status: DISCONTINUED | OUTPATIENT
Start: 2024-12-13 | End: 2024-12-13 | Stop reason: HOSPADM

## 2024-12-13 RX ADMIN — Medication 3 MG: at 08:27

## 2024-12-13 RX ADMIN — MORPHINE SULFATE 4 MG: 4 INJECTION INTRAVENOUS at 08:50

## 2024-12-13 RX ADMIN — MORPHINE SULFATE 4 MG: 4 INJECTION INTRAVENOUS at 08:55

## 2024-12-13 RX ADMIN — Medication 180 MG: at 07:52

## 2024-12-13 RX ADMIN — ONDANSETRON 4 MG: 2 INJECTION INTRAMUSCULAR; INTRAVENOUS at 08:24

## 2024-12-13 RX ADMIN — FENTANYL CITRATE 100 MCG: 0.05 INJECTION, SOLUTION INTRAMUSCULAR; INTRAVENOUS at 09:26

## 2024-12-13 RX ADMIN — MORPHINE SULFATE 4 MG: 4 INJECTION INTRAVENOUS at 08:45

## 2024-12-13 RX ADMIN — ACETAMINOPHEN 1000 MG: 500 TABLET, FILM COATED ORAL at 09:21

## 2024-12-13 RX ADMIN — FENTANYL CITRATE 100 MCG: 50 INJECTION, SOLUTION INTRAMUSCULAR; INTRAVENOUS at 08:10

## 2024-12-13 RX ADMIN — GLYCOPYRROLATE 0.4 MG: 0.2 INJECTION INTRAMUSCULAR; INTRAVENOUS at 08:27

## 2024-12-13 RX ADMIN — MORPHINE SULFATE 4 MG: 4 INJECTION INTRAVENOUS at 09:00

## 2024-12-13 RX ADMIN — SODIUM CHLORIDE, SODIUM LACTATE, POTASSIUM CHLORIDE, AND CALCIUM CHLORIDE: 600; 310; 30; 20 INJECTION, SOLUTION INTRAVENOUS at 07:45

## 2024-12-13 RX ADMIN — Medication 20 MG: at 09:01

## 2024-12-13 RX ADMIN — TRANEXAMIC ACID 1000 MG: 100 INJECTION, SOLUTION INTRAVENOUS at 08:08

## 2024-12-13 RX ADMIN — LIDOCAINE HYDROCHLORIDE 100 MG: 20 INJECTION, SOLUTION EPIDURAL; INFILTRATION; INTRACAUDAL; PERINEURAL at 07:52

## 2024-12-13 RX ADMIN — FENTANYL CITRATE 100 MCG: 0.05 INJECTION, SOLUTION INTRAMUSCULAR; INTRAVENOUS at 09:18

## 2024-12-13 RX ADMIN — MIDAZOLAM 2 MG: 1 INJECTION INTRAMUSCULAR; INTRAVENOUS at 07:41

## 2024-12-13 RX ADMIN — DEXAMETHASONE SODIUM PHOSPHATE 4 MG: 4 INJECTION INTRA-ARTICULAR; INTRALESIONAL; INTRAMUSCULAR; INTRAVENOUS; SOFT TISSUE at 08:24

## 2024-12-13 RX ADMIN — DIPHENHYDRAMINE HYDROCHLORIDE 25 MG: 25 CAPSULE ORAL at 09:55

## 2024-12-13 RX ADMIN — CEFAZOLIN 2000 MG: 2 INJECTION, POWDER, FOR SOLUTION INTRAMUSCULAR; INTRAVENOUS at 07:57

## 2024-12-13 RX ADMIN — KETOROLAC TROMETHAMINE 30 MG: 30 INJECTION, SOLUTION INTRAMUSCULAR at 08:24

## 2024-12-13 RX ADMIN — TRAMADOL HYDROCHLORIDE 50 MG: 50 TABLET ORAL at 09:27

## 2024-12-13 RX ADMIN — PROPOFOL 200 MG: 10 INJECTION, EMULSION INTRAVENOUS at 07:52

## 2024-12-13 RX ADMIN — ROCURONIUM BROMIDE 20 MG: 10 INJECTION, SOLUTION INTRAVENOUS at 07:57

## 2024-12-13 ASSESSMENT — PAIN SCALES - GENERAL
PAINLEVEL_OUTOF10: 10
PAINLEVEL_OUTOF10: 7
PAINLEVEL_OUTOF10: 7
PAINLEVEL_OUTOF10: 2
PAINLEVEL_OUTOF10: 7
PAINLEVEL_OUTOF10: 8
PAINLEVEL_OUTOF10: 8
PAINLEVEL_OUTOF10: 7
PAINLEVEL_OUTOF10: 7

## 2024-12-13 ASSESSMENT — PAIN DESCRIPTION - LOCATION: LOCATION: KNEE

## 2024-12-13 ASSESSMENT — PAIN DESCRIPTION - ORIENTATION: ORIENTATION: LEFT

## 2024-12-13 NOTE — H&P
Name: Will Garcia  YOB: 1975  Gender: female  MRN: [id number]        CC: Knee Pain (L)        HPI: Will Garcia is a 49 y.o. female who presents with Knee Pain (L)  .  She is a new patient to me today referred by Dr. Pinky Rivera.  Patient reports insidious onset of knee pain several months ago.  She has tried physical therapy, an  brace, and injections without any significant improvements other than transient benefit from intra-articular injection.  She is referred here for surgical consultation today             Current Medication      Current Outpatient Medications:     esomeprazole (NEXIUM) 40 MG delayed release capsule, Take 1 capsule by mouth every morning (before breakfast), Disp: 90 capsule, Rfl: 1    ibuprofen (ADVIL;MOTRIN) 800 MG tablet, Take 1 tablet by mouth 2 times daily as needed for Pain, Disp: 60 tablet, Rfl: 0    fluticasone (FLONASE) 50 MCG/ACT nasal spray, 2 sprays by Nasal route daily, Disp: 3 each, Rfl: 0    gabapentin (NEURONTIN) 300 MG capsule, Take 1 capsule by mouth 3 times daily for 180 days., Disp: 270 capsule, Rfl: 0    hydroCHLOROthiazide (HYDRODIURIL) 25 MG tablet, Take 1 tablet by mouth daily, Disp: 90 tablet, Rfl: 0    loratadine (CLARITIN) 10 MG tablet, Take 1 tablet by mouth daily, Disp: 90 tablet, Rfl: 0    metFORMIN (GLUCOPHAGE) 500 MG tablet, Take 1 tablet by mouth 2 times daily (with meals), Disp: 180 tablet, Rfl: 0    pravastatin (PRAVACHOL) 40 MG tablet, Take 1 tablet by mouth nightly, Disp: 90 tablet, Rfl: 0    tamsulosin (FLOMAX) 0.4 MG capsule, Take 1 capsule by mouth daily, Disp: 90 capsule, Rfl: 0    Tirzepatide (MOUNJARO) 15 MG/0.5ML SOPN SC injection, Inject 0.5 mLs into the skin once a week, Disp: 12 Adjustable Dose Pre-filled Pen Syringe, Rfl: 1    Tirzepatide (MOUNJARO) 12.5 MG/0.5ML SOPN SC injection, Inject 0.5 mLs into the skin once a week, Disp: 2 mL, Rfl: 3     Allergies         Allergies   Allergen Reactions    Ace Inhibitors  Intimate Partner Violence      Fear of Current or Ex-Partner: Not asked      Emotionally Abused: Not asked      Physically Abused: Not asked      Sexually Abused: Not asked   Housing Stability: Unknown (9/19/2024)     Housing Stability Vital Sign      Unable to Pay for Housing in the Last Year: Not on file      Number of Times Moved in the Last Year: Not on file      Homeless in the Last Year: No            ROS/Meds/PSH/PMH/FH/SH: I personally reviewed the patients standard intake form.  Below are the pertinents     Tobacco:  reports that she quit smoking about 15 years ago. Her smoking use included cigarettes. She has never used smokeless tobacco.  Diabetes: diabetic-non insulin  Other:      Physical Examination:  General: no acute distress  Lungs: breathing easily  CV: regular rhythm by pulse  Left Knee: Tenderness palpation medial joint line pain at the extremes of motion of the medial joint line pain with Natividad's medial joint line stable to varus and valgus at 0 and 30 degrees negative Lachman's negative anterior posterior drawer        Imaging:   Reviewed the MRI scan of the left knee which demonstrates some mild degenerative changes medial compartment and patellofemoral compartment some full-thickness chondromalacia of the patella.  There is some extrusion of the mid body medial meniscus and a flap tear with undersurface component.  There is some mild extrusion although the root appears to be intact.  All imaging interpreted by myself Aram Abdi MD independent of radiology review           Assessment:       ICD-10-CM     1. Acute medial meniscus tear, left, initial encounter  S83.242A               Plan:     Continued left knee pain despite conservative treatment.  We discussed the possibility of arthroscopic intervention which likely be a debridement with a possible root repair.  I do not see an obvious root tear but there is extrusion of the body is somewhat concerning.  We discussed details risk

## 2024-12-13 NOTE — ANESTHESIA PRE PROCEDURE
Department of Anesthesiology  Preprocedure Note       Name:  Will Garcia   Age:  49 y.o.  :  1975                                          MRN:  [id number]         Date:  2024      Surgeon: Surgeon(s):  Aram Abdi MD    Procedure: Procedure(s):  LEFT KNEE  ARTHROSCOPY MEDIAL MENISCECTOMY     SUPINE    Medications prior to admission:   Prior to Admission medications    Medication Sig Start Date End Date Taking? Authorizing Provider   ibuprofen (ADVIL;MOTRIN) 800 MG tablet Take 1 tablet by mouth 2 times daily as needed for Pain 24  Yes Chelsie Adames APRN - CNP   gabapentin (NEURONTIN) 300 MG capsule Take 1 capsule by mouth 3 times daily for 180 days. 12/12/24 6/10/25 Yes Chelsie Adames APRN - CNP   loratadine (CLARITIN) 10 MG tablet Take 1 tablet by mouth daily 24  Yes Chelsie Adames APRN - CNP   pravastatin (PRAVACHOL) 40 MG tablet Take 1 tablet by mouth nightly 24  Yes Chelsie Adames APRN - CNP   fluticasone (FLONASE) 50 MCG/ACT nasal spray 2 sprays by Nasal route daily 24  Yes Chelsie Adames APRN - CNP   hydroCHLOROthiazide (HYDRODIURIL) 25 MG tablet Take 1 tablet by mouth daily 24  Yes Chelsie Adames APRN - CNP   esomeprazole (NEXIUM) 40 MG delayed release capsule Take 1 capsule by mouth every morning (before breakfast) 24  Yes Chelsie Adames APRN - CNP   metFORMIN (GLUCOPHAGE) 500 MG tablet Take 1 tablet by mouth 2 times daily (with meals) 24  Yes Chelsie Adames APRN - CNP   Tirzepatide (MOUNJARO) 15 MG/0.5ML SOPN SC injection Inject 0.5 mLs into the skin once a week  Patient taking differently: Inject 0.5 mLs into the skin once a week Takes on 24  Yes Chelsie Adames APRN - CNP   colestipol (COLESTID) 1 g tablet Take 2 tablets by mouth 2 times daily  Patient not taking: Reported on 2024 12/6/24 3/6/25  Provider, MD Nazanin   Tirzepatide 15 MG/0.5ML SOAJ Inject 15 mcg into the skin

## 2024-12-13 NOTE — DISCHARGE INSTRUCTIONS
Post-op instructions for Knee Arthroscopy (Dr. Abdi)    Day of Surgery:     DIET:   Begin with liquids and light foods (jell-o, soup, etc.). Progress to your normal diet if you are not nauseated.    MEDICATION:   1. Pain- Norco (hydrocodone/acetaminophen) or Oxycodone. If you are taking oxycodone, you may also take two (2) Tylenol (acetaminophen) 500mg tabs every eight (8) hours. Do not take Tylenol (acetaminophen) if you are given Norco as this medicine already contains acetaminophen. Do not drink alcohol while taking pain medication. Slowly wean off the pain medication as the pain improves.   2. Aspirin 81 mg: Take one (1) tablet in the morning and at night each day x 2 weeks post-operatively.  3. Stool Softener-Pain medication can cause constipation. Be sure to drink plenty of water and take an over the counter stool softener as needed.     ICE:  Do NOT put the ice machine directly on your skin. Use it frequently for the first 72 hours. You may also use a regular ice bag/pack for 20 minutes at a time. Place a thin layer of clothing or pillow case between ice pack and your skin. Ice for 20-30 minutes on and then 20-30 minutes off.  If you have the Surgical dressing intact you may run your ice machine for as long as as comfortable as long as your skin is not irritated/burned.     SHOWERING:  No showering. Leave bandages in place.     ACTIVITY:  Keep leg elevated on a pillow placed under your ankle.   **DO NOT PUT A PILLOW UNDER YOUR KNEE!!**  It is important for you to keep your leg straight. Use crutches and you may put light weight on the operative leg.    EXERCISES:  Begin ankle pumps.   Attempt quadriceps sets and straight leg raises    First & Second Post-Op Day:    MEDICATION: Continue as instructed above.    BANDAGES: No showering. Keep bandage in place.     EXERCISES: Continue Quad sets and ankle pumps as above. Bend and extend the knee as tolerated. You may put light weight on the leg. Continue to

## 2024-12-13 NOTE — ANESTHESIA POSTPROCEDURE EVALUATION
Department of Anesthesiology  Postprocedure Note    Patient: Will Garcia  MRN: [id number]  YOB: 1975  Date of evaluation: 12/13/2024    Procedure Summary       Date: 12/13/24 Room / Location: [facility] OR 06 / [facility]    Anesthesia Start: 0745 Anesthesia Stop: 0842    Procedure: LEFT KNEE  ARTHROSCOPY MEDIAL MENISCUS DEBRIDEMENT (Left: Knee) Diagnosis:       Acute medial meniscus tear of left knee, initial encounter      Pes anserinus bursitis of left knee      Primary osteoarthritis of left knee      Acute pain of left knee      (Acute medial meniscus tear of left knee, initial encounter [S83.242A])      (Pes anserinus bursitis of left knee [M70.52])      (Primary osteoarthritis of left knee [M17.12])      (Acute pain of left knee [M25.562])    Surgeons: Aram Abdi MD Responsible Provider: Jovany Toledo MD    Anesthesia Type: general ASA Status: 3            Anesthesia Type: No value filed.    De Phase I: De Score: 8    De Phase II: De Score: 10    Anesthesia Post Evaluation    Patient location during evaluation: PACU  Patient participation: complete - patient participated  Level of consciousness: awake and alert  Airway patency: patent  Nausea & Vomiting: no nausea and no vomiting  Cardiovascular status: hemodynamically stable  Respiratory status: acceptable, nonlabored ventilation and spontaneous ventilation  Hydration status: euvolemic  Comments: BP (!) 113/55   Pulse 87   Temp 97.4 °F (36.3 °C) (Temporal)   Resp 12   Ht 1.626 m (5' 4\")   Wt 109.8 kg (242 lb)   SpO2 (!) 88%   BMI 41.54 kg/m²     Multimodal analgesia pain management approach  Pain management: adequate and satisfactory to patient    No notable events documented.

## 2024-12-13 NOTE — OP NOTE
Operative Report    Patient: Will Garcia MRN: [id number]  SSN: [id number]    YOB: 1975  Age: 49 y.o.  Sex: female       Date of Surgery: 12/13/2024     Preoperative Diagnosis: left  Knee medial Meniscus tear    Postoperative Diagnosis: Same    Surgeons and Role:     * Aram Abdi MD - Primary    Assistant: Tabitha Davenport PA-C     Anesthesia: General     Procedure:    1) left  Knee arthroscopy with partial medial meniscectomy/debridement      Estimated Blood Loss:  5 mL    Tourniquet Time:   Total Tourniquet Time Documented:  Thigh (Left) - 13 minutes  Total: Thigh (Left) - 13 minutes          Implants:   None           Specimens: * No specimens in log *        Drains: None                Complications: None    Counts: Sponge and needle counts were correct times two.    Indications: See H&P      Findings:  Complex degenerative tear posterior horn medial meniscus white white zone    Procedure in Detail: After informed consent was obtained the surgical site was marked in the preoperative area by myself the surgeon.  Patient was brought to the operating room placed supine the operating table general anesthesia was induced.  The operative extremity was prepped and draped in usual orthopedic sterile fashion timeout was performed per protocol.  Antibiotics were given per protocol.    Initially a standard inferolateral arthroscopy portal was established.  Diagnostic arthroscopy was carried out.  Suprapatellar pouch was benign.  Patellofemoral compartment demonstrated mild to moderate diffuse of the articular surfaces of the undersurface of the patella and  of the trochlea. Medial and lateral gutters were free of loose bodies.  Anteromedial portal was established under spinal needle guidance.  Synovitis in anterior interval was debrided with a motorized shaver. ACL was intact and stable to probing with good tension.  PCL was intact.   Lateral compartment demonstrated mild degenerative changes

## 2024-12-16 ENCOUNTER — EVALUATION (OUTPATIENT)
Age: 49
End: 2024-12-16
Payer: COMMERCIAL

## 2024-12-16 DIAGNOSIS — R29.898 WEAKNESS OF LEFT LOWER EXTREMITY: ICD-10-CM

## 2024-12-16 DIAGNOSIS — Z78.9 IMPAIRED MOBILITY AND ADLS: ICD-10-CM

## 2024-12-16 DIAGNOSIS — M25.662 DECREASED RANGE OF MOTION (ROM) OF LEFT KNEE: ICD-10-CM

## 2024-12-16 DIAGNOSIS — M25.562 ACUTE PAIN OF LEFT KNEE: Primary | ICD-10-CM

## 2024-12-16 DIAGNOSIS — M25.469 KNEE SWELLING: ICD-10-CM

## 2024-12-16 DIAGNOSIS — Z74.09 IMPAIRED MOBILITY AND ADLS: ICD-10-CM

## 2024-12-16 PROCEDURE — 97116 GAIT TRAINING THERAPY: CPT | Performed by: PHYSICAL THERAPIST

## 2024-12-16 PROCEDURE — 97161 PT EVAL LOW COMPLEX 20 MIN: CPT | Performed by: PHYSICAL THERAPIST

## 2024-12-16 PROCEDURE — 97110 THERAPEUTIC EXERCISES: CPT | Performed by: PHYSICAL THERAPIST

## 2024-12-18 ENCOUNTER — TREATMENT (OUTPATIENT)
Age: 49
End: 2024-12-18

## 2024-12-18 DIAGNOSIS — Z78.9 IMPAIRED MOBILITY AND ADLS: ICD-10-CM

## 2024-12-18 DIAGNOSIS — Z74.09 IMPAIRED MOBILITY AND ADLS: ICD-10-CM

## 2024-12-18 DIAGNOSIS — M25.469 KNEE SWELLING: ICD-10-CM

## 2024-12-18 DIAGNOSIS — M25.662 DECREASED RANGE OF MOTION (ROM) OF LEFT KNEE: ICD-10-CM

## 2024-12-18 DIAGNOSIS — M25.562 ACUTE PAIN OF LEFT KNEE: Primary | ICD-10-CM

## 2024-12-18 DIAGNOSIS — R29.898 WEAKNESS OF LEFT LOWER EXTREMITY: ICD-10-CM

## 2024-12-18 NOTE — PROGRESS NOTES
with good eccentric quad control left.    [employer]  Access Code: [email or web address]  URL: [email or web address]  Date: 12/16/2024  Prepared by: Keyur Nielsen    Exercises  - Seated Knee Extension Stretch with Chair  - 4-5 x daily - 2 min hold  - Long Sitting Quad Set  - 2 x daily - 3 sets - 10 reps - 2 sec hold  - Supine Knee Extension Strengthening  - 2 x daily - 2-3 sets - 10 reps  - Supine Single Leg Ankle Pumps  - 4-5 x daily - 2-3 sets - 10 reps  - Supine Active Straight Leg Raise  - 2 x daily - 2-3 sets - 6-8 reps  - Supine Heel Slide with Strap  - 2 x daily - 20-30 reps - 2 sec hold  - Seated Knee Flexion Stretch  - 2 x daily - 20-30 reps - 2 sec hold

## 2024-12-23 ENCOUNTER — TREATMENT (OUTPATIENT)
Age: 49
End: 2024-12-23
Payer: COMMERCIAL

## 2024-12-23 DIAGNOSIS — R29.898 WEAKNESS OF LEFT LOWER EXTREMITY: ICD-10-CM

## 2024-12-23 DIAGNOSIS — M25.469 KNEE SWELLING: ICD-10-CM

## 2024-12-23 DIAGNOSIS — Z74.09 IMPAIRED MOBILITY AND ADLS: ICD-10-CM

## 2024-12-23 DIAGNOSIS — M25.662 DECREASED RANGE OF MOTION (ROM) OF LEFT KNEE: ICD-10-CM

## 2024-12-23 DIAGNOSIS — Z78.9 IMPAIRED MOBILITY AND ADLS: ICD-10-CM

## 2024-12-23 DIAGNOSIS — M25.562 ACUTE PAIN OF LEFT KNEE: Primary | ICD-10-CM

## 2024-12-23 PROCEDURE — 97140 MANUAL THERAPY 1/> REGIONS: CPT | Performed by: PHYSICAL THERAPIST

## 2024-12-23 PROCEDURE — 97110 THERAPEUTIC EXERCISES: CPT | Performed by: PHYSICAL THERAPIST

## 2024-12-23 NOTE — PROGRESS NOTES
Name: Will Garcia  YOB: 1975  Gender: female  MRN: [id number]    Procedure Performed: 1) left  Knee arthroscopy with partial medial meniscectomy/debridement       Date of Procedure: 12/13/2024      Subjective:Returns 2 weeks status post the above procedure.  She is doing well pain is controlled      Physical Examination:Incisions clean dry and intact, no sign of infection. Motor and sensory intact.  Full passive hyperextension and near full flexion      Assessment:   1. S/P orthopedic surgery, follow-up exam         Plan:   Doing well.  .   Continue PT per  Routine knee arthroscopy protocol with range of motion as tolerated and gradual progression of weightbearing as tolerated   Follow up in 4 weeks with Tiff Davenport

## 2024-12-23 NOTE — PROGRESS NOTES
while cooking for the holidays. Good tolerance to upright bike. Able to complete full revolutions.        PLAN      Cleared to wean from asst device slowly. Will resume if pain or edema increases.     PLAN OF CARE     Effective Dates/Duration: 12/16/2024 TO 3/16/2025 (90 days).    Frequency: 2x/week   Interventions may include but are not limited to:   (17588) Therapeutic exercise to develop ROM, strength, endurance and flexibility  (31443) Therapeutic activities using dynamic activities to improve function  (02310) Gait training to address mechanics, proper step length and weight shifting to improve household and community mobility as well as overall safety with ADLs  (08335) Manual therapy techniques to improve joint and/or soft tissue mobility, ROM, and function as well as helping to decrease pain/spasms and swelling  (12639) Neuromuscular reeducation addressing impaired balance, coordination, kinesthetic sense, posture and proprioception  Modalities prn to address pain, spasms, and swelling: (84018/) Electrical stimulation- unattended  (09796) Vasopneumatic compression  (56074) Hot/cold pack      GOALS     Goals:  Short term goals to be met by 1/13/2025  (4 weeks):  Pt will demonstrate good recall of HEP requiring minimal verbal cuing for proper form and technique.  Decrease use of prescription pain medications, demonstrating a reduction in overall pain.  Pt will improve quad set to WNL left for improved knee stability with gait.  Pt will increase strength of left hip and knee to >/= 4/5 for improved tolerance to unassisted gait, transfers, and ADLs.  5. Pt will perform 10 or greater sit to/from  30 sec.  6. Pt will demonstrate SLS ea LE 10 sec or greater for safety with unassisted gait.    Long term goals to be met by 3/16/2025 (90 days):  Pt will ambulate with normal gait pattern for improved community mobility.  Pt will be able to rise from commode height with  no assist from UEs to help and wt

## 2024-12-26 ENCOUNTER — OFFICE VISIT (OUTPATIENT)
Dept: ORTHOPEDIC SURGERY | Age: 49
End: 2024-12-26

## 2024-12-26 DIAGNOSIS — Z09 S/P ORTHOPEDIC SURGERY, FOLLOW-UP EXAM: Primary | ICD-10-CM

## 2024-12-26 PROCEDURE — 99024 POSTOP FOLLOW-UP VISIT: CPT | Performed by: ORTHOPAEDIC SURGERY

## 2024-12-27 ENCOUNTER — TREATMENT (OUTPATIENT)
Age: 49
End: 2024-12-27
Payer: COMMERCIAL

## 2024-12-27 DIAGNOSIS — M25.469 KNEE SWELLING: ICD-10-CM

## 2024-12-27 DIAGNOSIS — M25.562 ACUTE PAIN OF LEFT KNEE: Primary | ICD-10-CM

## 2024-12-27 DIAGNOSIS — M25.662 DECREASED RANGE OF MOTION (ROM) OF LEFT KNEE: ICD-10-CM

## 2024-12-27 PROCEDURE — 97110 THERAPEUTIC EXERCISES: CPT

## 2024-12-27 PROCEDURE — 97016 VASOPNEUMATIC DEVICE THERAPY: CPT

## 2024-12-27 NOTE — PROGRESS NOTES
[facility] ORTHOPAEDICS  [facility] [address]  Dept: [phone]      Physical Therapy Daily Note     Referring MD: Aram Abdi MD  Diagnosis:    Diagnosis Orders   1. Acute pain of left knee        2. Knee swelling        3. Decreased range of motion (ROM) of left knee              Surgery: Left Knee  Arthroscopy Medial Meniscus Debridement - Left Date: 12/13/2024  Therapy precautions:Diabetes/neuropathy- sensory precautions  Postoperative plan:  1) Discharge Home  2) DVT prophylaxis with aspirin 81 mg twice daily x 2 weeks  3) Rehab protocol: Routine knee arthroscopy protocol with range of motion as tolerated and gradual progression of weightbearing as tolerated   Co-morbidities affecting plan of care: HTN (controlled), DM type II, GERD    Payor: [place] /  /  /  Billing pattern: Commercial- substantial/midpoint time each CPT  Total Timed Codes: 40 min, Total Treatment Time: 55 min Modifier needed: No    Episode visit count:  4     PERTINENT MEDICAL HISTORY     Past medical and surgical history: reviewed in chart   Medications: reviewed in chart   Allergies:   Allergies   Allergen Reactions    Ace Inhibitors Anaphylaxis and Angioedema    Hydromorphone Anaphylaxis     Swollen tongue, lips, hypotension, low O2 SATS-\"Dilaudid\"    Lisinopril Anaphylaxis     Lips swell, last time she was in the hospital for 4 days    Trulicity [Dulaglutide] Other (See Comments)     Anorexia    Amlodipine Other (See Comments)     Pt denies any allergic reaction to Norvasc    Oxycodone Other (See Comments)     Makes itch    Sulfa Antibiotics Hives    Sulfamethoxazole-Trimethoprim Hives        Diagnostic exams (per chart review):  None since surgery    Chief complaints/history of injury:  Insidious onset of Lt medial joint line and infrapatellar knee pain ~ May 2024. Unable to ID specific PHYLLIS, only a progressive onset of pain with a sense of weakness and occasional feeling of instability or giving

## 2024-12-31 ENCOUNTER — TREATMENT (OUTPATIENT)
Age: 49
End: 2024-12-31
Payer: COMMERCIAL

## 2024-12-31 DIAGNOSIS — M25.662 DECREASED RANGE OF MOTION (ROM) OF LEFT KNEE: ICD-10-CM

## 2024-12-31 DIAGNOSIS — M25.562 ACUTE PAIN OF LEFT KNEE: Primary | ICD-10-CM

## 2024-12-31 DIAGNOSIS — Z74.09 IMPAIRED MOBILITY AND ADLS: ICD-10-CM

## 2024-12-31 DIAGNOSIS — R29.898 WEAKNESS OF LEFT LOWER EXTREMITY: ICD-10-CM

## 2024-12-31 DIAGNOSIS — Z78.9 IMPAIRED MOBILITY AND ADLS: ICD-10-CM

## 2024-12-31 DIAGNOSIS — M25.469 KNEE SWELLING: ICD-10-CM

## 2024-12-31 PROCEDURE — 97110 THERAPEUTIC EXERCISES: CPT | Performed by: PHYSICAL THERAPIST

## 2024-12-31 PROCEDURE — 97016 VASOPNEUMATIC DEVICE THERAPY: CPT | Performed by: PHYSICAL THERAPIST

## 2024-12-31 NOTE — PROGRESS NOTES
[facility] ORTHOPAEDICS  [facility] [address]  Dept: [phone]      Physical Therapy Daily Note     Referring MD: Aram Abdi MD  Diagnosis:    Diagnosis Orders   1. Acute pain of left knee        2. Knee swelling        3. Decreased range of motion (ROM) of left knee        4. Impaired mobility and ADLs        5. Weakness of left lower extremity              Surgery: Left Knee  Arthroscopy Medial Meniscus Debridement - Left Date: 12/13/2024  Therapy precautions:Diabetes/neuropathy- sensory precautions  Postoperative plan:  1) Discharge Home  2) DVT prophylaxis with aspirin 81 mg twice daily x 2 weeks  3) Rehab protocol: Routine knee arthroscopy protocol with range of motion as tolerated and gradual progression of weightbearing as tolerated   Co-morbidities affecting plan of care: HTN (controlled), DM type II, GERD    Payor: [place] /  /  /  Billing pattern: Commercial- substantial/midpoint time each CPT  Total Timed Codes: 40 min, Total Treatment Time: 55 min Modifier needed: No    Episode visit count:  5     PERTINENT MEDICAL HISTORY     Past medical and surgical history: reviewed in chart   Medications: reviewed in chart   Allergies:   Allergies   Allergen Reactions    Ace Inhibitors Anaphylaxis and Angioedema    Hydromorphone Anaphylaxis     Swollen tongue, lips, hypotension, low O2 SATS-\"Dilaudid\"    Lisinopril Anaphylaxis     Lips swell, last time she was in the hospital for 4 days    Trulicity [Dulaglutide] Other (See Comments)     Anorexia    Amlodipine Other (See Comments)     Pt denies any allergic reaction to Norvasc    Oxycodone Other (See Comments)     Makes itch    Sulfa Antibiotics Hives    Sulfamethoxazole-Trimethoprim Hives        Diagnostic exams (per chart review):  None since surgery    Chief complaints/history of injury:  Insidious onset of Lt medial joint line and infrapatellar knee pain ~ May 2024. Unable to ID specific PHYLLIS, only a progressive onset

## 2025-01-01 NOTE — PROGRESS NOTES
[facility] ORTHOPAEDICS  [facility] [address]  Dept: [phone]      Physical Therapy Daily Note     Referring MD: Aram Abdi MD  Diagnosis:    Diagnosis Orders   1. Acute pain of left knee        2. Knee swelling        3. Decreased range of motion (ROM) of left knee        4. Impaired mobility and ADLs        5. Weakness of left lower extremity              Surgery: Left Knee  Arthroscopy Medial Meniscus Debridement - Left Date: 12/13/2024  Therapy precautions:Diabetes/neuropathy- sensory precautions  Postoperative plan:  1) Discharge Home  2) DVT prophylaxis with aspirin 81 mg twice daily x 2 weeks  3) Rehab protocol: Routine knee arthroscopy protocol with range of motion as tolerated and gradual progression of weightbearing as tolerated   Co-morbidities affecting plan of care: HTN (controlled), DM type II, GERD    Payor: [place] /  /  /  Billing pattern: Commercial- substantial/midpoint time each CPT  Total Timed Codes: 40 min, Total Treatment Time: 45 min Modifier needed: No    Episode visit count:  6     PERTINENT MEDICAL HISTORY     Past medical and surgical history: reviewed in chart   Medications: reviewed in chart   Allergies:   Allergies   Allergen Reactions    Ace Inhibitors Anaphylaxis and Angioedema    Hydromorphone Anaphylaxis     Swollen tongue, lips, hypotension, low O2 SATS-\"Dilaudid\"    Lisinopril Anaphylaxis     Lips swell, last time she was in the hospital for 4 days    Trulicity [Dulaglutide] Other (See Comments)     Anorexia    Amlodipine Other (See Comments)     Pt denies any allergic reaction to Norvasc    Oxycodone Other (See Comments)     Makes itch    Sulfa Antibiotics Hives    Sulfamethoxazole-Trimethoprim Hives        Diagnostic exams (per chart review):  None since surgery    Chief complaints/history of injury:  Insidious onset of Lt medial joint line and infrapatellar knee pain ~ May 2024. Unable to ID specific PHYLLIS, only a progressive onset

## 2025-01-02 ENCOUNTER — TREATMENT (OUTPATIENT)
Age: 50
End: 2025-01-02

## 2025-01-02 DIAGNOSIS — R29.898 WEAKNESS OF LEFT LOWER EXTREMITY: ICD-10-CM

## 2025-01-02 DIAGNOSIS — Z74.09 IMPAIRED MOBILITY AND ADLS: ICD-10-CM

## 2025-01-02 DIAGNOSIS — Z78.9 IMPAIRED MOBILITY AND ADLS: ICD-10-CM

## 2025-01-02 DIAGNOSIS — M25.562 ACUTE PAIN OF LEFT KNEE: Primary | ICD-10-CM

## 2025-01-02 DIAGNOSIS — M25.662 DECREASED RANGE OF MOTION (ROM) OF LEFT KNEE: ICD-10-CM

## 2025-01-02 DIAGNOSIS — M25.469 KNEE SWELLING: ICD-10-CM

## 2025-01-07 ENCOUNTER — TREATMENT (OUTPATIENT)
Age: 50
End: 2025-01-07
Payer: COMMERCIAL

## 2025-01-07 DIAGNOSIS — M25.562 ACUTE PAIN OF LEFT KNEE: Primary | ICD-10-CM

## 2025-01-07 DIAGNOSIS — M25.662 DECREASED RANGE OF MOTION (ROM) OF LEFT KNEE: ICD-10-CM

## 2025-01-07 DIAGNOSIS — M25.469 KNEE SWELLING: ICD-10-CM

## 2025-01-07 DIAGNOSIS — Z74.09 IMPAIRED MOBILITY AND ADLS: ICD-10-CM

## 2025-01-07 DIAGNOSIS — R29.898 WEAKNESS OF LEFT LOWER EXTREMITY: ICD-10-CM

## 2025-01-07 DIAGNOSIS — Z78.9 IMPAIRED MOBILITY AND ADLS: ICD-10-CM

## 2025-01-07 PROCEDURE — 97016 VASOPNEUMATIC DEVICE THERAPY: CPT | Performed by: PHYSICAL THERAPIST

## 2025-01-07 PROCEDURE — 97110 THERAPEUTIC EXERCISES: CPT | Performed by: PHYSICAL THERAPIST

## 2025-01-07 NOTE — PROGRESS NOTES
[facility] ORTHOPAEDICS  [facility] [address]  Dept: [phone]      Physical Therapy Daily Note     Referring MD: Aram Abdi MD  Diagnosis:    Diagnosis Orders   1. Acute pain of left knee        2. Knee swelling        3. Decreased range of motion (ROM) of left knee        4. Impaired mobility and ADLs        5. Weakness of left lower extremity              Surgery: Left Knee  Arthroscopy Medial Meniscus Debridement - Left Date: 12/13/2024  Therapy precautions:Diabetes/neuropathy- sensory precautions  Postoperative plan:  1) Discharge Home  2) DVT prophylaxis with aspirin 81 mg twice daily x 2 weeks  3) Rehab protocol: Routine knee arthroscopy protocol with range of motion as tolerated and gradual progression of weightbearing as tolerated   Co-morbidities affecting plan of care: HTN (controlled), DM type II, GERD    Payor: [place] /  /  /  Billing pattern: Commercial- substantial/midpoint time each CPT  Total Timed Codes: 45 min, Total Treatment Time: 60 min Modifier needed: No    Episode visit count:  7     PERTINENT MEDICAL HISTORY     Past medical and surgical history: reviewed in chart   Medications: reviewed in chart   Allergies:   Allergies   Allergen Reactions    Ace Inhibitors Anaphylaxis and Angioedema    Hydromorphone Anaphylaxis     Swollen tongue, lips, hypotension, low O2 SATS-\"Dilaudid\"    Lisinopril Anaphylaxis     Lips swell, last time she was in the hospital for 4 days    Trulicity [Dulaglutide] Other (See Comments)     Anorexia    Amlodipine Other (See Comments)     Pt denies any allergic reaction to Norvasc    Oxycodone Other (See Comments)     Makes itch    Sulfa Antibiotics Hives    Sulfamethoxazole-Trimethoprim Hives        Diagnostic exams (per chart review):  None since surgery    Chief complaints/history of injury:  Insidious onset of Lt medial joint line and infrapatellar knee pain ~ May 2024. Unable to ID specific PHYLLIS, only a progressive onset

## 2025-01-09 ENCOUNTER — TREATMENT (OUTPATIENT)
Age: 50
End: 2025-01-09
Payer: COMMERCIAL

## 2025-01-09 DIAGNOSIS — R29.898 WEAKNESS OF LEFT LOWER EXTREMITY: ICD-10-CM

## 2025-01-09 DIAGNOSIS — Z74.09 IMPAIRED MOBILITY AND ADLS: ICD-10-CM

## 2025-01-09 DIAGNOSIS — M25.662 DECREASED RANGE OF MOTION (ROM) OF LEFT KNEE: ICD-10-CM

## 2025-01-09 DIAGNOSIS — M25.562 ACUTE PAIN OF LEFT KNEE: Primary | ICD-10-CM

## 2025-01-09 DIAGNOSIS — M25.469 KNEE SWELLING: ICD-10-CM

## 2025-01-09 DIAGNOSIS — Z78.9 IMPAIRED MOBILITY AND ADLS: ICD-10-CM

## 2025-01-09 PROCEDURE — 97110 THERAPEUTIC EXERCISES: CPT | Performed by: PHYSICAL THERAPIST

## 2025-01-09 NOTE — PROGRESS NOTES
[facility] ORTHOPAEDICS  [facility] [address]  Dept: [phone]      Physical Therapy Daily Note     Referring MD: Aram Abdi MD  Diagnosis:    Diagnosis Orders   1. Acute pain of left knee        2. Knee swelling        3. Decreased range of motion (ROM) of left knee        4. Impaired mobility and ADLs        5. Weakness of left lower extremity              Surgery: Left Knee  Arthroscopy Medial Meniscus Debridement - Left Date: 12/13/2024  Therapy precautions:Diabetes/neuropathy- sensory precautions  Postoperative plan:  1) Discharge Home  2) DVT prophylaxis with aspirin 81 mg twice daily x 2 weeks  3) Rehab protocol: Routine knee arthroscopy protocol with range of motion as tolerated and gradual progression of weightbearing as tolerated   Co-morbidities affecting plan of care: HTN (controlled), DM type II, GERD    Payor: [place] /  /  /  Billing pattern: Commercial- substantial/midpoint time each CPT  Total Timed Codes: 45 min, Total Treatment Time: 50 min Modifier needed: No    Episode visit count:  8     PERTINENT MEDICAL HISTORY     Past medical and surgical history: reviewed in chart   Medications: reviewed in chart   Allergies:   Allergies   Allergen Reactions    Ace Inhibitors Anaphylaxis and Angioedema    Hydromorphone Anaphylaxis     Swollen tongue, lips, hypotension, low O2 SATS-\"Dilaudid\"    Lisinopril Anaphylaxis     Lips swell, last time she was in the hospital for 4 days    Trulicity [Dulaglutide] Other (See Comments)     Anorexia    Amlodipine Other (See Comments)     Pt denies any allergic reaction to Norvasc    Oxycodone Other (See Comments)     Makes itch    Sulfa Antibiotics Hives    Sulfamethoxazole-Trimethoprim Hives        Diagnostic exams (per chart review):  None since surgery    Chief complaints/history of injury:  Insidious onset of Lt medial joint line and infrapatellar knee pain ~ May 2024. Unable to ID specific PHYLLIS, only a progressive onset

## 2025-01-14 ENCOUNTER — TREATMENT (OUTPATIENT)
Age: 50
End: 2025-01-14
Payer: COMMERCIAL

## 2025-01-14 DIAGNOSIS — M25.562 ACUTE PAIN OF LEFT KNEE: Primary | ICD-10-CM

## 2025-01-14 DIAGNOSIS — Z74.09 IMPAIRED MOBILITY AND ADLS: ICD-10-CM

## 2025-01-14 DIAGNOSIS — M25.469 KNEE SWELLING: ICD-10-CM

## 2025-01-14 DIAGNOSIS — G89.29 CHRONIC PAIN OF LEFT KNEE: ICD-10-CM

## 2025-01-14 DIAGNOSIS — M25.562 CHRONIC PAIN OF LEFT KNEE: ICD-10-CM

## 2025-01-14 DIAGNOSIS — Z78.9 IMPAIRED MOBILITY AND ADLS: ICD-10-CM

## 2025-01-14 DIAGNOSIS — R29.898 WEAKNESS OF LEFT LOWER EXTREMITY: ICD-10-CM

## 2025-01-14 PROCEDURE — 97016 VASOPNEUMATIC DEVICE THERAPY: CPT | Performed by: PHYSICAL THERAPIST

## 2025-01-14 PROCEDURE — 97110 THERAPEUTIC EXERCISES: CPT | Performed by: PHYSICAL THERAPIST

## 2025-01-14 NOTE — PROGRESS NOTES
and/or soft tissue mobility, ROM, and function as well as helping to decrease pain/spasms and swelling  (48242) Neuromuscular reeducation addressing impaired balance, coordination, kinesthetic sense, posture and proprioception  Modalities prn to address pain, spasms, and swelling: (56665/) Electrical stimulation- unattended  (14183) Vasopneumatic compression  (20644) Hot/cold pack      GOALS     Goals:  Short term goals to be met by 1/13/2025  (4 weeks):  Pt will demonstrate good recall of HEP requiring minimal verbal cuing for proper form and technique. _ MET  Decrease use of prescription pain medications, demonstrating a reduction in overall pain. - MET  Pt will improve quad set to WNL left for improved knee stability with gait. - Partially Met  Pt will increase strength of left hip and knee to >/= 4/5 for improved tolerance to unassisted gait, transfers, and ADLs. - MET  5. Pt will perform 10 or greater sit to/from  30 sec. - To be assessed  6. Pt will demonstrate SLS ea LE 10 sec or greater for safety with unassisted gait. - Partially Met    Long term goals to be met by 3/16/2025 (90 days):  Pt will ambulate with normal gait pattern for improved community mobility.  Pt will be able to rise from commode height with  no assist from UEs to help and wt distributed equally, demonstrating improved functional strength of surgical LE.   Improve LEFS to </= 35% functional restrictions with ADLs, work and athletic activities.   Attain patient's goal(s) of passing return to work test, return with min modifications for pain or weakness left knee.   Pt will ascend and descend stairs reciprocally with good eccentric quad control left.    [employer]  Access Code: [email or web address]  URL: [email or web address]  Date: 12/16/2024  Prepared by: Keyur Nielsen    Exercises  - Seated Knee Extension Stretch with Chair  - 4-5 x daily - 2 min hold  - Long Sitting Quad Set  - 2 x daily - 3 sets - 10 reps - 2 sec hold  -

## 2025-01-16 ENCOUNTER — TREATMENT (OUTPATIENT)
Age: 50
End: 2025-01-16

## 2025-01-16 DIAGNOSIS — R29.898 WEAKNESS OF LEFT LOWER EXTREMITY: ICD-10-CM

## 2025-01-16 DIAGNOSIS — M25.469 KNEE SWELLING: ICD-10-CM

## 2025-01-16 DIAGNOSIS — Z74.09 IMPAIRED MOBILITY AND ADLS: ICD-10-CM

## 2025-01-16 DIAGNOSIS — M25.562 ACUTE PAIN OF LEFT KNEE: Primary | ICD-10-CM

## 2025-01-16 DIAGNOSIS — Z78.9 IMPAIRED MOBILITY AND ADLS: ICD-10-CM

## 2025-01-16 NOTE — PROGRESS NOTES
[facility] ORTHOPAEDICS  [address] [place] [address]  Dept: [phone]      Physical Therapy Progress Report     Referring MD: Aram Abdi MD  Diagnosis:    Diagnosis Orders   1. Acute pain of left knee        2. Knee swelling        3. Impaired mobility and ADLs        4. Weakness of left lower extremity            Surgery: Left Knee  Arthroscopy Medial Meniscus Debridement - Left Date: 12/13/2024  Therapy precautions:Diabetes/neuropathy- sensory precautions  Postoperative plan:  1) Discharge Home  2) DVT prophylaxis with aspirin 81 mg twice daily x 2 weeks  3) Rehab protocol: Routine knee arthroscopy protocol with range of motion as tolerated and gradual progression of weightbearing as tolerated   Co-morbidities affecting plan of care: HTN (controlled), DM type II, GERD    Payor: [place] /  /  /  Billing pattern: Commercial- substantial/midpoint time each CPT  Total Timed Codes: 45 min, Total Treatment Time: 60 min Modifier needed: No    Episode visit count:  10     PERTINENT MEDICAL HISTORY     Past medical and surgical history: reviewed in chart   Medications: reviewed in chart   Allergies:   Allergies   Allergen Reactions    Ace Inhibitors Anaphylaxis and Angioedema    Hydromorphone Anaphylaxis     Swollen tongue, lips, hypotension, low O2 SATS-\"Dilaudid\"    Lisinopril Anaphylaxis     Lips swell, last time she was in the hospital for 4 days    Trulicity [Dulaglutide] Other (See Comments)     Anorexia    Amlodipine Other (See Comments)     Pt denies any allergic reaction to Norvasc    Oxycodone Other (See Comments)     Makes itch    Sulfa Antibiotics Hives    Sulfamethoxazole-Trimethoprim Hives        Diagnostic exams (per chart review):  None since surgery    Chief complaints/history of injury:  Insidious onset of Lt medial joint line and infrapatellar knee pain ~ May 2024. Unable to ID specific PHYLLIS, only a progressive onset of pain with a sense of weakness and occasional

## 2025-01-21 ENCOUNTER — TREATMENT (OUTPATIENT)
Age: 50
End: 2025-01-21

## 2025-01-21 ENCOUNTER — OFFICE VISIT (OUTPATIENT)
Dept: ORTHOPEDIC SURGERY | Age: 50
End: 2025-01-21

## 2025-01-21 DIAGNOSIS — Z78.9 IMPAIRED MOBILITY AND ADLS: ICD-10-CM

## 2025-01-21 DIAGNOSIS — M25.469 KNEE SWELLING: ICD-10-CM

## 2025-01-21 DIAGNOSIS — M25.562 ACUTE PAIN OF LEFT KNEE: Primary | ICD-10-CM

## 2025-01-21 DIAGNOSIS — Z74.09 IMPAIRED MOBILITY AND ADLS: ICD-10-CM

## 2025-01-21 DIAGNOSIS — M25.562 CHRONIC PAIN OF LEFT KNEE: ICD-10-CM

## 2025-01-21 DIAGNOSIS — Z09 STATUS POST ORTHOPEDIC SURGERY, FOLLOW-UP EXAM: Primary | ICD-10-CM

## 2025-01-21 DIAGNOSIS — G89.29 CHRONIC PAIN OF LEFT KNEE: ICD-10-CM

## 2025-01-21 DIAGNOSIS — R29.898 WEAKNESS OF LEFT LOWER EXTREMITY: ICD-10-CM

## 2025-01-21 PROCEDURE — 99024 POSTOP FOLLOW-UP VISIT: CPT | Performed by: PHYSICIAN ASSISTANT

## 2025-01-21 NOTE — PROGRESS NOTES
[facility] ORTHOPAEDICS  [address] [place] [address]  Dept: [phone]      Physical Therapy Daily Note     Referring MD: Aram bAdi MD  Diagnosis:    Diagnosis Orders   1. Acute pain of left knee        2. Knee swelling        3. Impaired mobility and ADLs        4. Weakness of left lower extremity        5. Chronic pain of left knee            Surgery: Left Knee  Arthroscopy Medial Meniscus Debridement - Left Date: 12/13/2024  Therapy precautions:Diabetes/neuropathy- sensory precautions  Postoperative plan:  1) Discharge Home  2) DVT prophylaxis with aspirin 81 mg twice daily x 2 weeks  3) Rehab protocol: Routine knee arthroscopy protocol with range of motion as tolerated and gradual progression of weightbearing as tolerated   Co-morbidities affecting plan of care: HTN (controlled), DM type II, GERD    Payor: [place] /  /  /  Billing pattern: Commercial- substantial/midpoint time each CPT  Total Timed Codes: 45 min, Total Treatment Time: 60 min Modifier needed: No    Episode visit count:  11     PERTINENT MEDICAL HISTORY     Past medical and surgical history: reviewed in chart   Medications: reviewed in chart   Allergies:   Allergies   Allergen Reactions    Ace Inhibitors Anaphylaxis and Angioedema    Hydromorphone Anaphylaxis     Swollen tongue, lips, hypotension, low O2 SATS-\"Dilaudid\"    Lisinopril Anaphylaxis     Lips swell, last time she was in the hospital for 4 days    Trulicity [Dulaglutide] Other (See Comments)     Anorexia    Amlodipine Other (See Comments)     Pt denies any allergic reaction to Norvasc    Oxycodone Other (See Comments)     Makes itch    Sulfa Antibiotics Hives    Sulfamethoxazole-Trimethoprim Hives        Diagnostic exams (per chart review):  None since surgery    Chief complaints/history of injury:  Insidious onset of Lt medial joint line and infrapatellar knee pain ~ May 2024. Unable to ID specific PHYLLIS, only a progressive onset of pain with a sense

## 2025-01-21 NOTE — PROGRESS NOTES
Name: Will Garcia  YOB: 1975  Gender: female  MRN: [id number]    Procedure Performed:  1) left  Knee arthroscopy with partial medial meniscectomy/debridement     Date of Procedure: 12/13/24    Subjective: Returns 6 weeks status post the above procedure.  She states that progressing well.  Today she would like to go back to work if possible.    Physical Examination: Incisions clean dry and intact, no sign of infection. Motor and sensory intact.  Quad atrophy still present.  Calf is soft and nontender to palpation.  Dorsi and plantarflexion mechanism intact.  Dorsalis pulse 2+.    Assessment:   1. Status post orthopedic surgery, follow-up exam         Plan:   Doing well.   Continue PT per Routine knee arthroscopy protocol with range of motion as tolerated and gradual progression of weightbearing as tolerated, continue to work on strengthening.  We discussed that she go back to work as long as she continues work with [employer] and formal physical therapy.  She was given a note today states she go back to work.  Follow up in 6 weeks      Tabitha Davenport PA-C  Orthopaedics and Sports Medicine   [] : results reviewed [de-identified] : nSR, non-specific T-wave changes, seen on previous EKG

## 2025-01-28 ENCOUNTER — TREATMENT (OUTPATIENT)
Age: 50
End: 2025-01-28
Payer: COMMERCIAL

## 2025-01-28 DIAGNOSIS — R29.898 WEAKNESS OF LEFT LOWER EXTREMITY: ICD-10-CM

## 2025-01-28 DIAGNOSIS — Z74.09 IMPAIRED MOBILITY AND ADLS: ICD-10-CM

## 2025-01-28 DIAGNOSIS — Z78.9 IMPAIRED MOBILITY AND ADLS: ICD-10-CM

## 2025-01-28 DIAGNOSIS — M25.562 ACUTE PAIN OF LEFT KNEE: Primary | ICD-10-CM

## 2025-01-28 DIAGNOSIS — M25.469 KNEE SWELLING: ICD-10-CM

## 2025-01-28 PROCEDURE — 97140 MANUAL THERAPY 1/> REGIONS: CPT | Performed by: PHYSICAL THERAPIST

## 2025-01-28 PROCEDURE — 97110 THERAPEUTIC EXERCISES: CPT | Performed by: PHYSICAL THERAPIST

## 2025-01-28 PROCEDURE — 97016 VASOPNEUMATIC DEVICE THERAPY: CPT | Performed by: PHYSICAL THERAPIST

## 2025-01-28 NOTE — PROGRESS NOTES
[facility] PT [facility]TH[facility]  [address] [place] [address]  Dept: [phone]      Physical Therapy Daily Note     Referring MD: Aram Abdi MD  Diagnosis:    Diagnosis Orders   1. Acute pain of left knee        2. Knee swelling        3. Impaired mobility and ADLs        4. Weakness of left lower extremity            Surgery: Left Knee  Arthroscopy Medial Meniscus Debridement - Left Date: 12/13/2024  Therapy precautions:Diabetes/neuropathy- sensory precautions  Postoperative plan:  1) Discharge Home  2) DVT prophylaxis with aspirin 81 mg twice daily x 2 weeks  3) Rehab protocol: Routine knee arthroscopy protocol with range of motion as tolerated and gradual progression of weightbearing as tolerated   Co-morbidities affecting plan of care: HTN (controlled), DM type II, GERD    Payor: Pay[place] /  /  /  Billing pattern: Commercial- substantial/midpoint time each CPT  Total Timed Codes: 45 min, Total Treatment Time: 60 min Modifier needed: No    Episode visit count:  12     PERTINENT MEDICAL HISTORY     Past medical and surgical history: reviewed in chart   Medications: reviewed in chart   Allergies:   Allergies   Allergen Reactions    Ace Inhibitors Anaphylaxis and Angioedema    Hydromorphone Anaphylaxis     Swollen tongue, lips, hypotension, low O2 SATS-\"Dilaudid\"    Lisinopril Anaphylaxis     Lips swell, last time she was in the hospital for 4 days    Trulicity [Dulaglutide] Other (See Comments)     Anorexia    Amlodipine Other (See Comments)     Pt denies any allergic reaction to Norvasc    Oxycodone Other (See Comments)     Makes itch    Sulfa Antibiotics Hives    Sulfamethoxazole-Trimethoprim Hives        Diagnostic exams (per chart review):  None since surgery    Chief complaints/history of injury:  Insidious onset of Lt medial joint line and infrapatellar knee pain ~ May 2024. Unable to ID specific PHYLLIS, only a progressive onset of pain with a sense of weakness and occasional feeling

## 2025-01-30 ENCOUNTER — CLINICAL DOCUMENTATION (OUTPATIENT)
Age: 50
End: 2025-01-30

## 2025-01-30 NOTE — PROGRESS NOTES
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 PT offered latera appointment. Pt will attempt to attend if she is able to get to PT prior to 11:45.

## 2025-02-06 ENCOUNTER — TELEPHONE (OUTPATIENT)
Age: 50
End: 2025-02-06

## 2025-02-06 NOTE — TELEPHONE ENCOUNTER
Pt did not show for scheduled appointment. I called to check on patient, who did not answer the phone. I left a message reminding her of her next appointment on 2/11/25 at 9:15.

## 2025-02-10 NOTE — PROGRESS NOTES
[facility]THOPAED[facility]  [facility] [address]  Dept: [phone]      Physical Therapy Daily Note     Referring MD: Aram Abdi MD  Diagnosis:    Diagnosis Orders   1. Acute pain of left knee        2. Knee swelling        3. Impaired mobility and ADLs        4. Weakness of left lower extremity        5. Decreased range of motion (ROM) of left knee            Surgery: Left Knee  Arthroscopy Medial Meniscus Debridement - Left Date: 12/13/2024  Therapy precautions:Diabetes/neuropathy- sensory precautions  Postoperative plan:  1) Discharge Home  2) DVT prophylaxis with aspirin 81 mg twice daily x 2 weeks  3) Rehab protocol: Routine knee arthroscopy protocol with range of motion as tolerated and gradual progression of weightbearing as tolerated   Co-morbidities affecting plan of care: HTN (controlled), DM type II, GERD    Payor: [place] /  /  /  Billing pattern: Commercial- substantial/midpoint time each CPT  Total Timed Codes: 45 min, Total Treatment Time: 45 min Modifier needed: No    Episode visit count:  13     PERTINENT MEDICAL HISTORY     Past medical and surgical history: reviewed in chart   Medications: reviewed in chart   Allergies:   Allergies   Allergen Reactions    Ace Inhibitors Anaphylaxis and Angioedema    Hydromorphone Anaphylaxis     Swollen tongue, lips, hypotension, low O2 SATS-\"Dilaudid\"    Lisinopril Anaphylaxis     Lips swell, last time she was in the hospital for 4 days    Trulicity [Dulaglutide] Other (See Comments)     Anorexia    Amlodipine Other (See Comments)     Pt denies any allergic reaction to Norvasc    Oxycodone Other (See Comments)     Makes itch    Sulfa Antibiotics Hives    Sulfamethoxazole-Trimethoprim Hives        Diagnostic exams (per chart review):  None since surgery    Chief complaints/history of injury:  Insidious onset of Lt medial joint line and infrapatellar knee pain ~ May 2024. Unable to ID specific PHYLLIS, only a progressive onset

## 2025-02-11 ENCOUNTER — TREATMENT (OUTPATIENT)
Age: 50
End: 2025-02-11
Payer: COMMERCIAL

## 2025-02-11 DIAGNOSIS — M25.469 KNEE SWELLING: ICD-10-CM

## 2025-02-11 DIAGNOSIS — Z74.09 IMPAIRED MOBILITY AND ADLS: ICD-10-CM

## 2025-02-11 DIAGNOSIS — M25.562 ACUTE PAIN OF LEFT KNEE: Primary | ICD-10-CM

## 2025-02-11 DIAGNOSIS — M25.662 DECREASED RANGE OF MOTION (ROM) OF LEFT KNEE: ICD-10-CM

## 2025-02-11 DIAGNOSIS — Z78.9 IMPAIRED MOBILITY AND ADLS: ICD-10-CM

## 2025-02-11 DIAGNOSIS — R29.898 WEAKNESS OF LEFT LOWER EXTREMITY: ICD-10-CM

## 2025-02-11 PROCEDURE — 97110 THERAPEUTIC EXERCISES: CPT | Performed by: PHYSICAL THERAPIST

## 2025-02-17 NOTE — PROGRESS NOTES
[email or web address]  Date: 01/28/2025  Prepared by: [employer] Nielsen    Exercises  - Seated Knee Extension Stretch with Chair  - 4-5 x daily - 2 min hold  - Long Sitting Quad Set  - 2 x daily - 3 sets - 10 reps - 2 sec hold  - Supine Knee Extension Strengthening  - 2 x daily - 2-3 sets - 10 reps  - Supine Single Leg Ankle Pumps  - 4-5 x daily - 2-3 sets - 10 reps  - Supine Active Straight Leg Raise  - 2 x daily - 2-3 sets - 6-8 reps  - Supine Heel Slide with Strap  - 2 x daily - 20-30 reps - 2 sec hold  - Seated Knee Flexion Stretch  - 2 x daily - 20-30 reps - 2 sec hold  - Seated Hamstring Stretch  - 2-3 x daily - 3 reps - 20 sec hold  - Standing Hamstring Stretch with Step  - 2-3 x daily - 3 reps - 20 sec hold

## 2025-02-18 ENCOUNTER — TREATMENT (OUTPATIENT)
Age: 50
End: 2025-02-18
Payer: COMMERCIAL

## 2025-02-18 DIAGNOSIS — M25.662 DECREASED RANGE OF MOTION (ROM) OF LEFT KNEE: ICD-10-CM

## 2025-02-18 DIAGNOSIS — Z74.09 IMPAIRED MOBILITY AND ADLS: ICD-10-CM

## 2025-02-18 DIAGNOSIS — M25.562 ACUTE PAIN OF LEFT KNEE: Primary | ICD-10-CM

## 2025-02-18 DIAGNOSIS — Z78.9 IMPAIRED MOBILITY AND ADLS: ICD-10-CM

## 2025-02-18 DIAGNOSIS — M25.469 KNEE SWELLING: ICD-10-CM

## 2025-02-18 DIAGNOSIS — R29.898 WEAKNESS OF LEFT LOWER EXTREMITY: ICD-10-CM

## 2025-02-18 PROCEDURE — 97110 THERAPEUTIC EXERCISES: CPT | Performed by: PHYSICAL THERAPIST

## 2025-02-28 ENCOUNTER — PATIENT MESSAGE (OUTPATIENT)
Dept: FAMILY MEDICINE CLINIC | Facility: CLINIC | Age: 50
End: 2025-02-28

## 2025-02-28 DIAGNOSIS — E11.40 TYPE 2 DIABETES MELLITUS WITH DIABETIC NEUROPATHY, WITHOUT LONG-TERM CURRENT USE OF INSULIN (HCC): Primary | ICD-10-CM

## 2025-03-17 ENCOUNTER — PATIENT MESSAGE (OUTPATIENT)
Dept: FAMILY MEDICINE CLINIC | Facility: CLINIC | Age: 50
End: 2025-03-17

## 2025-04-21 ENCOUNTER — PATIENT MESSAGE (OUTPATIENT)
Dept: FAMILY MEDICINE CLINIC | Facility: CLINIC | Age: 50
End: 2025-04-21

## 2025-04-21 DIAGNOSIS — R23.2 HOT FLASHES: Primary | ICD-10-CM

## 2025-04-22 DIAGNOSIS — R23.2 HOT FLASHES: ICD-10-CM

## 2025-04-30 DIAGNOSIS — I10 ESSENTIAL HYPERTENSION: Primary | ICD-10-CM

## 2025-04-30 DIAGNOSIS — E55.9 VITAMIN D INSUFFICIENCY: ICD-10-CM

## 2025-04-30 DIAGNOSIS — E78.00 HYPERCHOLESTEROLEMIA: ICD-10-CM

## 2025-04-30 DIAGNOSIS — E66.01 MORBID OBESITY WITH BMI OF 40.0-44.9, ADULT (HCC): ICD-10-CM

## 2025-04-30 DIAGNOSIS — E11.40 TYPE 2 DIABETES MELLITUS WITH DIABETIC NEUROPATHY, WITHOUT LONG-TERM CURRENT USE OF INSULIN (HCC): ICD-10-CM

## 2025-05-13 ENCOUNTER — OFFICE VISIT (OUTPATIENT)
Dept: ORTHOPEDIC SURGERY | Age: 50
End: 2025-05-13
Payer: COMMERCIAL

## 2025-05-13 DIAGNOSIS — M25.562 LEFT KNEE PAIN, UNSPECIFIED CHRONICITY: Primary | ICD-10-CM

## 2025-05-13 PROCEDURE — 99213 OFFICE O/P EST LOW 20 MIN: CPT | Performed by: ORTHOPAEDIC SURGERY

## 2025-05-13 PROCEDURE — 20610 DRAIN/INJ JOINT/BURSA W/O US: CPT | Performed by: ORTHOPAEDIC SURGERY

## 2025-05-13 RX ORDER — TRIAMCINOLONE ACETONIDE 40 MG/ML
40 INJECTION, SUSPENSION INTRA-ARTICULAR; INTRAMUSCULAR ONCE
Status: COMPLETED | OUTPATIENT
Start: 2025-05-13 | End: 2025-05-13

## 2025-05-13 RX ADMIN — TRIAMCINOLONE ACETONIDE 40 MG: 40 INJECTION, SUSPENSION INTRA-ARTICULAR; INTRAMUSCULAR at 09:32

## 2025-05-13 NOTE — PROGRESS NOTES
Name: Will Garcia  YOB: 1975  Gender: female  MRN: [id number]    CC: Knee Pain (L)     HPI: Will Garcia is a 50 y.o. female who returns for follow up on the left knee.  She had a medial meniscectomy in December 2024.  She states she did great after that procedure until she went back to work.  She works as a schoolbus  and notes pain when she goes from a sitting to a standing position.  She rehabbed with [employer] at our [facility] location after surgery.  She notes after her 6-week appointment she did not follow-up but she felt as though she was doing great.  She has been working on a home exercise program and trying to walk in between her best routes and notes pain and swelling when she does like walking.  She states at rest she does not have any pain.  She does note some swelling in this knee with walking.  She denies any mechanical symptoms or locking of this left knee and denies any known new injury.  She is here today for evaluation of left knee pain.    Physical Examination:  General: no acute distress  Lungs: breathing easily  CV: regular rhythm by pulse  Left Knee:   Tender to palpate along the medial joint line.  No significant joint effusion noted.  Quad atrophy noted.  No tenderness along the hamstring tendons or pes today.  Ligamentously stable x 4.  Previous surgical scars are well-healed.  Motor and sensory intact distally.  Calf is soft and nontender to palpation.  Dorsalis pedis pulse 2+.    Assessment:     ICD-10-CM    1. Left knee pain, unspecified chronicity  M25.562 Ambulatory referral to Physical Therapy     DRAIN/INJECT LARGE JOINT/BURSA     triamcinolone acetonide (KENALOG-40) injection 40 mg        Plan:     Discussion has multiple reasons have pain today. We discussed risks, benefits and indications of an intra-articular injection today to calm down some of this intra-articular knee pain.  She agreed to proceed with the injection.  I also think a lot of her

## 2025-05-14 ASSESSMENT — PATIENT HEALTH QUESTIONNAIRE - PHQ9
SUM OF ALL RESPONSES TO PHQ QUESTIONS 1-9: 0
SUM OF ALL RESPONSES TO PHQ QUESTIONS 1-9: 0
2. FEELING DOWN, DEPRESSED OR HOPELESS: NOT AT ALL
SUM OF ALL RESPONSES TO PHQ9 QUESTIONS 1 & 2: 0
1. LITTLE INTEREST OR PLEASURE IN DOING THINGS: NOT AT ALL
SUM OF ALL RESPONSES TO PHQ QUESTIONS 1-9: 0
1. LITTLE INTEREST OR PLEASURE IN DOING THINGS: NOT AT ALL
SUM OF ALL RESPONSES TO PHQ QUESTIONS 1-9: 0
2. FEELING DOWN, DEPRESSED OR HOPELESS: NOT AT ALL

## 2025-05-15 ENCOUNTER — OFFICE VISIT (OUTPATIENT)
Dept: FAMILY MEDICINE CLINIC | Facility: CLINIC | Age: 50
End: 2025-05-15
Payer: COMMERCIAL

## 2025-05-15 VITALS
TEMPERATURE: 97.6 F | RESPIRATION RATE: 18 BRPM | HEIGHT: 64 IN | OXYGEN SATURATION: 98 % | WEIGHT: 247 LBS | BODY MASS INDEX: 42.17 KG/M2 | DIASTOLIC BLOOD PRESSURE: 78 MMHG | HEART RATE: 84 BPM | SYSTOLIC BLOOD PRESSURE: 122 MMHG

## 2025-05-15 DIAGNOSIS — E11.622 CONTROLLED TYPE 2 DIABETES MELLITUS WITH OTHER SKIN ULCER, WITHOUT LONG-TERM CURRENT USE OF INSULIN (HCC): Primary | ICD-10-CM

## 2025-05-15 DIAGNOSIS — M25.562 LEFT KNEE PAIN, UNSPECIFIED CHRONICITY: ICD-10-CM

## 2025-05-15 DIAGNOSIS — J30.1 NON-SEASONAL ALLERGIC RHINITIS DUE TO POLLEN: ICD-10-CM

## 2025-05-15 DIAGNOSIS — E78.2 MIXED HYPERLIPIDEMIA: ICD-10-CM

## 2025-05-15 DIAGNOSIS — Z12.31 ENCOUNTER FOR SCREENING MAMMOGRAM FOR MALIGNANT NEOPLASM OF BREAST: ICD-10-CM

## 2025-05-15 DIAGNOSIS — E78.00 HYPERCHOLESTEROLEMIA: ICD-10-CM

## 2025-05-15 DIAGNOSIS — R61 NIGHT SWEATS: ICD-10-CM

## 2025-05-15 DIAGNOSIS — E55.9 VITAMIN D INSUFFICIENCY: ICD-10-CM

## 2025-05-15 DIAGNOSIS — K21.9 GASTROESOPHAGEAL REFLUX DISEASE WITHOUT ESOPHAGITIS: ICD-10-CM

## 2025-05-15 DIAGNOSIS — I10 ESSENTIAL HYPERTENSION: ICD-10-CM

## 2025-05-15 DIAGNOSIS — E11.65 TYPE 2 DIABETES MELLITUS WITH HYPERGLYCEMIA, WITHOUT LONG-TERM CURRENT USE OF INSULIN (HCC): ICD-10-CM

## 2025-05-15 DIAGNOSIS — E11.42 DIABETIC SENSORY POLYNEUROPATHY (HCC): ICD-10-CM

## 2025-05-15 DIAGNOSIS — E11.40 TYPE 2 DIABETES MELLITUS WITH DIABETIC NEUROPATHY, WITHOUT LONG-TERM CURRENT USE OF INSULIN (HCC): ICD-10-CM

## 2025-05-15 LAB
25(OH)D3 SERPL-MCNC: 27.8 NG/ML (ref 30–100)
ALBUMIN SERPL-MCNC: 3.7 G/DL (ref 3.5–5)
ALBUMIN/GLOB SERPL: 0.8 (ref 1–1.9)
ALP SERPL-CCNC: 122 U/L (ref 35–104)
ALT SERPL-CCNC: 15 U/L (ref 8–45)
ANION GAP SERPL CALC-SCNC: 13 MMOL/L (ref 7–16)
AST SERPL-CCNC: 22 U/L (ref 15–37)
BASOPHILS # BLD: 0.03 K/UL (ref 0–0.2)
BASOPHILS NFR BLD: 0.2 % (ref 0–2)
BILIRUB SERPL-MCNC: 0.3 MG/DL (ref 0–1.2)
BUN SERPL-MCNC: 17 MG/DL (ref 6–23)
CALCIUM SERPL-MCNC: 9.3 MG/DL (ref 8.8–10.2)
CHLORIDE SERPL-SCNC: 101 MMOL/L (ref 98–107)
CHOLEST SERPL-MCNC: 161 MG/DL (ref 0–200)
CO2 SERPL-SCNC: 26 MMOL/L (ref 20–29)
CREAT SERPL-MCNC: 0.77 MG/DL (ref 0.6–1.1)
DIFFERENTIAL METHOD BLD: ABNORMAL
EOSINOPHIL # BLD: 0.01 K/UL (ref 0–0.8)
EOSINOPHIL NFR BLD: 0.1 % (ref 0.5–7.8)
ERYTHROCYTE [DISTWIDTH] IN BLOOD BY AUTOMATED COUNT: 13.2 % (ref 11.9–14.6)
EST. AVERAGE GLUCOSE BLD GHB EST-MCNC: 134 MG/DL
FSH SERPL-ACNC: 54.2 MIU/ML
GLOBULIN SER CALC-MCNC: 4.5 G/DL (ref 2.3–3.5)
GLUCOSE SERPL-MCNC: 97 MG/DL (ref 70–99)
HBA1C MFR BLD: 6.3 % (ref 0–5.6)
HCT VFR BLD AUTO: 37.3 % (ref 35.8–46.3)
HDLC SERPL-MCNC: 41 MG/DL (ref 40–60)
HDLC SERPL: 3.9 (ref 0–5)
HGB BLD-MCNC: 11.8 G/DL (ref 11.7–15.4)
HIV 1+2 AB+HIV1 P24 AG SERPL QL IA: NONREACTIVE
HIV 1/2 RESULT COMMENT: NORMAL
IMM GRANULOCYTES # BLD AUTO: 0.05 K/UL (ref 0–0.5)
IMM GRANULOCYTES NFR BLD AUTO: 0.4 % (ref 0–5)
LDLC SERPL CALC-MCNC: 100 MG/DL (ref 0–100)
LH SERPL-ACNC: 37.7 MIU/ML
LYMPHOCYTES # BLD: 3.72 K/UL (ref 0.5–4.6)
LYMPHOCYTES NFR BLD: 28.4 % (ref 13–44)
MCH RBC QN AUTO: 28.2 PG (ref 26.1–32.9)
MCHC RBC AUTO-ENTMCNC: 31.6 G/DL (ref 31.4–35)
MCV RBC AUTO: 89.2 FL (ref 82–102)
MONOCYTES # BLD: 0.5 K/UL (ref 0.1–1.3)
MONOCYTES NFR BLD: 3.8 % (ref 4–12)
NEUTS SEG # BLD: 8.79 K/UL (ref 1.7–8.2)
NEUTS SEG NFR BLD: 67.1 % (ref 43–78)
NRBC # BLD: 0 K/UL (ref 0–0.2)
PLATELET # BLD AUTO: 340 K/UL (ref 150–450)
PLATELET COMMENT: ADEQUATE
PMV BLD AUTO: 9.5 FL (ref 9.4–12.3)
POTASSIUM SERPL-SCNC: 3.8 MMOL/L (ref 3.5–5.1)
PROT SERPL-MCNC: 8.2 G/DL (ref 6.3–8.2)
RBC # BLD AUTO: 4.18 M/UL (ref 4.05–5.2)
RBC MORPH BLD: ABNORMAL
SODIUM SERPL-SCNC: 140 MMOL/L (ref 136–145)
TRIGL SERPL-MCNC: 100 MG/DL (ref 0–150)
TSH, 3RD GENERATION: 0.82 UIU/ML (ref 0.27–4.2)
VLDLC SERPL CALC-MCNC: 20 MG/DL (ref 6–23)
WBC # BLD AUTO: 13.1 K/UL (ref 4.3–11.1)
WBC MORPH BLD: ABNORMAL

## 2025-05-15 PROCEDURE — 3074F SYST BP LT 130 MM HG: CPT | Performed by: NURSE PRACTITIONER

## 2025-05-15 PROCEDURE — 3078F DIAST BP <80 MM HG: CPT | Performed by: NURSE PRACTITIONER

## 2025-05-15 PROCEDURE — 99214 OFFICE O/P EST MOD 30 MIN: CPT | Performed by: NURSE PRACTITIONER

## 2025-05-15 RX ORDER — LORATADINE 10 MG/1
10 TABLET ORAL DAILY
Qty: 90 TABLET | Refills: 3 | Status: SHIPPED | OUTPATIENT
Start: 2025-05-15

## 2025-05-15 RX ORDER — GABAPENTIN 300 MG/1
300 CAPSULE ORAL 3 TIMES DAILY
Qty: 270 CAPSULE | Refills: 1 | Status: SHIPPED | OUTPATIENT
Start: 2025-05-15 | End: 2025-11-11

## 2025-05-15 RX ORDER — PRAVASTATIN SODIUM 40 MG
40 TABLET ORAL NIGHTLY
Qty: 90 TABLET | Refills: 1 | Status: SHIPPED | OUTPATIENT
Start: 2025-05-15

## 2025-05-15 RX ORDER — IBUPROFEN 800 MG/1
800 TABLET, FILM COATED ORAL 2 TIMES DAILY PRN
Qty: 180 TABLET | Refills: 1 | Status: SHIPPED | OUTPATIENT
Start: 2025-05-15

## 2025-05-15 RX ORDER — ESOMEPRAZOLE MAGNESIUM 40 MG/1
40 CAPSULE, DELAYED RELEASE ORAL
Qty: 90 CAPSULE | Refills: 1 | Status: CANCELLED | OUTPATIENT
Start: 2025-05-15

## 2025-05-15 RX ORDER — FLUTICASONE PROPIONATE 50 MCG
2 SPRAY, SUSPENSION (ML) NASAL DAILY
Qty: 3 EACH | Refills: 3 | Status: CANCELLED | OUTPATIENT
Start: 2025-05-15

## 2025-05-15 RX ORDER — HYDROCHLOROTHIAZIDE 25 MG/1
25 TABLET ORAL DAILY
Qty: 90 TABLET | Refills: 1 | Status: CANCELLED | OUTPATIENT
Start: 2025-05-15

## 2025-05-15 SDOH — ECONOMIC STABILITY: FOOD INSECURITY: WITHIN THE PAST 12 MONTHS, THE FOOD YOU BOUGHT JUST DIDN'T LAST AND YOU DIDN'T HAVE MONEY TO GET MORE.: NEVER TRUE

## 2025-05-15 SDOH — ECONOMIC STABILITY: FOOD INSECURITY: WITHIN THE PAST 12 MONTHS, YOU WORRIED THAT YOUR FOOD WOULD RUN OUT BEFORE YOU GOT MONEY TO BUY MORE.: NEVER TRUE

## 2025-05-15 ASSESSMENT — ENCOUNTER SYMPTOMS
RHINORRHEA: 0
VISUAL CHANGE: 0
ABDOMINAL PAIN: 0
SHORTNESS OF BREATH: 0
VOMITING: 0
CHEST TIGHTNESS: 0
COUGH: 0
EYE PAIN: 0
CONSTIPATION: 0
DIARRHEA: 0
BLOOD IN STOOL: 0
WHEEZING: 0
EYE DISCHARGE: 0

## 2025-05-15 NOTE — ASSESSMENT & PLAN NOTE
Chronic, at goal (stable), continue current treatment plan, medication adherence emphasized, and lifestyle modifications recommended    Continue PPI--just got it refilled.

## 2025-05-15 NOTE — ASSESSMENT & PLAN NOTE
Chronic, at goal (stable), continue current treatment plan, medication adherence emphasized, and lifestyle modifications recommended    Orders:    TSH    Comprehensive Metabolic Panel    CBC with Auto Differential    Monitor blood pressure at home twice weekly and keep log.  To ER with signs of CVA or MI.    Follow up in 6 months or sooner if needed.

## 2025-05-15 NOTE — ASSESSMENT & PLAN NOTE
Chronic, at goal (stable), continue current treatment plan, medication adherence emphasized, and lifestyle modifications recommended    Orders:    Comprehensive Metabolic Panel    Lipid Panel    As above.

## 2025-05-15 NOTE — ASSESSMENT & PLAN NOTE
Chronic, at goal (stable), continue current treatment plan, medication adherence emphasized, and lifestyle modifications recommended    Orders:    Tirzepatide (MOUNJARO) 15 MG/0.5ML SOAJ pen; Inject 15 mg into the skin every 7 days    Patient will begin alternating walking with fast walking to jump start her weight loss  She will avoid sweets and sodas.    She will increase her distance to 2 miles per hour.

## 2025-05-15 NOTE — PROGRESS NOTES
Will Garcia (:  1975) is a 50 y.o. female,Established patient, here for evaluation of the following chief complaint(s):  Diabetes (6 month follow up ) and Medication Refill         Assessment & Plan  Controlled type 2 diabetes mellitus with other skin ulcer, without long-term current use of insulin (HCC)   Chronic, at goal (stable), continue current treatment plan, medication adherence emphasized, and lifestyle modifications recommended    Orders:    Tirzepatide (MOUNJARO) 15 MG/0.5ML SOAJ pen; Inject 15 mg into the skin every 7 days    Patient will begin alternating walking with fast walking to jump start her weight loss  She will avoid sweets and sodas.    She will increase her distance to 2 miles per hour.    Type 2 diabetes mellitus with hyperglycemia, without long-term current use of insulin (HCC)   Chronic, at goal (stable), continue current treatment plan, medication adherence emphasized, and lifestyle modifications recommended    Orders:    gabapentin (NEURONTIN) 300 MG capsule; Take 1 capsule by mouth 3 times daily for 180 days.    metFORMIN (GLUCOPHAGE) 500 MG tablet; Take 1 tablet by mouth 2 times daily (with meals)    Tirzepatide (MOUNJARO) 15 MG/0.5ML SOAJ pen; Inject 15 mg into the skin every 7 days    Check feet daily.  Avoid flip flops.    Type 2 diabetes mellitus with diabetic neuropathy, without long-term current use of insulin (HCC)   Chronic, at goal (stable), continue current treatment plan, medication adherence emphasized, and lifestyle modifications recommended    Orders:    Tirzepatide (MOUNJARO) 15 MG/0.5ML SOAJ pen; Inject 15 mg into the skin every 7 days    Hemoglobin A1C    Continue Mounjaro.  Will check A1c.    Essential hypertension   Chronic, at goal (stable), continue current treatment plan, medication adherence emphasized, and lifestyle modifications recommended    Orders:    TSH    Comprehensive Metabolic Panel    CBC with Auto Differential    Monitor blood pressure

## 2025-05-15 NOTE — ASSESSMENT & PLAN NOTE
Chronic, at goal (stable), continue current treatment plan, medication adherence emphasized, and lifestyle modifications recommended    Orders:    Tirzepatide (MOUNJARO) 15 MG/0.5ML SOAJ pen; Inject 15 mg into the skin every 7 days    Hemoglobin A1C    Continue Mounjaro.  Will check A1c.

## 2025-05-15 NOTE — PATIENT INSTRUCTIONS

## 2025-05-15 NOTE — ASSESSMENT & PLAN NOTE
Chronic, at goal (stable), continue current treatment plan, medication adherence emphasized, and lifestyle modifications recommended    Monitor for worsening neuropathy.

## 2025-05-15 NOTE — ASSESSMENT & PLAN NOTE
Chronic, at goal (stable), continue current treatment plan, medication adherence emphasized, and lifestyle modifications recommended    Orders:    Vitamin D 25 Hydroxy    Recheck levels.

## 2025-05-17 LAB
ESTRADIOL SERPL-MCNC: <5 PG/ML
ESTRONE SERPL-MCNC: 11 PG/ML

## 2025-05-29 ENCOUNTER — RESULTS FOLLOW-UP (OUTPATIENT)
Dept: FAMILY MEDICINE CLINIC | Facility: CLINIC | Age: 50
End: 2025-05-29

## 2025-06-11 NOTE — ROUTINE PROCESS
12/20/2018      RE: Marixa Lopez      To Whom it May Concern: This is to certify that Marixa Lopez may may return to work on 12/22/18. She had a surgical procedure on 12/20/18 and is not permitted to work or drive on 05/07/33. Please feel free to contact my office if you have any questions or concerns. Thank you for your assistance in this matter.     Sincerely,      Judy Davila RN
Detail Level: Generalized
Detail Level: Zone
Detail Level: Detailed
Detail Level: Simple

## 2025-06-20 ENCOUNTER — TELEPHONE (OUTPATIENT)
Age: 50
End: 2025-06-20

## 2025-06-20 NOTE — TELEPHONE ENCOUNTER
Pt did not show for their scheduled therapy appointment today.    Reason: unknown  Communication: Left voicemail requesting pt call back to reschedule.

## 2025-06-23 PROBLEM — S83.242A ACUTE MEDIAL MENISCUS TEAR OF LEFT KNEE: Status: RESOLVED | Noted: 2024-12-03 | Resolved: 2025-06-23

## 2025-06-23 PROBLEM — M25.562 ACUTE PAIN OF LEFT KNEE: Status: RESOLVED | Noted: 2024-12-03 | Resolved: 2025-06-23

## 2025-06-23 PROBLEM — R19.7 DIARRHEA, UNSPECIFIED: Status: RESOLVED | Noted: 2020-07-09 | Resolved: 2025-06-23

## 2025-06-24 ENCOUNTER — OFFICE VISIT (OUTPATIENT)
Dept: OBGYN CLINIC | Age: 50
End: 2025-06-24
Payer: COMMERCIAL

## 2025-06-24 VITALS
BODY MASS INDEX: 42.17 KG/M2 | SYSTOLIC BLOOD PRESSURE: 132 MMHG | HEIGHT: 64 IN | DIASTOLIC BLOOD PRESSURE: 72 MMHG | WEIGHT: 247 LBS

## 2025-06-24 DIAGNOSIS — N95.1 MENOPAUSAL SYMPTOMS: ICD-10-CM

## 2025-06-24 DIAGNOSIS — Z01.419 WOMEN'S ANNUAL ROUTINE GYNECOLOGICAL EXAMINATION: Primary | ICD-10-CM

## 2025-06-24 PROBLEM — R10.2 PELVIC PAIN: Status: RESOLVED | Noted: 2020-07-09 | Resolved: 2025-06-24

## 2025-06-24 PROCEDURE — 3078F DIAST BP <80 MM HG: CPT | Performed by: OBSTETRICS & GYNECOLOGY

## 2025-06-24 PROCEDURE — 99459 PELVIC EXAMINATION: CPT | Performed by: OBSTETRICS & GYNECOLOGY

## 2025-06-24 PROCEDURE — 99213 OFFICE O/P EST LOW 20 MIN: CPT | Performed by: OBSTETRICS & GYNECOLOGY

## 2025-06-24 PROCEDURE — 99396 PREV VISIT EST AGE 40-64: CPT | Performed by: OBSTETRICS & GYNECOLOGY

## 2025-06-24 PROCEDURE — 3075F SYST BP GE 130 - 139MM HG: CPT | Performed by: OBSTETRICS & GYNECOLOGY

## 2025-06-24 RX ORDER — ESTRADIOL 1 MG/1
1 TABLET ORAL DAILY
Qty: 30 TABLET | Refills: 11 | Status: SHIPPED | OUTPATIENT
Start: 2025-06-24

## 2025-06-24 RX ORDER — MEDROXYPROGESTERONE ACETATE 5 MG
5 TABLET ORAL DAILY
Qty: 30 TABLET | Refills: 11 | Status: SHIPPED | OUTPATIENT
Start: 2025-06-24

## 2025-06-24 NOTE — PROGRESS NOTES
Patient comes in today for annual exam.     LAST PAP:  02/21/2022, Negative HPV Negative     LAST MAMMO:  05/29/2025    LMP:  No LMP recorded. Patient has had an ablation.    BIRTH CONTROL:  tubal ligation    TOBACCO USE:  No    FAMILY HISTORY OF:   Breast Cancer:  Yes   Ovarian Cancer:  No   Uterine Cancer:  No   Colon Cancer:  Yes    Vitals:    06/24/25 1010   BP: 132/72   BP Site: Left Upper Arm   Patient Position: Sitting   Weight: 112 kg (247 lb)   Height: 1.626 m (5' 4\")      Chaperone for Intimate Exam     Chaperone was offer accepted as part of the rooming process    Chaperone: Shanon Quiroga MA  06/24/25  10:24 AM  
    Years since quittin.4    Smokeless tobacco: Never   Vaping Use    Vaping status: Never Used   Substance and Sexual Activity    Alcohol use: Yes    Drug use: No    Sexual activity: Not Currently     Partners: Male     Birth control/protection: Surgical     Comment: tubal   Other Topics Concern    Not on file   Social History Narrative    Abuse: Feels safe at home, no history of physical abuse, no history of sexual abuse       Social Drivers of Health     Financial Resource Strain: Low Risk  (2024)    Overall Financial Resource Strain (CARDIA)     Difficulty of Paying Living Expenses: Not hard at all   Food Insecurity: No Food Insecurity (5/15/2025)    Hunger Vital Sign     Worried About Running Out of Food in the Last Year: Never true     Ran Out of Food in the Last Year: Never true   Transportation Needs: No Transportation Needs (5/15/2025)    PRAPARE - Transportation     Lack of Transportation (Medical): No     Lack of Transportation (Non-Medical): No   Physical Activity: Not on file   Stress: Not on file   Social Connections: Unknown (2024)    Received from [employer]    Social Connections     Frequency of Communication with Friends and Family: Not asked     Frequency of Social Gatherings with Friends and Family: Not asked   Intimate Partner Violence: Unknown (2024)    Received from [facility]    Intimate Partner Violence     Fear of Current or Ex-Partner: Not asked     Emotionally Abused: Not asked     Physically Abused: Not asked     Sexually Abused: Not asked   Housing Stability: Unknown (5/15/2025)    Housing Stability Vital Sign     Unable to Pay for Housing in the Last Year: No     Number of Times Moved in the Last Year: Not on file     Homeless in the Last Year: No           Allergies   Allergen Reactions    Ace Inhibitors Anaphylaxis and Angioedema    Hydromorphone Anaphylaxis     Swollen tongue, lips, hypotension, low O2 SATS-\"Dilaudid\"

## 2025-06-24 NOTE — PATIENT INSTRUCTIONS
You can start taking your hormone pills today.  It may take a few weeks to take full effect  We will call you if anything is abnormal from your testing today.   Please come back in 2-3 weeks for a blood pressure check and 3 months for a recheck  Thanks for coming to see us today and letting us take care of you!

## 2025-06-24 NOTE — ASSESSMENT & PLAN NOTE
D/W pt at length hormone replacement and data from [facility] and subsequent studies.  D/W her risks and benefits of HRT including but not limited to symptomatic relief, osteoporosis prevention, potential effects on CAD, cognition and breast cancer.  Also D/W pt that recommendations are for lowest dosage for relief and consideration of tapering/cessation at age 55 and/or 5 years of therapy (ACOG has come out recently stated that in low risk patients, consideration of continuation until age 60-65 possible with transdermal being safest modality). Pt exhibited full understanding and accepts potential known and unknown risks. She wishes to proceed with treatment at this point.

## 2025-06-26 ENCOUNTER — EVALUATION (OUTPATIENT)
Age: 50
End: 2025-06-26

## 2025-06-26 DIAGNOSIS — Z74.09 DECREASED FUNCTIONAL MOBILITY AND ENDURANCE: ICD-10-CM

## 2025-06-26 DIAGNOSIS — Z78.9 IMPAIRED MOBILITY AND ADLS: Primary | ICD-10-CM

## 2025-06-26 DIAGNOSIS — Z78.9 IMPAIRED MOTOR CONTROL: ICD-10-CM

## 2025-06-26 DIAGNOSIS — R29.898 WEAKNESS OF LEFT LOWER EXTREMITY: ICD-10-CM

## 2025-06-26 DIAGNOSIS — G89.29 CHRONIC PAIN OF LEFT KNEE: ICD-10-CM

## 2025-06-26 DIAGNOSIS — Z74.09 IMPAIRED MOBILITY AND ADLS: Primary | ICD-10-CM

## 2025-06-26 DIAGNOSIS — M25.562 CHRONIC PAIN OF LEFT KNEE: ICD-10-CM

## 2025-06-26 DIAGNOSIS — M25.469 KNEE SWELLING: ICD-10-CM

## 2025-06-26 DIAGNOSIS — M25.662 DECREASED RANGE OF MOTION (ROM) OF LEFT KNEE: ICD-10-CM

## 2025-06-26 NOTE — PROGRESS NOTES
[facility] INT - [facility]  [address] [place] [id number]  Dept: [phone]      Physical Therapy Initial Assessment     Referring MD: Aram Abdi MD Total Timed Procedure Codes: 25 min, Total Time: 60 min   Diagnosis:     ICD-10-CM    1. Impaired mobility and ADLs  Z74.09     Z78.9       2. Chronic pain of left knee  M25.562     G89.29       3. Weakness of left lower extremity  R29.898       4. Decreased functional mobility and endurance  Z74.09       5. Decreased range of motion (ROM) of left knee  M25.662       6. Knee swelling  M25.469       7. Impaired motor control  Z78.9         Time In: 10:40 AM  Time Out: 11:40 AM   Surgery: Left Knee  Arthroscopy Medial Meniscus Debridement - Left    Visit: 1    DOS:  12/13/2024  Modifier needed: No   Therapy precautions:None   Co-morbidities affecting plan of care: Chronic LBP, DM, HTN, left ankle fracture,      Payor: Pay[place] /  /  /  Billing pattern: Commercial- substantial/midpoint time each CPT     PERTINENT MEDICAL HISTORY     Past medical and surgical history:   Past Medical History:   Diagnosis Date    Abnormal Papanicolaou smear of cervix     Adverse effect of anesthesia     slow to awake-discharged as planned    Anemia     Arthritis 2023    arthritis in left knee    Cellulitis of buttock     Chronic low back pain     Chronic pain of both knees     Diabetes (HCC)     type 2 - oral agents- -does not check sugar-regularly- denies hypoglycemic episodes, A1c 6.3 on 5/20/24    ROBERTO (generalized anxiety disorder)     GERD (gastroesophageal reflux disease)     medication,  sleeps on 3 pillows-    Heel spur, left     Hypercholesterolemia     controlled well with meds    Hypertension     medication controlled    Irritable bowel syndrome     Kidney stone     Non-seasonal allergic rhinitis     Vitamin D deficiency       Past Surgical History:   Procedure Laterality Date    ANKLE FRACTURE SURGERY  2019    Two ligaments torn away from

## 2025-06-27 LAB
C TRACH RRNA CVX QL NAA+PROBE: NEGATIVE
COLLECTION METHOD: NORMAL
CYTOLOGIST CVX/VAG CYTO: NORMAL
CYTOLOGY CVX/VAG DOC THIN PREP: NORMAL
DATE OF LMP: 0
HPV APTIMA: NEGATIVE
HPV GENOTYPE REFLEX: NORMAL
Lab: NORMAL
N GONORRHOEA RRNA CVX QL NAA+PROBE: NEGATIVE
PAP SOURCE: NORMAL
PATH REPORT.FINAL DX SPEC: NORMAL
STAT OF ADQ CVX/VAG CYTO-IMP: NORMAL
T VAGINALIS RRNA SPEC QL NAA+PROBE: NEGATIVE

## 2025-06-30 ENCOUNTER — RESULTS FOLLOW-UP (OUTPATIENT)
Dept: OBGYN CLINIC | Age: 50
End: 2025-06-30

## 2025-07-08 ENCOUNTER — TELEPHONE (OUTPATIENT)
Dept: NEUROSURGERY | Age: 50
End: 2025-07-08

## 2025-07-08 NOTE — TELEPHONE ENCOUNTER
Pt called to make an appt but thought she was calling Dr. Fung's office  Contact info given for aditya

## 2025-07-09 ENCOUNTER — CLINICAL SUPPORT (OUTPATIENT)
Dept: OBGYN CLINIC | Age: 50
End: 2025-07-09

## 2025-07-09 VITALS
DIASTOLIC BLOOD PRESSURE: 70 MMHG | HEIGHT: 64 IN | SYSTOLIC BLOOD PRESSURE: 122 MMHG | BODY MASS INDEX: 42.85 KG/M2 | WEIGHT: 251 LBS

## 2025-07-09 DIAGNOSIS — Z01.30 BLOOD PRESSURE CHECK: Primary | ICD-10-CM

## 2025-07-09 RX ORDER — FAMOTIDINE 20 MG/1
TABLET, FILM COATED ORAL
COMMUNITY
Start: 2025-07-06 | End: 2025-07-09

## 2025-07-09 NOTE — PROGRESS NOTES
Patient here for BP check due to being on estradiol 1mg PO tablet. Confirms having started estradiol therapy on 6/24/25.     Patient has taken her hydrochlorothiazide around 9am this morning before appointment.       Vitals:    07/09/25 1133   BP: 122/70   BP Site: Left Upper Arm   Patient Position: Sitting   Weight: 113.9 kg (251 lb)   Height: 1.626 m (5' 4\")     Spoke with provider via telephone encounter, updated provider on patients BP.  Dr. Echevarria states patient is good to continue taking estradiol rx as prescribed and to come to f/u appointment scheduled for 9/25/2025.     Updated patient on providers instructions, patient verbalized understanding.

## 2025-07-11 ENCOUNTER — TREATMENT (OUTPATIENT)
Age: 50
End: 2025-07-11
Payer: COMMERCIAL

## 2025-07-11 DIAGNOSIS — M25.562 CHRONIC PAIN OF LEFT KNEE: ICD-10-CM

## 2025-07-11 DIAGNOSIS — M25.662 DECREASED RANGE OF MOTION (ROM) OF LEFT KNEE: ICD-10-CM

## 2025-07-11 DIAGNOSIS — R29.898 WEAKNESS OF LEFT LOWER EXTREMITY: ICD-10-CM

## 2025-07-11 DIAGNOSIS — Z74.09 DECREASED FUNCTIONAL MOBILITY AND ENDURANCE: ICD-10-CM

## 2025-07-11 DIAGNOSIS — G89.29 CHRONIC PAIN OF LEFT KNEE: ICD-10-CM

## 2025-07-11 DIAGNOSIS — Z74.09 IMPAIRED MOBILITY AND ADLS: Primary | ICD-10-CM

## 2025-07-11 DIAGNOSIS — Z78.9 IMPAIRED MOBILITY AND ADLS: Primary | ICD-10-CM

## 2025-07-11 PROCEDURE — 97110 THERAPEUTIC EXERCISES: CPT | Performed by: PHYSICAL THERAPIST

## 2025-07-11 NOTE — PROGRESS NOTES
[facility] INT [facility]  [address] [place] [id number]  Dept: [phone]      Physical Therapy Daily Note     Referring MD: Aram Abdi MD   Diagnosis:     ICD-10-CM    1. Impaired mobility and ADLs  Z74.09     Z78.9       2. Chronic pain of left knee  M25.562     G89.29       3. Weakness of left lower extremity  R29.898       4. Decreased functional mobility and endurance  Z74.09       5. Decreased range of motion (ROM) of left knee  M25.662           Surgery: Left Knee  Arthroscopy Medial Meniscus Debridement - Left      DOS:  12/13/2024    Therapy precautions:None   Co-morbidities affecting plan of care: Chronic LBP, DM, HTN, left ankle fracture,      Date symptoms began: 3/2025   Will Garcia underwent left knee arthroscopy with partial medial meniscectomy/debridement on 12/13/2024.  The procedure was followed by 14 PT sessions, and patient self-discharged so that she could return to work.  However therapy goals were partially met as patient had not yet achieved functional quadriceps strength.  At her last therapy session she c/o pain-free crepitation that occurred with knee flexion and squatting.  She also experienced exacerbation of knee swelling with prolonged walking.  Patient returned to work in 2/2025 and felt return of symptoms in 03/2025.    Describe current symptoms:  Will Garcia reports she sought medial attention on 5/13/2025 d/t left knee pain with progressive activity.  States she was treated with a cortisone injection however she only achieved temporary relief.  She now has constant left knee pain that curves proximal to distal.        Payor: Pay[place] /  /  /  Billing pattern: Commercial- substantial/midpoint time each CPT   Total Timed Procedure Codes: 40 min, Total Time: 40 min Modifier needed: No  Episode visit count:  2     SUBJECTIVE     Pt reports that knee was \"terrible\" on her recent 7 day cruise. She walked a lot and the more she walked

## 2025-07-14 ENCOUNTER — TREATMENT (OUTPATIENT)
Age: 50
End: 2025-07-14
Payer: COMMERCIAL

## 2025-07-14 DIAGNOSIS — Z78.9 IMPAIRED MOBILITY AND ADLS: Primary | ICD-10-CM

## 2025-07-14 DIAGNOSIS — M25.562 CHRONIC PAIN OF LEFT KNEE: ICD-10-CM

## 2025-07-14 DIAGNOSIS — M25.662 DECREASED RANGE OF MOTION (ROM) OF LEFT KNEE: ICD-10-CM

## 2025-07-14 DIAGNOSIS — G89.29 CHRONIC PAIN OF LEFT KNEE: ICD-10-CM

## 2025-07-14 DIAGNOSIS — Z74.09 IMPAIRED MOBILITY AND ADLS: Primary | ICD-10-CM

## 2025-07-14 DIAGNOSIS — R29.898 WEAKNESS OF LEFT LOWER EXTREMITY: ICD-10-CM

## 2025-07-14 DIAGNOSIS — Z74.09 DECREASED FUNCTIONAL MOBILITY AND ENDURANCE: ICD-10-CM

## 2025-07-14 PROCEDURE — 97110 THERAPEUTIC EXERCISES: CPT | Performed by: PHYSICAL THERAPIST

## 2025-07-14 NOTE — PROGRESS NOTES
[facility] INT [facility]  [address] [place] [phone]  Dept: [phone]      Physical Therapy Daily Note     Referring MD: Aram Abdi MD   Diagnosis:     ICD-10-CM    1. Impaired mobility and ADLs  Z74.09     Z78.9       2. Chronic pain of left knee  M25.562     G89.29       3. Weakness of left lower extremity  R29.898       4. Decreased functional mobility and endurance  Z74.09       5. Decreased range of motion (ROM) of left knee  M25.662           Surgery: Left Knee  Arthroscopy Medial Meniscus Debridement - Left      DOS:  12/13/2024    Therapy precautions:None   Co-morbidities affecting plan of care: Chronic LBP, DM, HTN, left ankle fracture,      Date symptoms began: 3/2025   Will Garcia underwent left knee arthroscopy with partial medial meniscectomy/debridement on 12/13/2024.  The procedure was followed by 14 PT sessions, and patient self-discharged so that she could return to work.  However therapy goals were partially met as patient had not yet achieved functional quadriceps strength.  At her last therapy session she c/o pain-free crepitation that occurred with knee flexion and squatting.  She also experienced exacerbation of knee swelling with prolonged walking.  Patient returned to work in 2/2025 and felt return of symptoms in 03/2025.    Describe current symptoms:  Will Garcia reports she sought medial attention on 5/13/2025 d/t left knee pain with progressive activity.  States she was treated with a cortisone injection however she only achieved temporary relief.  She now has constant left knee pain that curves proximal to distal.        Payor: [place] /  /  /  Billing pattern: Commercial- substantial/midpoint time each CPT   Total Timed Procedure Codes: 40 min, Total Time: 40 min Modifier needed: No  Episode visit count:  3     SUBJECTIVE     Pt reports that knee feels almost like it did before surgery. Pain and swelling persists, particularly

## 2025-07-20 NOTE — PROGRESS NOTES
[facility]  [address] [place] [id number]  Dept: [phone]      Physical Therapy Daily Note     Referring MD: Aram Abdi MD   Diagnosis:     ICD-10-CM    1. Impaired mobility and ADLs  Z74.09     Z78.9       2. Chronic pain of left knee  M25.562     G89.29       3. Weakness of left lower extremity  R29.898       4. Decreased functional mobility and endurance  Z74.09       5. Decreased range of motion (ROM) of left knee  M25.662       6. Knee swelling  M25.469       7. Impaired motor control  Z78.9           Surgery: Left Knee  Arthroscopy Medial Meniscus Debridement - Left      DOS:  12/13/2024    Therapy precautions:None   Co-morbidities affecting plan of care: Chronic LBP, DM, HTN, left ankle fracture,      Date symptoms began: 3/2025   Will Garcia underwent left knee arthroscopy with partial medial meniscectomy/debridement on 12/13/2024.  The procedure was followed by 14 PT sessions, and patient self-discharged so that she could return to work.  However therapy goals were partially met as patient had not yet achieved functional quadriceps strength.  At her last therapy session she c/o pain-free crepitation that occurred with knee flexion and squatting.  She also experienced exacerbation of knee swelling with prolonged walking.  Patient returned to work in 2/2025 and felt return of symptoms in 03/2025.    Describe current symptoms:  Will Garcia reports she sought medial attention on 5/13/2025 d/t left knee pain with progressive activity.  States she was treated with a cortisone injection however she only achieved temporary relief.  She now has constant left knee pain that curves proximal to distal.        Payor: Payor[place] [employer] /  /  /  Billing pattern: Commercial- substantial/midpoint time each CPT   Total Timed Procedure Codes: 45 min, Total Time: 45 min Modifier needed: No  Episode visit count:  4     SUBJECTIVE     Presents to PT following MD appt.

## 2025-07-21 ENCOUNTER — TREATMENT (OUTPATIENT)
Age: 50
End: 2025-07-21
Payer: COMMERCIAL

## 2025-07-21 ENCOUNTER — OFFICE VISIT (OUTPATIENT)
Dept: ORTHOPEDIC SURGERY | Age: 50
End: 2025-07-21
Payer: COMMERCIAL

## 2025-07-21 DIAGNOSIS — Z78.9 IMPAIRED MOTOR CONTROL: ICD-10-CM

## 2025-07-21 DIAGNOSIS — M25.662 DECREASED RANGE OF MOTION (ROM) OF LEFT KNEE: ICD-10-CM

## 2025-07-21 DIAGNOSIS — M25.469 KNEE SWELLING: ICD-10-CM

## 2025-07-21 DIAGNOSIS — M25.562 CHRONIC PAIN OF LEFT KNEE: Primary | ICD-10-CM

## 2025-07-21 DIAGNOSIS — Z78.9 IMPAIRED MOBILITY AND ADLS: Primary | ICD-10-CM

## 2025-07-21 DIAGNOSIS — G89.29 CHRONIC PAIN OF LEFT KNEE: Primary | ICD-10-CM

## 2025-07-21 DIAGNOSIS — Z74.09 IMPAIRED MOBILITY AND ADLS: Primary | ICD-10-CM

## 2025-07-21 DIAGNOSIS — M25.562 CHRONIC PAIN OF LEFT KNEE: ICD-10-CM

## 2025-07-21 DIAGNOSIS — R29.898 WEAKNESS OF LEFT LOWER EXTREMITY: ICD-10-CM

## 2025-07-21 DIAGNOSIS — G89.29 CHRONIC PAIN OF LEFT KNEE: ICD-10-CM

## 2025-07-21 DIAGNOSIS — Z74.09 DECREASED FUNCTIONAL MOBILITY AND ENDURANCE: ICD-10-CM

## 2025-07-21 PROCEDURE — 99214 OFFICE O/P EST MOD 30 MIN: CPT | Performed by: ORTHOPAEDIC SURGERY

## 2025-07-21 PROCEDURE — 97110 THERAPEUTIC EXERCISES: CPT | Performed by: PHYSICAL THERAPIST

## 2025-07-21 RX ORDER — METHYLPREDNISOLONE 4 MG/1
TABLET ORAL
Qty: 1 KIT | Refills: 0 | Status: SHIPPED | OUTPATIENT
Start: 2025-07-21

## 2025-07-21 NOTE — PROGRESS NOTES
Name: Will Garcia  YOB: 1975  Gender: female  MRN: [id number]      CC: Knee Pain (L)       HPI: Will Garcia is a 50 y.o. female who returns for follow up on the left knee.  She reports continued pain and swelling, she recently went on a cruise and had significant pain while walking.  She is 7 months s/p partial medial meniscectomy/debridement on 12/13/2024.  She had a knee injection at her last visit in May which helped for a few weeks.     She has continued to participate in physical therapy but reports swelling and pain after those exercises.        Physical Examination:  General: no acute distress  Lungs: breathing easily  CV: regular rhythm by pulse  Left Knee: Tenderness palpation diffusely around medial aspect of the knee over the pes and medial joint line but mostly distal to the joint line.  Tolerates full active and passive range of motion.  Pain at the extremes.        Assessment:     ICD-10-CM    1. Chronic pain of left knee  M25.562     G89.29           Plan:   Unfortunately continues to have chronic pain I do think her biggest issue is weakness in her hips and in her knees and she has to address this from her strength standpoint.  She is taking Mounjaro and trying to work on weight loss as well as strengthening.  Recommend she continues physical therapy as well as going to the gym.  We discussed a pes bursa injection which she declined today.  We also discussed a oral Medrol Dosepak which she elected to proceed with this was prescribed.  She also requested a referral to pain management for consideration of a radiofrequency ablation procedure she requested a physician at [facility] that her friend has used and had a good response to the radiofrequency ablation so we have provided a referral there.  If she is not improving I will see her back in 2 months            Aram Abdi MD, FAAOS  Orthopaedics and Sports Medicine

## 2025-07-23 ENCOUNTER — TREATMENT (OUTPATIENT)
Age: 50
End: 2025-07-23
Payer: COMMERCIAL

## 2025-07-23 DIAGNOSIS — Z78.9 IMPAIRED MOTOR CONTROL: ICD-10-CM

## 2025-07-23 DIAGNOSIS — Z74.09 DECREASED FUNCTIONAL MOBILITY AND ENDURANCE: ICD-10-CM

## 2025-07-23 DIAGNOSIS — M25.469 KNEE SWELLING: ICD-10-CM

## 2025-07-23 DIAGNOSIS — Z74.09 IMPAIRED MOBILITY AND ADLS: Primary | ICD-10-CM

## 2025-07-23 DIAGNOSIS — M25.562 CHRONIC PAIN OF LEFT KNEE: ICD-10-CM

## 2025-07-23 DIAGNOSIS — G89.29 CHRONIC PAIN OF LEFT KNEE: ICD-10-CM

## 2025-07-23 DIAGNOSIS — R29.898 WEAKNESS OF LEFT LOWER EXTREMITY: ICD-10-CM

## 2025-07-23 DIAGNOSIS — Z78.9 IMPAIRED MOBILITY AND ADLS: Primary | ICD-10-CM

## 2025-07-23 DIAGNOSIS — M25.662 DECREASED RANGE OF MOTION (ROM) OF LEFT KNEE: ICD-10-CM

## 2025-07-23 PROCEDURE — 97110 THERAPEUTIC EXERCISES: CPT | Performed by: PHYSICAL THERAPIST

## 2025-07-23 NOTE — PROGRESS NOTES
[facility]  [address] [facility] [id number]  Dept: [phone]      Physical Therapy Daily Note     Referring MD: Aram Abdi MD   Diagnosis:     ICD-10-CM    1. Impaired mobility and ADLs  Z74.09     Z78.9       2. Chronic pain of left knee  M25.562     G89.29       3. Weakness of left lower extremity  R29.898       4. Decreased functional mobility and endurance  Z74.09       5. Decreased range of motion (ROM) of left knee  M25.662       6. Knee swelling  M25.469       7. Impaired motor control  Z78.9           Surgery: Left Knee  Arthroscopy Medial Meniscus Debridement - Left      DOS:  12/13/2024    Therapy precautions:None   Co-morbidities affecting plan of care: Chronic LBP, DM, HTN, left ankle fracture,      Date symptoms began: 3/2025   Will Garcia underwent left knee arthroscopy with partial medial meniscectomy/debridement on 12/13/2024.  The procedure was followed by 14 PT sessions, and patient self-discharged so that she could return to work.  However therapy goals were partially met as patient had not yet achieved functional quadriceps strength.  At her last therapy session she c/o pain-free crepitation that occurred with knee flexion and squatting.  She also experienced exacerbation of knee swelling with prolonged walking.  Patient returned to work in 2/2025 and felt return of symptoms in 03/2025.    Describe current symptoms:  Will Garcia reports she sought medial attention on 5/13/2025 d/t left knee pain with progressive activity.  States she was treated with a cortisone injection however she only achieved temporary relief.  She now has constant left knee pain that curves proximal to distal.        Payor: [place] [employer] /  /  /  Billing pattern: Commercial- substantial/midpoint time each CPT   Total Timed Procedure Codes: 45 min, Total Time: 45 min Modifier needed: No  Episode visit count:  5     SUBJECTIVE     Pt reports walk and floating in

## 2025-07-24 PROBLEM — Z01.419 WOMEN'S ANNUAL ROUTINE GYNECOLOGICAL EXAMINATION: Status: RESOLVED | Noted: 2019-04-09 | Resolved: 2025-07-24

## 2025-07-29 ENCOUNTER — TELEPHONE (OUTPATIENT)
Age: 50
End: 2025-07-29

## 2025-07-29 NOTE — TELEPHONE ENCOUNTER
Pt did not show for today's PT appt. I called and lvm to offer reschedule appointment and confirm pt's next visit.

## 2025-09-04 ENCOUNTER — OFFICE VISIT (OUTPATIENT)
Dept: FAMILY MEDICINE CLINIC | Facility: CLINIC | Age: 50
End: 2025-09-04
Payer: COMMERCIAL

## 2025-09-04 VITALS
SYSTOLIC BLOOD PRESSURE: 120 MMHG | WEIGHT: 248 LBS | HEIGHT: 64 IN | BODY MASS INDEX: 42.34 KG/M2 | TEMPERATURE: 97 F | HEART RATE: 74 BPM | DIASTOLIC BLOOD PRESSURE: 78 MMHG | RESPIRATION RATE: 18 BRPM

## 2025-09-04 DIAGNOSIS — B00.9 HSV INFECTION: Primary | ICD-10-CM

## 2025-09-04 DIAGNOSIS — M72.2 PLANTAR FASCIITIS OF LEFT FOOT: ICD-10-CM

## 2025-09-04 PROCEDURE — 3078F DIAST BP <80 MM HG: CPT | Performed by: NURSE PRACTITIONER

## 2025-09-04 PROCEDURE — 3074F SYST BP LT 130 MM HG: CPT | Performed by: NURSE PRACTITIONER

## 2025-09-04 PROCEDURE — 99214 OFFICE O/P EST MOD 30 MIN: CPT | Performed by: NURSE PRACTITIONER

## 2025-09-04 RX ORDER — FAMOTIDINE 20 MG/1
20 TABLET, FILM COATED ORAL NIGHTLY
COMMUNITY
Start: 2025-08-25

## 2025-09-04 RX ORDER — VALACYCLOVIR HYDROCHLORIDE 1 G/1
1000 TABLET, FILM COATED ORAL DAILY
Qty: 90 TABLET | Refills: 1 | Status: SHIPPED | OUTPATIENT
Start: 2025-09-04

## 2025-09-04 SDOH — ECONOMIC STABILITY: FOOD INSECURITY: WITHIN THE PAST 12 MONTHS, THE FOOD YOU BOUGHT JUST DIDN'T LAST AND YOU DIDN'T HAVE MONEY TO GET MORE.: NEVER TRUE

## 2025-09-04 SDOH — ECONOMIC STABILITY: FOOD INSECURITY: WITHIN THE PAST 12 MONTHS, YOU WORRIED THAT YOUR FOOD WOULD RUN OUT BEFORE YOU GOT MONEY TO BUY MORE.: NEVER TRUE

## 2025-09-04 ASSESSMENT — PATIENT HEALTH QUESTIONNAIRE - PHQ9
SUM OF ALL RESPONSES TO PHQ QUESTIONS 1-9: 0
2. FEELING DOWN, DEPRESSED OR HOPELESS: NOT AT ALL
1. LITTLE INTEREST OR PLEASURE IN DOING THINGS: NOT AT ALL

## 2025-09-04 ASSESSMENT — ENCOUNTER SYMPTOMS: RESPIRATORY NEGATIVE: 1

## (undated) DEVICE — SOLUTION IRRIG 3000ML H2O STRL BAG

## (undated) DEVICE — GOWN,REINFORCED,POLY,AURORA,XXLARGE,STR: Brand: MEDLINE

## (undated) DEVICE — Device

## (undated) DEVICE — KENDALL SCD EXPRESS SLEEVES, KNEE LENGTH, MEDIUM: Brand: KENDALL SCD

## (undated) DEVICE — SPLINT MAT XF SPEC 5X30IN --

## (undated) DEVICE — PADDING CAST W4INXL4YD ST COT COHESIVE HND TEARABLE SPEC

## (undated) DEVICE — TUBING PMP L16FT MAIN DISP FOR AR-6400 AR-6475 Â€“ ORDER MULTIPLES OF 10 EACH

## (undated) DEVICE — GARMENT,MEDLINE,DVT,INT,CALF,LG, GEN2: Brand: MEDLINE

## (undated) DEVICE — GUIDEWIRE ENDOSCP L150CM DIA0.038IN TIP L3CM PTFE FLX STR

## (undated) DEVICE — CYSTO/BLADDER IRRIGATION SET, REGULATING CLAMP

## (undated) DEVICE — LAP CHOLE: Brand: MEDLINE INDUSTRIES, INC.

## (undated) DEVICE — LOGICUT SCISSOR LENGTH 320MM: Brand: LOGI - LAPAROSCOPIC INSTRUMENT SYSTEM

## (undated) DEVICE — CATHETER URET 5FR L70CM TIP 8FR OPN END CONE TIP INJ HUB

## (undated) DEVICE — ABDOMINAL PAD: Brand: DERMACEA

## (undated) DEVICE — SINGLE PORT MANIFOLD: Brand: NEPTUNE 2

## (undated) DEVICE — PAD,ABDOMINAL,5"X9",ST,LF,25/BX: Brand: MEDLINE INDUSTRIES, INC.

## (undated) DEVICE — SPONGE GZ W4XL4IN COT 12 PLY TYP VII WVN C FLD DSGN STERILE

## (undated) DEVICE — CLIP APPLIER: Brand: ENDO CLIP II

## (undated) DEVICE — BLADE SHV L13CM DIA4MM CVD EXCALIBUR AGG COOLCUT

## (undated) DEVICE — ZIMMER® STERILE DISPOSABLE TOURNIQUET CUFF WITH PROTECTIVE SLEEVE, DUAL PORT, SINGLE BLADDER, 34 IN. (86 CM)

## (undated) DEVICE — TRAY PREP DRY W/ PREM GLV 2 APPL 6 SPNG 2 UNDPD 1 OVERWRAP

## (undated) DEVICE — STERILE HOOK LOCK LATEX FREE ELASTIC BANDAGE 4INX5YD: Brand: HOOK LOCK™

## (undated) DEVICE — BLADELESS OPTICAL TROCAR WITH FIXATION CANNULA: Brand: VERSAPORT

## (undated) DEVICE — UNIVERSAL FIXATION CANNULA: Brand: VERSAONE

## (undated) DEVICE — GLOVE ORTHO 8   MSG9480

## (undated) DEVICE — DILATOR/SHEATH SET: Brand: 8/10 DILATOR/SHEATH SET

## (undated) DEVICE — GLOVE SURG SZ 65 L12IN FNGR THK79MIL GRN LTX FREE

## (undated) DEVICE — ZIMMER® STERILE DISPOSABLE TOURNIQUET CUFF WITH PLC, DUAL PORT, SINGLE BLADDER, 30 IN. (76 CM)

## (undated) DEVICE — AMD ANTIMICROBIAL NON-ADHERENT PAD,0.2% POLYHEXAMETHYLENE BIGUANIDE HCI (PHMB): Brand: TELFA

## (undated) DEVICE — TISSUE RETRIEVAL SYSTEM: Brand: INZII RETRIEVAL SYSTEM

## (undated) DEVICE — KNEE ARTHROSCOPY PACK: Brand: MEDLINE INDUSTRIES, INC.

## (undated) DEVICE — SUTURE PDS II SZ 2-0 L27IN ABSRB VLT L26MM CT-2 1/2 CIR Z333H

## (undated) DEVICE — SOLUTION IV 1000ML 0.9% SOD CHL

## (undated) DEVICE — GOWN,SIRUS,NONRNF,XLN/XL,20/CS: Brand: MEDLINE

## (undated) DEVICE — [HIGH FLOW INSUFFLATOR,  DO NOT USE IF PACKAGE IS DAMAGED,  KEEP DRY,  KEEP AWAY FROM SUNLIGHT,  PROTECT FROM HEAT AND RADIOACTIVE SOURCES.]: Brand: PNEUMOSURE

## (undated) DEVICE — PACK SURGICAL PROCEDURE KIT CYSTOSCOPY TOTE

## (undated) DEVICE — GOWN,PREVENTION PLUS,2XL,ST,22/CS: Brand: MEDLINE

## (undated) DEVICE — AMD ANTIMICROBIAL GAUZE SPONGES,12 PLY USP TYPE VII, 0.2% POLYHEXAMETHYLENE BIGUANIDE HCI (PHMB): Brand: CURITY

## (undated) DEVICE — GLOVE SURG SZ 85 L12IN FNGR THK79MIL GRN LTX FREE

## (undated) DEVICE — REM POLYHESIVE ADULT PATIENT RETURN ELECTRODE: Brand: VALLEYLAB

## (undated) DEVICE — SUTURE PDS II SZ 3-0 L27IN ABSRB VLT CT-2 L26MM 1/2 CIR Z332H

## (undated) DEVICE — TUBING, SUCTION, 1/4" X 10', STRAIGHT: Brand: MEDLINE

## (undated) DEVICE — (D)PREP SKN CHLRAPRP APPL 26ML -- CONVERT TO ITEM 371833

## (undated) DEVICE — KENDALL SCD EXPRESS SLEEVES, KNEE LENGTH, LARGE: Brand: KENDALL SCD

## (undated) DEVICE — SUTURE SZ 0 27IN 5/8 CIR UR-6  TAPER PT VIOLET ABSRB VICRYL J603H

## (undated) DEVICE — BUTTON SWITCH PENCIL BLADE ELECTRODE, HOLSTER: Brand: EDGE

## (undated) DEVICE — SOLUTION IRRIG 3000ML 0.9% SOD CHL USP UROMATIC PLAS CONT

## (undated) DEVICE — AMD ANTIMICROBIAL BANDAGE ROLL,6 PLY: Brand: KERLIX

## (undated) DEVICE — GARMENT,MEDLINE,DVT,INT,CALF,MED, GEN2: Brand: MEDLINE

## (undated) DEVICE — GLOVE SURG SZ 65 CRM LTX FREE POLYISOPRENE POLYMER BEAD ANTI

## (undated) DEVICE — CYSTO: Brand: MEDLINE INDUSTRIES, INC.

## (undated) DEVICE — SOLUTION IRRIG 1000ML H2O STRL BLT

## (undated) DEVICE — STANDARD HYPODERMIC NEEDLE,POLYPROPYLENE HUB: Brand: MONOJECT

## (undated) DEVICE — 1010 S-DRAPE TOWEL DRAPE 10/BX: Brand: STERI-DRAPE™

## (undated) DEVICE — 3M™ TEGADERM™ TRANSPARENT FILM DRESSING FRAME STYLE, 1624W, 2-3/8 IN X 2-3/4 IN (6 CM X 7 CM), 100/CT 4CT/CASE: Brand: 3M™ TEGADERM™

## (undated) DEVICE — GDWIRE 3CM FLX-TIP 0.038X150CM -- BX/5 SENSOR

## (undated) DEVICE — CONTAINER SPEC FRMLN 120ML --

## (undated) DEVICE — INTENDED FOR TISSUE SEPARATION, AND OTHER PROCEDURES THAT REQUIRE A SHARP SURGICAL BLADE TO PUNCTURE OR CUT.: Brand: BARD-PARKER ® STAINLESS STEEL BLADES

## (undated) DEVICE — TROCARS: Brand: KII® BLUNT TIP ACCESS SYSTEM

## (undated) DEVICE — BANDAGE COBAN 6 IN WND 6INX5YD FOAM

## (undated) DEVICE — CURITY NON-ADHERENT STRIPS: Brand: CURITY

## (undated) DEVICE — SLIM BODY SKIN STAPLER: Brand: APPOSE ULC

## (undated) DEVICE — STERILE HOOK LOCK LATEX FREE ELASTIC BANDAGE 6INX5YD: Brand: HOOK LOCK™

## (undated) DEVICE — SUTURE MCRYL SZ 3-0 L27IN ABSRB UD L24MM PS-1 3/8 CIR PRIM Y936H

## (undated) DEVICE — SUT ETHLN 3-0 18IN PS1 BLK --

## (undated) DEVICE — SOL ANTI-FOG 6ML MEDC -- MEDICHOICE - CONVERT TO 358427

## (undated) DEVICE — HOOKED-PRONG GRASPING FORCEPS: Brand: TRICEP

## (undated) DEVICE — GOWN,SIRUS,NONRNF,SETINSLV,2XL,18/CS: Brand: MEDLINE